# Patient Record
Sex: FEMALE | Race: BLACK OR AFRICAN AMERICAN | NOT HISPANIC OR LATINO | Employment: OTHER | URBAN - METROPOLITAN AREA
[De-identification: names, ages, dates, MRNs, and addresses within clinical notes are randomized per-mention and may not be internally consistent; named-entity substitution may affect disease eponyms.]

---

## 2017-01-19 ENCOUNTER — HOSPITAL ENCOUNTER (EMERGENCY)
Facility: HOSPITAL | Age: 72
Discharge: HOME | End: 2017-01-19
Attending: EMERGENCY MEDICINE
Payer: MEDICARE

## 2017-01-19 VITALS
WEIGHT: 125 LBS | OXYGEN SATURATION: 98 % | SYSTOLIC BLOOD PRESSURE: 134 MMHG | DIASTOLIC BLOOD PRESSURE: 86 MMHG | HEART RATE: 78 BPM | TEMPERATURE: 99 F | RESPIRATION RATE: 16 BRPM | BODY MASS INDEX: 24.41 KG/M2

## 2017-01-19 DIAGNOSIS — M46.1 SACROILIITIS: ICD-10-CM

## 2017-01-19 DIAGNOSIS — G89.29 CHRONIC BACK PAIN, UNSPECIFIED BACK LOCATION, UNSPECIFIED BACK PAIN LATERALITY: Primary | ICD-10-CM

## 2017-01-19 DIAGNOSIS — M54.9 CHRONIC BACK PAIN, UNSPECIFIED BACK LOCATION, UNSPECIFIED BACK PAIN LATERALITY: Primary | ICD-10-CM

## 2017-01-19 PROCEDURE — 25000003 PHARM REV CODE 250: Performed by: PHYSICIAN ASSISTANT

## 2017-01-19 PROCEDURE — 96372 THER/PROPH/DIAG INJ SC/IM: CPT

## 2017-01-19 PROCEDURE — 99283 EMERGENCY DEPT VISIT LOW MDM: CPT | Mod: ,,, | Performed by: PHYSICIAN ASSISTANT

## 2017-01-19 PROCEDURE — 99283 EMERGENCY DEPT VISIT LOW MDM: CPT | Mod: 25

## 2017-01-19 PROCEDURE — 63600175 PHARM REV CODE 636 W HCPCS: Performed by: PHYSICIAN ASSISTANT

## 2017-01-19 RX ORDER — NAPROXEN 500 MG/1
500 TABLET ORAL 2 TIMES DAILY WITH MEALS
Qty: 20 TABLET | Refills: 0 | Status: SHIPPED | OUTPATIENT
Start: 2017-01-19 | End: 2017-01-19

## 2017-01-19 RX ORDER — TRAMADOL HYDROCHLORIDE 50 MG/1
50 TABLET ORAL EVERY 6 HOURS PRN
Qty: 21 TABLET | Refills: 0 | Status: SHIPPED | OUTPATIENT
Start: 2017-01-19 | End: 2017-01-19

## 2017-01-19 RX ORDER — HYDROCODONE BITARTRATE AND ACETAMINOPHEN 5; 325 MG/1; MG/1
1 TABLET ORAL EVERY 4 HOURS PRN
Qty: 8 TABLET | Refills: 0 | Status: SHIPPED | OUTPATIENT
Start: 2017-01-19 | End: 2017-04-04

## 2017-01-19 RX ORDER — HYDROCODONE BITARTRATE AND ACETAMINOPHEN 5; 325 MG/1; MG/1
1 TABLET ORAL EVERY 4 HOURS PRN
Qty: 21 TABLET | Refills: 0 | Status: SHIPPED | OUTPATIENT
Start: 2017-01-19 | End: 2017-01-19

## 2017-01-19 RX ORDER — NAPROXEN 500 MG/1
500 TABLET ORAL
Status: COMPLETED | OUTPATIENT
Start: 2017-01-19 | End: 2017-01-19

## 2017-01-19 RX ORDER — TRIAMCINOLONE ACETONIDE 40 MG/ML
40 INJECTION, SUSPENSION INTRA-ARTICULAR; INTRAMUSCULAR
Status: COMPLETED | OUTPATIENT
Start: 2017-01-19 | End: 2017-01-19

## 2017-01-19 RX ADMIN — TRIAMCINOLONE ACETONIDE 40 MG: 40 INJECTION, SUSPENSION INTRA-ARTICULAR; INTRAMUSCULAR at 01:01

## 2017-01-19 RX ADMIN — NAPROXEN 500 MG: 500 TABLET ORAL at 01:01

## 2017-01-19 NOTE — ED AVS SNAPSHOT
[facility]  [address] [facility] [address]               Krysta Kam   2017 12:58 PM   ED    Description:  Female : 1945   Department:  [facility]           Your Care was Coordinated By:     Provider Role From To    Live Keller MD Attending Provider 17 [phone] --    Hermila Brownlee PA-C Physician Assistant 17 [phone] --      Reason for Visit     Flank Pain           Diagnoses this Visit        Comments    Chronic back pain, unspecified back location, unspecified back pain laterality    -  Primary     Sacroiliitis           ED Disposition     ED Disposition Condition Comment    Discharge             To Do List           Follow-up Information     Follow up with Gold Palafox MD In 1 week.    Specialty:  Internal Medicine    Contact information:    [address]  [place] [address]  [phone]          Follow up with [facility] In 1 week.    Specialty:  Physical Therapy    Contact information:    [address]  [facility] [address] [phone]        Follow up with [facility].    Specialty:  Emergency Medicine    Why:  If symptoms worsen    Contact information:    [address] [facility] [address] [phone]       These Medications        Disp Refills Start End    hydrocodone-acetaminophen 5-325mg (NORCO) 5-325 mg per tablet 8 tablet 0 2017     Take 1 tablet by mouth every 4 (four) hours as needed for Pain. - Oral    Pharmacy: [facility] [facility], [place] - [address] Ph #: [phone]         [facility]     [facility] -  Assistance  Registered nurses in the [facility] provide clinical advisement, health education, appointment booking, and other advisory services.  Call for this free service at [phone].             Medications            Message regarding Medications     Verify the changes and/or additions to your medication regime listed below are the same as discussed with your clinician today.  If any of these changes or additions are incorrect, please notify your healthcare provider.        START taking these NEW medications        Refills    hydrocodone-acetaminophen 5-325mg (NORCO) 5-325 mg per tablet 0    Sig: Take 1 tablet by mouth every 4 (four) hours as needed for Pain.    Class: Print    Route: Oral      These medications were administered today        Dose Freq    triamcinolone acetonide injection 40 mg 40 mg ED 1 Time    Sig: Inject 1 mL (40 mg total) into the muscle ED 1 Time.    Class: Normal    Route: Intramuscular    Cosign for Ordering: Accepted by Live Keller MD on 1/19/2017  1:55 PM    naproxen tablet 500 mg 500 mg ED 1 Time    Sig: Take 1 tablet (500 mg total) by mouth ED 1 Time.    Class: Normal    Route: Oral    Cosign for Ordering: Accepted by Live Keller MD on 1/19/2017  1:55 PM      STOP taking these medications     diclofenac (VOLTAREN) 50 MG EC tablet Take 1 tablet (50 mg total) by mouth 2 (two) times daily.           Verify that the below list of medications is an accurate representation of the medications you are currently taking.  If none reported, the list may be blank. If incorrect, please contact your healthcare provider. Carry this list with you in case of emergency.           Current Medications     amlodipine (NORVASC) 10 MG tablet take 1 tablet by mouth once daily    atorvastatin (LIPITOR) 20 MG tablet Take 1 tablet (20 mg total) by mouth once daily.    calcium carbonate-vit D3-min (CALTRATE 600+D PLUS MINERALS) 600 mg calcium- 400 unit Tab Take 1 tablet by mouth once daily.    ferrous gluconate (FERGON) 325 MG Tab Take 1 tablet (325 mg total) by mouth daily with breakfast.    gabapentin (NEURONTIN) 300 MG capsule Increase to 1 pill in morning and night for 7 days, followed by 1 pill  "morning afternoon and night    hydrocodone-acetaminophen 5-325mg (NORCO) 5-325 mg per tablet Take 1 tablet by mouth every 4 (four) hours as needed for Pain.    omeprazole (PRILOSEC) 40 MG capsule Take 1 capsule (40 mg total) by mouth once daily.           Clinical Reference Information           Your Vitals Were     BP Pulse Temp Resp Weight SpO2    134/86 (BP Location: Right arm, Patient Position: Sitting) 78 98.5 °F (36.9 °C) (Oral) 16 56.7 kg (125 lb) 98%    BMI                24.41 kg/m2          Allergies as of 1/19/2017        Reactions    Ondansetron     Other reaction(s): Flushing (Skin)    Savella [Milnacipran] Nausea Only      Immunizations Administered on Date of Encounter - 1/19/2017     None      ED Micro, Lab, POCT     None      ED Imaging Orders     None        Discharge Instructions       Take pain medication (Norco) every 4 hours as needed for pain relief.  Continue OTC/prescribe nonsteroidal anti-inflammatories as directed by PCP.  Follow-up with your primary care physician in 2 weeks if symptoms do not resolve.  Please consider back exercises for 5 minutes 2 times a day as discussed; see below and also google "back pain exercises".  Follow-up with physical therapy.  Return to ED for new or worsening symptoms.        Back Pain (Acute or Chronic)    Back pain is one of the most common problems. The good news is that most people feel better in 1 to 2 weeks, and most of the rest in 1 to 2 months. Most people can remain active.  People experience and describe pain differently; not everyone is the same.  · The pain can be sharp, stabbing, shooting, aching, cramping or burning.  · Movement, standing, bending, lifting, sitting, or walking may worsen pain.  · It can be localized to one spot or area, or it can be more generalized.  · It can spread or radiate upwards, to the front, or go down your arms or legs (sciatica).  · It can cause muscle spasm.  Most of the time, mechanical problems with the " muscles or spine cause the pain. Mechanical problems are usually caused by an injury to the muscles or ligaments. While illness can cause back pain, it is usually not caused by a serious illness. Mechanical problems include:   · Physical activity such as sports, exercise, work, or normal activity  · Overexertion, lifting, pushing, pulling incorrectly or too aggressively  · Sudden twisting, bending, or stretching from an accident, or accidental movement  · Poor posture  · Stretching or moving wrong, without noticing pain at the time  · Poor coordination, lack of regular exercise (check with your doctor about this)  · Spinal disc disease or arthritis  · Stress  Pain can also be related to pregnancy, or illness like appendicitis, bladder or kidney infections, pelvic infections, and many other things.  Acute back pain usually gets better in 1 to 2 weeks. Back pain related to disk disease, arthritis in the spinal joints or spinal stenosis (narrowing of the spinal canal) can become chronic and last for months or years.  Unless you had a physical injury (for example, a car accident or fall) X-rays are usually not needed for the initial evaluation of back pain. If pain continues and does not respond to medical treatment, X-rays and other tests may be needed.  Home care  Try these home care recommendations:  · When in bed, try to find a position of comfort. A firm mattress is best. Try lying flat on your back with pillows under your knees. You can also try lying on your side with your knees bent up towards your chest and a pillow between your knees.  · At first, do not try to stretch out the sore spots. If there is a strain, it is not like the good soreness you get after exercising without an injury. In this case, stretching may make it worse.  · Avoid prolong sitting, long car rides, or travel. This puts more stress on the lower back than standing or walking.  · During the first 24 to 72 hours after an acute injury or flare  up of chronic back pain, apply an ice pack to the painful area for 20 minutes and then remove it for 20 minutes. Do this over a period of 60 to 90 minutes or several times a day. This will reduce swelling and pain. Wrap the ice pack in a thin towel or plastic to protect your skin.  · You can start with ice, then switch to heat. Heat (hot shower, hot bath, or heating pad) reduces pain and works well for muscle spasms. Heat can be applied to the painful area for 20 minutes then remove it for 20 minutes. Do this over a period of 60 to 90 minutes or several times a day. Do not sleep on a heating pad. It can lead to skin burns or tissue damage.  · You can alternate ice and heat therapy. Talk with your doctor about the best treatment for your back pain.  · Therapeutic massage can help relax the back muscles without stretching them.  · Be aware of safe lifting methods and do not lift anything without stretching first.  Medicines  Talk to your doctor before using medicine, especially if you have other medical problems or are taking other medicines.  · You may use over-the-counter medicine as directed on the bottle to control pain, unless another pain medicine was prescribed. If you have chronic conditions like diabetes, liver or kidney disease, stomach ulcers, or gastrointestinal bleeding, or are taking blood thinners, talk to your doctor before taking any medicine.  · Be careful if you are given a prescription medicines, narcotics, or medicine for muscle spasms. They can cause drowsiness, affect your coordination, reflexes, and judgement. Do not drive or operate heavy machinery.  Follow-up care  Follow up with your healthcare provider, or as advised.   A radiologist will review any X-rays that were taken. Your provide will notify you of any new findings that may affect your care.  Call 911  Call emergency services if any of the following occur:  · Trouble breathing  · Confusion  · Very drowsy or trouble  awakening  · Fainting or loss of consciousness  · Rapid or very slow heart rate  · Loss of bowel or bladder control  When to seek medical advice  Call your healthcare provider right away if any of these occur:   · Pain becomes worse or spreads to your legs  · Weakness or numbness in one or both legs  · Numbness in the groin or genital area  © 2434-5678 [employer]. [address] [place] [address]. All rights reserved. This information is not intended as a substitute for professional medical care. Always follow your healthcare professional's instructions.        Exercises to Strengthen Your Lower Back  Strong lower back and abdominal muscles work together to support your spine. The exercises below will help strengthen the lower back. It is important that you begin exercising slowly and increase levels gradually.  Always begin any exercise program with stretching. If you feel pain while doing any of these exercises, stop and talk to your doctor about a more specific exercise program that better suits your condition.   Low back stretch  The point of stretching is to make you more flexible and increase your range of motion. Stretch only as much as you are able. Stretch slowly. Do not push your stretch to the limit. If at any point you feel pain while stretching, this is your (temporary) limit.  · Lie on your back with your knees bent and both feet on the ground.  · Slowly raise your left knee to your chest as you flatten your lower back against the floor. Hold for 5 seconds.  · Relax and repeat the exercise with your right knee.  · Do 10 of these exercises for each leg.  · Repeat hugging both knees to your chest at the same time.  Building lower back strength  Start your exercise routine with 10 to 30 minutes a day, 1 to 3 times a day.  Initial exercises  Lying on your back:  1. Ankle pumps: Move your foot up and down, towards your head, and then away. Repeat 10 times with each foot.  2. Heel  slides: Slowly bend your knee, drawing the heel of your foot towards you. Then slide your heel/foot from you, straightening your knee. Do not lift your foot off the floor (this is not a leg lift).  3. Abdominal contraction: Bend your knees and put your hands on your stomach. Tighten your stomach muscles. Hold for 5 seconds, then relax. Repeat 10 times.  4. Straight leg raise: Bend one leg at the knee and keep the other leg straight. Tighten your stomach muscles. Slowly lift your straight leg 6 to 12 inches off the floor and hold for up to 5 seconds. Repeat 10 times on each side.  Standin. Wall squats: Stand with your back against the wall. Move your feet about 12 inches away from the wall. Tighten your stomach muscles, and slowly bend your knees until they are at about a 45 degree angle. Do not go down too far. Hold about 5 seconds. Then slowly return to your starting position. Repeat 10 times.  2. Heel raises: Stand facing the wall. Slowly raise the heels of your feet up and down, while keeping your toes on the floor. If you have trouble balancing, you can touch the wall with your hands. Repeat 10 times.  More advanced exercises  When you feel comfortable enough, try these exercises.  1. Kneeling lumbar extension: Begin on your hands and knees. At the same time, raise and straighten your right arm and left leg until they are parallel to the ground. Hold for 2 seconds and come back slowly to a starting position. Repeat with left arm and right leg, alternating 10 times.  2. Prone lumbar extension: Lie face down, arms extended overhead, palms on the floor. At the same time, raise your right arm and left leg as high as comfortably possible. Hold for 10 seconds and slowly return to start. Repeat with left arm and right leg, alternating 10 times. Gradually build up to 20 times. (Advanced: Repeat this exercise raising both arms and both legs a few inches off the floor at the same time. Hold for 5 seconds and  release.)  3. Pelvic tilt: Lie on the floor on your back with your knees bent at 90 degrees. Your feet should be flat on the floor. Inhale, exhale, then slowly contract your abdominal muscles bringing your navel toward your spine. Let your pelvis rock back until your lower back is flat on the floor. Hold for 10 seconds while breathing smoothly.  4. Abdominal crunch: Perform a pelvic tilt (above) flattening your lower back against the floor. Holding the tension in your abdominal muscles, take another breath and raise your shoulder blades off the ground (this is not a full sit-up). Keep your head in line with your body (dont bend your neck forward). Hold for 2 seconds, then slowly lower.  © 2083-6350 [employer]. [address] [place] [address], [place] [address]. All rights reserved. This information is not intended as a substitute for professional medical care. Always follow your healthcare professional's instructions.            Your Future Surgeries/Procedures     Jan 31, 2017   Surgery with Brittni Edmondson MD   [facility] ([facility])    [address] [place] [address]   [phone]               [facility] complies with applicable Federal civil rights laws and does not discriminate on the basis of race, color, national origin, age, disability, or sex.        Language Assistance Services     ATTENTION: Language assistance services are available, free of charge. Please call [phone].      ATENCIÓN: Si habla español, tiene a chan disposición servicios gratuitos de asistencia lingüística. Llame al [phone].     CHÚ Ý: N?u b?n nói Ti?ng Vi?t, có các d?ch v? h? tr? ngôn ng? mi?n phí dành cho b?n. G?i s? [phone].

## 2017-01-19 NOTE — ED NOTES
Two patient identifiers checked and confirmed.    APPEARANCE: Resting comfortably in no acute distress. Patient has clean hair, skin and nails. Clothing is appropriate and properly fastened.  NEURO: Awake, alert, appropriate for age, and cooperative with a calm affect; pupils equal and round.  HEENT: Head symmetrical. Bilateral eyes without redness or drainage.  CARDIAC: Regular rate and rhythm.  RESPIRATORY: Airway is open and patent. Respirations are spontaneous on room air. Normal respiratory effort and rate noted.  GI/: Abdomen soft and non-distended. Patient is reported to void and stool appropriately for age.  NEUROVASCULAR: All extremities are warm and pink with +2 pulses and capillary refill less than 3 seconds.  MUSCULOSKELETAL: Moves all extremities well; no obvious deformities noted.  SKIN: Warm and dry, adequate turgor, mucus membranes moist and pink

## 2017-01-19 NOTE — ED PROVIDER NOTES
Encounter Date: 1/19/2017       History     Chief Complaint   Patient presents with    Flank Pain     Review of patient's allergies indicates:   Allergen Reactions    Ondansetron      Other reaction(s): Flushing (Skin)    Savella [milnacipran] Nausea Only     HPI Comments: Patient is a 71-year-old -American female with a past medical history of HTN, HLD, anemia, fibromyalgia, and GERD who presents the ED with low back pain.  Patient states that she has been having constant bilateral lower back pain for the past 5 months.  She denies any trauma or injury.  Patient's current pain is 7/10.  She states getting out of bed, bending, and movement exacerbates the pain.  Taking nonsteroidal anti-inflammatories relieves the pain.  Patient took ibuprofen last night with some improvements.  She denies any fever, chills, chest pain, SOB, abdominal pain, flank pain, nausea, vomiting, diarrhea (LPM this morning and normal), urinary/bowel incontinence or retention, dysuria, hematuria, lower extremity paresthesias or weakness, saddle anesthesias, or history of back surgeries.    The history is provided by the patient.     Past Medical History   Diagnosis Date    Anemia 6/2012    Cataract     Fibromyalgia     GERD (gastroesophageal reflux disease)     High cholesterol     Hypertension      Past Medical History Pertinent Negatives   Diagnosis Date Noted    Amblyopia 7/24/2013    Diabetes mellitus 7/24/2013    Diabetes mellitus 2/4/2014    Diabetic retinopathy 7/24/2013    Diabetic retinopathy 2/4/2014    Glaucoma 7/24/2013    Glaucoma 2/4/2014    Hypertensive retinopathy 7/24/2013     OU    Macular degeneration 7/24/2013    Retinal detachment 7/24/2013    Sickle cell anemia 7/11/2016    Sickle cell trait 7/11/2016    Strabismus 7/24/2013    Uveitis 7/24/2013     Past Surgical History   Procedure Laterality Date    Hysterectomy      Colonoscopy N/A 10/18/2016     Procedure: COLONOSCOPY;  Surgeon: Brittni  ROGE Edmondson MD;  Location: [facility] ([address] [place] [address]);  Service: Endoscopy;  Laterality: N/A;     Family History   Problem Relation Age of Onset    Hypertension Mother     Stroke Mother     Heart disease Father     Cataracts Neg Hx     Cancer Neg Hx     Diabetes Neg Hx     Glaucoma Neg Hx     Retinal detachment Neg Hx     Celiac disease Neg Hx     Cirrhosis Neg Hx     Colon cancer Neg Hx     Cystic fibrosis Neg Hx     Esophageal cancer Neg Hx     Inflammatory bowel disease Neg Hx     Liver disease Neg Hx     Stomach cancer Neg Hx     Amblyopia Neg Hx     Blindness Neg Hx     Macular degeneration Neg Hx     Strabismus Neg Hx     Thyroid disease Neg Hx      Social History   Substance Use Topics    Smoking status: Never Smoker    Smokeless tobacco: None    Alcohol use No     Review of Systems   Constitutional: Negative for chills and fever.   HENT: Negative for ear pain, postnasal drip and sore throat.    Eyes: Negative for pain.   Respiratory: Negative for cough and shortness of breath.    Cardiovascular: Negative for chest pain.   Gastrointestinal: Negative for abdominal pain, blood in stool, diarrhea, nausea and vomiting.   Genitourinary: Negative for difficulty urinating, dysuria, hematuria and urgency.   Musculoskeletal: Positive for back pain. Negative for gait problem, joint swelling and neck pain.   Neurological: Negative for weakness, light-headedness, numbness and headaches.       Physical Exam   Initial Vitals   BP Pulse Resp Temp SpO2   01/19/17 1212 01/19/17 1212 01/19/17 1212 01/19/17 1212 01/19/17 1212   134/86 78 16 98.5 °F (36.9 °C) 98 %     Physical Exam    Nursing note and vitals reviewed.  Constitutional: She appears well-developed and well-nourished.   HENT:   Head: Normocephalic and atraumatic.   Nose: Nose normal.   Eyes: Conjunctivae and EOM are normal.   Neck: Normal range of motion.   Cardiovascular: Normal rate, regular rhythm, normal heart sounds and intact distal pulses. Exam  reveals no friction rub.    No murmur heard.  Pulmonary/Chest: Breath sounds normal. No respiratory distress. She has no wheezes. She has no rales.   Abdominal: Soft. Bowel sounds are normal. She exhibits no distension. There is no tenderness. There is no rebound and no guarding.   Musculoskeletal: Normal range of motion.        Back:    No C, T, or L bony or paraspinal tenderness to palpation.  She has TTP at the SI joint.  Negative straight leg raises bilaterally.   Neurological: She is oriented to person, place, and time. She has normal strength. No sensory deficit.   Reflex Scores:       Patellar reflexes are 2+ on the right side.       Achilles reflexes are 2+ on the right side and 2+ on the left side.  Skin: Skin is warm and dry. No erythema. No pallor.         ED Course   Procedures  Labs Reviewed - No data to display                APC / Resident Notes:   Patient is a 71-year-old -American female with a past medical history of HTN, HLD, anemia, fibromyalgia, and GERD who presents the ED with low back pain.  Vital signs stable.  NAD and nontoxic.  She has TTP at the left SI joint.  No spinous or paraspinal tenderness to palpation.  Skin without signs of infection.  I have considered but do not suspect ANDREA, fractures or dislocations.  I will treat patient for back pain and sacroiliitis.  She does not need any further labs or imaging at this time.  Will give steroid injection here.  I will prescribe Norco prn for home.  She is to continue OTC NSAIDs as directed by her PCP.  Back exercises as discussed.  Follow up with PCP as scheduled or sooner if needed.  Strict ED return precaution given.  All questions answered.  She is comfortable with plan and stable for discharge.  I reviewed patient's chart and discussed this case with my supervising MISIS.         Attending Attestation:     Physician Attestation Statement for NP/PA:   I discussed this assessment and plan of this patient with the NP/PA, but I did  not personally examine the patient. The face to face encounter was performed by the NP/PA.    Other NP/PA Attestation Additions:    History of Present Illness: 71 year old woman presents for evaluation of acute on chronic back pain without hx trauma                   ED Course     Clinical Impression:   The primary encounter diagnosis was Chronic back pain, unspecified back location, unspecified back pain laterality. A diagnosis of Sacroiliitis was also pertinent to this visit.    Disposition:   Disposition: Discharged  Condition: Stable       Hermila Brownlee PA-C  01/19/17 [id number]

## 2017-01-19 NOTE — DISCHARGE INSTRUCTIONS
"Take pain medication (Norco) every 4 hours as needed for pain relief.  Continue OTC/prescribe nonsteroidal anti-inflammatories as directed by PCP.  Follow-up with your primary care physician in 2 weeks if symptoms do not resolve.  Please consider back exercises for 5 minutes 2 times a day as discussed; see below and also google "back pain exercises".  Follow-up with physical therapy.  Return to ED for new or worsening symptoms.        Back Pain (Acute or Chronic)    Back pain is one of the most common problems. The good news is that most people feel better in 1 to 2 weeks, and most of the rest in 1 to 2 months. Most people can remain active.  People experience and describe pain differently; not everyone is the same.  · The pain can be sharp, stabbing, shooting, aching, cramping or burning.  · Movement, standing, bending, lifting, sitting, or walking may worsen pain.  · It can be localized to one spot or area, or it can be more generalized.  · It can spread or radiate upwards, to the front, or go down your arms or legs (sciatica).  · It can cause muscle spasm.  Most of the time, mechanical problems with the muscles or spine cause the pain. Mechanical problems are usually caused by an injury to the muscles or ligaments. While illness can cause back pain, it is usually not caused by a serious illness. Mechanical problems include:   · Physical activity such as sports, exercise, work, or normal activity  · Overexertion, lifting, pushing, pulling incorrectly or too aggressively  · Sudden twisting, bending, or stretching from an accident, or accidental movement  · Poor posture  · Stretching or moving wrong, without noticing pain at the time  · Poor coordination, lack of regular exercise (check with your doctor about this)  · Spinal disc disease or arthritis  · Stress  Pain can also be related to pregnancy, or illness like appendicitis, bladder or kidney infections, pelvic infections, and many other things.  Acute back pain " usually gets better in 1 to 2 weeks. Back pain related to disk disease, arthritis in the spinal joints or spinal stenosis (narrowing of the spinal canal) can become chronic and last for months or years.  Unless you had a physical injury (for example, a car accident or fall) X-rays are usually not needed for the initial evaluation of back pain. If pain continues and does not respond to medical treatment, X-rays and other tests may be needed.  Home care  Try these home care recommendations:  · When in bed, try to find a position of comfort. A firm mattress is best. Try lying flat on your back with pillows under your knees. You can also try lying on your side with your knees bent up towards your chest and a pillow between your knees.  · At first, do not try to stretch out the sore spots. If there is a strain, it is not like the good soreness you get after exercising without an injury. In this case, stretching may make it worse.  · Avoid prolong sitting, long car rides, or travel. This puts more stress on the lower back than standing or walking.  · During the first 24 to 72 hours after an acute injury or flare up of chronic back pain, apply an ice pack to the painful area for 20 minutes and then remove it for 20 minutes. Do this over a period of 60 to 90 minutes or several times a day. This will reduce swelling and pain. Wrap the ice pack in a thin towel or plastic to protect your skin.  · You can start with ice, then switch to heat. Heat (hot shower, hot bath, or heating pad) reduces pain and works well for muscle spasms. Heat can be applied to the painful area for 20 minutes then remove it for 20 minutes. Do this over a period of 60 to 90 minutes or several times a day. Do not sleep on a heating pad. It can lead to skin burns or tissue damage.  · You can alternate ice and heat therapy. Talk with your doctor about the best treatment for your back pain.  · Therapeutic massage can help relax the back muscles without  stretching them.  · Be aware of safe lifting methods and do not lift anything without stretching first.  Medicines  Talk to your doctor before using medicine, especially if you have other medical problems or are taking other medicines.  · You may use over-the-counter medicine as directed on the bottle to control pain, unless another pain medicine was prescribed. If you have chronic conditions like diabetes, liver or kidney disease, stomach ulcers, or gastrointestinal bleeding, or are taking blood thinners, talk to your doctor before taking any medicine.  · Be careful if you are given a prescription medicines, narcotics, or medicine for muscle spasms. They can cause drowsiness, affect your coordination, reflexes, and judgement. Do not drive or operate heavy machinery.  Follow-up care  Follow up with your healthcare provider, or as advised.   A radiologist will review any X-rays that were taken. Your provide will notify you of any new findings that may affect your care.  Call 911  Call emergency services if any of the following occur:  · Trouble breathing  · Confusion  · Very drowsy or trouble awakening  · Fainting or loss of consciousness  · Rapid or very slow heart rate  · Loss of bowel or bladder control  When to seek medical advice  Call your healthcare provider right away if any of these occur:   · Pain becomes worse or spreads to your legs  · Weakness or numbness in one or both legs  · Numbness in the groin or genital area  © 0792-3878 [employer]. [address] [place] [address], [place] [address]. All rights reserved. This information is not intended as a substitute for professional medical care. Always follow your healthcare professional's instructions.        Exercises to Strengthen Your Lower Back  Strong lower back and abdominal muscles work together to support your spine. The exercises below will help strengthen the lower back. It is important that you begin exercising slowly and increase levels  gradually.  Always begin any exercise program with stretching. If you feel pain while doing any of these exercises, stop and talk to your doctor about a more specific exercise program that better suits your condition.   Low back stretch  The point of stretching is to make you more flexible and increase your range of motion. Stretch only as much as you are able. Stretch slowly. Do not push your stretch to the limit. If at any point you feel pain while stretching, this is your (temporary) limit.  · Lie on your back with your knees bent and both feet on the ground.  · Slowly raise your left knee to your chest as you flatten your lower back against the floor. Hold for 5 seconds.  · Relax and repeat the exercise with your right knee.  · Do 10 of these exercises for each leg.  · Repeat hugging both knees to your chest at the same time.  Building lower back strength  Start your exercise routine with 10 to 30 minutes a day, 1 to 3 times a day.  Initial exercises  Lying on your back:  1. Ankle pumps: Move your foot up and down, towards your head, and then away. Repeat 10 times with each foot.  2. Heel slides: Slowly bend your knee, drawing the heel of your foot towards you. Then slide your heel/foot from you, straightening your knee. Do not lift your foot off the floor (this is not a leg lift).  3. Abdominal contraction: Bend your knees and put your hands on your stomach. Tighten your stomach muscles. Hold for 5 seconds, then relax. Repeat 10 times.  4. Straight leg raise: Bend one leg at the knee and keep the other leg straight. Tighten your stomach muscles. Slowly lift your straight leg 6 to 12 inches off the floor and hold for up to 5 seconds. Repeat 10 times on each side.  Standin. Wall squats: Stand with your back against the wall. Move your feet about 12 inches away from the wall. Tighten your stomach muscles, and slowly bend your knees until they are at about a 45 degree angle. Do not go down too far. Hold about  5 seconds. Then slowly return to your starting position. Repeat 10 times.  2. Heel raises: Stand facing the wall. Slowly raise the heels of your feet up and down, while keeping your toes on the floor. If you have trouble balancing, you can touch the wall with your hands. Repeat 10 times.  More advanced exercises  When you feel comfortable enough, try these exercises.  1. Kneeling lumbar extension: Begin on your hands and knees. At the same time, raise and straighten your right arm and left leg until they are parallel to the ground. Hold for 2 seconds and come back slowly to a starting position. Repeat with left arm and right leg, alternating 10 times.  2. Prone lumbar extension: Lie face down, arms extended overhead, palms on the floor. At the same time, raise your right arm and left leg as high as comfortably possible. Hold for 10 seconds and slowly return to start. Repeat with left arm and right leg, alternating 10 times. Gradually build up to 20 times. (Advanced: Repeat this exercise raising both arms and both legs a few inches off the floor at the same time. Hold for 5 seconds and release.)  3. Pelvic tilt: Lie on the floor on your back with your knees bent at 90 degrees. Your feet should be flat on the floor. Inhale, exhale, then slowly contract your abdominal muscles bringing your navel toward your spine. Let your pelvis rock back until your lower back is flat on the floor. Hold for 10 seconds while breathing smoothly.  4. Abdominal crunch: Perform a pelvic tilt (above) flattening your lower back against the floor. Holding the tension in your abdominal muscles, take another breath and raise your shoulder blades off the ground (this is not a full sit-up). Keep your head in line with your body (dont bend your neck forward). Hold for 2 seconds, then slowly lower.  © 3149-0751 [employer]. [address] [place] [address], [place] [address]. All rights reserved. This information is not intended as a  substitute for professional medical care. Always follow your healthcare professional's instructions.

## 2017-01-31 ENCOUNTER — SURGERY (OUTPATIENT)
Age: 72
End: 2017-01-31

## 2017-01-31 ENCOUNTER — ANESTHESIA (OUTPATIENT)
Dept: ENDOSCOPY | Facility: HOSPITAL | Age: 72
End: 2017-01-31
Payer: MEDICARE

## 2017-01-31 ENCOUNTER — ANESTHESIA EVENT (OUTPATIENT)
Dept: ENDOSCOPY | Facility: HOSPITAL | Age: 72
End: 2017-01-31
Payer: MEDICARE

## 2017-01-31 VITALS — RESPIRATION RATE: 16 BRPM

## 2017-01-31 PROBLEM — K63.5 COLON POLYPS: Status: ACTIVE | Noted: 2017-01-31

## 2017-01-31 PROCEDURE — D9220A PRA ANESTHESIA: Mod: ANES,,, | Performed by: ANESTHESIOLOGY

## 2017-01-31 PROCEDURE — 63600175 PHARM REV CODE 636 W HCPCS: Performed by: NURSE ANESTHETIST, CERTIFIED REGISTERED

## 2017-01-31 PROCEDURE — D9220A PRA ANESTHESIA: Mod: CRNA,,, | Performed by: NURSE ANESTHETIST, CERTIFIED REGISTERED

## 2017-01-31 RX ORDER — PROPOFOL 10 MG/ML
VIAL (ML) INTRAVENOUS
Status: DISCONTINUED | OUTPATIENT
Start: 2017-01-31 | End: 2017-01-31

## 2017-01-31 RX ADMIN — PROPOFOL 20 MG: 10 INJECTION, EMULSION INTRAVENOUS at 01:01

## 2017-01-31 RX ADMIN — PROPOFOL 50 MG: 10 INJECTION, EMULSION INTRAVENOUS at 01:01

## 2017-01-31 NOTE — ANESTHESIA POSTPROCEDURE EVALUATION
Anesthesia Post Evaluation    Patient: Krysta Kam    Procedure(s) Performed: Procedure(s) (LRB):  SIGMOIDOSCOPY-FLEXIBLE (N/A)    Final Anesthesia Type: general  Patient location during evaluation: GI PACU  Patient participation: Yes- Able to Participate  Level of consciousness: awake and alert  Post-procedure vital signs: reviewed and stable  Pain management: adequate  Airway patency: patent  PONV status at discharge: No PONV  Anesthetic complications: no      Cardiovascular status: blood pressure returned to baseline  Respiratory status: unassisted  Hydration status: euvolemic  Follow-up not needed.        Visit Vitals    BP (!) 109/59 (BP Location: Left arm, Patient Position: Lying, BP Method: Automatic)    Pulse 60    Temp 36.7 °C (98 °F) (Oral)    Resp 18    Ht 5' (1.524 m)    Wt 56.7 kg (125 lb)    SpO2 97%    BMI 24.41 kg/m2       Pain/Marito Score: Pain Assessment Performed: Yes (1/31/2017  1:58 PM)  Presence of Pain: denies (1/31/2017  1:58 PM)  Marito Score: 10 (1/31/2017  1:58 PM)

## 2017-01-31 NOTE — ANESTHESIA PREPROCEDURE EVALUATION
01/31/2017  Krysta Kam is a 71 y.o., female.    [facility] Anesthesia Evaluation    I have reviewed the Patient Summary Reports.    I have reviewed the Nursing Notes.   I have reviewed the Medications.     Review of Systems  Anesthesia Hx:  No problems with previous Anesthesia  History of prior surgery of interest to airway management or planning: Previous anesthesia: General Denies Family Hx of Anesthesia complications.   Denies Personal Hx of Anesthesia complications.   Social:  Non-Smoker, No Alcohol Use    Hematology/Oncology:         -- Anemia:   Cardiovascular:   Exercise tolerance: good Hypertension    Hepatic/GI:   GERD    Neurological:   Chronic Pain Syndrome       Physical Exam  General:  Well nourished    Airway/Jaw/Neck:  Airway Findings: Mouth Opening: Normal Tongue: Normal  General Airway Assessment: Adult  Mallampati: II  Improves to II with phonation.  TM Distance: Normal, at least 6 cm  Jaw/Neck Findings:  Neck ROM: Normal ROM      Dental:  Dental Findings: Upper Dentures, Lower Dentures   Chest/Lungs:  Chest/Lungs Findings: Clear to auscultation, Normal Respiratory Rate     Heart/Vascular:  Heart Findings: Rate: Normal  Rhythm: Regular Rhythm  Sounds: Normal        Mental Status:  Mental Status Findings:  Cooperative, Alert and Oriented         Anesthesia Plan  Type of Anesthesia, risks & benefits discussed:  Anesthesia Type:  general  Patient's Preference:   Intra-op Monitoring Plan: standard ASA monitors  Intra-op Monitoring Plan Comments:   Post Op Pain Control Plan:   Post Op Pain Control Plan Comments:   Induction:   IV  Beta Blocker:  Patient is not currently on a Beta-Blocker (No further documentation required).       Informed Consent: Patient understands risks and agrees with Anesthesia plan.  Questions answered. Anesthesia consent signed with patient.  ASA Score: 2     Day of  Surgery Review of History & Physical:    H&P update referred to the surgeon.         Ready For Surgery From Anesthesia Perspective.

## 2017-01-31 NOTE — ANESTHESIA RELEASE NOTE
Anesthesia Release from PACU Note    Patient: Krysta Kam    Procedure(s) Performed: Procedure(s) (LRB):  SIGMOIDOSCOPY-FLEXIBLE (N/A)    Anesthesia type: general    Post pain: Adequate analgesia    Post assessment: no apparent anesthetic complications, tolerated procedure well and no evidence of recall    Last Vitals:   Visit Vitals    BP (!) 109/59 (BP Location: Left arm, Patient Position: Lying, BP Method: Automatic)    Pulse 60    Temp 36.7 °C (98 °F) (Oral)    Resp 18    Ht 5' (1.524 m)    Wt 56.7 kg (125 lb)    SpO2 97%    BMI 24.41 kg/m2       Post vital signs: stable    Level of consciousness: awake, alert  and oriented    Nausea/Vomiting: no nausea/no vomiting    Complications: none    Airway Patency: patent    Respiratory: unassisted    Cardiovascular: stable and blood pressure at baseline    Hydration: euvolemic

## 2017-01-31 NOTE — TRANSFER OF CARE
Anesthesia Transfer of Care Note    Patient: Krysta Kam    Procedure(s) Performed: Procedure(s) (LRB):  SIGMOIDOSCOPY-FLEXIBLE (N/A)    Patient location: PACU    Anesthesia Type: general    Transport from OR: Transported from OR on 2-3 L/min O2 by NC with adequate spontaneous ventilation    Post pain: adequate analgesia    Post assessment: no apparent anesthetic complications    Post vital signs: stable    Level of consciousness: awake, alert and oriented    Nausea/Vomiting: no nausea/vomiting    Complications: none          Last vitals:   Visit Vitals    BP (!) 152/67    Pulse 82    Temp 37.2 °C (99 °F)    Resp 18    Ht 5' (1.524 m)    Wt 56.7 kg (125 lb)    SpO2 99%    BMI 24.41 kg/m2

## 2017-02-07 ENCOUNTER — TELEPHONE (OUTPATIENT)
Dept: ENDOSCOPY | Facility: HOSPITAL | Age: 72
End: 2017-02-07

## 2017-03-13 PROBLEM — K63.5 COLON POLYPS: Status: RESOLVED | Noted: 2017-01-31 | Resolved: 2017-03-13

## 2017-04-04 ENCOUNTER — OFFICE VISIT (OUTPATIENT)
Dept: INTERNAL MEDICINE | Facility: CLINIC | Age: 72
End: 2017-04-04
Payer: MEDICARE

## 2017-04-04 ENCOUNTER — LAB VISIT (OUTPATIENT)
Dept: LAB | Facility: HOSPITAL | Age: 72
End: 2017-04-04
Attending: INTERNAL MEDICINE
Payer: MEDICARE

## 2017-04-04 VITALS
HEIGHT: 60 IN | BODY MASS INDEX: 26.84 KG/M2 | HEART RATE: 80 BPM | WEIGHT: 136.69 LBS | DIASTOLIC BLOOD PRESSURE: 76 MMHG | SYSTOLIC BLOOD PRESSURE: 132 MMHG

## 2017-04-04 DIAGNOSIS — M26.609 TMJ DISEASE: ICD-10-CM

## 2017-04-04 DIAGNOSIS — E78.5 HYPERLIPIDEMIA, UNSPECIFIED HYPERLIPIDEMIA TYPE: ICD-10-CM

## 2017-04-04 DIAGNOSIS — I10 ESSENTIAL HYPERTENSION: ICD-10-CM

## 2017-04-04 DIAGNOSIS — D50.9 IRON DEFICIENCY ANEMIA, UNSPECIFIED IRON DEFICIENCY ANEMIA TYPE: ICD-10-CM

## 2017-04-04 DIAGNOSIS — H53.9 VISION ABNORMALITIES: ICD-10-CM

## 2017-04-04 DIAGNOSIS — M79.7 FIBROMYALGIA: Primary | ICD-10-CM

## 2017-04-04 DIAGNOSIS — R52 TOTAL BODY PAIN: ICD-10-CM

## 2017-04-04 LAB
ALBUMIN SERPL BCP-MCNC: 3.7 G/DL
ALP SERPL-CCNC: 81 U/L
ALT SERPL W/O P-5'-P-CCNC: 19 U/L
ANION GAP SERPL CALC-SCNC: 14 MMOL/L
AST SERPL-CCNC: 23 U/L
BASOPHILS # BLD AUTO: 0.03 K/UL
BASOPHILS NFR BLD: 0.4 %
BILIRUB SERPL-MCNC: 0.6 MG/DL
BUN SERPL-MCNC: 12 MG/DL
CALCIUM SERPL-MCNC: 9.9 MG/DL
CHLORIDE SERPL-SCNC: 104 MMOL/L
CHOLEST/HDLC SERPL: 2.7 {RATIO}
CO2 SERPL-SCNC: 23 MMOL/L
CREAT SERPL-MCNC: 0.9 MG/DL
DIFFERENTIAL METHOD: ABNORMAL
EOSINOPHIL # BLD AUTO: 0.1 K/UL
EOSINOPHIL NFR BLD: 1.8 %
ERYTHROCYTE [DISTWIDTH] IN BLOOD BY AUTOMATED COUNT: 17.8 %
EST. GFR  (AFRICAN AMERICAN): >60 ML/MIN/1.73 M^2
EST. GFR  (NON AFRICAN AMERICAN): >60 ML/MIN/1.73 M^2
GLUCOSE SERPL-MCNC: 96 MG/DL
HCT VFR BLD AUTO: 36 %
HDL/CHOLESTEROL RATIO: 37.7 %
HDLC SERPL-MCNC: 231 MG/DL
HDLC SERPL-MCNC: 87 MG/DL
HGB BLD-MCNC: 11.6 G/DL
LDLC SERPL CALC-MCNC: 134.6 MG/DL
LYMPHOCYTES # BLD AUTO: 2.2 K/UL
LYMPHOCYTES NFR BLD: 27.2 %
MCH RBC QN AUTO: 23.5 PG
MCHC RBC AUTO-ENTMCNC: 32.2 %
MCV RBC AUTO: 73 FL
MONOCYTES # BLD AUTO: 0.6 K/UL
MONOCYTES NFR BLD: 7.2 %
NEUTROPHILS # BLD AUTO: 5 K/UL
NEUTROPHILS NFR BLD: 63.4 %
NONHDLC SERPL-MCNC: 144 MG/DL
PLATELET # BLD AUTO: 236 K/UL
PLATELET BLD QL SMEAR: ABNORMAL
PMV BLD AUTO: ABNORMAL FL
POTASSIUM SERPL-SCNC: 4.1 MMOL/L
PROT SERPL-MCNC: 7.8 G/DL
RBC # BLD AUTO: 4.94 M/UL
SODIUM SERPL-SCNC: 141 MMOL/L
TRIGL SERPL-MCNC: 47 MG/DL
WBC # BLD AUTO: 7.91 K/UL

## 2017-04-04 PROCEDURE — 1159F MED LIST DOCD IN RCRD: CPT | Mod: S$GLB,,, | Performed by: INTERNAL MEDICINE

## 2017-04-04 PROCEDURE — 36415 COLL VENOUS BLD VENIPUNCTURE: CPT

## 2017-04-04 PROCEDURE — 3078F DIAST BP <80 MM HG: CPT | Mod: S$GLB,,, | Performed by: INTERNAL MEDICINE

## 2017-04-04 PROCEDURE — 80053 COMPREHEN METABOLIC PANEL: CPT

## 2017-04-04 PROCEDURE — 1160F RVW MEDS BY RX/DR IN RCRD: CPT | Mod: S$GLB,,, | Performed by: INTERNAL MEDICINE

## 2017-04-04 PROCEDURE — 99999 PR PBB SHADOW E&M-EST. PATIENT-LVL III: CPT | Mod: PBBFAC,,, | Performed by: INTERNAL MEDICINE

## 2017-04-04 PROCEDURE — 1157F ADVNC CARE PLAN IN RCRD: CPT | Mod: S$GLB,,, | Performed by: INTERNAL MEDICINE

## 2017-04-04 PROCEDURE — 80061 LIPID PANEL: CPT

## 2017-04-04 PROCEDURE — 85025 COMPLETE CBC W/AUTO DIFF WBC: CPT

## 2017-04-04 PROCEDURE — 3075F SYST BP GE 130 - 139MM HG: CPT | Mod: S$GLB,,, | Performed by: INTERNAL MEDICINE

## 2017-04-04 PROCEDURE — 99214 OFFICE O/P EST MOD 30 MIN: CPT | Mod: S$GLB,,, | Performed by: INTERNAL MEDICINE

## 2017-04-04 PROCEDURE — 99499 UNLISTED E&M SERVICE: CPT | Mod: S$GLB,,, | Performed by: INTERNAL MEDICINE

## 2017-04-04 PROCEDURE — 1125F AMNT PAIN NOTED PAIN PRSNT: CPT | Mod: S$GLB,,, | Performed by: INTERNAL MEDICINE

## 2017-04-04 RX ORDER — BACLOFEN 10 MG/1
10 TABLET ORAL 3 TIMES DAILY PRN
Qty: 90 TABLET | Refills: 11 | Status: SHIPPED | OUTPATIENT
Start: 2017-04-04 | End: 2018-10-18 | Stop reason: SDUPTHER

## 2017-04-04 NOTE — PROGRESS NOTES
She is a 71-year-old female coming in today with   history of iron deficiency anemia, had a recent scopes (10/2016) , both upper and lower.   Upper scope showed patchy mild inflammation of the gastric antrum. Biopsies   were taken for H. pylori testing. She had a white mucosal nodule found in the   duodenal that was biopsied. No source of bleeding was found on the EGD.  In 10/2016  she had a polyp removed from the sigmoid colon  On the  colonoscopy, she had nonbleeding colonic angioectasia, two 5 mm polyps in the   sigmoid colon. IN Oct 2016, she had a  H and H of 10.5 and 33.2 with an MCV of 68 with  elevated TIBC. Iron was actually looking pretty good at 30 and saturated iron   is now at 6. In the past, she has had normal MCV with normal H and H.     She   continues to have fibromyalgia, which she says she still has a lot of aches and   pains of her TMJ and her joints. She denies any fevers or chills. She   continues to take physical therapy, which is helping. Tylenol is not helping   her pain at all. She is not taking any narcotics.       She suffers from   hyperlipidemia. She is on Lipitor, a 20 mg pill.       She has hypertension. Blood   pressure is doing well today at 132/76. She has a longstanding history of   hypertension. She is not up-to-date with pneumonia, zoster or tetanus vaccines,  but does not wish to have these since she has flu vaccine done on September 14th.     REVIEW OF SYSTEMS: Otherwise negative. SHe complains of  trouble with vision.- blurriness episodic, both eyes.  She has seen optho and they diagnosed her with dry eye and a small cararacts.    No urinary   problems.     PHYSICAL EXAMINATION:  GENERAL: She is a well-appearing 70-year-old female in no acute distress.  NECK: Supple. She has no JVD. Thyroid is not enlarged.  CARDIOVASCULAR: S1 and S2, regular rate and rhythm without murmur, gallop or   rub.  ABDOMEN: Soft, nontender, no hepatosplenomegaly, no guarding or rebound    tenderness.  LOWER EXTREMITIES: No edema.  JOINTS: No swollen or erythematous joints. She does have some tender points in  the legs and arms, but it is not redness or swellling    ASSESSMENT: Fibromyalgia, insomnia, hyperlipidemia, TMJ, total body pain and   iron deficiency anemia.     PLAN:  . Fibromyalgia    Iron deficiency anemia, unspecified iron deficiency anemia type    Essential hypertension    TMJ disease    Total body pain    Back pain with sciatica    Hyperlipidemia     Will recheck H/H-- recheck lipid panel-- WIll try some baclofen for back opain and tmj-  She will follow up with optho.      . We discussed vaccinations including the  one she does not wish to get and also discussed taking her lipid-lowering   medicines appropriately and discussed her issues with the medication.

## 2017-04-04 NOTE — MR AVS SNAPSHOT
[facility]  [address]  [facility] [address]  Phone: [phone]  Fax: [phone]                  Krysta Kam   2017 10:20 AM   Office Visit    Description:  Female : 1945   Provider:  Gold Palafox Jr., MD   Department:  [facility]           Reason for Visit     Follow-up           Diagnoses this Visit        Comments    Fibromyalgia    -  Primary     Iron deficiency anemia, unspecified iron deficiency anemia type         Essential hypertension         TMJ disease         Total body pain         Hyperlipidemia, unspecified hyperlipidemia type         Vision abnormalities                To Do List           Goals (5 Years of Data)     None       These Medications        Disp Refills Start End    baclofen (LIORESAL) 10 MG tablet 90 tablet 11 2017     Take 1 tablet (10 mg total) by mouth 3 (three) times daily as needed. - Oral    Pharmacy: [id number] [address] [place], [place] - [address] Ph #: [phone]         [facility] On [facility]     [facility] On Call Nurse Care Line -  Assistance  Unless otherwise directed by your provider, please contact [facility], our nurse care line that is available for  assistance.     Registered nurses in the [facility] provide: appointment scheduling, clinical advisement, health education, and other advisory services.  Call: [phone] (toll free)               Medications           Message regarding Medications     Verify the changes and/or additions to your medication regime listed below are the same as discussed with your clinician today.  If any of these changes or additions are incorrect, please notify your healthcare provider.        START taking these NEW medications        Refills    baclofen (LIORESAL) 10 MG tablet 11    Sig: Take 1 tablet (10 mg total) by mouth 3 (three) times daily as needed.    Class: Normal    Route: Oral      STOP  taking these medications     hydrocodone-acetaminophen 5-325mg (NORCO) 5-325 mg per tablet Take 1 tablet by mouth every 4 (four) hours as needed for Pain.           Verify that the below list of medications is an accurate representation of the medications you are currently taking.  If none reported, the list may be blank. If incorrect, please contact your healthcare provider. Carry this list with you in case of emergency.           Current Medications     amlodipine (NORVASC) 10 MG tablet take 1 tablet by mouth once daily    atorvastatin (LIPITOR) 20 MG tablet Take 1 tablet (20 mg total) by mouth once daily.    ferrous gluconate (FERGON) 325 MG Tab Take 1 tablet (325 mg total) by mouth daily with breakfast.    gabapentin (NEURONTIN) 300 MG capsule Increase to 1 pill in morning and night for 7 days, followed by 1 pill morning afternoon and night    baclofen (LIORESAL) 10 MG tablet Take 1 tablet (10 mg total) by mouth 3 (three) times daily as needed.    calcium carbonate-vit D3-min (CALTRATE 600+D PLUS MINERALS) 600 mg calcium- 400 unit Tab Take 1 tablet by mouth once daily.    omeprazole (PRILOSEC) 40 MG capsule Take 1 capsule (40 mg total) by mouth once daily.           Clinical Reference Information           Your Vitals Were     BP Pulse Height Weight BMI    132/76 (BP Location: Left arm, Patient Position: Sitting, BP Method: Manual) 80 5' (1.524 m) 62 kg (136 lb 11 oz) 26.69 kg/m2      Blood Pressure          Most Recent Value    BP  132/76      Allergies as of 4/4/2017     Ondansetron    Savella [Milnacipran]      Immunizations Administered on Date of Encounter - 4/4/2017     None      Orders Placed During Today's Visit      Normal Orders This Visit    Ambulatory consult to Optometry     Future Labs/Procedures Expected by Expires    CBC auto differential  4/4/2017 6/3/2018    Comprehensive metabolic panel  4/4/2017 6/3/2018    Lipid panel  4/4/2017 6/3/2018      Language Assistance Services     ATTENTION:  Language assistance services are available, free of charge. Please call [phone].      ATENCIÓN: Si habla tamieañol, tiene a chan disposición servicios gratuitos de asistencia lingüística. Llame al [phone].     CHÚ Ý: N?u b?n nói Ti?ng Vi?t, có các d?ch v? h? tr? ngôn ng? mi?n phí dành cho b?n. G?i s? [phone].         [facility] complies with applicable Federal civil rights laws and does not discriminate on the basis of race, color, national origin, age, disability, or sex.

## 2017-04-08 ENCOUNTER — PATIENT MESSAGE (OUTPATIENT)
Dept: INTERNAL MEDICINE | Facility: CLINIC | Age: 72
End: 2017-04-08

## 2017-04-10 ENCOUNTER — PATIENT MESSAGE (OUTPATIENT)
Dept: INTERNAL MEDICINE | Facility: CLINIC | Age: 72
End: 2017-04-10

## 2017-04-11 ENCOUNTER — PATIENT MESSAGE (OUTPATIENT)
Dept: INTERNAL MEDICINE | Facility: CLINIC | Age: 72
End: 2017-04-11

## 2017-05-24 ENCOUNTER — PATIENT MESSAGE (OUTPATIENT)
Dept: INTERNAL MEDICINE | Facility: CLINIC | Age: 72
End: 2017-05-24

## 2017-05-25 RX ORDER — GABAPENTIN 300 MG/1
CAPSULE ORAL
Qty: 30 CAPSULE | Refills: 11 | Status: SHIPPED | OUTPATIENT
Start: 2017-05-25 | End: 2018-01-24 | Stop reason: SDUPTHER

## 2017-05-26 RX ORDER — GABAPENTIN 300 MG/1
300 CAPSULE ORAL NIGHTLY
Qty: 30 CAPSULE | Refills: 11 | OUTPATIENT
Start: 2017-05-26

## 2017-05-31 RX ORDER — ATORVASTATIN CALCIUM 20 MG/1
TABLET, FILM COATED ORAL
Qty: 90 TABLET | Refills: 3 | Status: SHIPPED | OUTPATIENT
Start: 2017-05-31 | End: 2018-05-24 | Stop reason: SDUPTHER

## 2017-07-11 ENCOUNTER — OFFICE VISIT (OUTPATIENT)
Dept: INTERNAL MEDICINE | Facility: CLINIC | Age: 72
End: 2017-07-11
Payer: MEDICARE

## 2017-07-11 VITALS
HEIGHT: 60 IN | BODY MASS INDEX: 26.79 KG/M2 | OXYGEN SATURATION: 98 % | SYSTOLIC BLOOD PRESSURE: 128 MMHG | HEART RATE: 70 BPM | DIASTOLIC BLOOD PRESSURE: 71 MMHG | WEIGHT: 136.44 LBS

## 2017-07-11 DIAGNOSIS — M25.512 LEFT SHOULDER PAIN, UNSPECIFIED CHRONICITY: Primary | ICD-10-CM

## 2017-07-11 PROCEDURE — 1125F AMNT PAIN NOTED PAIN PRSNT: CPT | Mod: S$GLB,,, | Performed by: INTERNAL MEDICINE

## 2017-07-11 PROCEDURE — 99213 OFFICE O/P EST LOW 20 MIN: CPT | Mod: S$GLB,,, | Performed by: INTERNAL MEDICINE

## 2017-07-11 PROCEDURE — 1159F MED LIST DOCD IN RCRD: CPT | Mod: S$GLB,,, | Performed by: INTERNAL MEDICINE

## 2017-07-11 PROCEDURE — 99999 PR PBB SHADOW E&M-EST. PATIENT-LVL IV: CPT | Mod: PBBFAC,,, | Performed by: INTERNAL MEDICINE

## 2017-07-11 RX ORDER — PIROXICAM 10 MG/1
10 CAPSULE ORAL DAILY
Qty: 30 CAPSULE | Refills: 3 | Status: SHIPPED | OUTPATIENT
Start: 2017-07-11 | End: 2018-01-20 | Stop reason: SDUPTHER

## 2017-07-12 ENCOUNTER — OFFICE VISIT (OUTPATIENT)
Dept: OPTOMETRY | Facility: CLINIC | Age: 72
End: 2017-07-12
Payer: MEDICARE

## 2017-07-12 DIAGNOSIS — H52.03 HYPEROPIA WITH ASTIGMATISM AND PRESBYOPIA, BILATERAL: ICD-10-CM

## 2017-07-12 DIAGNOSIS — H25.13 NUCLEAR SCLEROTIC CATARACT OF BOTH EYES: Primary | ICD-10-CM

## 2017-07-12 DIAGNOSIS — H04.123 DRY EYE SYNDROME, BILATERAL: ICD-10-CM

## 2017-07-12 DIAGNOSIS — H52.4 HYPEROPIA WITH ASTIGMATISM AND PRESBYOPIA, BILATERAL: ICD-10-CM

## 2017-07-12 DIAGNOSIS — H52.203 HYPEROPIA WITH ASTIGMATISM AND PRESBYOPIA, BILATERAL: ICD-10-CM

## 2017-07-12 PROCEDURE — 99999 PR PBB SHADOW E&M-EST. PATIENT-LVL II: CPT | Mod: PBBFAC,,, | Performed by: OPTOMETRIST

## 2017-07-12 PROCEDURE — 92015 DETERMINE REFRACTIVE STATE: CPT | Mod: S$GLB,,, | Performed by: OPTOMETRIST

## 2017-07-12 PROCEDURE — 92014 COMPRE OPH EXAM EST PT 1/>: CPT | Mod: S$GLB,,, | Performed by: OPTOMETRIST

## 2017-07-12 NOTE — PATIENT INSTRUCTIONS
CATARACT    Symptoms and Signs:  A cataract starts out small, and at first has little effect on your vision. You may notice that your vision is blurred a little, like looking through a cloudy piece of glass or viewing an impressionist painting. A cataract may make light from the sun or a lamp seem too bright or glaring. Or you may notice when you drive at night that the oncoming headlights cause more glare than before. Colors may not appear as bright as they once did.  The type of cataract you have will affect exactly which symptoms you experience and how soon they will occur. When a nuclear cataract first develops it can bring about a temporary improvement in your near vision, called second sight. Unfortunately, the improved vision is short-lived and will disappear as the cataract worsens. Meanwhile, a sub-capsular cataract may not produce any symptoms until it's well-developed.    Causes:  No one knows for sure why the eye's lens changes as we age, forming cataracts. Researchers are gradually identifying factors that may cause cataracts - and information that may help to prevent them.  Many studies suggest that exposure to ultraviolet light is associated with cataracts, so eye care practitioners recommend wearing sunglasses and a wide-brimmed hat to lessen your exposure.  Other studies suggest people with diabetes are at risk for developing a cataract.   Some eye care practitioners believe that a diet high in antioxidants, such as beta-carotene (vitamin A), selenium and vitamins C and E, may forestall cataracts.  The most important of these is probably vitamin C; it might be helpful to supplement the diet with an extra Vitamin C tablet.  Meanwhile, eating a lot of salt may increase your risk.  Other risk factors include cigarette smoke, air pollution and heavy alcohol consumption.  We simply recommend that you be careful to use sunglasses and to take Vitamin C.    Treatment:  When symptoms begin to appear, we can  improve your vision for a while using new glasses, strong bifocals, magnification, appropriate lighting or other visual aids.  This is true in your case; your cataract does not impact your vision very much at this time. If you experience any of the symptoms we described you can return at any time. Otherwise it is fine to see you in 1 year.

## 2017-07-12 NOTE — PROGRESS NOTES
"HPI     Ms. Krysta Kam was referred by Gold Palafox MD for vision   abnormalities.    Pt says he vision is not blurry, but says vision "is not clear enough for   me." Affects all ranges with glasses. She requests refraction today. She   also has trouble "focusing" when the sun is in her eyes while driving.   She also says that vision is cloudy upon waking since she started taking   Gabapentin, has been improving over time. Vision improved after using   Refresh.   She also reports itching and tearing OU, tearing worse in sunlight.     (+)drops: Refresh 1-2 times per day OU   (-)flashes  (+)floaters: longstanding x 3 yrs and stable, unsure which eye  (-)diplopia    Diabetic no  Hemoglobin A1C       Date                     Value               Ref Range             Status                09/07/2016               6.3 (H)             4.5 - 6.2 %           Final                  05/02/2016               6.5 (H)             4.5 - 6.2 %           Final                 03/22/2016               6.2                 4.5 - 6.2 %           Final            ----------    OCULAR HISTORY  Last Eye Exam 07/11/16 with Dr. Prado  (-)eye surgery   Dry eyes  Cataracts OU    FAMILY HISTORY  (-)Glaucoma        Last edited by Brandie Shah, OD on 7/12/2017  9:50 AM. (History)            Assessment /Plan     For exam results, see Encounter Report.    Nuclear sclerotic cataract of both eyes   Cause of mild blur OU. Surgery not yet indicated. Monitor yearly.    Dry eye syndrome, bilateral   Cause of tearing and itchiness OU. Continue Refresh up to QID OU.    Hyperopia with astigmatism and presbyopia, bilateral   Small change OU. New glasses prescription released, adaptation expected.  New glasses optional.   Eyeglass Final Rx     Eyeglass Final Rx       Sphere Cylinder Axis Add    Right +1.00 +1.25 004 +2.75    Left +1.00 +1.25 164 +2.75    Expiration Date:  7/13/2018                 RTC 1 year                 "

## 2017-07-12 NOTE — LETTER
July 12, 2017      Gold Palafox Jr., MD  [address]  [facility] [address]           [facility]  [address]  [facility] [address]  Phone: [phone]          Patient: Krysta Kam   MR Number: [id number]   YOB: 1945   Date of Visit: 7/12/2017       Dear Dr. Gold Palafox Jr.:    Thank you for referring Krysta Kam to me for evaluation. Attached you will find relevant portions of my assessment and plan of care.    If you have questions, please do not hesitate to call me. I look forward to following Krysta Kam along with you.    Sincerely,    Brandie Shah, OD    Enclosure  CC:  No Recipients    If you would like to receive this communication electronically, please contact [email or web address] or [phone] to request more information on [facility] access.    For providers and/or their staff who would like to refer a patient to [facility], please contact us through our one-stop-shop provider referral line, [facility] [place], at [phone].    If you feel you have received this communication in error or would no longer like to receive these types of communications, please e-mail [email or web address]

## 2017-08-02 NOTE — PROGRESS NOTES
HISTORY OF PRESENT ILLNESS:  She is a 71-year-old female coming in today for   pain, aches all over, bodyaches.  She has a history of fibromyalgia.  She comes   in today with more shoulder pain than she has been having in the past.  She has   to use her right arm to raise her left arm because of pain.  She has normal   passive range of motion of the shoulder.  For active range of motion, she had   trouble combing her hair on that side.  She denies any fevers or chills.  She   does not remember any trauma to the shoulder and denies any other issues today.    She always has aches all over, but that has not changed recently.  Her TMJ is   doing well.  She has hyperlipidemia and hypertension   has been well   controlled, blood pressure is 128/71 today.    REVIEW OF SYSTEMS:  Otherwise, unremarkable.    PHYSICAL EXAMINATION:  GENERAL:  She is a well-appearing 71-year-old female in no acute distress.  NECK:  Supple.  She has no JVD.  Thyroid is not enlarged.  CARDIOVASCULAR:  S1 and S2, regular rate and rhythm without murmur, gallop or   rub.  ABDOMEN:  Soft, nontender.  No hepatosplenomegaly.  No guarding or rebound   tenderness.  LOWER EXTREMITIES:  No edema.  She has normal passive range of motion with a   little bit of pain on the left shoulder.  No point tenderness.  Active range of   motion is decreased secondary to pain, but her shoulder is definitely not   frozen.    ASSESSMENT:  Left shoulder pain, suspect rotator cuff tendinitis.  We are going   to have her follow up with Orthopedics.  She is already on gabapentin.  She has   had some issues with gastritis.  Today, we discussed nonsteroidal use.  She   should continue on the omeprazole, but recommend continuing Advil for this and   we showed a couple of quick shoulder exercises and I want her to do once a day   to help prevent frozen shoulder.  If she has any worsening or any other   symptoms, she is going to give me a call.        / [id number]  blank(s)        TAWNY/IN  dd: 08/02/2017 15:51:22 (CDT)  td: 08/03/2017 02:02:17 (CDT)  Doc ID   #[id number]  Job ID #[id number]    CC:

## 2017-08-29 RX ORDER — AMLODIPINE BESYLATE 10 MG/1
TABLET ORAL
Qty: 90 TABLET | Refills: 2 | Status: SHIPPED | OUTPATIENT
Start: 2017-08-29 | End: 2018-03-06 | Stop reason: SDUPTHER

## 2017-09-21 ENCOUNTER — TELEPHONE (OUTPATIENT)
Dept: INTERNAL MEDICINE | Facility: CLINIC | Age: 72
End: 2017-09-21

## 2017-09-21 NOTE — TELEPHONE ENCOUNTER
Pt has a cbc placed is that a lab you will like pt to have or are there any other labs you will like pt to have please advise pt appt is :10/5

## 2017-09-21 NOTE — TELEPHONE ENCOUNTER
----- Message from Kennedi Kay sent at 9/21/2017  4:12 PM CDT -----  Contact: [phone]  Patient would like to know if she has to come in for labs before she sees the Dr. Please call to advise

## 2017-09-26 ENCOUNTER — LAB VISIT (OUTPATIENT)
Dept: LAB | Facility: HOSPITAL | Age: 72
End: 2017-09-26
Attending: INTERNAL MEDICINE
Payer: MEDICARE

## 2017-09-26 DIAGNOSIS — D50.0 IRON DEFICIENCY ANEMIA DUE TO CHRONIC BLOOD LOSS: ICD-10-CM

## 2017-09-26 LAB
BASOPHILS # BLD AUTO: 0.04 K/UL
BASOPHILS NFR BLD: 0.6 %
DIFFERENTIAL METHOD: ABNORMAL
EOSINOPHIL # BLD AUTO: 0.2 K/UL
EOSINOPHIL NFR BLD: 3 %
ERYTHROCYTE [DISTWIDTH] IN BLOOD BY AUTOMATED COUNT: 15.3 %
FERRITIN SERPL-MCNC: 23 NG/ML
HCT VFR BLD AUTO: 38.5 %
HGB BLD-MCNC: 12.3 G/DL
IRON SERPL-MCNC: 63 UG/DL
LYMPHOCYTES # BLD AUTO: 2.1 K/UL
LYMPHOCYTES NFR BLD: 30.7 %
MCH RBC QN AUTO: 25 PG
MCHC RBC AUTO-ENTMCNC: 31.9 G/DL
MCV RBC AUTO: 78 FL
MONOCYTES # BLD AUTO: 0.5 K/UL
MONOCYTES NFR BLD: 6.5 %
NEUTROPHILS # BLD AUTO: 4.1 K/UL
NEUTROPHILS NFR BLD: 59.2 %
PLATELET # BLD AUTO: 197 K/UL
PMV BLD AUTO: ABNORMAL FL
RBC # BLD AUTO: 4.92 M/UL
SATURATED IRON: 14 %
TOTAL IRON BINDING CAPACITY: 460 UG/DL
TRANSFERRIN SERPL-MCNC: 311 MG/DL
WBC # BLD AUTO: 6.94 K/UL

## 2017-09-26 PROCEDURE — 82728 ASSAY OF FERRITIN: CPT

## 2017-09-26 PROCEDURE — 83540 ASSAY OF IRON: CPT

## 2017-09-26 PROCEDURE — 36415 COLL VENOUS BLD VENIPUNCTURE: CPT

## 2017-10-05 ENCOUNTER — IMMUNIZATION (OUTPATIENT)
Dept: INTERNAL MEDICINE | Facility: CLINIC | Age: 72
End: 2017-10-05
Payer: MEDICARE

## 2017-10-05 ENCOUNTER — OFFICE VISIT (OUTPATIENT)
Dept: INTERNAL MEDICINE | Facility: CLINIC | Age: 72
End: 2017-10-05
Payer: MEDICARE

## 2017-10-05 VITALS
BODY MASS INDEX: 26.97 KG/M2 | DIASTOLIC BLOOD PRESSURE: 68 MMHG | SYSTOLIC BLOOD PRESSURE: 159 MMHG | HEART RATE: 78 BPM | WEIGHT: 137.38 LBS | HEIGHT: 60 IN

## 2017-10-05 DIAGNOSIS — M26.609 TMJ DISEASE: ICD-10-CM

## 2017-10-05 DIAGNOSIS — E78.00 HIGH CHOLESTEROL: ICD-10-CM

## 2017-10-05 DIAGNOSIS — I10 ESSENTIAL HYPERTENSION: ICD-10-CM

## 2017-10-05 DIAGNOSIS — M79.7 FIBROMYALGIA: Primary | ICD-10-CM

## 2017-10-05 DIAGNOSIS — D50.9 IRON DEFICIENCY ANEMIA, UNSPECIFIED IRON DEFICIENCY ANEMIA TYPE: ICD-10-CM

## 2017-10-05 PROCEDURE — 99213 OFFICE O/P EST LOW 20 MIN: CPT | Mod: S$GLB,,, | Performed by: INTERNAL MEDICINE

## 2017-10-05 PROCEDURE — 90662 IIV NO PRSV INCREASED AG IM: CPT | Mod: S$GLB,,, | Performed by: INTERNAL MEDICINE

## 2017-10-05 PROCEDURE — G0008 ADMIN INFLUENZA VIRUS VAC: HCPCS | Mod: S$GLB,,, | Performed by: INTERNAL MEDICINE

## 2017-10-05 PROCEDURE — 99999 PR PBB SHADOW E&M-EST. PATIENT-LVL III: CPT | Mod: PBBFAC,,, | Performed by: INTERNAL MEDICINE

## 2017-10-05 NOTE — PROGRESS NOTES
Subjective:       Patient ID: Krysta Kam is a 71 y.o. female.    Chief Complaint: Follow-up    HPI     She is a 71-year-old female presenting to the clinic for a f/u appt regarding her medical conditions.     Fibromyalgia  Complaining of left sided shoulder pain, non-radiating, which was present on last visit. No history of trauma to the shoulder. Prescribed peroxicam last visit, which she has been taking, reports improvement with use. Has normal passive ROM and active range of motion.     TMJ- right side  Reports that lately her TMJ has been active, and has been associated with a HA. Reports not being compliant with her medications.     She suffers from   hyperlipidemia. She is on Lipitor, a 20 mg pill.      She has hypertension. Blood   pressure is doing well today at 159/68. She has a longstanding history of   hypertension.     Anemia  Anemia has improved, iron studies relieve that she is not deficient in iron. Denies any bleeding.     Otherwise no complaints.     Review of Systems   Constitutional: Negative for appetite change, chills, diaphoresis and fever.   HENT: Negative for trouble swallowing and voice change.    Eyes: Negative for photophobia and visual disturbance.   Respiratory: Negative for choking, chest tightness and shortness of breath.    Cardiovascular: Negative for chest pain, palpitations and leg swelling.   Gastrointestinal: Negative for abdominal distention and abdominal pain.   Endocrine: Negative for polyphagia and polyuria.   Genitourinary: Negative for difficulty urinating and dysuria.   Musculoskeletal: Positive for back pain and neck pain. Negative for gait problem and joint swelling.   Neurological: Negative for tremors and weakness.   Psychiatric/Behavioral: Negative for agitation and behavioral problems.       Objective:      Physical Exam   Constitutional: She is oriented to person, place, and time. She appears well-developed and well-nourished.   HENT:   Head:  Normocephalic and atraumatic.   Eyes: EOM are normal. Pupils are equal, round, and reactive to light.   Neck: Normal range of motion. Neck supple.   Cardiovascular: Normal rate, regular rhythm and normal heart sounds.    Pulmonary/Chest: Effort normal and breath sounds normal.   Abdominal: Bowel sounds are normal.   Musculoskeletal: Normal range of motion. She exhibits no edema.   Left shoulder, Full passive/active ROM   Neurological: She is alert and oriented to person, place, and time.       Assessment:       1. Fibromyalgia    2. TMJ disease    3. High cholesterol    4. Essential hypertension    5. Iron deficiency anemia, unspecified iron deficiency anemia type        Plan:       Fibromyalgia  Iron deficiency anemia, unspecified iron deficiency anemia type   - suspecting some thalassemia trait  Essential hypertension  TMJ disease  Total body pain   Back pain with sciatica  Hyperlipidemia      - Continue all current medications.    - Advised patient to continue Iron therapy    - Patient counseled on medication compliance.    - Patient advised to use ice back for her TMJ.    - Flu shot today   - Follow-up 6 months            Charlette Garcia MD     Internal Medicine PGY-1     #[phone]

## 2017-10-11 RX ORDER — OMEPRAZOLE 40 MG/1
40 CAPSULE, DELAYED RELEASE ORAL DAILY
Qty: 30 CAPSULE | Refills: 11 | Status: SHIPPED | OUTPATIENT
Start: 2017-10-11 | End: 2017-10-11 | Stop reason: SDUPTHER

## 2017-10-11 RX ORDER — OMEPRAZOLE 40 MG/1
40 CAPSULE, DELAYED RELEASE ORAL DAILY
Qty: 30 CAPSULE | Refills: 11 | Status: SHIPPED | OUTPATIENT
Start: 2017-10-11

## 2018-01-21 RX ORDER — PIROXICAM 10 MG/1
CAPSULE ORAL
Qty: 30 CAPSULE | Refills: 3 | Status: SHIPPED | OUTPATIENT
Start: 2018-01-21 | End: 2018-01-22 | Stop reason: SDUPTHER

## 2018-01-22 RX ORDER — PIROXICAM 10 MG/1
10 CAPSULE ORAL DAILY
Qty: 90 CAPSULE | Refills: 3 | Status: SHIPPED | OUTPATIENT
Start: 2018-01-22 | End: 2018-07-12 | Stop reason: SDUPTHER

## 2018-01-23 ENCOUNTER — TELEPHONE (OUTPATIENT)
Dept: PAIN MEDICINE | Facility: CLINIC | Age: 73
End: 2018-01-23

## 2018-01-23 NOTE — TELEPHONE ENCOUNTER
Staff spoke with patient regarding Gabapentin refill request, advised patient that should would need to come in for an office visit since her LOV 05/2016, patient refused to make an apppintment stated she would call her PCP for a refill

## 2018-01-24 RX ORDER — GABAPENTIN 300 MG/1
300 CAPSULE ORAL NIGHTLY
Qty: 30 CAPSULE | Refills: 11 | Status: SHIPPED | OUTPATIENT
Start: 2018-01-24 | End: 2018-05-31 | Stop reason: SDUPTHER

## 2018-01-24 NOTE — TELEPHONE ENCOUNTER
I looked in the medications and you were the last one to prescribe it, but it might have been a day you were covering for Doctor Palafox. According to her chart it looks like she is still seeing him as her PCP

## 2018-02-16 ENCOUNTER — PES CALL (OUTPATIENT)
Dept: ADMINISTRATIVE | Facility: CLINIC | Age: 73
End: 2018-02-16

## 2018-03-06 RX ORDER — AMLODIPINE BESYLATE 10 MG/1
10 TABLET ORAL DAILY
Qty: 90 TABLET | Refills: 2 | Status: SHIPPED | OUTPATIENT
Start: 2018-03-06 | End: 2018-03-06

## 2018-03-06 RX ORDER — AMLODIPINE BESYLATE 10 MG/1
TABLET ORAL
Qty: 90 TABLET | Refills: 2 | Status: SHIPPED | OUTPATIENT
Start: 2018-03-06 | End: 2022-01-14 | Stop reason: SDUPTHER

## 2018-03-16 ENCOUNTER — TELEPHONE (OUTPATIENT)
Dept: INTERNAL MEDICINE | Facility: CLINIC | Age: 73
End: 2018-03-16

## 2018-03-16 NOTE — TELEPHONE ENCOUNTER
----- Message from Kennedi Kay sent at 3/16/2018  2:58 PM CDT -----  Contact: [phone]  Patient would like nurse to give her a call concerning Elaina Pimentel her mom.

## 2018-04-13 ENCOUNTER — TELEPHONE (OUTPATIENT)
Dept: INTERNAL MEDICINE | Facility: CLINIC | Age: 73
End: 2018-04-13

## 2018-04-13 DIAGNOSIS — D50.9 IRON DEFICIENCY ANEMIA, UNSPECIFIED IRON DEFICIENCY ANEMIA TYPE: ICD-10-CM

## 2018-04-13 DIAGNOSIS — I10 ESSENTIAL HYPERTENSION: Primary | ICD-10-CM

## 2018-05-24 ENCOUNTER — LAB VISIT (OUTPATIENT)
Dept: LAB | Facility: HOSPITAL | Age: 73
End: 2018-05-24
Attending: INTERNAL MEDICINE
Payer: MEDICARE

## 2018-05-24 DIAGNOSIS — I10 ESSENTIAL HYPERTENSION: ICD-10-CM

## 2018-05-24 DIAGNOSIS — D50.9 ANEMIA, IRON DEFICIENCY: ICD-10-CM

## 2018-05-24 LAB
ALBUMIN SERPL BCP-MCNC: 3.8 G/DL
ALP SERPL-CCNC: 71 U/L
ALT SERPL W/O P-5'-P-CCNC: 19 U/L
ANION GAP SERPL CALC-SCNC: 7 MMOL/L
AST SERPL-CCNC: 22 U/L
BASOPHILS # BLD AUTO: 0.03 K/UL
BASOPHILS NFR BLD: 0.4 %
BILIRUB SERPL-MCNC: 0.6 MG/DL
BUN SERPL-MCNC: 15 MG/DL
CALCIUM SERPL-MCNC: 9.6 MG/DL
CHLORIDE SERPL-SCNC: 104 MMOL/L
CHOLEST SERPL-MCNC: 220 MG/DL
CHOLEST/HDLC SERPL: 3 {RATIO}
CO2 SERPL-SCNC: 28 MMOL/L
CREAT SERPL-MCNC: 0.7 MG/DL
DIFFERENTIAL METHOD: ABNORMAL
EOSINOPHIL # BLD AUTO: 0.2 K/UL
EOSINOPHIL NFR BLD: 3.2 %
ERYTHROCYTE [DISTWIDTH] IN BLOOD BY AUTOMATED COUNT: 14.8 %
EST. GFR  (AFRICAN AMERICAN): >60 ML/MIN/1.73 M^2
EST. GFR  (NON AFRICAN AMERICAN): >60 ML/MIN/1.73 M^2
GLUCOSE SERPL-MCNC: 112 MG/DL
HCT VFR BLD AUTO: 38.6 %
HDLC SERPL-MCNC: 73 MG/DL
HDLC SERPL: 33.2 %
HGB BLD-MCNC: 12.6 G/DL
LDLC SERPL CALC-MCNC: 136 MG/DL
LYMPHOCYTES # BLD AUTO: 2 K/UL
LYMPHOCYTES NFR BLD: 26.9 %
MCH RBC QN AUTO: 25.4 PG
MCHC RBC AUTO-ENTMCNC: 32.6 G/DL
MCV RBC AUTO: 78 FL
MONOCYTES # BLD AUTO: 0.7 K/UL
MONOCYTES NFR BLD: 9.5 %
NEUTROPHILS # BLD AUTO: 4.3 K/UL
NEUTROPHILS NFR BLD: 59.9 %
NONHDLC SERPL-MCNC: 147 MG/DL
PLATELET # BLD AUTO: 216 K/UL
PMV BLD AUTO: 13.2 FL
POTASSIUM SERPL-SCNC: 4.3 MMOL/L
PROT SERPL-MCNC: 7.7 G/DL
RBC # BLD AUTO: 4.97 M/UL
SODIUM SERPL-SCNC: 139 MMOL/L
TRIGL SERPL-MCNC: 55 MG/DL
WBC # BLD AUTO: 7.25 K/UL

## 2018-05-24 PROCEDURE — 85025 COMPLETE CBC W/AUTO DIFF WBC: CPT

## 2018-05-24 PROCEDURE — 80061 LIPID PANEL: CPT

## 2018-05-24 PROCEDURE — 36415 COLL VENOUS BLD VENIPUNCTURE: CPT

## 2018-05-24 PROCEDURE — 80053 COMPREHEN METABOLIC PANEL: CPT

## 2018-05-25 RX ORDER — ATORVASTATIN CALCIUM 20 MG/1
TABLET, FILM COATED ORAL
Qty: 90 TABLET | Refills: 3 | Status: SHIPPED | OUTPATIENT
Start: 2018-05-25 | End: 2018-05-31

## 2018-05-31 ENCOUNTER — HOSPITAL ENCOUNTER (OUTPATIENT)
Dept: RADIOLOGY | Facility: HOSPITAL | Age: 73
Discharge: HOME | End: 2018-05-31
Attending: INTERNAL MEDICINE
Payer: MEDICARE

## 2018-05-31 ENCOUNTER — OFFICE VISIT (OUTPATIENT)
Dept: INTERNAL MEDICINE | Facility: CLINIC | Age: 73
End: 2018-05-31
Payer: MEDICARE

## 2018-05-31 DIAGNOSIS — M26.609 TMJ DISEASE: Primary | ICD-10-CM

## 2018-05-31 DIAGNOSIS — M79.7 FIBROMYALGIA: ICD-10-CM

## 2018-05-31 DIAGNOSIS — G62.9 PERIPHERAL POLYNEUROPATHY: ICD-10-CM

## 2018-05-31 DIAGNOSIS — E78.00 HIGH CHOLESTEROL: ICD-10-CM

## 2018-05-31 DIAGNOSIS — R73.09 ELEVATED GLUCOSE: ICD-10-CM

## 2018-05-31 DIAGNOSIS — E78.5 HYPERLIPIDEMIA, UNSPECIFIED HYPERLIPIDEMIA TYPE: ICD-10-CM

## 2018-05-31 DIAGNOSIS — I10 ESSENTIAL HYPERTENSION: ICD-10-CM

## 2018-05-31 DIAGNOSIS — M25.569 KNEE PAIN, UNSPECIFIED CHRONICITY, UNSPECIFIED LATERALITY: ICD-10-CM

## 2018-05-31 PROCEDURE — 99214 OFFICE O/P EST MOD 30 MIN: CPT | Mod: S$GLB,,, | Performed by: INTERNAL MEDICINE

## 2018-05-31 PROCEDURE — 73560 X-RAY EXAM OF KNEE 1 OR 2: CPT | Mod: 26,RT,, | Performed by: RADIOLOGY

## 2018-05-31 PROCEDURE — 3077F SYST BP >= 140 MM HG: CPT | Mod: CPTII,S$GLB,, | Performed by: INTERNAL MEDICINE

## 2018-05-31 PROCEDURE — 73560 X-RAY EXAM OF KNEE 1 OR 2: CPT | Mod: TC,RT

## 2018-05-31 PROCEDURE — 3079F DIAST BP 80-89 MM HG: CPT | Mod: CPTII,S$GLB,, | Performed by: INTERNAL MEDICINE

## 2018-05-31 PROCEDURE — 99999 PR PBB SHADOW E&M-EST. PATIENT-LVL IV: CPT | Mod: PBBFAC,,, | Performed by: INTERNAL MEDICINE

## 2018-05-31 RX ORDER — GABAPENTIN 300 MG/1
300 CAPSULE ORAL 2 TIMES DAILY
Qty: 60 CAPSULE | Refills: 11 | Status: SHIPPED | OUTPATIENT
Start: 2018-05-31 | End: 2019-09-11 | Stop reason: SDUPTHER

## 2018-05-31 RX ORDER — ATORVASTATIN CALCIUM 40 MG/1
40 TABLET, FILM COATED ORAL DAILY
Qty: 90 TABLET | Refills: 3 | Status: SHIPPED | OUTPATIENT
Start: 2018-05-31 | End: 2019-09-11 | Stop reason: SDUPTHER

## 2018-05-31 RX ORDER — ASPIRIN 81 MG/1
81 TABLET ORAL DAILY
Refills: 0 | COMMUNITY
Start: 2018-05-31 | End: 2020-09-01

## 2018-05-31 NOTE — PROGRESS NOTES
"INTERNAL MEDICINE RESIDENT CLINIC  CLINIC NOTE    Patient Name: Krysta Kam  YOB: 1945    PRESENTING HISTORY       History of Present Illness:  Ms. Krysta Kam is a 72 y.o. female w/ significant PMHx of fibromyalgia, HTN, TMJ dysfunction, peripheral neuropathy, DJD of knee, history of depression presents today for follow up on her on going medical problems.    She c/o right knee pain that started 1 month ago, aching, 9/10, starts when she gets up from sitting position and goes away if she continue walking. It radiates sometimes to the whole right leg, she sometimes feel the pain at night when she is laying in bed. She noticed some swelling but denies any redness, fevers, or chills.    She also reports feeling "sting/pocking" pain on the her lower back both sides, doesn't radiates anywhere and only stays for few mins ?, no hx of dysuria, change of urine color or smell.    Pt ASCVD score 17.5%, her last Hb A1c in 2016 was 6.3%, Last cholesterol 220    Review of Systems:  Constitutional: no fever or chills  Eyes: no visual changes  ENT: no nasal congestion or sore throat  Respiratory: no cough or shortness of breath  Cardiovascular: no chest pain or palpitations  Gastrointestinal: no nausea or vomiting, no abdominal pain or change in bowel habits  Genitourinary: no hematuria or dysuria  Musculoskeletal: no arthralgias or myalgias  Neurological: no seizures or tremors    PAST HISTORY:     Past Medical History:   Diagnosis Date    Anemia 6/2012    Cataract     Colon polyps 1/31/2017    Fibromyalgia     GERD (gastroesophageal reflux disease)     High cholesterol     Hypertension        Past Surgical History:   Procedure Laterality Date    COLONOSCOPY N/A 10/18/2016    Procedure: COLONOSCOPY;  Surgeon: Brittni Edmondson MD;  Location: [address];  Service: Endoscopy;  Laterality: N/A;    HYSTERECTOMY         Family History   Problem Relation Age of Onset    " Hypertension Mother     Stroke Mother     Heart disease Father     Cataracts Neg Hx     Cancer Neg Hx     Diabetes Neg Hx     Glaucoma Neg Hx     Retinal detachment Neg Hx     Celiac disease Neg Hx     Cirrhosis Neg Hx     Colon cancer Neg Hx     Cystic fibrosis Neg Hx     Esophageal cancer Neg Hx     Inflammatory bowel disease Neg Hx     Liver disease Neg Hx     Stomach cancer Neg Hx     Amblyopia Neg Hx     Blindness Neg Hx     Macular degeneration Neg Hx     Strabismus Neg Hx     Thyroid disease Neg Hx        Social History     Social History    Marital status: Single     Spouse name: N/A    Number of children: N/A    Years of education: N/A     Social History Main Topics    Smoking status: Never Smoker    Smokeless tobacco: None    Alcohol use No    Drug use: No    Sexual activity: Not Currently     Other Topics Concern    None     Social History Narrative    Retired        MEDICATIONS & ALLERGIES:     Current Outpatient Prescriptions on File Prior to Visit   Medication Sig    amLODIPine (NORVASC) 10 MG tablet take 1 tablet by mouth once daily    baclofen (LIORESAL) 10 MG tablet Take 1 tablet (10 mg total) by mouth 3 (three) times daily as needed.    ferrous gluconate (FERGON) 325 MG Tab Take 1 tablet (325 mg total) by mouth daily with breakfast.    omeprazole (PRILOSEC) 40 MG capsule Take 1 capsule (40 mg total) by mouth once daily.    piroxicam (FELDENE) 10 MG Cap Take 1 capsule (10 mg total) by mouth once daily.    [DISCONTINUED] atorvastatin (LIPITOR) 20 MG tablet take 1 tablet by mouth once daily    [DISCONTINUED] gabapentin (NEURONTIN) 300 MG capsule Take 1 capsule (300 mg total) by mouth every evening.    calcium carbonate-vit D3-min (CALTRATE 600+D PLUS MINERALS) 600 mg calcium- 400 unit Tab Take 1 tablet by mouth once daily.     No current facility-administered medications on file prior to visit.        Review of patient's allergies indicates:   Allergen Reactions     Ondansetron      Other reaction(s): Flushing (Skin)    Savella [milnacipran] Nausea Only       OBJECTIVE:   Vital Signs:  Vitals:    05/31/18 0951   BP: (!) 140/86   Pulse: 79   Weight: 64.9 kg (143 lb 1.3 oz)   Height: 5' (1.524 m)       No results found for this or any previous visit (from the past 24 hour(s)).      Physical Exam:   General:  Well developed, well nourished, no acute distress  HEENT:  Normocephalic, atraumatic, PERRL, EOMI, clear sclera, ears normal, throat clear without erythema or exudates  CVS:  RRR, S1 and S2 normal, no murmurs, rubs, gallops  Resp:  Lungs clear to auscultation, no wheezes, rales, rhonchi, cough  GI:  Abdomen soft, non-tender, non-distended, normoactive bowel sounds, no masses  MSK:  No muscle atrophy, cyanosis, peripheral edema, full range of motion  Skin:  No rashes, ulcers, erythema  Neuro:  CNII-XII grossly intact  Psych:  Alert and oriented to person, place, and time    ASSESSMENT & PLAN:     Krysta was seen today for follow-up.    Diagnoses and all orders for this visit:    TMJ disease    Peripheral polyneuropathy    Essential hypertension    High cholesterol    Fibromyalgia    Elevated glucose  -     Hemoglobin A1c; Future  -     Comprehensive metabolic panel; Future  -     Hemoglobin A1c; Future    Hyperlipidemia, unspecified hyperlipidemia type  -     Lipid panel; Future    Knee pain, unspecified chronicity, unspecified laterality  -     X-Ray Knee 1 or 2 View Right; Future  -     Ambulatory consult to Orthopedics    Other orders  -     atorvastatin (LIPITOR) 40 MG tablet; Take 1 tablet (40 mg total) by mouth once daily.  -     gabapentin (NEURONTIN) 300 MG capsule; Take 1 capsule (300 mg total) by mouth 2 (two) times daily.  -     aspirin (ECOTRIN) 81 MG EC tablet; Take 1 tablet (81 mg total) by mouth once daily.      Will repeat HbA1c today and will consider adding metformin if its high, will also increase her gabapentin for her aches to BID, will increase her  Lipitor to 40 mg from 20 mg, will add ASA 81 mg.    For her knee will do x-rays and refer her to orthopedic as last time she had the pain a steroid shoot help her symptoms.  Her BP is on the higher side will consider another agent (HZT) next visit if continues to be high.    Michael Leary  PGY-1

## 2018-06-02 VITALS
SYSTOLIC BLOOD PRESSURE: 140 MMHG | HEIGHT: 60 IN | HEART RATE: 79 BPM | DIASTOLIC BLOOD PRESSURE: 82 MMHG | WEIGHT: 143.06 LBS | BODY MASS INDEX: 28.09 KG/M2

## 2018-06-02 NOTE — PROGRESS NOTES
I have personally taken the history and examined this patient and agree with the resident's note as stated above with the following thoughts:    Discussed various aches and pains-- spent time discussing bp goals.  Will follow upin 3 months if bp is still high will add another agent.

## 2018-06-08 DIAGNOSIS — M25.561 RIGHT KNEE PAIN, UNSPECIFIED CHRONICITY: Primary | ICD-10-CM

## 2018-06-11 ENCOUNTER — TELEPHONE (OUTPATIENT)
Dept: ORTHOPEDICS | Facility: CLINIC | Age: 73
End: 2018-06-11

## 2018-06-11 NOTE — TELEPHONE ENCOUNTER
----- Message from Jacqui Khan sent at 6/11/2018 10:47 AM CDT -----  Contact: Pt  Pt would like to be called back regarding her appt for the xray pt states she had one in late may    Pt can be reached at [phone]

## 2018-06-25 ENCOUNTER — OFFICE VISIT (OUTPATIENT)
Dept: ORTHOPEDICS | Facility: CLINIC | Age: 73
End: 2018-06-25
Payer: MEDICARE

## 2018-06-25 ENCOUNTER — HOSPITAL ENCOUNTER (OUTPATIENT)
Dept: RADIOLOGY | Facility: HOSPITAL | Age: 73
Discharge: HOME | End: 2018-06-25
Attending: PHYSICIAN ASSISTANT
Payer: MEDICARE

## 2018-06-25 VITALS
HEIGHT: 60 IN | HEART RATE: 72 BPM | BODY MASS INDEX: 28.09 KG/M2 | WEIGHT: 143.06 LBS | DIASTOLIC BLOOD PRESSURE: 67 MMHG | SYSTOLIC BLOOD PRESSURE: 153 MMHG

## 2018-06-25 DIAGNOSIS — M25.561 RIGHT KNEE PAIN, UNSPECIFIED CHRONICITY: ICD-10-CM

## 2018-06-25 DIAGNOSIS — M17.11 PRIMARY OSTEOARTHRITIS OF RIGHT KNEE: Primary | ICD-10-CM

## 2018-06-25 PROCEDURE — 20610 DRAIN/INJ JOINT/BURSA W/O US: CPT | Mod: RT,S$GLB,, | Performed by: PHYSICIAN ASSISTANT

## 2018-06-25 PROCEDURE — 73562 X-RAY EXAM OF KNEE 3: CPT | Mod: 26,59,LT, | Performed by: RADIOLOGY

## 2018-06-25 PROCEDURE — 73562 X-RAY EXAM OF KNEE 3: CPT | Mod: 59,TC,LT

## 2018-06-25 PROCEDURE — 99999 PR PBB SHADOW E&M-EST. PATIENT-LVL III: CPT | Mod: PBBFAC,,, | Performed by: PHYSICIAN ASSISTANT

## 2018-06-25 PROCEDURE — 73564 X-RAY EXAM KNEE 4 OR MORE: CPT | Mod: 26,RT,, | Performed by: RADIOLOGY

## 2018-06-25 PROCEDURE — 3078F DIAST BP <80 MM HG: CPT | Mod: CPTII,S$GLB,, | Performed by: PHYSICIAN ASSISTANT

## 2018-06-25 PROCEDURE — 3077F SYST BP >= 140 MM HG: CPT | Mod: CPTII,S$GLB,, | Performed by: PHYSICIAN ASSISTANT

## 2018-06-25 PROCEDURE — 99203 OFFICE O/P NEW LOW 30 MIN: CPT | Mod: 25,S$GLB,, | Performed by: PHYSICIAN ASSISTANT

## 2018-06-25 RX ADMIN — TRIAMCINOLONE ACETONIDE 60 MG: 40 INJECTION, SUSPENSION INTRA-ARTICULAR; INTRAMUSCULAR at 04:06

## 2018-06-25 NOTE — LETTER
June 26, 2018      Gold Palafox Jr., MD  [address]  [facility] [address]           [facility]  [address]  [facility] [address]  Phone: [phone]          Patient: Krysta Kam   MR Number: [id number]   YOB: 1945   Date of Visit: 6/25/2018       Dear Dr. Gold Palafox Jr.:    Thank you for referring Krysta Kam to me for evaluation. Attached you will find relevant portions of my assessment and plan of care.    If you have questions, please do not hesitate to call me. I look forward to following Krysta Kam along with you.    Sincerely,    Kaylyn Argueta PA-C    Enclosure  CC:  No Recipients    If you would like to receive this communication electronically, please contact [email or web address] or [phone] to request more information on [facility] access.    For providers and/or their staff who would like to refer a patient to [facility], please contact us through our one-stop-shop provider referral line, [facility], at [phone].    If you feel you have received this communication in error or would no longer like to receive these types of communications, please e-mail [email or web address]

## 2018-06-26 RX ORDER — TRIAMCINOLONE ACETONIDE 40 MG/ML
60 INJECTION, SUSPENSION INTRA-ARTICULAR; INTRAMUSCULAR
Status: COMPLETED | OUTPATIENT
Start: 2018-06-26 | End: 2018-06-25

## 2018-06-26 NOTE — PROGRESS NOTES
SUBJECTIVE:     Chief Complaint : right knee pain    History of Present Illness:  Krysta Kam is a 72 y.o. female seen in clinic today with a chief complaint of right knee pain. Patient denies trauma. She has long history of knee pain and received injections many years ago in rheumatology. Pain is currently lateral. She has pain with weight bearing. She admits to mild intermittent swelling. She has tried topical analgesics. No previous knee surgery.     Past Medical History:   Diagnosis Date    Anemia 6/2012    Cataract     Colon polyps 1/31/2017    Fibromyalgia     GERD (gastroesophageal reflux disease)     High cholesterol     Hypertension        Review of Systems:  Constitutional: no fever or chills  ENT: no nasal congestion or sore throat  Respiratory: no cough or shortness of breath  Cardiovascular: no chest pain or palpitations  Gastrointestinal: no nausea or vomiting, tolerating diet  Genitourinary: no hematuria or dysuria  Integument/Breast: no rash or pruritis  Hematologic/Lymphatic: no easy bruising or lymphadenopathy  Musculoskeletal: see HPI  Neurological: no seizures or tremors  Behavioral/Psych: no auditory or visual hallucinations    OBJECTIVE:     PHYSICAL EXAM:  Blood pressure (!) 153/67, pulse 72, height 5' (1.524 m), weight 64.9 kg (143 lb 1.3 oz).   General Appearance: WDWN, NAD  Gait: Normal  Neuro/Psych: Mood & affect appropriate  Lungs: Respirations equal and unlabored.   CV: 2+ bilateral upper and lower extremity pulses.   Skin: Intact throughout LE  Extremities: No LE edema    Right Knee Exam  Range of Motion:0-125 active   Effusion: small  Condition of skin:intact  Location of tenderness:Lateral joint line   Strength:4 of 5 quadriceps strength and 5 of 5 hamstring strength  Stability:stable to testing  Valentine: negative    Left Knee Exam  Range of Motion:0-130 active   Effusion:none  Condition of skin:intact  Location of tenderness:None   Strength:4 of 5 quadriceps  strength and 5 of 5 hamstring strength  Stability:stable to testing  Valentine: negative/negative    Alignment: Moderate valgus    Right Hip Examination: no pain with PROM     RADIOGRAPHS: AP, lateral and merchant knee x-rays ordered and images reviewed today by me reveal advanced degenerative changes lateral compartment right knee, increases in flexion    ASSESSMENT/PLAN:   Primary osteoarthritis right knee  - Offered arthrocentesis and injection. Pt declines. Wants injection only. CSI administered.  - F/u prn     Knee Injection Procedure Note    Diagnosis: right knee degenerative arthritis  Indications: right knee pain  Procedure Details: Verbal consent was obtained for the procedure. The injection site was identified and the skin was prepared with alcohol. The right knee was injected from an anterolateral approach with 1.5 ml of Kenalog and 2 ml Lidocaine under sterile technique using a 22 gauge needle. The needle was removed and the area cleansed and dressed.  Complications:  Patient tolerated the procedure well.    she was advised to rest the knee today, using ice and elevation as needed for comfort and swelling.Immediate relief of the knee pain may be short lived and secondary to the lidocaine. she may have an increase in discomfort tonight followed by steady improvement over the next several days. It may take 1-2 weeks following the injection to get the full benefit of the medication.

## 2018-07-12 RX ORDER — PIROXICAM 10 MG/1
10 CAPSULE ORAL DAILY PRN
Qty: 90 CAPSULE | Refills: 0 | Status: SHIPPED | OUTPATIENT
Start: 2018-07-12 | End: 2018-10-22 | Stop reason: SDUPTHER

## 2018-07-31 ENCOUNTER — OFFICE VISIT (OUTPATIENT)
Dept: OPTOMETRY | Facility: CLINIC | Age: 73
End: 2018-07-31
Payer: MEDICARE

## 2018-07-31 DIAGNOSIS — H52.203 HYPEROPIA WITH ASTIGMATISM AND PRESBYOPIA, BILATERAL: ICD-10-CM

## 2018-07-31 DIAGNOSIS — H04.123 DRY EYE SYNDROME, BILATERAL: ICD-10-CM

## 2018-07-31 DIAGNOSIS — H52.03 HYPEROPIA WITH ASTIGMATISM AND PRESBYOPIA, BILATERAL: ICD-10-CM

## 2018-07-31 DIAGNOSIS — H25.13 NUCLEAR SCLEROTIC CATARACT OF BOTH EYES: ICD-10-CM

## 2018-07-31 DIAGNOSIS — H35.033 HYPERTENSIVE RETINOPATHY OF BOTH EYES: ICD-10-CM

## 2018-07-31 DIAGNOSIS — H52.4 HYPEROPIA WITH ASTIGMATISM AND PRESBYOPIA, BILATERAL: ICD-10-CM

## 2018-07-31 DIAGNOSIS — Z01.00 ROUTINE EYE EXAM: Primary | ICD-10-CM

## 2018-07-31 PROCEDURE — 92014 COMPRE OPH EXAM EST PT 1/>: CPT | Mod: S$GLB,,, | Performed by: OPTOMETRIST

## 2018-07-31 PROCEDURE — 92015 DETERMINE REFRACTIVE STATE: CPT | Mod: S$GLB,,, | Performed by: OPTOMETRIST

## 2018-07-31 PROCEDURE — 99499 UNLISTED E&M SERVICE: CPT | Mod: S$GLB,,, | Performed by: OPTOMETRIST

## 2018-07-31 PROCEDURE — 99999 PR PBB SHADOW E&M-EST. PATIENT-LVL II: CPT | Mod: PBBFAC,,, | Performed by: OPTOMETRIST

## 2018-07-31 NOTE — LETTER
July 31, 2018      Gold Palafox Jr., MD  [address]  [facility] [address]           [facility]  [address]  [facility] [address]  Phone: [phone]          Patient: Krysta Kam   MR Number: [id number]   YOB: 1945   Date of Visit: 7/31/2018       Dear Dr. Gold Palafox Jr.:    Thank you for referring Krysta Kam to me for evaluation. Attached you will find relevant portions of my assessment and plan of care.    If you have questions, please do not hesitate to call me. I look forward to following Krysta Kam along with you.    Sincerely,    Brandie Shah, OD    Enclosure  CC:  No Recipients    If you would like to receive this communication electronically, please contact [email or web address] or [phone] to request more information on [employer] access.    For providers and/or their staff who would like to refer a patient to [facility], please contact us through our one-stop-shop provider referral line, [facility], at [phone].    If you feel you have received this communication in error or would no longer like to receive these types of communications, please e-mail [email or web address]

## 2018-07-31 NOTE — PROGRESS NOTES
HPI     Ms. Krysta Kam is here for her annual eye exam ([facility]).    Patient complains of eyes feeling dry, improved with artificial tears.  She also reports blurred vision with glasses. She requests refraction   today.    (+)drops: artifical tears prn OU (BID-TID)  (-)flashes  (-)floaters  (-)diplopia    Diabetic yes  Hemoglobin A1C       Date                     Value               Ref Range             Status           05/31/2018               6.8 (H)             4.0 - 5.6 %         Final  09/07/2016               6.3 (H)             4.5 - 6.2 %         Final  05/02/2016               6.5 (H)             4.5 - 6.2 %         Final    HTN: yes  BP Readings from Last 3 Encounters:  06/25/18 : (!) 153/67  06/02/18 : (!) 140/82  10/05/17 : (!) 159/68          OCULAR HISTORY  Last Eye Exam 07/12/17 with Dr. Shah   (-)eye surgery   Dry eyes  Cataracts OU    FAMILY HISTORY  (-)Glaucoma none         Last edited by Brandie Shah, OD on 7/31/2018  1:19 PM. (History)            Assessment /Plan     For exam results, see Encounter Report.    Routine eye exam    Hyperopia with astigmatism and presbyopia, bilateral   New glasses prescription released, adaptation expected.  New glasses optional.     Nuclear sclerotic cataract of both eyes   Cause of mildly reduced best-corrected visual acuity. Mild symptoms, but pt is not interested in cataract surgery at this time. Monitor.    Dry eye syndrome, bilateral   Continue artificial tears prn.     Hypertensive retinopathy of both eyes   As previously noted. Mild retinopathy OU, BP uncontrolled. Patient educated on findings and potential risk for retinal vascular occlusions. Continue management of HTN as directed by PCP. Discussed diet changes. Monitor yearly.         RTC 1 year

## 2018-07-31 NOTE — PATIENT INSTRUCTIONS
CATARACT    Symptoms and Signs:  A cataract starts out small, and at first has little effect on your vision. You may notice that your vision is blurred a little, like looking through a cloudy piece of glass or viewing an impressionist painting. A cataract may make light from the sun or a lamp seem too bright or glaring. Or you may notice when you drive at night that the oncoming headlights cause more glare than before. Colors may not appear as bright as they once did.  The type of cataract you have will affect exactly which symptoms you experience and how soon they will occur. When a nuclear cataract first develops it can bring about a temporary improvement in your near vision, called second sight. Unfortunately, the improved vision is short-lived and will disappear as the cataract worsens. Meanwhile, a sub-capsular cataract may not produce any symptoms until it's well-developed.    Causes:  No one knows for sure why the eye's lens changes as we age, forming cataracts. Researchers are gradually identifying factors that may cause cataracts - and information that may help to prevent them.  Many studies suggest that exposure to ultraviolet light is associated with cataracts, so eye care practitioners recommend wearing sunglasses and a wide-brimmed hat to lessen your exposure.  Other studies suggest people with diabetes are at risk for developing a cataract.   Some eye care practitioners believe that a diet high in antioxidants, such as beta-carotene (vitamin A), selenium and vitamins C and E, may forestall cataracts.  The most important of these is probably vitamin C; it might be helpful to supplement the diet with an extra Vitamin C tablet.  Meanwhile, eating a lot of salt may increase your risk.  Other risk factors include cigarette smoke, air pollution and heavy alcohol consumption.  We simply recommend that you be careful to use sunglasses and to take Vitamin C.    Treatment:  When symptoms begin to appear, we can  improve your vision for a while using new glasses, strong bifocals, magnification, appropriate lighting or other visual aids.  This is true in your case; your cataract does not impact your vision very much at this time. If you experience any of the symptoms we described you can return at any time. Otherwise it is fine to see you in 1 year.     ==============================================      Tips for Using Less Salt  Most people with heart problems need to eat less salt (sodium). Reducing the amount of salt you eat may help control your blood pressure. The higher your blood pressure, the greater your risk for heart disease, stroke, blindness, and kidney problems.    At the store  · Make low-salt choices by reading labels carefully. Look for the total amount of sodium per serving.  · Use more fresh food. Buy more fruits and vegetables. Select lean meats, fish, and poultry.  · Use fewer frozen, canned, and packaged foods which often contain a lot of sodium.  · Use plain frozen vegetables without sauces or toppings. These products are often low- or no-sodium.  · Opt for reduced-sodium or no-salt-added versions of canned vegetables and soups.  In the kitchen  · Don't add salt to food when you're cooking. Season with flavorings such as onion, garlic, pepper, salt-free herbal blends, and lemon or lime juice.  · Use a cookbook containing low-salt recipes. It can give you ideas for tasty meals that are healthy for your heart.  · Sprinkle salt-free herbal blends on vegetables and meat.  · Drain and rinse canned foods, such as canned beans and vegetables, before cooking or eating.  Eating out  · Tell the  you're on a low-salt diet. Ask questions about the menu.  · Order fish, chicken, and meat broiled, baked, poached, or grilled without salt, butter, or breading.  · Use lemon, pepper, and salt-free herb mixes to add flavor.  · Choose plain steamed rice, boiled noodles, and baked or boiled potatoes. Top potatoes with  chives and a little sour cream.     Beware! Salt goes by many other names. Limit foods with these words listed as ingredients: salt, sodium, soy sauce, baking soda, baking powder, MSG, monosodium, Na (the chemical symbol for sodium). Some antacids are also high in salt. © 7748-9517 [employer]. [address] [place] [address], [place] [address]. All rights reserved. This information is not intended as a substitute for professional medical care. Always follow your healthcare professional's instructions.           Low-Salt Choices  Eating salt (sodium) can make your body retain too much water. Excess water makes your heart work harder. Canned, packaged, and frozen foods are easy to prepare, but they are often high in sodium. Here are some ideas for low-salt foods you can easily prepare yourself.    For breakfast  · Fruit or 100% fruit juice  · Whole-wheat bread or an English muffin. Compare sodium content on labels.  · Low-fat milk or yogurt  · Unsalted eggs  · Shredded wheat  · Corn tortillas  · Unsalted steamed rice  · Regular (not instant) hot cereal, made without salt  Stay away from:  · Sausage, ty, and ham  · Flour tortillas  · Packaged muffins, pancakes, and biscuits  · Instant hot cereals  · Cottage cheese  For lunch and dinner  · Fresh fish, chicken, turkey, or meat--baked, broiled, or roasted without salt  · Dry beans, cooked without salt  · Tofu, stir-fried without salt  · Unsalted fresh fruit and vegetables, or frozen or canned fruit and vegetables with no added salt  Stay away from:  · Lunch or deli meat that is cured or smoked  · Cheese  · Tomato juice and catsup  · Canned vegetables, soups, and fish not labeled as no-salt-added or reduced sodium  · Packaged gravies and sauces  · Olives, pickles, and relish  · Bottled salad dressings  For snacks and desserts  · Yogurt  · Unsalted, air popped popcorn  · Unsalted nuts or seeds  Stay away from:  · Pies and cakes  · Packaged dessert  mixes  · Pizza  · Canned and packaged puddings  · Pretzels, chips, crackers, and nuts--unless the label says unsalted  © 20006463-7458 [employer]. [address] [place] [address], [place] [address]. All rights reserved. This information is not intended as a substitute for professional medical care. Always follow your healthcare professional's instructions.      Eating Heart-Healthy Food: Using the DASH Plan    Eating for your heart doesnt have to be hard or boring. You just need to know how to make healthier choices. The DASH eating plan has been developed to help you do just that. DASH stands for Dietary Approaches to Stop Hypertension. It is a plan that has been proven to be healthier for your heart and to lower your risk for high blood pressure. It can also help lower your risk for cancer, heart disease, osteoporosis, and diabetes.  Choosing from each food group  Choose foods from each of the food groups below each day. Try to get the recommended number of servings for each food group. The serving numbers are based on a diet of 2,000 calories a day. Talk to your doctor if youre unsure about your calorie needs. Along with getting the correct servings, the DASH plan also recommends a sodium intake less than 2,300 mg per day.          Grains  Servings: 6-8 a day  A serving is:  · 1 slice bread  · 1 ounce dry cereal  · Half a cup cooked rice, pasta or cereal  Best choices: Whole grains and any grains high in fiber.  Vegetables  Servings: 4-5 a day  A serving is:  · 1 cup raw leafy vegetable  · Half a cup cut-up raw or cooked vegetable  · Half a cup vegetable juice  Best choices: Fresh or frozen vegetables prepared without added salt or fat.    Fruits  Servings: 4-5 a day  A serving is:  · 1 medium fruit  · One-quarter cup dried fruit  · Half a cup fresh, frozen, or canned fruit  · Half a cup of 100% fruit juics  Best choices: A variety of fresh fruits of different colors. Whole fruits are a better choice than  fruit juices.  Low-fat or fat-free dairy  Servings: 2-3 a day  A serving is:  · 1 cup milk  · 1 cup yogurt  · One and a half ounces cheese  Best choices: Skim or 1% milk, low-fat or fat-free yogurt or buttermilk, and low-fat cheeses.            Lean meats, poultry, fish  Servings: 6 or fewer a day  A serving is:  · 1 ounce cooked meats, poultry, or fish  · 1 egg  Best choices: Lean poultry and fish. Trim away visible fat. Broil, grill, roast, or boil instead of frying. Remove skin from poultry before eating. Limit how much red meat you eat.   Nuts, seeds, beans  Servings: 4-5 a week  A serving is:  · One-third cup nuts (one and a half ounces)  · 2 tablespoons nut butter or seeds  · Half a cup cooked dry beans or legumes  Best choices: Dry roasted nuts with no salt added, lentils, kidney beans, garbanzo beans, and whole powell beans.    Fats and oils  Servings: 2-3 a day  A serving is:  · 1 teaspoon vegetable oil  · 1 teaspoon soft margarine  · 1 tablespoon mayonnaise  · 2 tablespoons salad dressing  Best choices: Nut and vegetable oils (nontropical vegetable oils), such as olive and canola oil.  Sweets  Servings: 5 a week or fewer  A serving is:  · 1 tablespoon sugar, maple syrup, or honey  · 1 tablespoon jam or jelly  · 1 half-ounce jelly beans (about 15)  · 1 cup lemonade  Best choices: Dried fruit can be a satisfying sweet. Choose low-fat sweets. And watch your serving sizes!        For more on the DASH eating plan, visit:  www.nhlbi.nih.gov/health/health-topics/topics/dash    © 0359-6469 [employer]. [address] [place] [address], [place] [address]. All rights reserved. This information is not intended as a substitute for professional medical care. Always follow your healthcare professional's instructions.

## 2018-08-24 ENCOUNTER — PES CALL (OUTPATIENT)
Dept: ADMINISTRATIVE | Facility: CLINIC | Age: 73
End: 2018-08-24

## 2018-10-08 ENCOUNTER — OFFICE VISIT (OUTPATIENT)
Dept: INTERNAL MEDICINE | Facility: CLINIC | Age: 73
End: 2018-10-08
Payer: MEDICARE

## 2018-10-08 VITALS
OXYGEN SATURATION: 98 % | WEIGHT: 135.38 LBS | BODY MASS INDEX: 26.58 KG/M2 | DIASTOLIC BLOOD PRESSURE: 58 MMHG | SYSTOLIC BLOOD PRESSURE: 120 MMHG | HEART RATE: 76 BPM | HEIGHT: 60 IN

## 2018-10-08 DIAGNOSIS — M79.7 FIBROMYALGIA: ICD-10-CM

## 2018-10-08 DIAGNOSIS — Z86.59 HISTORY OF DEPRESSION: ICD-10-CM

## 2018-10-08 DIAGNOSIS — E78.00 HIGH CHOLESTEROL: ICD-10-CM

## 2018-10-08 DIAGNOSIS — Z00.00 ENCOUNTER FOR PREVENTIVE HEALTH EXAMINATION: Primary | ICD-10-CM

## 2018-10-08 DIAGNOSIS — M17.0 PRIMARY OSTEOARTHRITIS OF BOTH KNEES: ICD-10-CM

## 2018-10-08 DIAGNOSIS — I10 ESSENTIAL HYPERTENSION: ICD-10-CM

## 2018-10-08 DIAGNOSIS — G62.9 PERIPHERAL POLYNEUROPATHY: ICD-10-CM

## 2018-10-08 DIAGNOSIS — Z12.39 BREAST CANCER SCREENING: ICD-10-CM

## 2018-10-08 PROCEDURE — G0439 PPPS, SUBSEQ VISIT: HCPCS | Mod: S$GLB,,, | Performed by: NURSE PRACTITIONER

## 2018-10-08 PROCEDURE — 3078F DIAST BP <80 MM HG: CPT | Mod: CPTII,S$GLB,, | Performed by: NURSE PRACTITIONER

## 2018-10-08 PROCEDURE — 3074F SYST BP LT 130 MM HG: CPT | Mod: CPTII,S$GLB,, | Performed by: NURSE PRACTITIONER

## 2018-10-08 PROCEDURE — 99214 OFFICE O/P EST MOD 30 MIN: CPT | Mod: PBBFAC | Performed by: NURSE PRACTITIONER

## 2018-10-08 PROCEDURE — 99999 PR PBB SHADOW E&M-EST. PATIENT-LVL IV: CPT | Mod: PBBFAC,,, | Performed by: NURSE PRACTITIONER

## 2018-10-08 RX ORDER — CHOLECALCIFEROL (VITAMIN D3) 25 MCG
1000 TABLET ORAL DAILY
COMMUNITY

## 2018-10-08 NOTE — PATIENT INSTRUCTIONS
Understanding Carbohydrates, Fats, and Protein  Food is a source of fuel and nourishment for your body. Its also a source of pleasure. Having diabetes doesnt mean you have to eat special foods or give up desserts. Instead, your dietitian can show you how to plan meals to suit your body. To start, learn how different foods affect blood sugar.  Carbohydrates  Carbohydrates are the main source of fuel for the body. Carbohydrates raise blood sugar. Many people think carbohydrates are only found in pasta or bread. But carbohydrates are actually in many kinds of foods:  · Sugars occur naturally in foods such as fruit, milk, honey, and molasses. Sugars can also be added to many foods, from cereals and yogurt to candy and desserts. Sugars raise blood sugar.  · Starches are found in bread, cereals, pasta, and dried beans. Theyre also found in corn, peas, potatoes, yam, acorn squash, and butternut squash. Starches also raise blood sugar.   · Fiber is found in foods such as vegetables, fruits, beans, and whole grains. Unlike other carbs, fiber isnt digested or absorbed. So it doesnt raise blood sugar. In fact, fiber can help keep blood sugar from rising too fast. It also helps keep blood cholesterol at a healthy level.  Did you know?  Even though carbohydrates raise blood sugar, its best to have some in every meal. They are an important part of a healthy diet.   Fat  Fat is an energy source that can be stored until needed. Fat does not raise blood sugar. However, it can raise blood cholesterol, increasing the risk of heart disease. Fat is also high in calories, which can cause weight gain. Not all types of fat are the same.  More Healthy:  · Monounsaturated fats are mostly found in vegetable oils, such as olive, canola, and peanut oils. They are also found in avocados and some nuts. Monounsaturated fats are healthy for your heart. Thats because they lower LDL (unhealthy) cholesterol.  · Polyunsaturated fats are mostly  found in vegetable oils, such as corn, safflower, and soybean oils. They are also found in some seeds, nuts, and fish. Polyunsaturated fats lower LDL (unhealthy) cholesterol. So, choosing them instead of saturated fats is healthy for your heart. Certain unsaturated fats can help lower triglycerides.   Less Healthy:  · Saturated fats are found in animal products, such as meat, poultry, whole milk, lard, and butter. Saturated fats raise LDL cholesterol and are not healthy for your heart.  · Hydrogenated oils and trans fats are formed when vegetable oils are processed into solid fats. They are found in many processed foods. Hydrogenated oils and trans fats raise LDL cholesterol and lower HDL (healthy) cholesterol. They are not healthy for your heart.  Protein  Protein helps the body build and repair muscle and other tissue. Protein has little or no effect on blood sugar. However, many foods that contain protein also contain saturated fat. By choosing low-fat protein sources, you can get the benefits of protein without the extra fat:  · Plant protein is found in dry beans and peas, nuts, and soy products, such as tofu and soymilk. These sources tend to be cholesterol-free and low in saturated fat.  · Animal protein is found in fish, poultry, meat, cheese, milk, and eggs. These contain cholesterol and can be high in saturated fat. Aim for lean, lower-fat choices.  Date Last Reviewed: 3/1/2016  © 7755-0346 [employer]. [address] [place] [address]. All rights reserved. This information is not intended as a substitute for professional medical care. Always follow your healthcare professional's instructions.        Diabetes: Meal Planning    You can help keep your blood sugar level in your target range by eating healthy foods. Your healthcare team can help you create a low-fat, nutritious meal plan. Take an active role in your diabetes management by following your meal plan and working with your  healthcare team.  Make your meal plan  A meal plan gives guidelines for the types and amounts of food you should eat. The goal is to balance food and insulin (or other diabetes medications) so your blood sugars will be in your target range. Your dietitian will help you make a flexible meal plan that includes many foods that you like.  Watch serving sizes  Your meal plan will group foods by servings. To learn how much a serving is, start by measuring food portions at each meal. Soon youll know what a serving looks like on your plate. Ask your healthcare provider about how to balance servings of different foods.  Eat from all the food groups  The basis of a healthy meal plan is variety (eating lots of different foods). Choose lean meats, fresh fruits and vegetables, whole grains, and low-fat or nonfat dairy products. Eating a wide variety of foods provides the nutrients your body needs. It can also keep you from getting bored with your meal plan.  Learn about carbohydrates, fats, and protein  · Carbohydrates are starches, sugars, and fiber. They are found in many foods, including fruit, bread, pasta, milk, and sweets. Of all the foods you eat, carbohydrates have the most effect on your blood sugar. Your dietitian may teach you about carb counting, a way to figure out the number of carbohydrates in a meal.  · Fats have the most calories. They also have the most effect on your weight and your risk of heart disease. When you have diabetes, its important to control your weight and protect your heart. Foods that are high in fat include whole milk, cheese, snack foods, and desserts.  · Protein is important for building and repairing muscles and bones. Choose low-fat protein sources, such as fish, egg whites, and skinless chicken.  Reduce liquid sugars  Extra calories from sodas, sports drinks, and fruit drinks make it hard to keep blood sugar in range. Cut as many liquid sugars from your meal plan as you can.  This  includes most fruit juices, which are often high in natural or added sugar. Instead, drink plenty of water and other sugar-free beverages.  Eat less fat  If you need to lose weight, try to reduce the amount of fat in your diet. This can also help lower your cholesterol level to keep blood vessels healthier. Cut fat by using only small amounts of liquid oil for cooking. Read food labels carefully to avoid foods with unhealthy trans fats.  Timing your meals  When it comes to blood sugar control, when you eat is as important as what you eat. You may need to eat several small meals spaced evenly throughout the day to stay in your target range. So dont skip breakfast or wait until late in the day to get most of your calories. Doing so can cause your blood sugar to rise too high or fall too low.   Date Last Reviewed: 3/1/2016  © 7000-3968 [employer]. [address] [place] [address]. All rights reserved. This information is not intended as a substitute for professional medical care. Always follow your healthcare professional's instructions.        Counseling and Referral of Other Preventative  (Italic type indicates deductible and co-insurance are waived)    Patient Name: Krysta Kam  Today's Date: 10/8/2018    Health Maintenance       Date Due Completion Date    TETANUS VACCINE 12/30/1963 ---    Zoster Vaccine 12/30/2005 ---    Pneumococcal (65+) (1 of 2 - PCV13) 12/30/2010 ---    Mammogram 05/02/2017 5/2/2016    Influenza Vaccine 08/01/2018 10/5/2017    DEXA SCAN 05/09/2019 5/9/2016    Lipid Panel 05/24/2019 5/24/2018    Colonoscopy 10/18/2026 10/18/2016        Orders Placed This Encounter   Procedures    Mammo Digital Screening Bilateral With CAD     The following information is provided to all patients.  This information is to help you find resources for any of the problems found today that may be affecting your health:                Living healthy guide: www.[facility].[place]       Understanding Diabetes: www.diabetes.org      Eating healthy: www.cdc.gov/healthyweight      CDC home safety checklist: www.cdc.gov/steadi/patient.html      Agency on Aging: www.goea.[place].[facility]      Alcoholics anonymous (AA): www.aa.org      Physical Activity: www.kaleb.nih.gov/qa8oltc      Tobacco use: www.quitwithusla.org

## 2018-10-08 NOTE — PROGRESS NOTES
Krysta Kam presented for a  Medicare AWV and comprehensive Health Risk Assessment today. The following components were reviewed and updated:    · Medical history  · Family History  · Social history  · Allergies and Current Medications  · Health Risk Assessment  · Health Maintenance  · Care Team     ** See Completed Assessments for Annual Wellness Visit within the encounter summary.**       The following assessments were completed:  · Living Situation  · CAGE  · Depression Screening  · Timed Get Up and Go  · Whisper Test  · Cognitive Function Screening  ·   ·   · Nutrition Screening  · ADL Screening  · PAQ Screening    Vitals:    10/08/18 1010   BP: (!) 120/58   Pulse: 76   SpO2: 98%   Weight: 61.4 kg (135 lb 5.8 oz)   Height: 5' (1.524 m)     Body mass index is 26.44 kg/m².  Physical Exam   Constitutional: She is oriented to person, place, and time. She appears well-developed and well-nourished.   HENT:   Head: Normocephalic and atraumatic.   Nose: Nose normal.   Eyes: Conjunctivae and EOM are normal.   Cardiovascular: Normal rate, regular rhythm, normal heart sounds and intact distal pulses.   No murmur heard.  Pulmonary/Chest: Effort normal and breath sounds normal.   Musculoskeletal: Normal range of motion.   Neurological: She is alert and oriented to person, place, and time.   Skin: Skin is warm and dry.   Psychiatric: She has a normal mood and affect. Her behavior is normal. Judgment and thought content normal.   Nursing note and vitals reviewed.        Diagnoses and health risks identified today and associated recommendations/orders:    1. Encounter for preventive health examination  Assessment performed. Health maintenance updated. Chart review completed.  Flu and Pneumococcal Rx given today. Not interested in this time about zoster and tetanus vaccine.  Would like DMV form completed. Will send form to PCP for review.    2. Breast cancer screening  - Mammo Digital Screening Bilateral With CAD; Future    3.  Peripheral polyneuropathy  Chronic. Stable on current regimen. Followed by PCP.    4. History of depression  Stable. Negative phq2 today.    5. High cholesterol  Chronic. Stable on current regimen. Followed by PCP.    6. Essential hypertension  Chronic. Stable on current regimen. Followed by PCP.    7. Fibromyalgia  Chronic. Stable with current regimen. Followed by PCP.    8. Primary osteoarthritis of both knees  Right knee giving her persistent pain. Ice offers some relief. Followed by Orthopedics. Continue current regimen.  Injections were not as helpful as they have been in the past.    Provided Krysta with a 5-10 year written screening schedule and personal prevention plan. Recommendations were developed using the USPSTF age appropriate recommendations. Education, counseling, and referrals were provided as needed. After Visit Summary printed and given to patient which includes a list of additional screenings\tests needed.    Follow-up for follow up with Primary Care Provider as instructed, ;sooner if problems, HRA in 1 year.    MITA Hanley

## 2018-10-17 ENCOUNTER — TELEPHONE (OUTPATIENT)
Dept: INTERNAL MEDICINE | Facility: CLINIC | Age: 73
End: 2018-10-17

## 2018-10-17 NOTE — TELEPHONE ENCOUNTER
----- Message from Clare Miller sent at 10/17/2018 11:00 AM CDT -----  Contact: [phone]  Caller is requesting a sooner appointment. Caller declined first available appointment listed below. Caller will not accept being placed on the wait list and is requesting a message be sent to the provider.    When is the next available appointment:  12/03  Did you offer to schedule the next available appt and put the patient on the wait list?:  yes yesWhat visit type: EP  Symptoms:  6 Month follow up  Patient preference of timeframe to be scheduled:  11/14  What is the reason the patient is requesting a sooner appointment? (insurance terminating, changing jobs):  no  Would you prefer an answer via [employer]?:  no  Comments:

## 2018-10-18 ENCOUNTER — TELEPHONE (OUTPATIENT)
Dept: INTERNAL MEDICINE | Facility: CLINIC | Age: 73
End: 2018-10-18

## 2018-10-18 RX ORDER — BACLOFEN 10 MG/1
10 TABLET ORAL 3 TIMES DAILY PRN
Qty: 90 TABLET | Refills: 11 | Status: SHIPPED | OUTPATIENT
Start: 2018-10-18 | End: 2021-05-24

## 2018-10-18 NOTE — TELEPHONE ENCOUNTER
----- Message from Soraya Pacheco sent at 10/18/2018  8:22 AM CDT -----  Contact: [phone]  Type: Rx    Name of medication(s): baclofen (LIORESAL) 10 MG tablet    Is this a refill? New rx? Refill     Who prescribed medication? Kimberly    Pharmacy Name, Phone, & Location: [facility] on [address]  [address] - [phone]    Comments:  silvio Roberts

## 2018-10-18 NOTE — TELEPHONE ENCOUNTER
----- Message from Soraya Pacheco sent at 10/18/2018  8:29 AM CDT -----  Contact: [phone]  Patient is checking the status of paperwork to get a handicap sticker.    Please advise, thank you

## 2018-10-23 RX ORDER — PIROXICAM 10 MG/1
CAPSULE ORAL
Qty: 90 CAPSULE | Refills: 0 | Status: SHIPPED | OUTPATIENT
Start: 2018-10-23 | End: 2018-12-18

## 2018-10-30 ENCOUNTER — TELEPHONE (OUTPATIENT)
Dept: INTERNAL MEDICINE | Facility: CLINIC | Age: 73
End: 2018-10-30

## 2018-10-30 NOTE — TELEPHONE ENCOUNTER
We need to complete this at a visit-- I have to have seen her within the last 90 days to do this-- looks like We see her in couple weeks, can she wait?

## 2018-10-30 NOTE — TELEPHONE ENCOUNTER
----- Message from Nehal Guillaume sent at 10/30/2018 11:34 AM CDT -----  Contact: self [phone]  Patient is calling to check status of form for Handicap sticker.     Please call and advise, Thanks !

## 2018-11-07 ENCOUNTER — LAB VISIT (OUTPATIENT)
Dept: LAB | Facility: HOSPITAL | Age: 73
End: 2018-11-07
Attending: INTERNAL MEDICINE
Payer: MEDICARE

## 2018-11-07 DIAGNOSIS — D50.9 IRON DEFICIENCY ANEMIA, UNSPECIFIED IRON DEFICIENCY ANEMIA TYPE: ICD-10-CM

## 2018-11-07 DIAGNOSIS — R73.09 ELEVATED GLUCOSE: ICD-10-CM

## 2018-11-07 DIAGNOSIS — E78.5 HYPERLIPIDEMIA, UNSPECIFIED HYPERLIPIDEMIA TYPE: ICD-10-CM

## 2018-11-07 LAB
ALBUMIN SERPL BCP-MCNC: 3.7 G/DL
ALP SERPL-CCNC: 75 U/L
ALT SERPL W/O P-5'-P-CCNC: 16 U/L
ANION GAP SERPL CALC-SCNC: 6 MMOL/L
AST SERPL-CCNC: 21 U/L
BASOPHILS # BLD AUTO: 0.03 K/UL
BASOPHILS NFR BLD: 0.5 %
BILIRUB SERPL-MCNC: 0.6 MG/DL
BUN SERPL-MCNC: 12 MG/DL
CALCIUM SERPL-MCNC: 9.8 MG/DL
CHLORIDE SERPL-SCNC: 105 MMOL/L
CHOLEST SERPL-MCNC: 189 MG/DL
CHOLEST/HDLC SERPL: 2.9 {RATIO}
CO2 SERPL-SCNC: 29 MMOL/L
CREAT SERPL-MCNC: 0.8 MG/DL
DIFFERENTIAL METHOD: ABNORMAL
EOSINOPHIL # BLD AUTO: 0.2 K/UL
EOSINOPHIL NFR BLD: 3.1 %
ERYTHROCYTE [DISTWIDTH] IN BLOOD BY AUTOMATED COUNT: 14.7 %
EST. GFR  (AFRICAN AMERICAN): >60 ML/MIN/1.73 M^2
EST. GFR  (NON AFRICAN AMERICAN): >60 ML/MIN/1.73 M^2
ESTIMATED AVG GLUCOSE: 146 MG/DL
GLUCOSE SERPL-MCNC: 111 MG/DL
HBA1C MFR BLD HPLC: 6.7 %
HCT VFR BLD AUTO: 36.8 %
HDLC SERPL-MCNC: 66 MG/DL
HDLC SERPL: 34.9 %
HGB BLD-MCNC: 11.5 G/DL
LDLC SERPL CALC-MCNC: 112.4 MG/DL
LYMPHOCYTES # BLD AUTO: 1.8 K/UL
LYMPHOCYTES NFR BLD: 27.6 %
MCH RBC QN AUTO: 25 PG
MCHC RBC AUTO-ENTMCNC: 31.3 G/DL
MCV RBC AUTO: 80 FL
MONOCYTES # BLD AUTO: 0.5 K/UL
MONOCYTES NFR BLD: 7.9 %
NEUTROPHILS # BLD AUTO: 3.9 K/UL
NEUTROPHILS NFR BLD: 60.7 %
NONHDLC SERPL-MCNC: 123 MG/DL
PLATELET # BLD AUTO: 205 K/UL
PMV BLD AUTO: 13.1 FL
POTASSIUM SERPL-SCNC: 4.4 MMOL/L
PROT SERPL-MCNC: 7.2 G/DL
RBC # BLD AUTO: 4.6 M/UL
SODIUM SERPL-SCNC: 140 MMOL/L
TRIGL SERPL-MCNC: 53 MG/DL
WBC # BLD AUTO: 6.48 K/UL

## 2018-11-07 PROCEDURE — 36415 COLL VENOUS BLD VENIPUNCTURE: CPT | Mod: HCNC

## 2018-11-07 PROCEDURE — 80061 LIPID PANEL: CPT | Mod: HCNC

## 2018-11-07 PROCEDURE — 85025 COMPLETE CBC W/AUTO DIFF WBC: CPT | Mod: HCNC

## 2018-11-07 PROCEDURE — 80053 COMPREHEN METABOLIC PANEL: CPT | Mod: HCNC

## 2018-11-07 PROCEDURE — 83036 HEMOGLOBIN GLYCOSYLATED A1C: CPT | Mod: HCNC

## 2018-11-14 ENCOUNTER — OFFICE VISIT (OUTPATIENT)
Dept: INTERNAL MEDICINE | Facility: CLINIC | Age: 73
End: 2018-11-14
Payer: MEDICARE

## 2018-11-14 ENCOUNTER — IMMUNIZATION (OUTPATIENT)
Dept: INTERNAL MEDICINE | Facility: CLINIC | Age: 73
End: 2018-11-14
Payer: MEDICARE

## 2018-11-14 VITALS
SYSTOLIC BLOOD PRESSURE: 135 MMHG | HEIGHT: 60 IN | DIASTOLIC BLOOD PRESSURE: 88 MMHG | WEIGHT: 135.56 LBS | OXYGEN SATURATION: 98 % | BODY MASS INDEX: 26.61 KG/M2 | HEART RATE: 71 BPM

## 2018-11-14 DIAGNOSIS — M79.7 FIBROMYALGIA: ICD-10-CM

## 2018-11-14 DIAGNOSIS — Z12.31 ENCOUNTER FOR SCREENING MAMMOGRAM FOR BREAST CANCER: ICD-10-CM

## 2018-11-14 DIAGNOSIS — E11.9 DIABETES MELLITUS WITHOUT COMPLICATION: ICD-10-CM

## 2018-11-14 DIAGNOSIS — E78.00 HIGH CHOLESTEROL: ICD-10-CM

## 2018-11-14 DIAGNOSIS — M17.0 PRIMARY OSTEOARTHRITIS OF BOTH KNEES: Primary | ICD-10-CM

## 2018-11-14 DIAGNOSIS — I10 ESSENTIAL HYPERTENSION: ICD-10-CM

## 2018-11-14 DIAGNOSIS — M26.609 TMJ DISEASE: ICD-10-CM

## 2018-11-14 DIAGNOSIS — G62.9 PERIPHERAL POLYNEUROPATHY: ICD-10-CM

## 2018-11-14 PROCEDURE — 99999 PR PBB SHADOW E&M-EST. PATIENT-LVL IV: CPT | Mod: PBBFAC,HCNC,, | Performed by: INTERNAL MEDICINE

## 2018-11-14 PROCEDURE — 90662 IIV NO PRSV INCREASED AG IM: CPT | Mod: HCNC,S$GLB,, | Performed by: INTERNAL MEDICINE

## 2018-11-14 PROCEDURE — 99214 OFFICE O/P EST MOD 30 MIN: CPT | Mod: HCNC,S$GLB,, | Performed by: INTERNAL MEDICINE

## 2018-11-14 PROCEDURE — 3075F SYST BP GE 130 - 139MM HG: CPT | Mod: CPTII,HCNC,S$GLB, | Performed by: INTERNAL MEDICINE

## 2018-11-14 PROCEDURE — 1101F PT FALLS ASSESS-DOCD LE1/YR: CPT | Mod: CPTII,HCNC,S$GLB, | Performed by: INTERNAL MEDICINE

## 2018-11-14 PROCEDURE — 3079F DIAST BP 80-89 MM HG: CPT | Mod: CPTII,HCNC,S$GLB, | Performed by: INTERNAL MEDICINE

## 2018-11-14 PROCEDURE — 3044F HG A1C LEVEL LT 7.0%: CPT | Mod: CPTII,HCNC,S$GLB, | Performed by: INTERNAL MEDICINE

## 2018-11-14 PROCEDURE — G0008 ADMIN INFLUENZA VIRUS VAC: HCPCS | Mod: HCNC,S$GLB,, | Performed by: INTERNAL MEDICINE

## 2018-11-14 RX ORDER — LOSARTAN POTASSIUM 100 MG/1
100 TABLET ORAL DAILY
Qty: 90 TABLET | Refills: 3 | Status: SHIPPED | OUTPATIENT
Start: 2018-11-14 | End: 2018-11-14

## 2018-11-14 RX ORDER — LOSARTAN POTASSIUM 50 MG/1
50 TABLET ORAL DAILY
Qty: 90 TABLET | Refills: 3 | Status: SHIPPED | OUTPATIENT
Start: 2018-11-14 | End: 2019-03-13 | Stop reason: SDUPTHER

## 2018-11-18 NOTE — PROGRESS NOTES
HISTORY OF PRESENT ILLNESS:  She is a 72-year-old female coming in today to   follow up her ongoing medical problems.  She is here for a six-month followup.    She has hyperlipidemia, which she has had for multiple years.  She is taking   atorvastatin 40 mg a day.  Most recent cholesterol was back in November 7th.    Her total cholesterol was 189, HDL was   , LDL was 112.  She is not having any   kind of chest pain or shortness of breath.  She also suffers from diabetes   mellitus type 2.  Most recent hemoglobin A1c was 6.6 and this is from today.    She denies any polyuria or polydipsia.  Most recent glucose was 108.    She has fibromyalgia affecting her right knee.  Her back hurts when she bends   over.  She is on baclofen and she is also on gabapentin.  We also have her on   Feldene nonsteroidal and this has been helping.  She saw Orthopedics last in   June 2018.  Overall, she states her aches and pains are doing okay.  They do   interfere with her daily activity as far as getting up and moving around as much   as she would like to, but she does okay.  She has TMJ, this has not been a   problem recently.  She has hypertension.  Blood pressure today was 160/94 when she first comes in;   after sitting and relaxing and resting well, it was 135/88.  Reviewing her most   recent blood pressure was 120/58 back in October; in June, it was 153/67,   140/82, 159/68, 128/71.  So, it seems kind of labile.  She is taking her   amlodipine 10 mg a day and she has been started on losartan 50 mg a day.  She   has peripheral neuropathy for which she takes gabapentin and also osteoarthritis   of the knee.  She did have a Kenalog injection in the knee, but it did not seem   to help.  She is supposed to follow up with Orthopedics.    REVIEW OF SYSTEMS:  She denies any chest pain, shortness of breath,   palpitations, nausea, vomiting, blurriness of vision.  No PND or orthopnea.    PHYSICAL EXAMINATION:  GENERAL:  She is a  well-appearing -American female in no acute distress.  NECK:  Supple.  She has no JVD.  Thyroid is not enlarged.  CARDIOVASCULAR:  S1 and S2, regular rate and rhythm without murmur, gallop or   rub.  ABDOMEN:  Soft, nonobese, nontender.  No hepatosplenomegaly.  No guarding or   rebound tenderness.  LOWER EXTREMITIES:  No edema.  She has some crepitance of both knees.  She has   tender points on her back that go along with fibromyalgia.  No wounds or skin   lesions noted.    ASSESSMENT:  1.  Primary osteoarthritis.  2.  Fibromyalgia.  3.  Hyperlipidemia.  4.  Hypertension.  5.  Diabetes mellitus type 2.  6.  She is going to get her screening mammogram.  Also, she is going to make eye   doctor appointment.  7.  TMJ, doing well at this time.  8.  Peripheral neuropathy. She will continue her gabapentin.  We are going to   work on low-fat, low-cholesterol diet.  Discussed exercise for her back.    Discussed seeing Orthopedics for her knee and possibly she is a candidate for   Synvisc injections.  We discussed also possibility of knee replacement.  I will   follow her up again in six months with labs.      UVALDO  dd: 11/18/2018 09:07:49 (CST)  td: 11/19/2018 00:39:28 (CST)  Doc ID   #[id number]  Job ID #[id number]    CC:

## 2018-12-18 ENCOUNTER — TELEPHONE (OUTPATIENT)
Dept: INTERNAL MEDICINE | Facility: CLINIC | Age: 73
End: 2018-12-18

## 2018-12-18 RX ORDER — CELECOXIB 100 MG/1
100 CAPSULE ORAL 2 TIMES DAILY
Qty: 90 CAPSULE | Refills: 1 | Status: SHIPPED | OUTPATIENT
Start: 2018-12-18 | End: 2019-02-20 | Stop reason: SDUPTHER

## 2018-12-18 NOTE — TELEPHONE ENCOUNTER
----- Message from Nehal Guillaume sent at 12/18/2018  9:00 AM CST -----  Contact: self [phone]  Patient requesting a call from the office to discuss medication piroxicam (FELDENE) 10 MG Cap . Please call advise, Thanks

## 2018-12-18 NOTE — TELEPHONE ENCOUNTER
Pt has been taking her Piroxicam 10mg she says she;s been having some side effects from medication. She wants to know if you can change medication to something else. Also she wants to know if she can get physical therapy on her leg its giving out on her.

## 2019-02-19 ENCOUNTER — TELEPHONE (OUTPATIENT)
Dept: INTERNAL MEDICINE | Facility: CLINIC | Age: 74
End: 2019-02-19

## 2019-02-19 DIAGNOSIS — E11.9 DIABETES MELLITUS WITHOUT COMPLICATION: Primary | ICD-10-CM

## 2019-02-19 NOTE — TELEPHONE ENCOUNTER
----- Message from Rick Montana sent at 2/19/2019 11:03 AM CST -----  Contact: Patient  Patient has an appointment on 3/1/2019 and would like to know if she needs blood work prior to the visit    Callback: [phone]    Thank you

## 2019-02-20 RX ORDER — CELECOXIB 100 MG/1
CAPSULE ORAL
Qty: 180 CAPSULE | Refills: 1 | Status: SHIPPED | OUTPATIENT
Start: 2019-02-20 | End: 2019-07-25

## 2019-02-22 ENCOUNTER — LAB VISIT (OUTPATIENT)
Dept: LAB | Facility: HOSPITAL | Age: 74
End: 2019-02-22
Attending: INTERNAL MEDICINE
Payer: MEDICARE

## 2019-02-22 DIAGNOSIS — E11.9 DIABETES MELLITUS WITHOUT COMPLICATION: ICD-10-CM

## 2019-02-22 LAB
ALBUMIN SERPL BCP-MCNC: 3.8 G/DL
ALP SERPL-CCNC: 68 U/L
ALT SERPL W/O P-5'-P-CCNC: 15 U/L
ANION GAP SERPL CALC-SCNC: 9 MMOL/L
AST SERPL-CCNC: 22 U/L
BILIRUB SERPL-MCNC: 0.7 MG/DL
BUN SERPL-MCNC: 14 MG/DL
CALCIUM SERPL-MCNC: 9.8 MG/DL
CHLORIDE SERPL-SCNC: 103 MMOL/L
CO2 SERPL-SCNC: 29 MMOL/L
CREAT SERPL-MCNC: 0.8 MG/DL
EST. GFR  (AFRICAN AMERICAN): >60 ML/MIN/1.73 M^2
EST. GFR  (NON AFRICAN AMERICAN): >60 ML/MIN/1.73 M^2
ESTIMATED AVG GLUCOSE: 137 MG/DL
GLUCOSE SERPL-MCNC: 102 MG/DL
HBA1C MFR BLD HPLC: 6.4 %
POTASSIUM SERPL-SCNC: 4.4 MMOL/L
PROT SERPL-MCNC: 7.2 G/DL
SODIUM SERPL-SCNC: 141 MMOL/L

## 2019-02-22 PROCEDURE — 80053 COMPREHEN METABOLIC PANEL: CPT | Mod: HCNC

## 2019-02-22 PROCEDURE — 36415 COLL VENOUS BLD VENIPUNCTURE: CPT | Mod: HCNC

## 2019-02-22 PROCEDURE — 83036 HEMOGLOBIN GLYCOSYLATED A1C: CPT | Mod: HCNC

## 2019-03-01 ENCOUNTER — OFFICE VISIT (OUTPATIENT)
Dept: INTERNAL MEDICINE | Facility: CLINIC | Age: 74
End: 2019-03-01
Payer: MEDICARE

## 2019-03-01 VITALS
BODY MASS INDEX: 25.53 KG/M2 | RESPIRATION RATE: 12 BRPM | WEIGHT: 130.06 LBS | OXYGEN SATURATION: 98 % | HEART RATE: 62 BPM | HEIGHT: 60 IN

## 2019-03-01 DIAGNOSIS — E11.9 DIABETES MELLITUS WITHOUT COMPLICATION: Primary | ICD-10-CM

## 2019-03-01 DIAGNOSIS — M17.9 OSTEOARTHRITIS OF KNEE, UNSPECIFIED LATERALITY, UNSPECIFIED OSTEOARTHRITIS TYPE: ICD-10-CM

## 2019-03-01 DIAGNOSIS — G62.9 PERIPHERAL POLYNEUROPATHY: ICD-10-CM

## 2019-03-01 DIAGNOSIS — Z12.31 ENCOUNTER FOR SCREENING MAMMOGRAM FOR BREAST CANCER: ICD-10-CM

## 2019-03-01 DIAGNOSIS — Z86.59 HISTORY OF DEPRESSION: ICD-10-CM

## 2019-03-01 DIAGNOSIS — E78.00 HIGH CHOLESTEROL: ICD-10-CM

## 2019-03-01 DIAGNOSIS — M79.7 FIBROMYALGIA: ICD-10-CM

## 2019-03-01 PROCEDURE — 99214 PR OFFICE/OUTPT VISIT, EST, LEVL IV, 30-39 MIN: ICD-10-PCS | Mod: HCNC,S$GLB,, | Performed by: INTERNAL MEDICINE

## 2019-03-01 PROCEDURE — 1101F PT FALLS ASSESS-DOCD LE1/YR: CPT | Mod: HCNC,CPTII,S$GLB, | Performed by: INTERNAL MEDICINE

## 2019-03-01 PROCEDURE — 99499 UNLISTED E&M SERVICE: CPT | Mod: HCNC,S$GLB,, | Performed by: INTERNAL MEDICINE

## 2019-03-01 PROCEDURE — 99999 PR PBB SHADOW E&M-EST. PATIENT-LVL III: ICD-10-PCS | Mod: PBBFAC,HCNC,, | Performed by: INTERNAL MEDICINE

## 2019-03-01 PROCEDURE — 3044F HG A1C LEVEL LT 7.0%: CPT | Mod: HCNC,CPTII,S$GLB, | Performed by: INTERNAL MEDICINE

## 2019-03-01 PROCEDURE — 1101F PR PT FALLS ASSESS DOC 0-1 FALLS W/OUT INJ PAST YR: ICD-10-PCS | Mod: HCNC,CPTII,S$GLB, | Performed by: INTERNAL MEDICINE

## 2019-03-01 PROCEDURE — 99999 PR PBB SHADOW E&M-EST. PATIENT-LVL III: CPT | Mod: PBBFAC,HCNC,, | Performed by: INTERNAL MEDICINE

## 2019-03-01 PROCEDURE — 99214 OFFICE O/P EST MOD 30 MIN: CPT | Mod: HCNC,S$GLB,, | Performed by: INTERNAL MEDICINE

## 2019-03-01 PROCEDURE — 99499 RISK ADDL DX/OHS AUDIT: ICD-10-PCS | Mod: HCNC,S$GLB,, | Performed by: INTERNAL MEDICINE

## 2019-03-01 PROCEDURE — 3044F PR MOST RECENT HEMOGLOBIN A1C LEVEL <7.0%: ICD-10-PCS | Mod: HCNC,CPTII,S$GLB, | Performed by: INTERNAL MEDICINE

## 2019-03-01 RX ORDER — DULOXETIN HYDROCHLORIDE 30 MG/1
30 CAPSULE, DELAYED RELEASE ORAL DAILY
Qty: 30 CAPSULE | Refills: 11 | Status: SHIPPED | OUTPATIENT
Start: 2019-03-01 | End: 2019-06-13

## 2019-03-01 NOTE — PROGRESS NOTES
HISTORY OF PRESENT ILLNESS:  She is a 73-year-old female coming in today to   follow up her ongoing medical problems.  She is here for a six-month followup.        She has hyperlipidemia, which she has had for multiple years.  She is taking   atorvastatin 40 mg a day.  Most recent cholesterol was back in November 7th.    Her total cholesterol was 189, HDL was 66  , LDL was 112.  She is not having any   kind of chest pain or shortness of breath.      She also suffers from diabetes   mellitus type 2.  Most recent hemoglobin A1c was 6.6 and this is from today.    She denies any polyuria or polydipsia.  Most recent glucose was 102, 6.4 hgb A1c.  .     She has fibromyalgia affecting her right knee.  Her back hurts when she bends   over.  She is on baclofen and she is also on gabapentin.  We also have her on   Feldene nonsteroidal and this has been helping.  She saw Orthopedics last in   June 2018.  Overall, she states her aches and pains are doing okay.  They do   interfere with her daily activity as far as getting up and moving around as much   as she would like to, but she does okay.  She has TMJ, this has not been a   problem recently.      She has hypertension.  Blood pressure today was 130/80    She is taking her   amlodipine 10 mg a day and she has been started on losartan 50 mg a day.      She   has peripheral neuropathy for which she takes gabapentin and also osteoarthritis   of the knee.  She did have a Kenalog injection in the knee, but it did not seem   to help.      REVIEW OF SYSTEMS:  She denies any chest pain, shortness of breath,   palpitations, nausea, vomiting, blurriness of vision.  No PND or orthopnea.     PHYSICAL EXAMINATION: Pulse 62   Resp 12   Ht 5' (1.524 m)   Wt 59 kg (130 lb 1.1 oz)   SpO2 98%   BMI 25.40 kg/m²     GENERAL:  She is a well-appearing -American female in no acute distress.  NECK:  Supple.  She has no JVD.  Thyroid is not enlarged.  CARDIOVASCULAR:  S1 and S2, regular  rate and rhythm without murmur, gallop or   rub.  ABDOMEN:  Soft, nonobese, nontender.  No hepatosplenomegaly.  No guarding or   rebound tenderness.  LOWER EXTREMITIES:  No edema.  She has some crepitance of both knees.  She has   tender points on her back that go along with fibromyalgia.  No wounds or skin   lesions noted.     ASSESSMENT:  1.  Primary osteoarthritis.  2.  Fibromyalgia.  3.  Hyperlipidemia.  4.  Hypertension.  5.  Diabetes mellitus type 2.  6. Anxiety--   7.  TMJ, doing well at this time.  8.  Peripheral neuropathy. She will continue her gabapentin.  We are going to   work on low-fat, low-cholesterol diet.    Discussed seeing Orthopedics for her knee and possibly she is a candidate for   Synvisc injections.  We discussed also possibility of knee replacement.   9. Will also start some medicine for anxiety-- she is taking care of her demented 9-0 year old mother an this is very stressful.  SHe will have her mother start adult day care soon and that may help.       I will   follow her up again in six months with labs.

## 2019-03-06 ENCOUNTER — OFFICE VISIT (OUTPATIENT)
Dept: ORTHOPEDICS | Facility: CLINIC | Age: 74
End: 2019-03-06
Payer: MEDICARE

## 2019-03-06 VITALS
DIASTOLIC BLOOD PRESSURE: 80 MMHG | WEIGHT: 130.31 LBS | HEART RATE: 69 BPM | BODY MASS INDEX: 25.58 KG/M2 | HEIGHT: 60 IN | SYSTOLIC BLOOD PRESSURE: 160 MMHG

## 2019-03-06 DIAGNOSIS — M17.11 PRIMARY OSTEOARTHRITIS OF RIGHT KNEE: Primary | ICD-10-CM

## 2019-03-06 PROCEDURE — 3077F PR MOST RECENT SYSTOLIC BLOOD PRESSURE >= 140 MM HG: ICD-10-PCS | Mod: HCNC,CPTII,S$GLB, | Performed by: PHYSICIAN ASSISTANT

## 2019-03-06 PROCEDURE — 1101F PT FALLS ASSESS-DOCD LE1/YR: CPT | Mod: HCNC,CPTII,S$GLB, | Performed by: PHYSICIAN ASSISTANT

## 2019-03-06 PROCEDURE — 3077F SYST BP >= 140 MM HG: CPT | Mod: HCNC,CPTII,S$GLB, | Performed by: PHYSICIAN ASSISTANT

## 2019-03-06 PROCEDURE — 99999 PR PBB SHADOW E&M-EST. PATIENT-LVL IV: CPT | Mod: PBBFAC,HCNC,, | Performed by: PHYSICIAN ASSISTANT

## 2019-03-06 PROCEDURE — 99213 PR OFFICE/OUTPT VISIT, EST, LEVL III, 20-29 MIN: ICD-10-PCS | Mod: HCNC,25,S$GLB, | Performed by: PHYSICIAN ASSISTANT

## 2019-03-06 PROCEDURE — 20610 DRAIN/INJ JOINT/BURSA W/O US: CPT | Mod: HCNC,RT,S$GLB, | Performed by: PHYSICIAN ASSISTANT

## 2019-03-06 PROCEDURE — 99999 PR PBB SHADOW E&M-EST. PATIENT-LVL IV: ICD-10-PCS | Mod: PBBFAC,HCNC,, | Performed by: PHYSICIAN ASSISTANT

## 2019-03-06 PROCEDURE — 20610 PR DRAIN/INJECT LARGE JOINT/BURSA: ICD-10-PCS | Mod: HCNC,RT,S$GLB, | Performed by: PHYSICIAN ASSISTANT

## 2019-03-06 PROCEDURE — 3079F DIAST BP 80-89 MM HG: CPT | Mod: HCNC,CPTII,S$GLB, | Performed by: PHYSICIAN ASSISTANT

## 2019-03-06 PROCEDURE — 3079F PR MOST RECENT DIASTOLIC BLOOD PRESSURE 80-89 MM HG: ICD-10-PCS | Mod: HCNC,CPTII,S$GLB, | Performed by: PHYSICIAN ASSISTANT

## 2019-03-06 PROCEDURE — 1101F PR PT FALLS ASSESS DOC 0-1 FALLS W/OUT INJ PAST YR: ICD-10-PCS | Mod: HCNC,CPTII,S$GLB, | Performed by: PHYSICIAN ASSISTANT

## 2019-03-06 PROCEDURE — 99213 OFFICE O/P EST LOW 20 MIN: CPT | Mod: HCNC,25,S$GLB, | Performed by: PHYSICIAN ASSISTANT

## 2019-03-06 RX ORDER — TRIAMCINOLONE ACETONIDE 40 MG/ML
40 INJECTION, SUSPENSION INTRA-ARTICULAR; INTRAMUSCULAR
Status: COMPLETED | OUTPATIENT
Start: 2019-03-06 | End: 2019-03-06

## 2019-03-06 RX ADMIN — TRIAMCINOLONE ACETONIDE 40 MG: 40 INJECTION, SUSPENSION INTRA-ARTICULAR; INTRAMUSCULAR at 01:03

## 2019-03-06 NOTE — LETTER
March 6, 2019      Gold Palafox Jr., MD  [address]  [facility] [address]           [facility]  [address]  [facility] [address]  Phone: [phone]          Patient: Krysta Kam   MR Number: [id number]   YOB: 1945   Date of Visit: 3/6/2019       Dear Dr. Gold Palafox Jr.:    Thank you for referring Krysta Kam to me for evaluation. Attached you will find relevant portions of my assessment and plan of care.    If you have questions, please do not hesitate to call me. I look forward to following Krysta Kam along with you.    Sincerely,    Nam Conti PA-C    Enclosure  CC:  No Recipients    If you would like to receive this communication electronically, please contact [email or web address] or [phone] to request more information on [facility] access.    For providers and/or their staff who would like to refer a patient to [facility], please contact us through our one-stop-shop provider referral line, [facility], at [phone].    If you feel you have received this communication in error or would no longer like to receive these types of communications, please e-mail [email or web address]

## 2019-03-06 NOTE — PROGRESS NOTES
SUBJECTIVE:     Chief Complaint & History of Present Illness:  Krysta Kam is a Established patient 73 y.o. female who is seen here today with a complaint of    Chief Complaint   Patient presents with    Right Knee - Pain    .  She is a patient well-known to us was last seen treated the clinic for this condition 06/25/2018 which time she had undergone a therapeutic diagnostic cortisone injection of the right knee. She received very short.  Relief from the injection but has had complete return of pain in about the knee having difficulty with start-up pain and weight-bearing and range of motion.  She also complaining of soreness and pain in the posterior aspect of the knee with some radiation down the calf to the foot.  She does have past history of plantar fasciitis but feels this may be a little bit different  On a scale of 1-10, with 10 being worst pain imaginable, he rates this pain as 3 on good days and 8 on bad days.  she describes the pain as tender and sore.    Review of patient's allergies indicates:   Allergen Reactions    Ondansetron      Other reaction(s): Flushing (Skin)    Savella [milnacipran] Nausea Only         Current Outpatient Medications   Medication Sig Dispense Refill    amLODIPine (NORVASC) 10 MG tablet take 1 tablet by mouth once daily 90 tablet 2    aspirin (ECOTRIN) 81 MG EC tablet Take 1 tablet (81 mg total) by mouth once daily.  0    atorvastatin (LIPITOR) 40 MG tablet Take 1 tablet (40 mg total) by mouth once daily. 90 tablet 3    baclofen (LIORESAL) 10 MG tablet Take 1 tablet (10 mg total) by mouth 3 (three) times daily as needed. 90 tablet 11    celecoxib (CELEBREX) 100 MG capsule TAKE 1 CAPSULE TWICE DAILY 180 capsule 1    DULoxetine (CYMBALTA) 30 MG capsule Take 1 capsule (30 mg total) by mouth once daily. 30 capsule 11    ferrous gluconate (FERGON) 325 MG Tab Take 1 tablet (325 mg total) by mouth daily with breakfast.  0    gabapentin (NEURONTIN) 300 MG  capsule Take 1 capsule (300 mg total) by mouth 2 (two) times daily. 60 capsule 11    losartan (COZAAR) 50 MG tablet Take 1 tablet (50 mg total) by mouth once daily. 90 tablet 3    multivit-min/iron/folic/lutein (CENTRUM SILVER WOMEN ORAL) Take 1 tablet by mouth once daily.      omeprazole (PRILOSEC) 40 MG capsule Take 1 capsule (40 mg total) by mouth once daily. 30 capsule 11    vitamin D (VITAMIN D3) 1000 units Tab Take 1,000 Units by mouth once daily.      calcium carbonate-vit D3-min (CALTRATE 600+D PLUS MINERALS) 600 mg calcium- 400 unit Tab Take 1 tablet by mouth once daily. 60 tablet 11     No current facility-administered medications for this visit.        Past Medical History:   Diagnosis Date    Anemia 6/2012    Cataract     Colon polyps 1/31/2017    Diabetes mellitus without complication 11/14/2018    Fibromyalgia     GERD (gastroesophageal reflux disease)     High cholesterol     Hypertension        Past Surgical History:   Procedure Laterality Date    COLONOSCOPY N/A 10/18/2016    Per[facility] at [facility] (4TH FLR)    ESOPHAGOGASTRODUODENOSCOPY (EGD) N/A 10/18/2016    [facility] ([facility])    HYSTERECTOMY      SIGMOIDOSCOPY-FLEXIBLE N/A 1/31/2017    Performed by [facility] at [facility] (4TH FLR)       Vital Signs (Most Recent)  Vitals:    03/06/19 0916   BP: (!) 160/80   Pulse: 69           Review of Systems:  ROS:  Constitutional: no fever or chills  Eyes: no visual changes  ENT: no nasal congestion or sore throat, Positive TMJ  Respiratory: no cough or shortness of breath  Cardiovascular: no chest pain or palpitations  Gastrointestinal: no nausea or vomiting, tolerating diet, Positive GERD  Genitourinary: no hematuria or dysuria  Integument/Breast: no rash or pruritis  Hematologic/Lymphatic: no easy bruising or lymphadenopathy, Positive history iron deficiency anemia  Musculoskeletal: no arthralgias or  myalgias  Neurological: no seizures or tremors, Positive peripheral neuropathy  Behavioral/Psych: no auditory or visual hallucinations, Positive history of depression  Endocrine: no heat or cold intolerance, Positive diabetes type 2                OBJECTIVE:     PHYSICAL EXAM:  Height: 5' (152.4 cm) Weight: 59.1 kg (130 lb 4.7 oz), General Appearance: Well nourished, well developed, in no acute distress.  Neurological: Mood & affect are normal.  right  Knee Exam:  Knee Range of Motion:0-115 degrees flexion   Effusion:yes  Condition of skin:intact  Location of tenderness:Lateral joint line and popliteal fossa   Strength:limited by pain and 5 of 5  Stability:  Lachman: stable, LCL: stable, MCL: stable, PCL: stable and posteromedial (dial): stable  Varus /Valgus stress:  normal  Valentine:   negative/negative    left  Knee Exam:  Knee Range of Motion:0-120 degrees flexion   Effusion:none  Condition of skin:intact  Location of tenderness:None   Strength:5 of 5  Stability:  Lachman: stable, LCL: stable, MCL: stable, PCL: stable and posteromedial (dial): stable  Varus /Valgus stress:  normal  Valentine:   negative/negative      Hip Examination:  normal    RADIOGRAPHS:  X-rays from previous visit reviewed by me today demonstrate moderate arthritic changes throughout both knees right much more than left with significant lateral joint space loss sclerotic changes noted in the lateral compartment with early osteophytic spurring.  No other evidence of fracture dislocation or other bony abnormality    ASSESSMENT/PLAN:       ICD-10-CM ICD-9-CM   1. Primary osteoarthritis of right knee M17.11 715.16       Plan: We discussed with the patient at length all the different treatment options available for  the knee including anti-inflammatories, acetaminophen, rest, ice, knee strengthening exercise, occasional cortisone injections for temporary relief, Viscosupplimentation injections, arthroscopic debridement osteotomy, and finally knee  arthroplasty.   Will proceed with aspiration and injection of the right knee.  A lateral  brace for support and protection    The injection site was identified and the skin was prepared with a betadine solution. The   right  knee was injected with 1 ml of kenalog and 5 ml Lidocaine under sterile technique. Krysta Kam tolerated the procedure well, she was advised to rest the knee today, ice and elevation. she did receive immediate relief of the pain in and about his knee she was told this would be short lived and is secondary to the lidocaine. she may have an increase in his discomfort tonight followed by steady improvement over the next several days. I may take 1-3 weeks following the injection to get the full benefit of the medication.  I will see her back in 4-6 months. Sooner if he has any problems or concerns.    Krysta Kam was advised to monitor her blood sugars closely over the next several days. The steroid may cause a rise in them. If her glucose levels rise to a point she is uncomfortable or he is unable to control them is is to contact his PCP or go immediately to the emergency department.

## 2019-03-07 ENCOUNTER — PATIENT MESSAGE (OUTPATIENT)
Dept: INTERNAL MEDICINE | Facility: CLINIC | Age: 74
End: 2019-03-07

## 2019-03-11 ENCOUNTER — TELEPHONE (OUTPATIENT)
Dept: INTERNAL MEDICINE | Facility: CLINIC | Age: 74
End: 2019-03-11

## 2019-03-11 NOTE — TELEPHONE ENCOUNTER
----- Message from Fiona Campbell sent at 3/11/2019  4:25 PM CDT -----  Contact: [phone]  patient is requesting a call from the office concerning medication losartan (COZAAR) 50 MG tablet. Patient stated the medication is on recall.    Please advise, thanks

## 2019-03-13 RX ORDER — LOSARTAN POTASSIUM 50 MG/1
50 TABLET ORAL DAILY
Qty: 90 TABLET | Refills: 3 | Status: SHIPPED | OUTPATIENT
Start: 2019-03-13 | End: 2019-06-13

## 2019-04-04 ENCOUNTER — HOSPITAL ENCOUNTER (EMERGENCY)
Facility: HOSPITAL | Age: 74
Discharge: HOME | End: 2019-04-04
Attending: EMERGENCY MEDICINE
Payer: MEDICARE

## 2019-04-04 VITALS
DIASTOLIC BLOOD PRESSURE: 75 MMHG | RESPIRATION RATE: 17 BRPM | OXYGEN SATURATION: 99 % | SYSTOLIC BLOOD PRESSURE: 170 MMHG | HEART RATE: 72 BPM | TEMPERATURE: 98 F

## 2019-04-04 DIAGNOSIS — R07.9 CHEST PAIN: ICD-10-CM

## 2019-04-04 DIAGNOSIS — R00.2 PALPITATIONS: ICD-10-CM

## 2019-04-04 LAB
ALBUMIN SERPL BCP-MCNC: 3.6 G/DL (ref 3.5–5.2)
ALP SERPL-CCNC: 84 U/L (ref 55–135)
ALT SERPL W/O P-5'-P-CCNC: 17 U/L (ref 10–44)
ANION GAP SERPL CALC-SCNC: 10 MMOL/L (ref 8–16)
AST SERPL-CCNC: 24 U/L (ref 10–40)
BASOPHILS # BLD AUTO: 0.05 K/UL (ref 0–0.2)
BASOPHILS NFR BLD: 0.6 % (ref 0–1.9)
BILIRUB SERPL-MCNC: 0.3 MG/DL (ref 0.1–1)
BUN SERPL-MCNC: 19 MG/DL (ref 8–23)
CALCIUM SERPL-MCNC: 9.8 MG/DL (ref 8.7–10.5)
CHLORIDE SERPL-SCNC: 105 MMOL/L (ref 95–110)
CO2 SERPL-SCNC: 24 MMOL/L (ref 23–29)
CREAT SERPL-MCNC: 0.8 MG/DL (ref 0.5–1.4)
DIFFERENTIAL METHOD: ABNORMAL
EOSINOPHIL # BLD AUTO: 0.3 K/UL (ref 0–0.5)
EOSINOPHIL NFR BLD: 3.4 % (ref 0–8)
ERYTHROCYTE [DISTWIDTH] IN BLOOD BY AUTOMATED COUNT: 16.9 % (ref 11.5–14.5)
EST. GFR  (AFRICAN AMERICAN): >60 ML/MIN/1.73 M^2
EST. GFR  (NON AFRICAN AMERICAN): >60 ML/MIN/1.73 M^2
GLUCOSE SERPL-MCNC: 108 MG/DL (ref 70–110)
HCT VFR BLD AUTO: 31.1 % (ref 37–48.5)
HGB BLD-MCNC: 9.5 G/DL (ref 12–16)
IMM GRANULOCYTES # BLD AUTO: 0.03 K/UL (ref 0–0.04)
IMM GRANULOCYTES NFR BLD AUTO: 0.4 % (ref 0–0.5)
LYMPHOCYTES # BLD AUTO: 2.4 K/UL (ref 1–4.8)
LYMPHOCYTES NFR BLD: 30.2 % (ref 18–48)
MCH RBC QN AUTO: 23.1 PG (ref 27–31)
MCHC RBC AUTO-ENTMCNC: 30.5 G/DL (ref 32–36)
MCV RBC AUTO: 76 FL (ref 82–98)
MONOCYTES # BLD AUTO: 0.9 K/UL (ref 0.3–1)
MONOCYTES NFR BLD: 11.5 % (ref 4–15)
NEUTROPHILS # BLD AUTO: 4.3 K/UL (ref 1.8–7.7)
NEUTROPHILS NFR BLD: 53.9 % (ref 38–73)
NRBC BLD-RTO: 0 /100 WBC
PLATELET # BLD AUTO: 228 K/UL (ref 150–350)
PMV BLD AUTO: 12.6 FL (ref 9.2–12.9)
POTASSIUM SERPL-SCNC: 4 MMOL/L (ref 3.5–5.1)
PROT SERPL-MCNC: 7 G/DL (ref 6–8.4)
RBC # BLD AUTO: 4.11 M/UL (ref 4–5.4)
SODIUM SERPL-SCNC: 139 MMOL/L (ref 136–145)
TROPONIN I SERPL DL<=0.01 NG/ML-MCNC: <0.006 NG/ML (ref 0–0.03)
WBC # BLD AUTO: 7.91 K/UL (ref 3.9–12.7)

## 2019-04-04 PROCEDURE — 99284 PR EMERGENCY DEPT VISIT,LEVEL IV: ICD-10-PCS | Mod: GC,,, | Performed by: EMERGENCY MEDICINE

## 2019-04-04 PROCEDURE — 93010 ELECTROCARDIOGRAM REPORT: CPT | Mod: HCNC,,, | Performed by: INTERNAL MEDICINE

## 2019-04-04 PROCEDURE — 25000003 PHARM REV CODE 250: Mod: HCNC | Performed by: STUDENT IN AN ORGANIZED HEALTH CARE EDUCATION/TRAINING PROGRAM

## 2019-04-04 PROCEDURE — 85025 COMPLETE CBC W/AUTO DIFF WBC: CPT | Mod: HCNC

## 2019-04-04 PROCEDURE — 84484 ASSAY OF TROPONIN QUANT: CPT | Mod: HCNC

## 2019-04-04 PROCEDURE — 80053 COMPREHEN METABOLIC PANEL: CPT | Mod: HCNC

## 2019-04-04 PROCEDURE — 93010 EKG 12-LEAD: ICD-10-PCS | Mod: HCNC,,, | Performed by: INTERNAL MEDICINE

## 2019-04-04 PROCEDURE — 99284 EMERGENCY DEPT VISIT MOD MDM: CPT | Mod: GC,,, | Performed by: EMERGENCY MEDICINE

## 2019-04-04 PROCEDURE — 93005 ELECTROCARDIOGRAM TRACING: CPT | Mod: HCNC

## 2019-04-04 PROCEDURE — 99285 EMERGENCY DEPT VISIT HI MDM: CPT | Mod: 25,HCNC

## 2019-04-04 RX ORDER — ASPIRIN 325 MG
325 TABLET ORAL
Status: COMPLETED | OUTPATIENT
Start: 2019-04-04 | End: 2019-04-04

## 2019-04-04 RX ADMIN — ALUMINUM HYDROXIDE, MAGNESIUM HYDROXIDE, AND SIMETHICONE 50 ML: 200; 200; 20 SUSPENSION ORAL at 01:04

## 2019-04-04 RX ADMIN — ASPIRIN 325 MG ORAL TABLET 325 MG: 325 PILL ORAL at 01:04

## 2019-04-04 NOTE — ED PROVIDER NOTES
"Encounter Date: 4/4/2019       History     Chief Complaint   Patient presents with    Palpitations     Presents to ED c/o waking up feeling like her heart is racing. C/o feeling pulse in both ears.      HPI     The patient is a 73F w/ PMH of fibromyalgia, HTN/HLD presenting with palpitations and chest pain. Patient states that she woke up at 10:30pm feeling a throbbing in her ears. She also feels like she has a racing/uncomfortable sensation in her chest. She also endorses an "aching" in her chest which she rates a 6/10 in severity. Non-radiating. She endorses subjective shortness of breath. Denies vomiting. Denies cough, fever. She has no personal history or family history of cardiac disease.      Review of patient's allergies indicates:   Allergen Reactions    Ondansetron      Other reaction(s): Flushing (Skin)    Savella [milnacipran] Nausea Only     Past Medical History:   Diagnosis Date    Anemia 6/2012    Cataract     Colon polyps 1/31/2017    Diabetes mellitus without complication 11/14/2018    Fibromyalgia     GERD (gastroesophageal reflux disease)     High cholesterol     Hypertension      Past Surgical History:   Procedure Laterality Date    COLONOSCOPY N/A 10/18/2016    Performed by [facility] at [facility] (4TH FLR)    ESOPHAGOGASTRODUODENOSCOPY (EGD) N/A 10/18/2016    Performed by [facility] at [facility] (4TH FLR)    HYSTERECTOMY      SIGMOIDOSCOPY-FLEXIBLE N/A 1/31/2017    Performed by [facility] at [facility] (4TH FLR)     Family History   Problem Relation Age of Onset    Hypertension Mother     Stroke Mother     Heart disease Father     Cataracts Neg Hx     Cancer Neg Hx     Diabetes Neg Hx     Glaucoma Neg Hx     Retinal detachment Neg Hx     Celiac disease Neg Hx     Cirrhosis Neg Hx     Colon cancer Neg Hx     Cystic fibrosis Neg Hx     Esophageal cancer Neg Hx     Inflammatory bowel disease Neg Hx     Liver disease Neg Hx     Stomach cancer " Neg Hx     Amblyopia Neg Hx     Blindness Neg Hx     Macular degeneration Neg Hx     Strabismus Neg Hx     Thyroid disease Neg Hx      Social History     Tobacco Use    Smoking status: Never Smoker    Smokeless tobacco: Never Used   Substance Use Topics    Alcohol use: No    Drug use: No     Review of Systems   Constitutional: Negative for fever.   HENT: Negative for drooling and facial swelling.    Eyes: Negative for discharge and redness.   Respiratory: Positive for shortness of breath.    Cardiovascular: Positive for chest pain and palpitations.   Gastrointestinal: Negative for vomiting.   Musculoskeletal: Negative for joint swelling.   Skin: Negative for rash.   Neurological: Negative for facial asymmetry and speech difficulty.   Psychiatric/Behavioral: Negative for behavioral problems.       Physical Exam     Initial Vitals [04/04/19 0043]   BP Pulse Resp Temp SpO2   (!) 166/72 76 18 98.5 °F (36.9 °C) 99 %      MAP       --         Physical Exam    Constitutional: She appears well-developed and well-nourished.   HENT:   Head: Normocephalic and atraumatic.   Eyes: Right eye exhibits no chemosis, no discharge and no exudate. Left eye exhibits no chemosis, no discharge and no exudate.   Neck: Normal range of motion. No stridor present.   Cardiovascular: Normal rate, regular rhythm and normal heart sounds.   No murmur heard.  Pulmonary/Chest: Breath sounds normal. No respiratory distress. She has no wheezes. She has no rhonchi. She has no rales.   Abdominal: Soft. There is no tenderness.   Neurological: She is alert.   Skin: Skin is warm and dry.   Psychiatric: She has a normal mood and affect. Her speech is normal and behavior is normal.         ED Course   Procedures  Labs Reviewed   CBC W/ AUTO DIFFERENTIAL   COMPREHENSIVE METABOLIC PANEL   TROPONIN I          Imaging Results    None                APC / Resident Notes:   PGY-2 MDM:     Patient is a 73 y.o. presenting with a chief complaint of  "palpitations and chest pain. Vital signs stable, patient afebrile, non-tachycardic and non-hypoxic. Initial EKG NSR, normal rate, normal intervals, no ST elevations or depressions, no ST interval changes. I have low concern for ACS at this point- I think description of chest "ache" is more consistent with MSK pain.However, given age and risk factors will order cardiac work-up.    Workup ongoing, will continue to re-assess patient and update management as needed.     Dorothy Ramesh  [facility] Emergency Medicine, PGY1   4/4/2019 1:29 AM    PGY2 Re-Evaluation:    Cardiac work-up unremarkable. Patient with slow/small drop in Hg. I notified the patient that she needs to follow up with her PCP regarding chest pain AND drop in hg as she may need outpatient stress test and/or colonoscopy. Stable for discharge with outpatient follow up.    Dorothy Ramesh  [facility] Emergency Medicine, PGY2   4/4/2019 2:58 AM                         Clinical Impression:   palpitations    ICD-10-CM ICD-9-CM   1. Palpitations R00.2 785.1   2. Chest pain R07.9 786.50                                Dorothy Ramesh MD  Resident  04/04/19 0258    "

## 2019-04-04 NOTE — ED NOTES
Patient states that she woke up at approximately 2300 feeling like her heart was racing, stating that she could feel her pulse in her ears. Patient states that she now has a headache. Denies shortness of breath. Patient states that she feels tired. Patient is alert and oriented. Patient is ambulatory at this time.

## 2019-04-22 ENCOUNTER — TELEPHONE (OUTPATIENT)
Dept: INTERNAL MEDICINE | Facility: CLINIC | Age: 74
End: 2019-04-22

## 2019-04-22 NOTE — TELEPHONE ENCOUNTER
----- Message from Rai Strauss sent at 4/22/2019 11:05 AM CDT -----  Contact: Pt  Pt is requesting an earlier appt due to having pain in both ears. No further in formation was given.    Pt can be reached at [phone].    Thank You.

## 2019-04-24 ENCOUNTER — OFFICE VISIT (OUTPATIENT)
Dept: INTERNAL MEDICINE | Facility: CLINIC | Age: 74
End: 2019-04-24
Payer: MEDICARE

## 2019-04-24 VITALS
OXYGEN SATURATION: 99 % | TEMPERATURE: 99 F | BODY MASS INDEX: 26.01 KG/M2 | HEIGHT: 60 IN | WEIGHT: 132.5 LBS | HEART RATE: 77 BPM | DIASTOLIC BLOOD PRESSURE: 72 MMHG | SYSTOLIC BLOOD PRESSURE: 136 MMHG

## 2019-04-24 DIAGNOSIS — J01.00 ACUTE NON-RECURRENT MAXILLARY SINUSITIS: Primary | ICD-10-CM

## 2019-04-24 PROCEDURE — 3075F SYST BP GE 130 - 139MM HG: CPT | Mod: HCNC,CPTII,S$GLB, | Performed by: NURSE PRACTITIONER

## 2019-04-24 PROCEDURE — 99999 PR PBB SHADOW E&M-EST. PATIENT-LVL V: CPT | Mod: PBBFAC,HCNC,, | Performed by: NURSE PRACTITIONER

## 2019-04-24 PROCEDURE — 99999 PR PBB SHADOW E&M-EST. PATIENT-LVL V: ICD-10-PCS | Mod: PBBFAC,HCNC,, | Performed by: NURSE PRACTITIONER

## 2019-04-24 PROCEDURE — 1101F PR PT FALLS ASSESS DOC 0-1 FALLS W/OUT INJ PAST YR: ICD-10-PCS | Mod: HCNC,CPTII,S$GLB, | Performed by: NURSE PRACTITIONER

## 2019-04-24 PROCEDURE — 1101F PT FALLS ASSESS-DOCD LE1/YR: CPT | Mod: HCNC,CPTII,S$GLB, | Performed by: NURSE PRACTITIONER

## 2019-04-24 PROCEDURE — 3075F PR MOST RECENT SYSTOLIC BLOOD PRESS GE 130-139MM HG: ICD-10-PCS | Mod: HCNC,CPTII,S$GLB, | Performed by: NURSE PRACTITIONER

## 2019-04-24 PROCEDURE — 99214 OFFICE O/P EST MOD 30 MIN: CPT | Mod: HCNC,S$GLB,, | Performed by: NURSE PRACTITIONER

## 2019-04-24 PROCEDURE — 99214 PR OFFICE/OUTPT VISIT, EST, LEVL IV, 30-39 MIN: ICD-10-PCS | Mod: HCNC,S$GLB,, | Performed by: NURSE PRACTITIONER

## 2019-04-24 PROCEDURE — 3078F PR MOST RECENT DIASTOLIC BLOOD PRESSURE < 80 MM HG: ICD-10-PCS | Mod: HCNC,CPTII,S$GLB, | Performed by: NURSE PRACTITIONER

## 2019-04-24 PROCEDURE — 3078F DIAST BP <80 MM HG: CPT | Mod: HCNC,CPTII,S$GLB, | Performed by: NURSE PRACTITIONER

## 2019-04-24 RX ORDER — AMOXICILLIN AND CLAVULANATE POTASSIUM 875; 125 MG/1; MG/1
1 TABLET, FILM COATED ORAL EVERY 12 HOURS
Qty: 20 TABLET | Refills: 0 | Status: SHIPPED | OUTPATIENT
Start: 2019-04-24 | End: 2019-05-04

## 2019-04-24 RX ORDER — AZELASTINE 1 MG/ML
1 SPRAY, METERED NASAL 2 TIMES DAILY
Qty: 30 ML | Refills: 3 | Status: SHIPPED | OUTPATIENT
Start: 2019-04-24 | End: 2021-05-24

## 2019-04-24 NOTE — PROGRESS NOTES
Subjective:       Patient ID: Krysta Kma is a 73 y.o. female.    Chief Complaint: Otalgia; Sore Throat; and Cough    Disclaimer: This note has been generated using voice-recognition software. There may be typographical errors that have been missed during proof-reading  Pt of Dr Palafox here complaining of ear pain for 3 weeks and sore throat.  The patient denies any tinnitus in the ears.  Patient denies hearing loss, fever, chills nausea vomiting or diarrhea.    Otalgia    Associated symptoms include coughing and a sore throat. Pertinent negatives include no abdominal pain, diarrhea, headaches, neck pain, rash, rhinorrhea or vomiting.   Sore Throat    Associated symptoms include coughing and ear pain. Pertinent negatives include no abdominal pain, congestion, diarrhea, headaches, neck pain or vomiting.   Cough   Associated symptoms include ear pain and a sore throat. Pertinent negatives include no chills, fever, headaches, myalgias, postnasal drip, rash or rhinorrhea.     Review of Systems   Constitutional: Negative for chills, diaphoresis, fatigue and fever.   HENT: Positive for ear pain and sore throat. Negative for congestion, nosebleeds, postnasal drip, rhinorrhea, sinus pressure and sinus pain.    Respiratory: Positive for cough.    Gastrointestinal: Negative for abdominal pain, diarrhea, nausea and vomiting.   Musculoskeletal: Negative for arthralgias, myalgias, neck pain and neck stiffness.   Skin: Negative for rash.   Neurological: Negative for dizziness, facial asymmetry and headaches.   Hematological: Negative for adenopathy.   Psychiatric/Behavioral: Negative for sleep disturbance.         Past Medical History:   Diagnosis Date    Anemia 6/2012    Cataract     Colon polyps 1/31/2017    Diabetes mellitus without complication 11/14/2018    Fibromyalgia     GERD (gastroesophageal reflux disease)     High cholesterol     Hypertension      Past Surgical History:   Procedure Laterality Date     COLONOSCOPY N/A 10/18/2016    Performed by [facility] (4TH FLR)    ESOPHAGOGASTRODUODENOSCOPY (EGD) N/A 10/18/2016    Performed by [facility] END[facility] (4TH FLR)    HYSTERECTOMY      SIGMOIDOSCOPY-FLEXIBLE N/A 1/31/2017    Performed by [facility] END[facility] (4TH FLR)     Social History     Social History Narrative    Retired      Family History   Problem Relation Age of Onset    Hypertension Mother     Stroke Mother     Heart disease Father     Cataracts Neg Hx     Cancer Neg Hx     Diabetes Neg Hx     Glaucoma Neg Hx     Retinal detachment Neg Hx     Celiac disease Neg Hx     Cirrhosis Neg Hx     Colon cancer Neg Hx     Cystic fibrosis Neg Hx     Esophageal cancer Neg Hx     Inflammatory bowel disease Neg Hx     Liver disease Neg Hx     Stomach cancer Neg Hx     Amblyopia Neg Hx     Blindness Neg Hx     Macular degeneration Neg Hx     Strabismus Neg Hx     Thyroid disease Neg Hx      Outpatient Encounter Medications as of 4/24/2019   Medication Sig Dispense Refill    amLODIPine (NORVASC) 10 MG tablet take 1 tablet by mouth once daily 90 tablet 2    aspirin (ECOTRIN) 81 MG EC tablet Take 1 tablet (81 mg total) by mouth once daily.  0    atorvastatin (LIPITOR) 40 MG tablet Take 1 tablet (40 mg total) by mouth once daily. 90 tablet 3    baclofen (LIORESAL) 10 MG tablet Take 1 tablet (10 mg total) by mouth 3 (three) times daily as needed. 90 tablet 11    celecoxib (CELEBREX) 100 MG capsule TAKE 1 CAPSULE TWICE DAILY 180 capsule 1    gabapentin (NEURONTIN) 300 MG capsule Take 1 capsule (300 mg total) by mouth 2 (two) times daily. 60 capsule 11    losartan (COZAAR) 50 MG tablet Take 1 tablet (50 mg total) by mouth once daily. 90 tablet 3    multivit-min/iron/folic/lutein (CENTRUM SILVER WOMEN ORAL) Take 1 tablet by mouth once daily.      omeprazole (PRILOSEC) 40 MG capsule Take 1 capsule (40 mg total) by mouth once daily. 30 capsule 11     "vitamin D (VITAMIN D3) 1000 units Tab Take 1,000 Units by mouth once daily.      amoxicillin-clavulanate 875-125mg (AUGMENTIN) 875-125 mg per tablet Take 1 tablet by mouth every 12 (twelve) hours. for 10 days 20 tablet 0    azelastine (ASTELIN) 137 mcg (0.1 %) nasal spray 1 spray (137 mcg total) by Nasal route 2 (two) times daily. 30 mL 3    calcium carbonate-vit D3-min (CALTRATE 600+D PLUS MINERALS) 600 mg calcium- 400 unit Tab Take 1 tablet by mouth once daily. 60 tablet 11    DULoxetine (CYMBALTA) 30 MG capsule Take 1 capsule (30 mg total) by mouth once daily. 30 capsule 11    ferrous gluconate (FERGON) 325 MG Tab Take 1 tablet (325 mg total) by mouth daily with breakfast.  0     No facility-administered encounter medications on file as of 4/24/2019.      Last 3 sets of Vitals  Vitals - 1 value per visit 3/6/2019 4/4/2019 4/24/2019   SYSTOLIC 160 170 136   DIASTOLIC 80 75 72   PULSE 69 72 77   TEMPERATURE - 97.8 98.6   RESPIRATIONS - 17 -   SPO2 - 99 99   Weight (lb) 130.29 - 132.5   Weight (kg) 59.1 - 60.1   HEIGHT 5' 0" - 5' 0"   BODY MASS INDEX 25.45 - 25.88   VISIT REPORT - - -   Pain Score  8 - 3   Some recent data might be hidden         Objective:      Physical Exam   Constitutional: She is oriented to person, place, and time. She appears well-developed and well-nourished. No distress.   HENT:   Head: Normocephalic and atraumatic.   Right Ear: External ear normal.   Left Ear: External ear normal.   Nose: Mucosal edema and rhinorrhea present. Right sinus exhibits maxillary sinus tenderness and frontal sinus tenderness. Left sinus exhibits maxillary sinus tenderness and frontal sinus tenderness.   Mouth/Throat: Uvula is midline, oropharynx is clear and moist and mucous membranes are normal. No tonsillar exudate.   Purulent rhinorrhea adherent to nares     Eyes: Pupils are equal, round, and reactive to light. Conjunctivae are normal. Right eye exhibits no discharge. Left eye exhibits no discharge. "   Neck: Normal range of motion. Neck supple.   Cardiovascular: Normal rate, regular rhythm, normal heart sounds and intact distal pulses.   Pulmonary/Chest: Effort normal and breath sounds normal. No stridor. No respiratory distress. She has no wheezes. She has no rales.   Abdominal: Soft.   Lymphadenopathy:     She has no cervical adenopathy.   Neurological: She is alert and oriented to person, place, and time.   Skin: Skin is warm and dry. Capillary refill takes less than 2 seconds. No rash noted. She is not diaphoretic. No erythema. No pallor.   Psychiatric: She has a normal mood and affect. Her behavior is normal. Judgment and thought content normal.   Nursing note and vitals reviewed.          Lab Results   Component Value Date    WBC 7.91 04/04/2019    RBC 4.11 04/04/2019    HGB 9.5 (L) 04/04/2019    HCT 31.1 (L) 04/04/2019    MCV 76 (L) 04/04/2019    MCH 23.1 (L) 04/04/2019    MCHC 30.5 (L) 04/04/2019    RDW 16.9 (H) 04/04/2019     04/04/2019    MPV 12.6 04/04/2019    GRAN 4.3 04/04/2019    GRAN 53.9 04/04/2019    LYMPH 2.4 04/04/2019    LYMPH 30.2 04/04/2019    MONO 0.9 04/04/2019    MONO 11.5 04/04/2019    EOS 0.3 04/04/2019    BASO 0.05 04/04/2019    EOSINOPHIL 3.4 04/04/2019    BASOPHIL 0.6 04/04/2019     Lab Results   Component Value Date    WBC 7.91 04/04/2019    HGB 9.5 (L) 04/04/2019    HCT 31.1 (L) 04/04/2019     04/04/2019    CHOL 189 11/07/2018    TRIG 53 11/07/2018    HDL 66 11/07/2018    ALT 17 04/04/2019    AST 24 04/04/2019     04/04/2019    K 4.0 04/04/2019     04/04/2019    CREATININE 0.8 04/04/2019    BUN 19 04/04/2019    CO2 24 04/04/2019    TSH 1.485 02/14/2014    INR 0.9 03/29/2016    HGBA1C 6.4 (H) 02/22/2019       Assessment:       1. Acute non-recurrent maxillary sinusitis        Plan:           Krysta was seen today for otalgia, sore throat and cough.    Diagnoses and all orders for this visit:    Acute non-recurrent maxillary sinusitis  -      amoxicillin-clavulanate 875-125mg (AUGMENTIN) 875-125 mg per tablet; Take 1 tablet by mouth every 12 (twelve) hours. for 10 days  -     azelastine (ASTELIN) 137 mcg (0.1 %) nasal spray; 1 spray (137 mcg total) by Nasal route 2 (two) times daily.      Patient Instructions   Take antibiotics with food twice a day for 10 days    Eat yogurt    Use the nasal spray as directed      Acute Sinusitis    Acute sinusitis is irritation and swelling of the sinuses. It is usually caused by a viral infection after a common cold. Your doctor can help you find relief.  What is acute sinusitis?  Sinuses are air-filled spaces in the skull behind the face. They are kept moist and clean by a lining of mucosa. Things such as pollen, smoke, and chemical fumes can irritate the mucosa. It can then swell up. As a response to irritation, the mucosa makes more mucus and other fluids. Tiny hairlike cilia cover the mucosa. Cilia help carry mucus toward the opening of the sinus. Too much mucus may cause the cilia to stop working. This blocks the sinus opening. A buildup of fluid in the sinuses then causes pain and pressure. It can also encourage bacteria to grow in the sinuses.  Common symptoms of acute sinusitis  You may have:  · Facial soreness pain  · Headache  · Fever  · Fluid draining in the back of the throat (postnasal drip)  · Congestion  · Drainage that is thick and colored, instead of clear  · Cough  Diagnosing acute sinusitis  Your doctor will ask about your symptoms and health history. He or she will look at your ear, nose, and throat. You usually won't need to have X-rays taken.    The doctor may take a sample of mucus to check for bacteria. If you have sinusitis that keeps coming back, you may need imaging tests such as X-rays or CAT scans. This will help your doctor check for a structural problem that may be causing the infection.  Treating acute sinusitis  Treatment is aimed at unblocking the sinus opening and helping the cilia  work again. You may need to take antihistamine and decongestant medicine. These can reduce inflammation and decrease the amount of fluid your sinuses make. If you have a bacterial infection, you will need to take antibiotic medicine for 10 to 14 days. Take this medicine until it is gone, even if you feel better.  Date Last Reviewed: 10/1/2016  © 2004-3350 [employer], [employer]. [address] [place] [address], [place] [address]. All rights reserved. This information is not intended as a substitute for professional medical care. Always follow your healthcare professional's instructions.

## 2019-04-24 NOTE — PATIENT INSTRUCTIONS
Take antibiotics with food twice a day for 10 days    Eat yogurt    Use the nasal spray as directed      Acute Sinusitis    Acute sinusitis is irritation and swelling of the sinuses. It is usually caused by a viral infection after a common cold. Your doctor can help you find relief.  What is acute sinusitis?  Sinuses are air-filled spaces in the skull behind the face. They are kept moist and clean by a lining of mucosa. Things such as pollen, smoke, and chemical fumes can irritate the mucosa. It can then swell up. As a response to irritation, the mucosa makes more mucus and other fluids. Tiny hairlike cilia cover the mucosa. Cilia help carry mucus toward the opening of the sinus. Too much mucus may cause the cilia to stop working. This blocks the sinus opening. A buildup of fluid in the sinuses then causes pain and pressure. It can also encourage bacteria to grow in the sinuses.  Common symptoms of acute sinusitis  You may have:  · Facial soreness pain  · Headache  · Fever  · Fluid draining in the back of the throat (postnasal drip)  · Congestion  · Drainage that is thick and colored, instead of clear  · Cough  Diagnosing acute sinusitis  Your doctor will ask about your symptoms and health history. He or she will look at your ear, nose, and throat. You usually won't need to have X-rays taken.    The doctor may take a sample of mucus to check for bacteria. If you have sinusitis that keeps coming back, you may need imaging tests such as X-rays or CAT scans. This will help your doctor check for a structural problem that may be causing the infection.  Treating acute sinusitis  Treatment is aimed at unblocking the sinus opening and helping the cilia work again. You may need to take antihistamine and decongestant medicine. These can reduce inflammation and decrease the amount of fluid your sinuses make. If you have a bacterial infection, you will need to take antibiotic medicine for 10 to 14 days. Take this medicine until  it is gone, even if you feel better.  Date Last Reviewed: 10/1/2016  © 2533-2940 [employer], [employer]. [address] [place] [address], [place] [address]. All rights reserved. This information is not intended as a substitute for professional medical care. Always follow your healthcare professional's instructions.

## 2019-06-13 ENCOUNTER — LAB VISIT (OUTPATIENT)
Dept: LAB | Facility: HOSPITAL | Age: 74
End: 2019-06-13
Attending: INTERNAL MEDICINE
Payer: MEDICARE

## 2019-06-13 ENCOUNTER — TELEPHONE (OUTPATIENT)
Dept: INTERNAL MEDICINE | Facility: CLINIC | Age: 74
End: 2019-06-13

## 2019-06-13 ENCOUNTER — OFFICE VISIT (OUTPATIENT)
Dept: INTERNAL MEDICINE | Facility: CLINIC | Age: 74
End: 2019-06-13
Payer: MEDICARE

## 2019-06-13 DIAGNOSIS — K55.9 ISCHEMIC COLITIS: ICD-10-CM

## 2019-06-13 DIAGNOSIS — D50.9 IRON DEFICIENCY ANEMIA, UNSPECIFIED IRON DEFICIENCY ANEMIA TYPE: ICD-10-CM

## 2019-06-13 DIAGNOSIS — E11.9 DIABETES MELLITUS WITHOUT COMPLICATION: ICD-10-CM

## 2019-06-13 DIAGNOSIS — I10 ESSENTIAL HYPERTENSION: ICD-10-CM

## 2019-06-13 DIAGNOSIS — D64.9 ANEMIA, UNSPECIFIED TYPE: ICD-10-CM

## 2019-06-13 DIAGNOSIS — M79.7 FIBROMYALGIA: Primary | ICD-10-CM

## 2019-06-13 DIAGNOSIS — E78.00 HIGH CHOLESTEROL: ICD-10-CM

## 2019-06-13 DIAGNOSIS — F41.9 ANXIETY: ICD-10-CM

## 2019-06-13 LAB
ACANTHOCYTES BLD QL SMEAR: PRESENT
ALBUMIN SERPL BCP-MCNC: 4 G/DL (ref 3.5–5.2)
ALP SERPL-CCNC: 73 U/L (ref 55–135)
ALT SERPL W/O P-5'-P-CCNC: 18 U/L (ref 10–44)
ANION GAP SERPL CALC-SCNC: 12 MMOL/L (ref 8–16)
ANISOCYTOSIS BLD QL SMEAR: SLIGHT
AST SERPL-CCNC: 26 U/L (ref 10–40)
BASOPHILS # BLD AUTO: 0.05 K/UL (ref 0–0.2)
BASOPHILS NFR BLD: 0.8 % (ref 0–1.9)
BILIRUB SERPL-MCNC: 0.5 MG/DL (ref 0.1–1)
BUN SERPL-MCNC: 13 MG/DL (ref 8–23)
CALCIUM SERPL-MCNC: 9.7 MG/DL (ref 8.7–10.5)
CHLORIDE SERPL-SCNC: 104 MMOL/L (ref 95–110)
CHOLEST SERPL-MCNC: 178 MG/DL (ref 120–199)
CHOLEST/HDLC SERPL: 2.3 {RATIO} (ref 2–5)
CO2 SERPL-SCNC: 24 MMOL/L (ref 23–29)
CREAT SERPL-MCNC: 0.8 MG/DL (ref 0.5–1.4)
DIFFERENTIAL METHOD: ABNORMAL
EOSINOPHIL # BLD AUTO: 0.2 K/UL (ref 0–0.5)
EOSINOPHIL NFR BLD: 2.4 % (ref 0–8)
ERYTHROCYTE [DISTWIDTH] IN BLOOD BY AUTOMATED COUNT: 16.9 % (ref 11.5–14.5)
EST. GFR  (AFRICAN AMERICAN): >60 ML/MIN/1.73 M^2
EST. GFR  (NON AFRICAN AMERICAN): >60 ML/MIN/1.73 M^2
ESTIMATED AVG GLUCOSE: 120 MG/DL (ref 68–131)
GIANT PLATELETS BLD QL SMEAR: PRESENT
GLUCOSE SERPL-MCNC: 95 MG/DL (ref 70–110)
HBA1C MFR BLD HPLC: 5.8 % (ref 4–5.6)
HCT VFR BLD AUTO: 29 % (ref 37–48.5)
HDLC SERPL-MCNC: 77 MG/DL (ref 40–75)
HDLC SERPL: 43.3 % (ref 20–50)
HGB BLD-MCNC: 8.2 G/DL (ref 12–16)
HYPOCHROMIA BLD QL SMEAR: ABNORMAL
LDLC SERPL CALC-MCNC: 91.2 MG/DL (ref 63–159)
LYMPHOCYTES # BLD AUTO: 2 K/UL (ref 1–4.8)
LYMPHOCYTES NFR BLD: 31.7 % (ref 18–48)
MCH RBC QN AUTO: 18.8 PG (ref 27–31)
MCHC RBC AUTO-ENTMCNC: 28.3 G/DL (ref 32–36)
MCV RBC AUTO: 66 FL (ref 82–98)
MONOCYTES # BLD AUTO: 0.7 K/UL (ref 0.3–1)
MONOCYTES NFR BLD: 11 % (ref 4–15)
NEUTROPHILS # BLD AUTO: 3.3 K/UL (ref 1.8–7.7)
NEUTROPHILS NFR BLD: 54.1 % (ref 38–73)
NONHDLC SERPL-MCNC: 101 MG/DL
PLATELET # BLD AUTO: 304 K/UL (ref 150–350)
PLATELET BLD QL SMEAR: ABNORMAL
PMV BLD AUTO: 11.6 FL (ref 9.2–12.9)
POIKILOCYTOSIS BLD QL SMEAR: SLIGHT
POLYCHROMASIA BLD QL SMEAR: ABNORMAL
POTASSIUM SERPL-SCNC: 4.5 MMOL/L (ref 3.5–5.1)
PROT SERPL-MCNC: 7.6 G/DL (ref 6–8.4)
RBC # BLD AUTO: 4.37 M/UL (ref 4–5.4)
SCHISTOCYTES BLD QL SMEAR: ABNORMAL
SODIUM SERPL-SCNC: 140 MMOL/L (ref 136–145)
TARGETS BLD QL SMEAR: ABNORMAL
TRIGL SERPL-MCNC: 49 MG/DL (ref 30–150)
WBC # BLD AUTO: 6.18 K/UL (ref 3.9–12.7)

## 2019-06-13 PROCEDURE — 99499 RISK ADDL DX/OHS AUDIT: ICD-10-PCS | Mod: HCNC,S$GLB,, | Performed by: INTERNAL MEDICINE

## 2019-06-13 PROCEDURE — 3044F PR MOST RECENT HEMOGLOBIN A1C LEVEL <7.0%: ICD-10-PCS | Mod: HCNC,CPTII,S$GLB, | Performed by: INTERNAL MEDICINE

## 2019-06-13 PROCEDURE — 3077F PR MOST RECENT SYSTOLIC BLOOD PRESSURE >= 140 MM HG: ICD-10-PCS | Mod: HCNC,CPTII,S$GLB, | Performed by: INTERNAL MEDICINE

## 2019-06-13 PROCEDURE — 85025 COMPLETE CBC W/AUTO DIFF WBC: CPT | Mod: HCNC

## 2019-06-13 PROCEDURE — 1101F PR PT FALLS ASSESS DOC 0-1 FALLS W/OUT INJ PAST YR: ICD-10-PCS | Mod: HCNC,CPTII,S$GLB, | Performed by: INTERNAL MEDICINE

## 2019-06-13 PROCEDURE — 99999 PR PBB SHADOW E&M-EST. PATIENT-LVL V: ICD-10-PCS | Mod: PBBFAC,HCNC,, | Performed by: INTERNAL MEDICINE

## 2019-06-13 PROCEDURE — 1101F PT FALLS ASSESS-DOCD LE1/YR: CPT | Mod: HCNC,CPTII,S$GLB, | Performed by: INTERNAL MEDICINE

## 2019-06-13 PROCEDURE — 99499 UNLISTED E&M SERVICE: CPT | Mod: HCNC,S$GLB,, | Performed by: INTERNAL MEDICINE

## 2019-06-13 PROCEDURE — 80053 COMPREHEN METABOLIC PANEL: CPT | Mod: HCNC

## 2019-06-13 PROCEDURE — 83036 HEMOGLOBIN GLYCOSYLATED A1C: CPT | Mod: HCNC

## 2019-06-13 PROCEDURE — 99999 PR PBB SHADOW E&M-EST. PATIENT-LVL V: CPT | Mod: PBBFAC,HCNC,, | Performed by: INTERNAL MEDICINE

## 2019-06-13 PROCEDURE — 3044F HG A1C LEVEL LT 7.0%: CPT | Mod: HCNC,CPTII,S$GLB, | Performed by: INTERNAL MEDICINE

## 2019-06-13 PROCEDURE — 99214 OFFICE O/P EST MOD 30 MIN: CPT | Mod: HCNC,S$GLB,, | Performed by: INTERNAL MEDICINE

## 2019-06-13 PROCEDURE — 80061 LIPID PANEL: CPT | Mod: HCNC

## 2019-06-13 PROCEDURE — 36415 COLL VENOUS BLD VENIPUNCTURE: CPT | Mod: HCNC

## 2019-06-13 PROCEDURE — 99214 PR OFFICE/OUTPT VISIT, EST, LEVL IV, 30-39 MIN: ICD-10-PCS | Mod: HCNC,S$GLB,, | Performed by: INTERNAL MEDICINE

## 2019-06-13 PROCEDURE — 3079F PR MOST RECENT DIASTOLIC BLOOD PRESSURE 80-89 MM HG: ICD-10-PCS | Mod: HCNC,CPTII,S$GLB, | Performed by: INTERNAL MEDICINE

## 2019-06-13 PROCEDURE — 3077F SYST BP >= 140 MM HG: CPT | Mod: HCNC,CPTII,S$GLB, | Performed by: INTERNAL MEDICINE

## 2019-06-13 PROCEDURE — 3079F DIAST BP 80-89 MM HG: CPT | Mod: HCNC,CPTII,S$GLB, | Performed by: INTERNAL MEDICINE

## 2019-06-13 RX ORDER — LOSARTAN POTASSIUM 100 MG/1
100 TABLET ORAL DAILY
Qty: 90 TABLET | Refills: 3 | Status: SHIPPED | OUTPATIENT
Start: 2019-06-13 | End: 2020-06-15

## 2019-06-13 RX ORDER — LOSARTAN POTASSIUM 25 MG/1
100 TABLET ORAL DAILY
Qty: 90 TABLET | Refills: 3 | Status: SHIPPED | OUTPATIENT
Start: 2019-06-13 | End: 2019-06-13

## 2019-06-13 RX ORDER — FERROUS SULFATE 324(65)MG
325 TABLET, DELAYED RELEASE (ENTERIC COATED) ORAL DAILY
Qty: 90 TABLET | Refills: 2 | Status: SHIPPED | OUTPATIENT
Start: 2019-06-13 | End: 2019-08-22

## 2019-06-13 RX ORDER — ESCITALOPRAM OXALATE 10 MG/1
10 TABLET ORAL DAILY
Qty: 90 TABLET | Refills: 3 | Status: SHIPPED | OUTPATIENT
Start: 2019-06-13 | End: 2020-08-05

## 2019-06-13 NOTE — TELEPHONE ENCOUNTER
----- Message from Uli Allen sent at 6/13/2019 12:10 PM CDT -----  Contact: Mary Mortensen [phone]  Pharmacy is calling to clarify an RX.  RX name:  losartan (COZAAR) 25 MG tablet  What do they need to clarify:  Pharmacy stating the Rx is available in 100 MG tablets, wants to know if ok to prescribed, because insurance will not covered 25 MG at four times per day.  Comments: Mary Mortensen [phone]    Please call an advise  Thank you

## 2019-06-23 ENCOUNTER — TELEPHONE (OUTPATIENT)
Dept: INTERNAL MEDICINE | Facility: CLINIC | Age: 74
End: 2019-06-23

## 2019-06-23 VITALS
DIASTOLIC BLOOD PRESSURE: 84 MMHG | OXYGEN SATURATION: 99 % | HEART RATE: 65 BPM | WEIGHT: 133.19 LBS | BODY MASS INDEX: 26.15 KG/M2 | HEIGHT: 60 IN | SYSTOLIC BLOOD PRESSURE: 150 MMHG

## 2019-06-23 DIAGNOSIS — M25.569 KNEE PAIN, UNSPECIFIED CHRONICITY, UNSPECIFIED LATERALITY: Primary | ICD-10-CM

## 2019-06-23 NOTE — TELEPHONE ENCOUNTER
Please call-- anemia is little worse-- stop the clebrex. -- also called Celcoxib.    HOw is she feeling?

## 2019-06-23 NOTE — PROGRESS NOTES
HISTORY OF PRESENT ILLNESS:  A 73-year-old Ms. Krysta Kam comes in today to   follow up her ongoing medical problems and also she has some tinnitus today that   has been bothering her.  She has a history of hyperlipidemia, reports that she   has had for many years.  She is taking atorvastatin and tolerating it well.  Her   last lipid panel was done after this visit.  Cholesterol 170, HDL 77, LDL is   91.  She has diabetes mellitus type 2, which she has had for multiple years.    Most recent hemoglobin A1c is 5.8.  She denies any polyuria or polydipsia.  She   is not taking any diabetic medication.  Next, she has hypertension, which she   has had for quite a while.  Blood pressure back at her last visit with me in   March was 130/80.  Blood pressure today is 170/80.  She did not take her   amlodipine this morning.  She normally takes amlodipine and losartan for her   hypertension.  Recheck blood pressure is better at 150/84, but like I said she   did not take her blood pressure medicine this morning, but has been better   controlled, otherwise.  Next, she has peripheral neuropathy, for which she takes   gabapentin, also osteoarthritis of the knee.  She has both fibromyalgia and   TMJ.  For the fibromyalgia, she takes baclofen.  She has some back pain when she   bends over.  She had been on Feldene for anti-inflammatory.  Next, she has   anemia.  Recent H and H was 9.5, 31, the MCV going at 76, after this visit 8.2   and 29 with the MCV being 66.  Lastly, she complains a lot of anxiety.  She   thinks the tinnitus maybe is perfect for her anxiety.  She denies any suicidal   or homicidal thoughts or ideation, but she has been having a lot of stress.  The   main thing is her mom.  Today, she is crying quite a bit.  Her mom is a sweet   elderly lady that unfortunately has dementia that has been causing quite of     at home.  Eddy is trying to take care of her mom by herself.  So today, we   spent a lot of time  discussing her current state of health and also it is   probably time for her mom to come on into a nursing type of situation and just   the type of nature her mom has turned to 90s, stop picking grove.  She is not   eating very well.  So today, we discussed a lot about her anxiety and she is   quite tearful.  Viewing her records, she has history of ischemic colitis, has   not really been following up with GI recently.  She did not really have a crampy   abdominal pain occasionally, but nothing recently.    PHYSICAL EXAMINATION:  GENERAL:  She is a well-appearing, tearful female.  VITAL SIGNS:  Her blood pressure as above.  NECK:  Supple.  She has no JVD.  Thyroid is not enlarged.  CARDIOVASCULAR:  S1 and S2, regular rate and rhythm without murmur, gallop or   rub.  LUNGS:  Clear.  LOWER EXTREMITIES:  She has no edema.  Abdominal exam is unremarkable.  She is   very tearful.  HEENT:  Ear canals are open.  TMs clear.  Hearing is grossly normal.    ASSESSMENT:  1. Iron deficiency anemia with history of ischemic colitis, not really having   whole bunch of symptoms right now except for some crampy abdominal pain.  She is   past due to follow up with GI, so we will get that setup.  We are going to work   to keep her off nonsteroidals and due to her drop in H and H, we are going to   start her on some iron.  2. Hypertension, very stressed right now.  I am not going to increase her   medications, which has been better controlled.  I am going to follow her up next   month.  3. Diabetes mellitus type 2, well controlled.  4. Hyperlipidemia, also well controlled.  5. For her anxiety, we are also going to start her on low citalopram and I will   follow her up again next month.        TAWNY/KAYLA  dd: 06/23/2019 11:07:51 (CDT)  td: 06/23/2019 23:20:24 (CDT)  Doc ID   #[id number]  Job ID #[id number]    CC:

## 2019-06-24 NOTE — TELEPHONE ENCOUNTER
-She can you give her something else because that helps with her knee.   -Also it helps with her ears.  -She's been having some voices in her head that's about it.

## 2019-06-25 NOTE — TELEPHONE ENCOUNTER
Until she sees Gi and her anemia looks better-- she can take tylenol.  Also, I will pt referral in for her to see [facility]- maybe they can help gher with and injection

## 2019-07-02 ENCOUNTER — TELEPHONE (OUTPATIENT)
Dept: ORTHOPEDICS | Facility: CLINIC | Age: 74
End: 2019-07-02

## 2019-07-02 DIAGNOSIS — M25.561 RIGHT KNEE PAIN, UNSPECIFIED CHRONICITY: Primary | ICD-10-CM

## 2019-07-02 NOTE — TELEPHONE ENCOUNTER
Spoke with pt to see what she was coming in for on tomorrow. She said its her R knee I asked if she can come in 30 min earlier to get x rays. Pt verbalized understanding.

## 2019-07-03 ENCOUNTER — OFFICE VISIT (OUTPATIENT)
Dept: ORTHOPEDICS | Facility: CLINIC | Age: 74
End: 2019-07-03
Payer: MEDICARE

## 2019-07-03 ENCOUNTER — HOSPITAL ENCOUNTER (OUTPATIENT)
Dept: RADIOLOGY | Facility: HOSPITAL | Age: 74
Discharge: HOME | End: 2019-07-03
Attending: NURSE PRACTITIONER
Payer: MEDICARE

## 2019-07-03 VITALS
SYSTOLIC BLOOD PRESSURE: 153 MMHG | BODY MASS INDEX: 23.4 KG/M2 | DIASTOLIC BLOOD PRESSURE: 72 MMHG | HEIGHT: 60 IN | WEIGHT: 119.19 LBS | HEART RATE: 74 BPM

## 2019-07-03 DIAGNOSIS — M17.11 PRIMARY OSTEOARTHRITIS OF RIGHT KNEE: Primary | ICD-10-CM

## 2019-07-03 DIAGNOSIS — M25.561 RIGHT KNEE PAIN, UNSPECIFIED CHRONICITY: ICD-10-CM

## 2019-07-03 PROCEDURE — 99214 PR OFFICE/OUTPT VISIT, EST, LEVL IV, 30-39 MIN: ICD-10-PCS | Mod: HCNC,25,S$GLB, | Performed by: NURSE PRACTITIONER

## 2019-07-03 PROCEDURE — 3077F SYST BP >= 140 MM HG: CPT | Mod: HCNC,CPTII,S$GLB, | Performed by: NURSE PRACTITIONER

## 2019-07-03 PROCEDURE — 1101F PT FALLS ASSESS-DOCD LE1/YR: CPT | Mod: HCNC,CPTII,S$GLB, | Performed by: NURSE PRACTITIONER

## 2019-07-03 PROCEDURE — 3078F PR MOST RECENT DIASTOLIC BLOOD PRESSURE < 80 MM HG: ICD-10-PCS | Mod: HCNC,CPTII,S$GLB, | Performed by: NURSE PRACTITIONER

## 2019-07-03 PROCEDURE — 73560 X-RAY EXAM OF KNEE 1 OR 2: CPT | Mod: TC,HCNC,RT

## 2019-07-03 PROCEDURE — 3077F PR MOST RECENT SYSTOLIC BLOOD PRESSURE >= 140 MM HG: ICD-10-PCS | Mod: HCNC,CPTII,S$GLB, | Performed by: NURSE PRACTITIONER

## 2019-07-03 PROCEDURE — 1101F PR PT FALLS ASSESS DOC 0-1 FALLS W/OUT INJ PAST YR: ICD-10-PCS | Mod: HCNC,CPTII,S$GLB, | Performed by: NURSE PRACTITIONER

## 2019-07-03 PROCEDURE — 20610 DRAIN/INJ JOINT/BURSA W/O US: CPT | Mod: HCNC,RT,S$GLB, | Performed by: NURSE PRACTITIONER

## 2019-07-03 PROCEDURE — 73560 X-RAY EXAM OF KNEE 1 OR 2: CPT | Mod: 26,HCNC,59,LT | Performed by: RADIOLOGY

## 2019-07-03 PROCEDURE — 99999 PR PBB SHADOW E&M-EST. PATIENT-LVL V: ICD-10-PCS | Mod: PBBFAC,HCNC,, | Performed by: NURSE PRACTITIONER

## 2019-07-03 PROCEDURE — 73560 XR KNEE ORTHO RIGHT: ICD-10-PCS | Mod: 26,HCNC,59,LT | Performed by: RADIOLOGY

## 2019-07-03 PROCEDURE — 3078F DIAST BP <80 MM HG: CPT | Mod: HCNC,CPTII,S$GLB, | Performed by: NURSE PRACTITIONER

## 2019-07-03 PROCEDURE — 99999 PR PBB SHADOW E&M-EST. PATIENT-LVL V: CPT | Mod: PBBFAC,HCNC,, | Performed by: NURSE PRACTITIONER

## 2019-07-03 PROCEDURE — 20610 PR DRAIN/INJECT LARGE JOINT/BURSA: ICD-10-PCS | Mod: HCNC,RT,S$GLB, | Performed by: NURSE PRACTITIONER

## 2019-07-03 PROCEDURE — 73562 X-RAY EXAM OF KNEE 3: CPT | Mod: TC,HCNC,RT

## 2019-07-03 PROCEDURE — 99214 OFFICE O/P EST MOD 30 MIN: CPT | Mod: HCNC,25,S$GLB, | Performed by: NURSE PRACTITIONER

## 2019-07-03 RX ORDER — TRIAMCINOLONE ACETONIDE 40 MG/ML
40 INJECTION, SUSPENSION INTRA-ARTICULAR; INTRAMUSCULAR
Status: COMPLETED | OUTPATIENT
Start: 2019-07-03 | End: 2019-07-03

## 2019-07-03 RX ADMIN — TRIAMCINOLONE ACETONIDE 40 MG: 40 INJECTION, SUSPENSION INTRA-ARTICULAR; INTRAMUSCULAR at 01:07

## 2019-07-03 NOTE — LETTER
July 3, 2019      Gold Palafox Jr., MD  [address]  [facility] [address]           [facility]  [address]  [facility] [address]  Phone: [phone]          Patient: Krysta Kam   MR Number: [id number]   YOB: 1945   Date of Visit: 7/3/2019       Dear Dr. Gold Palafox Jr.:    Thank you for referring Krysta Kam to me for evaluation. Attached you will find relevant portions of my assessment and plan of care.    If you have questions, please do not hesitate to call me. I look forward to following Krysta Kam along with you.    Sincerely,    Michi Bell NP    Enclosure  CC:  No Recipients    If you would like to receive this communication electronically, please contact [email or web address] or [phone] to request more information on [employer] access.    For providers and/or their staff who would like to refer a patient to [facility], please contact us through our one-stop-shop provider referral line, [facility], at [phone].    If you feel you have received this communication in error or would no longer like to receive these types of communications, please e-mail [email or web address]

## 2019-07-03 NOTE — PROGRESS NOTES
SUBJECTIVE:     Chief Complaint & History of Present Illness:  Krysta Kam is a New 73 y.o. year old female patient here with a history of intermittent right knee pain which started 6 months ago.  There is not a history of trauma.  The pain is located in the lateral aspect of the knee.  The pain is described as achy, 8/10.  There is not radiation.  There is catching or locking.  Aggravating factors include going up and down stairs, rising after sitting, standing and walking.  Associated symptoms include knee giving out, swelling.  There is not numbness or tingling of the lower extremity.  There is back pain. Previous treatments include ice and steroid injections which have provided good and adequate relief.  There is not a history of previous injury or surgery to the knee.  The patient does not use an assistive device.    Review of patient's allergies indicates:   Allergen Reactions    Ondansetron      Other reaction(s): Flushing (Skin)    Savella [milnacipran] Nausea Only         Current Outpatient Medications   Medication Sig Dispense Refill    amLODIPine (NORVASC) 10 MG tablet take 1 tablet by mouth once daily 90 tablet 2    atorvastatin (LIPITOR) 40 MG tablet Take 1 tablet (40 mg total) by mouth once daily. 90 tablet 3    azelastine (ASTELIN) 137 mcg (0.1 %) nasal spray 1 spray (137 mcg total) by Nasal route 2 (two) times daily. 30 mL 3    escitalopram oxalate (LEXAPRO) 10 MG tablet Take 1 tablet (10 mg total) by mouth once daily. 90 tablet 3    ferrous sulfate 324 mg (65 mg iron) TbEC Take 1 tablet (324 mg total) by mouth once daily. 90 tablet 2    gabapentin (NEURONTIN) 300 MG capsule Take 1 capsule (300 mg total) by mouth 2 (two) times daily. 60 capsule 11    losartan (COZAAR) 100 MG tablet Take 1 tablet (100 mg total) by mouth once daily. 90 tablet 3    multivit-min/iron/folic/lutein (CENTRUM SILVER WOMEN ORAL) Take 1 tablet by mouth once daily.      vitamin D (VITAMIN D3) 1000 units  Tab Take 1,000 Units by mouth once daily.      aspirin (ECOTRIN) 81 MG EC tablet Take 1 tablet (81 mg total) by mouth once daily.  0    baclofen (LIORESAL) 10 MG tablet Take 1 tablet (10 mg total) by mouth 3 (three) times daily as needed. 90 tablet 11    calcium carbonate-vit D3-min (CALTRATE 600+D PLUS MINERALS) 600 mg calcium- 400 unit Tab Take 1 tablet by mouth once daily. 60 tablet 11    celecoxib (CELEBREX) 100 MG capsule TAKE 1 CAPSULE TWICE DAILY 180 capsule 1    omeprazole (PRILOSEC) 40 MG capsule Take 1 capsule (40 mg total) by mouth once daily. 30 capsule 11     Current Facility-Administered Medications   Medication Dose Route Frequency Provider Last Rate Last Dose    triamcinolone acetonide injection 40 mg  40 mg Intra-articular 1 time in Clinic/HOD Michi Bell NP           Past Medical History:   Diagnosis Date    Anemia 6/2012    Cataract     Colon polyps 1/31/2017    Diabetes mellitus without complication 11/14/2018    Fibromyalgia     GERD (gastroesophageal reflux disease)     High cholesterol     Hypertension        Past Surgical History:   Procedure Laterality Date    COLONOSCOPY N/A 10/18/2016    Performed [facility] at [facility] (4TH FLR)    ESOPHAGOGASTRODUODENOSCOPY (EGD) N/A 10/18/2016    Performed by [facility] at [facility] (4TH FLR)    HYSTERECTOMY      SIGMOIDOSCOPY-FLEXIBLE N/A 1/31/2017    Performed by Brittni [facility]son MD at [facility] (4TH FLR)       Family History   Problem Relation Age of Onset    Hypertension Mother     Stroke Mother     Heart disease Father     Cataracts Neg Hx     Cancer Neg Hx     Diabetes Neg Hx     Glaucoma Neg Hx     Retinal detachment Neg Hx     Celiac disease Neg Hx     Cirrhosis Neg Hx     Colon cancer Neg Hx     Cystic fibrosis Neg Hx     Esophageal cancer Neg Hx     Inflammatory bowel disease Neg Hx     Liver disease Neg Hx     Stomach cancer Neg Hx     Amblyopia Neg Hx     Blindness Neg Hx      Macular degeneration Neg Hx     Strabismus Neg Hx     Thyroid disease Neg Hx          Review of Systems:  ROS:  Constitutional: no fever or chills  Eyes: no visual changes  ENT: no nasal congestion or sore throat  Respiratory: no cough or shortness of breath  Cardiovascular: no chest pain or palpitations  Gastrointestinal: no nausea or vomiting, tolerating diet  Genitourinary: no hematuria or dysuria  Integument/Breast: no rash or pruritis  Hematologic/Lymphatic: no easy bruising or lymphadenopathy  Musculoskeletal: right knee pain  Neurological: no seizures or tremors  Behavioral/Psych: no auditory or visual hallucinations  Endocrine: no heat or cold intolerance      OBJECTIVE:     PHYSICAL EXAM:  Vital Signs (Most Recent)  Vitals:    07/03/19 1042   BP: (!) 153/72   Pulse: 74     Height: 5' (152.4 cm) Weight: 54.1 kg (119 lb 2.5 oz),   General Appearance: Well nourished, well developed, in no acute distress.  HENT: Normal cephalic, oropharynx pink and moist  Eyes: PERRLA bilaterally and EOM x 4  Respiratory: Even and unlabored  Skin: Warm and Dry.   Psychiatric: AAO x 4, Mood & affect are normal.    right  Knee Exam:  Knee Range of Motion:pain with terminal flexion   Effusion:swelling and fluid felt to lateral aspect of knee  Condition of skin:intact  Location of tenderness:Lateral joint line   Strength:normal  Stability:  stable to testing, Lachman: stable, LCL: stable, MCL: stable and PCL: stable  Varus /Valgus stress:  normal  Valentine:   negative      Hip Examination:  full painless range of motion, without tenderness    RADIOGRAPHS:  X-ray of right knee obtained today, personally by me shows lateral joint space narrowing on the right.  There is no fracture or dislocation seen.  Of note, on radiology report, joint effusion is noted in the suprapatellar bursa.    ASSESSMENT/PLAN:       ICD-10-CM ICD-9-CM   1. Primary osteoarthritis of right knee M17.11 715.16       Plan: We discussed with the patient at  length all the different treatment options available for  the knee including anti-inflammatories, acetaminophen, rest, ice, knee strengthening exercise, occasional cortisone injections for temporary relief, Viscosupplimentation injections, arthroscopic debridement osteotomy, and finally knee arthroplasty.     -Patient with right knee pain x 6 months.  -X-ray negative for fracture or dislocation.  DJD in lateral component on right.  Joint effusion in suprapatellar joint region.  -Will attempt to aspirate and administer steroid today.  -Recommend Tylenol.  Cannot do NSAIDs secondary to other co-morbidity.  -Previously seen by PMR and given knee injections which have helped in the past.  Will refer her to PMR.  -Will refer to therapy, sent to [facility] on [address] per request.    PROCEDURE:  I have explained the risks, benefits, and alternatives of the procedure in detail.  The patient voices understanding and all questions have been answered.  The patient agrees to proceed as planned. So after I performed a sterile prep of the skin in the normal fashion the right knee is injected using a 22 gauge needle from the anterolateral approach with a combination of 2 cc of 1% plain lidocaine, then aspirated knee and 20 cc of clear fluid was obtained, then using same needle, I injected 4cc 1% plain lidocaine and 40 mg of Kenalog.  The knee was then wrapped in an ace wrap.  She was told to keep on for the remainder of the day and remove at bedtime, then to reapply tomorrow and keep on during day and remove at bedtime.  The patient is cautioned and immediate relief of pain is secondary to the local anesthetic and will be temporary.  After the anesthetic wears off there may be a increase in pain that may last for a few hours or a few days and they should use ice to help alleviate this flair up of pain.     Lab Results   Component Value Date    HGBA1C 5.8 (H) 06/13/2019     Lab Results   Component Value Date    HGBA1C 5.8 (H) 06/13/2019       Estimated body mass index is 23.27 kg/m² as calculated from the following:    Height as of this encounter: 5' (1.524 m).    Weight as of this encounter: 54.1 kg (119 lb 2.5 oz).      Krysta Dina Kam was advised to monitor her blood sugars closely over the next several days. The steroid may cause a rise in them. If her glucose levels rise to a point she is uncomfortable or she is unable to control them she is to contact her PCP or go immediately to the emergency department.     Patient tolerated procedure well and post injection they reported improvement in their pain.

## 2019-07-10 ENCOUNTER — PES CALL (OUTPATIENT)
Dept: ADMINISTRATIVE | Facility: CLINIC | Age: 74
End: 2019-07-10

## 2019-07-23 ENCOUNTER — TELEPHONE (OUTPATIENT)
Dept: PHYSICAL MEDICINE AND REHAB | Facility: CLINIC | Age: 74
End: 2019-07-23

## 2019-07-23 NOTE — TELEPHONE ENCOUNTER
Spoke with pt to reschedule appt.  ----- Message from Rosemary Nuno sent at 7/23/2019 10:31 AM CDT -----  Contact: pt @ [phone]  Patient calling to reschedule her appt with Dr. Goldberg, please call.

## 2019-07-25 ENCOUNTER — OFFICE VISIT (OUTPATIENT)
Dept: INTERNAL MEDICINE | Facility: CLINIC | Age: 74
End: 2019-07-25
Payer: MEDICARE

## 2019-07-25 VITALS
OXYGEN SATURATION: 99 % | BODY MASS INDEX: 25.67 KG/M2 | SYSTOLIC BLOOD PRESSURE: 130 MMHG | HEART RATE: 69 BPM | DIASTOLIC BLOOD PRESSURE: 70 MMHG | HEIGHT: 60 IN | WEIGHT: 130.75 LBS

## 2019-07-25 DIAGNOSIS — E78.00 HIGH CHOLESTEROL: ICD-10-CM

## 2019-07-25 DIAGNOSIS — E11.9 DIABETES MELLITUS WITHOUT COMPLICATION: ICD-10-CM

## 2019-07-25 DIAGNOSIS — Z78.0 POST-MENOPAUSAL: ICD-10-CM

## 2019-07-25 DIAGNOSIS — M79.7 FIBROMYALGIA: ICD-10-CM

## 2019-07-25 DIAGNOSIS — I10 ESSENTIAL HYPERTENSION: ICD-10-CM

## 2019-07-25 DIAGNOSIS — F41.9 ANXIETY: ICD-10-CM

## 2019-07-25 DIAGNOSIS — K55.9 ISCHEMIC COLITIS: ICD-10-CM

## 2019-07-25 DIAGNOSIS — D64.9 ANEMIA, UNSPECIFIED TYPE: ICD-10-CM

## 2019-07-25 DIAGNOSIS — Z12.31 ENCOUNTER FOR SCREENING MAMMOGRAM FOR BREAST CANCER: Primary | ICD-10-CM

## 2019-07-25 PROCEDURE — 99999 PR PBB SHADOW E&M-EST. PATIENT-LVL IV: CPT | Mod: PBBFAC,HCNC,, | Performed by: INTERNAL MEDICINE

## 2019-07-25 PROCEDURE — 99499 UNLISTED E&M SERVICE: CPT | Mod: HCNC,S$GLB,, | Performed by: INTERNAL MEDICINE

## 2019-07-25 PROCEDURE — 99999 PR PBB SHADOW E&M-EST. PATIENT-LVL IV: ICD-10-PCS | Mod: PBBFAC,HCNC,, | Performed by: INTERNAL MEDICINE

## 2019-07-25 PROCEDURE — 99215 PR OFFICE/OUTPT VISIT, EST, LEVL V, 40-54 MIN: ICD-10-PCS | Mod: HCNC,S$GLB,, | Performed by: INTERNAL MEDICINE

## 2019-07-25 PROCEDURE — 3075F SYST BP GE 130 - 139MM HG: CPT | Mod: HCNC,CPTII,S$GLB, | Performed by: INTERNAL MEDICINE

## 2019-07-25 PROCEDURE — 3078F PR MOST RECENT DIASTOLIC BLOOD PRESSURE < 80 MM HG: ICD-10-PCS | Mod: HCNC,CPTII,S$GLB, | Performed by: INTERNAL MEDICINE

## 2019-07-25 PROCEDURE — 99215 OFFICE O/P EST HI 40 MIN: CPT | Mod: HCNC,S$GLB,, | Performed by: INTERNAL MEDICINE

## 2019-07-25 PROCEDURE — 99499 RISK ADDL DX/OHS AUDIT: ICD-10-PCS | Mod: HCNC,S$GLB,, | Performed by: INTERNAL MEDICINE

## 2019-07-25 PROCEDURE — 3075F PR MOST RECENT SYSTOLIC BLOOD PRESS GE 130-139MM HG: ICD-10-PCS | Mod: HCNC,CPTII,S$GLB, | Performed by: INTERNAL MEDICINE

## 2019-07-25 PROCEDURE — 3044F HG A1C LEVEL LT 7.0%: CPT | Mod: HCNC,CPTII,S$GLB, | Performed by: INTERNAL MEDICINE

## 2019-07-25 PROCEDURE — 3044F PR MOST RECENT HEMOGLOBIN A1C LEVEL <7.0%: ICD-10-PCS | Mod: HCNC,CPTII,S$GLB, | Performed by: INTERNAL MEDICINE

## 2019-07-25 PROCEDURE — 1101F PR PT FALLS ASSESS DOC 0-1 FALLS W/OUT INJ PAST YR: ICD-10-PCS | Mod: HCNC,CPTII,S$GLB, | Performed by: INTERNAL MEDICINE

## 2019-07-25 PROCEDURE — 1101F PT FALLS ASSESS-DOCD LE1/YR: CPT | Mod: HCNC,CPTII,S$GLB, | Performed by: INTERNAL MEDICINE

## 2019-07-25 PROCEDURE — 3078F DIAST BP <80 MM HG: CPT | Mod: HCNC,CPTII,S$GLB, | Performed by: INTERNAL MEDICINE

## 2019-07-25 RX ORDER — DICLOFENAC SODIUM 10 MG/G
2 GEL TOPICAL DAILY
Qty: 100 G | Refills: 2 | Status: SHIPPED | OUTPATIENT
Start: 2019-07-25 | End: 2020-08-25
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
## 2019-08-05 ENCOUNTER — OFFICE VISIT (OUTPATIENT)
Dept: OPTOMETRY | Facility: CLINIC | Age: 74
End: 2019-08-05
Payer: COMMERCIAL

## 2019-08-05 DIAGNOSIS — H52.4 HYPEROPIA WITH ASTIGMATISM AND PRESBYOPIA, BILATERAL: ICD-10-CM

## 2019-08-05 DIAGNOSIS — H52.203 HYPEROPIA WITH ASTIGMATISM AND PRESBYOPIA, BILATERAL: ICD-10-CM

## 2019-08-05 DIAGNOSIS — H25.13 NUCLEAR SCLEROTIC CATARACT OF BOTH EYES: ICD-10-CM

## 2019-08-05 DIAGNOSIS — H35.033 HYPERTENSIVE RETINOPATHY OF BOTH EYES: ICD-10-CM

## 2019-08-05 DIAGNOSIS — H16.223 KERATOCONJUNCTIVITIS SICCA, NOT SPECIFIED AS SJOGREN'S, BILATERAL: Primary | ICD-10-CM

## 2019-08-05 DIAGNOSIS — H52.03 HYPEROPIA WITH ASTIGMATISM AND PRESBYOPIA, BILATERAL: ICD-10-CM

## 2019-08-05 PROCEDURE — 92015 DETERMINE REFRACTIVE STATE: CPT | Mod: HCNC,S$GLB,, | Performed by: OPTOMETRIST

## 2019-08-05 PROCEDURE — 99499 RISK ADDL DX/OHS AUDIT: ICD-10-PCS | Mod: HCNC,S$GLB,, | Performed by: OPTOMETRIST

## 2019-08-05 PROCEDURE — 99999 PR PBB SHADOW E&M-EST. PATIENT-LVL III: ICD-10-PCS | Mod: PBBFAC,HCNC,, | Performed by: OPTOMETRIST

## 2019-08-05 PROCEDURE — 99499 UNLISTED E&M SERVICE: CPT | Mod: HCNC,S$GLB,, | Performed by: OPTOMETRIST

## 2019-08-05 PROCEDURE — 92014 COMPRE OPH EXAM EST PT 1/>: CPT | Mod: HCNC,S$GLB,, | Performed by: OPTOMETRIST

## 2019-08-05 PROCEDURE — 99999 PR PBB SHADOW E&M-EST. PATIENT-LVL III: CPT | Mod: PBBFAC,HCNC,, | Performed by: OPTOMETRIST

## 2019-08-05 PROCEDURE — 92015 PR REFRACTION: ICD-10-PCS | Mod: HCNC,S$GLB,, | Performed by: OPTOMETRIST

## 2019-08-05 PROCEDURE — 92014 PR EYE EXAM, EST PATIENT,COMPREHESV: ICD-10-PCS | Mod: HCNC,S$GLB,, | Performed by: OPTOMETRIST

## 2019-08-05 RX ORDER — CYCLOSPORINE 0.5 MG/ML
1 EMULSION OPHTHALMIC 2 TIMES DAILY
Qty: 60 EACH | Refills: 12 | Status: SHIPPED | OUTPATIENT
Start: 2019-08-05 | End: 2020-09-02 | Stop reason: SDUPTHER

## 2019-08-05 NOTE — PATIENT INSTRUCTIONS
MEIBOMITIS    Your eyes look dry today. Your eyes are dry not because you're missing the watery portion of your tear film but because you're missing an oily component. The oil component keeps the tears from evaporating and provides stability to the tears.    This portion of the tears is produced by the Meibomian glands which are located just behind the base of the eyelashes. Your Meibomian glands are clogged because of a condition called Meibomitis. Meibomitis is caused by chronic inflammation inside the glands thought to be a reaction to the normal bacteria that live on your skin.     As this is a chronic condition the goal of treatment is to reduce your symptoms and the inflammation under control. The initial treatment is as follows:     - Warm Compresses: Heat a wash cloth by running it under hot water. Then hold this wash cloth over your closed eyelids and allow your eyelids to absorb the heat. After 20-30 seconds once the wash cloth cools down you'll need to heat it up again.  (An alternative heat source is a gel pack or microwavable eye mask, warmed in the microwave or in a dish of water,  wrapped with a warm wet washcloth). You want to get 10 minutes of warmth to your eyelids, so if the compress feels cool before the end of 10 minutes you should reheat it. You should consistently do this 2 times a day.     - Artificial tears: Some good Artificial tear drops to try are Systane Balance, Soothe XP, Refresh Optive, Blink, Thera Tears or Genteal. You should use these consistently 2-3 times a day more if you need them. It's important to use these when in breezy environments or when doing prolonged near work such as reading or computer. The goal is to prevent your eyes from drying rather than instilling them once your eye is already dry.     - Omega 3 Supplement: 1000 mg of good quality fish oil (labeled DHA and/or EPA).   Fish oil, flax seed oil or omega 3 supplements have found to be beneficial in Meibomitis and  dry eye syndrome. There are a lot of choices out there and not one single product has been shown to be more beneficial than others.    It usually takes 1-2 months of treatment before you'll notice a difference. However some will continue to have problems even with this therapy. If you continue to have problems please let me know.

## 2019-08-05 NOTE — PROGRESS NOTES
HPI     Ms. Krysta Kam is here for a diabetic eye exam.    Patient complains of dry and watery eyes for last 4 months. She has been   using artificial tears tid-qid with little relief (dryness returns in less   than 20 minutes). Dryness is worse in the mornings, accompanied by   headache at top of her head and feeling like there is sand in her eyes.     She c/o of blurred vision at all ranges with current correction (3yr old   bifocal). She requests refraction today.    (+)drops: artificial tears OU tid-qid  (-)flashes  (+)floaters: unchanged  (-)diplopia  (-)transient vision loss    (+)Diabetes: not taking any meds  Hemoglobin A1C       Date                     Value               Ref Range             Status           06/13/2019               5.8 (H)             4.0 - 5.6 %         Final  02/22/2019               6.4 (H)             4.0 - 5.6 %         Final  11/14/2018               6.6 (H)             4.0 - 5.6 %         Final    (+)HTN  BP Readings from Last 3 Encounters:  07/25/19 : 130/70  07/03/19 : (!) 153/72  06/23/19 : (!) 150/84    OCULAR HISTORY  Last Eye Exam 07/31/18 with Dr. Shah   (-)eye surgery   Cataracts OU  Hypertensive retinopathy OU  Dry eyes     FAMILY HISTORY  (-)Glaucoma          Last edited by Brandie Shah, OD on 8/5/2019 11:42 AM. (History)            Assessment /Plan     For exam results, see Encounter Report.    Keratoconjunctivitis sicca, not specified as Sjogren's, bilateral   No relief with artificial tears TID-QID OU. Start warm compresses and Restasis BID OU.   -     cycloSPORINE (RESTASIS) 0.05 % ophthalmic emulsion; Place 0.4 mLs (1 drop total) into both eyes 2 (two) times daily.  Dispense: 60 each; Refill: 12    Nuclear sclerotic cataract of both eyes   Mild and stable. Monitor.    Hypertensive retinopathy of both eyes   Mild and stable, likely due to previously uncontrolled HTN, but BP has been well controlled recently. Monitor yearly.     Hyperopia with  astigmatism and presbyopia, bilateral   Relatively stable OU, but slight increase in add power. New glasses prescription released, adaptation expected.    Eyeglass Final Rx     Eyeglass Final Rx       Sphere Cylinder Axis Add    Right +1.25 +0.75 005 +2.75    Left +1.00 +1.25 166 +2.75    Expiration Date:  8/5/2020                 RTC 1 year

## 2019-08-09 ENCOUNTER — HOSPITAL ENCOUNTER (OUTPATIENT)
Dept: RADIOLOGY | Facility: OTHER | Age: 74
Discharge: HOME | End: 2019-08-09
Attending: INTERNAL MEDICINE
Payer: MEDICARE

## 2019-08-09 DIAGNOSIS — Z12.31 ENCOUNTER FOR SCREENING MAMMOGRAM FOR BREAST CANCER: ICD-10-CM

## 2019-08-09 DIAGNOSIS — Z78.0 POST-MENOPAUSAL: ICD-10-CM

## 2019-08-09 PROCEDURE — 77067 SCR MAMMO BI INCL CAD: CPT | Mod: TC,HCNC

## 2019-08-09 PROCEDURE — 77067 SCR MAMMO BI INCL CAD: CPT | Mod: 26,HCNC,, | Performed by: RADIOLOGY

## 2019-08-09 PROCEDURE — 77067 MAMMO DIGITAL SCREENING BILAT WITH CAD: ICD-10-PCS | Mod: 26,HCNC,, | Performed by: RADIOLOGY

## 2019-08-09 PROCEDURE — 77080 DEXA BONE DENSITY SPINE HIP: ICD-10-PCS | Mod: 26,HCNC,, | Performed by: RADIOLOGY

## 2019-08-09 PROCEDURE — 77080 DXA BONE DENSITY AXIAL: CPT | Mod: 26,HCNC,, | Performed by: RADIOLOGY

## 2019-08-09 PROCEDURE — 77080 DXA BONE DENSITY AXIAL: CPT | Mod: TC,HCNC

## 2019-08-21 ENCOUNTER — TELEPHONE (OUTPATIENT)
Dept: GASTROENTEROLOGY | Facility: CLINIC | Age: 74
End: 2019-08-21

## 2019-08-21 NOTE — TELEPHONE ENCOUNTER
MA contacted pt to confirm appointment on 8/21 at 1 pm. Pt confirmed and wanted to know did she have to fast for her appointment tomorrow. MA told the pt she did not have to fast for her appointment .

## 2019-08-22 ENCOUNTER — OFFICE VISIT (OUTPATIENT)
Dept: GASTROENTEROLOGY | Facility: CLINIC | Age: 74
End: 2019-08-22
Payer: MEDICARE

## 2019-08-22 ENCOUNTER — TELEPHONE (OUTPATIENT)
Dept: GASTROENTEROLOGY | Facility: CLINIC | Age: 74
End: 2019-08-22

## 2019-08-22 ENCOUNTER — LAB VISIT (OUTPATIENT)
Dept: LAB | Facility: HOSPITAL | Age: 74
End: 2019-08-22
Payer: MEDICARE

## 2019-08-22 VITALS
HEIGHT: 60 IN | WEIGHT: 128.5 LBS | SYSTOLIC BLOOD PRESSURE: 179 MMHG | HEART RATE: 74 BPM | BODY MASS INDEX: 25.23 KG/M2 | DIASTOLIC BLOOD PRESSURE: 84 MMHG

## 2019-08-22 DIAGNOSIS — D50.9 IRON DEFICIENCY ANEMIA, UNSPECIFIED IRON DEFICIENCY ANEMIA TYPE: ICD-10-CM

## 2019-08-22 DIAGNOSIS — D50.9 IRON DEFICIENCY ANEMIA, UNSPECIFIED IRON DEFICIENCY ANEMIA TYPE: Primary | ICD-10-CM

## 2019-08-22 DIAGNOSIS — K59.00 CONSTIPATION, UNSPECIFIED CONSTIPATION TYPE: ICD-10-CM

## 2019-08-22 LAB
BASOPHILS # BLD AUTO: 0.04 K/UL (ref 0–0.2)
BASOPHILS NFR BLD: 0.6 % (ref 0–1.9)
DIFFERENTIAL METHOD: ABNORMAL
EOSINOPHIL # BLD AUTO: 0.1 K/UL (ref 0–0.5)
EOSINOPHIL NFR BLD: 1.5 % (ref 0–8)
ERYTHROCYTE [DISTWIDTH] IN BLOOD BY AUTOMATED COUNT: 26.5 % (ref 11.5–14.5)
FERRITIN SERPL-MCNC: 34 NG/ML (ref 20–300)
HCT VFR BLD AUTO: 39.1 % (ref 37–48.5)
HGB BLD-MCNC: 11.4 G/DL (ref 12–16)
IMM GRANULOCYTES # BLD AUTO: 0.02 K/UL (ref 0–0.04)
IMM GRANULOCYTES NFR BLD AUTO: 0.3 % (ref 0–0.5)
IRON SERPL-MCNC: 36 UG/DL (ref 30–160)
LYMPHOCYTES # BLD AUTO: 1.5 K/UL (ref 1–4.8)
LYMPHOCYTES NFR BLD: 21.4 % (ref 18–48)
MCH RBC QN AUTO: 21.6 PG (ref 27–31)
MCHC RBC AUTO-ENTMCNC: 29.2 G/DL (ref 32–36)
MCV RBC AUTO: 74 FL (ref 82–98)
MONOCYTES # BLD AUTO: 0.6 K/UL (ref 0.3–1)
MONOCYTES NFR BLD: 8.5 % (ref 4–15)
NEUTROPHILS # BLD AUTO: 4.9 K/UL (ref 1.8–7.7)
NEUTROPHILS NFR BLD: 67.7 % (ref 38–73)
NRBC BLD-RTO: 0 /100 WBC
PLATELET # BLD AUTO: 193 K/UL (ref 150–350)
PMV BLD AUTO: ABNORMAL FL (ref 9.2–12.9)
RBC # BLD AUTO: 5.29 M/UL (ref 4–5.4)
SATURATED IRON: 8 % (ref 20–50)
TOTAL IRON BINDING CAPACITY: 459 UG/DL (ref 250–450)
TRANSFERRIN SERPL-MCNC: 310 MG/DL (ref 200–375)
WBC # BLD AUTO: 7.16 K/UL (ref 3.9–12.7)

## 2019-08-22 PROCEDURE — 99214 OFFICE O/P EST MOD 30 MIN: CPT | Mod: HCNC,S$GLB,, | Performed by: NURSE PRACTITIONER

## 2019-08-22 PROCEDURE — 82728 ASSAY OF FERRITIN: CPT | Mod: HCNC

## 2019-08-22 PROCEDURE — 1101F PR PT FALLS ASSESS DOC 0-1 FALLS W/OUT INJ PAST YR: ICD-10-PCS | Mod: HCNC,CPTII,S$GLB, | Performed by: NURSE PRACTITIONER

## 2019-08-22 PROCEDURE — 83540 ASSAY OF IRON: CPT | Mod: HCNC

## 2019-08-22 PROCEDURE — 83516 IMMUNOASSAY NONANTIBODY: CPT | Mod: 59,HCNC

## 2019-08-22 PROCEDURE — 3079F PR MOST RECENT DIASTOLIC BLOOD PRESSURE 80-89 MM HG: ICD-10-PCS | Mod: HCNC,CPTII,S$GLB, | Performed by: NURSE PRACTITIONER

## 2019-08-22 PROCEDURE — 85025 COMPLETE CBC W/AUTO DIFF WBC: CPT | Mod: HCNC

## 2019-08-22 PROCEDURE — 3079F DIAST BP 80-89 MM HG: CPT | Mod: HCNC,CPTII,S$GLB, | Performed by: NURSE PRACTITIONER

## 2019-08-22 PROCEDURE — 36415 COLL VENOUS BLD VENIPUNCTURE: CPT | Mod: HCNC

## 2019-08-22 PROCEDURE — 99999 PR PBB SHADOW E&M-EST. PATIENT-LVL III: CPT | Mod: PBBFAC,HCNC,, | Performed by: NURSE PRACTITIONER

## 2019-08-22 PROCEDURE — 3077F PR MOST RECENT SYSTOLIC BLOOD PRESSURE >= 140 MM HG: ICD-10-PCS | Mod: HCNC,CPTII,S$GLB, | Performed by: NURSE PRACTITIONER

## 2019-08-22 PROCEDURE — 99999 PR PBB SHADOW E&M-EST. PATIENT-LVL III: ICD-10-PCS | Mod: PBBFAC,HCNC,, | Performed by: NURSE PRACTITIONER

## 2019-08-22 PROCEDURE — 99214 PR OFFICE/OUTPT VISIT, EST, LEVL IV, 30-39 MIN: ICD-10-PCS | Mod: HCNC,S$GLB,, | Performed by: NURSE PRACTITIONER

## 2019-08-22 PROCEDURE — 86677 HELICOBACTER PYLORI ANTIBODY: CPT | Mod: HCNC

## 2019-08-22 PROCEDURE — 3077F SYST BP >= 140 MM HG: CPT | Mod: HCNC,CPTII,S$GLB, | Performed by: NURSE PRACTITIONER

## 2019-08-22 PROCEDURE — 1101F PT FALLS ASSESS-DOCD LE1/YR: CPT | Mod: HCNC,CPTII,S$GLB, | Performed by: NURSE PRACTITIONER

## 2019-08-22 RX ORDER — POLYETHYLENE GLYCOL 3350 17 G/17G
17 POWDER, FOR SOLUTION ORAL DAILY
Qty: 1530 G | Refills: 0 | Status: SHIPPED | OUTPATIENT
Start: 2019-08-22 | End: 2019-11-20

## 2019-08-22 RX ORDER — FERROUS SULFATE 325(65) MG
325 TABLET ORAL
Qty: 90 TABLET | Refills: 3 | Status: SHIPPED | OUTPATIENT
Start: 2019-08-22 | End: 2020-08-21

## 2019-08-22 NOTE — TELEPHONE ENCOUNTER
MA contacted pt to give test results per Erica. Pt verbalized understanding.       ----- Message from Erica Xiong NP sent at 8/22/2019  2:53 PM CDT -----  Her anemia has improved.  However she is still anemic.  She needs to take her oral iron as discussed in clinic

## 2019-08-22 NOTE — PATIENT INSTRUCTIONS
Start taking your iron every other day.    Start taking miralax one capful twice a day x 14 days. Mix with 8-10 oz of liquid. Then start taking it once nightly there after. Miralax is generally a safe medication and can be taken long term daily.     What is constipation?Constipation is a common problem that makes it hard to have bowel movements. Your bowel movements might be:  ?Too hard  ?Too small  ?Hard to get out  ?Happening fewer than 3 times a week  What causes constipation?Constipation can be caused by:  ?Side effects of some medicines  ?Poor diet  ?Diseases of the digestive system   What other symptoms should I watch for?These symptoms could signal a more serious problem:  ?Blood in the toilet or on the toilet paper after having a bowel movement  ?Fever  ?Weight loss  ?Feeling weak  Is there anything I can do on my own to get rid of constipation?Yes. Try these steps:  ?Eat foods that have a lot of fiber. Good choices are fruits, vegetables, prune juice, and cereal  ?Drink plenty of water and other fluids.  ?When you feel the need to go to the bathroom, go to the bathroom. Don't hold it.      Dietary sources of iron  Food Approximate measure Iron (mg)   High iron sources    Cream of Wheat (quick or instant)* 1/2 cup 7.8   Kidney, beef¶ 2 oz (60 g) 5.3   Liver, beef¶ 2 oz (60 g) 5.8   Liver, calf¶ 2 oz (60 g) 9   Liver, chicken¶ 2 oz (60 g) 6   Liverwurst¶ 2 oz (60 g) 3.6   Prune juice 1/2 cup 5.1   Spinach 1/2 cup 3.2   Moderate iron sources   All-Bran cereal 1/2 cup 2.9   Almonds, dried unblanched 1/2 cup 3   Dried beans and peas   Baked beans, no pork 1/4 cup 1.5   Blackeye peas, cooked 1/4 cup 0.8   Chick peas, dry 1/4 cup 3.5   Great northern beans, cooked 1/4 cup 1.3   Green peas, cooked 1/4 cup 1.4   Lentils, dry 1/4 cup 3.4   Lima beans, cooked 1/4 cup 1.3   Navy beans, cooked 1/4 cup 1.3   Red beans, dry 1/4 cup 3.5   Soybeans, cooked 1/4 cup 1.4   White beans, dry 1/4 cup 3.9   Beef, cooked 2 oz (60  g) 2-3?   Ham, cooked 2 oz (60 g) 1.3   Matt, cooked 2 oz (60 g) 1.9   Peaches, dried 1/4 cup 2.4   Peanuts, roasted without skins 3 1/2 oz (100 g) 3.2   Pork, cooked 2 oz (60 g) 2-3?   Prunes, dried 2 large 1.1   Scallops 2 oz (60 g) 1.6   Turkey, cooked 2 oz (60 g) 1.7   Approximate iron content of children's favorite foods   Hamburger, small 1 3   Large 1 5.2   Big Mac 1 4.3   Quarter Pounder 1 5.1   Spaghetti with meatballs 1 cup 3.3   Frankfurter and beans 1 cup 4.8   Pork and beans 1 cup 5.9   Raisins§ 5/8 cup 3.5   Cereals, fortified 1 serving 4.5-17.8   Nuts§ 1 cup 5-7   Seeds, sunflower§ 3 1/2 oz (100 g) 7.1   Ch[place] con carne 1 cup 3.6   Beef burrito or irena 1 medium 3.4-4.6   Cheese pizza 2 slices 3   Cheese pizza with beef 2 slices 4.8   White bread 1 piece 0.7

## 2019-08-22 NOTE — PROGRESS NOTES
[facility] Consultation Note    Reason for Consult:  The primary encounter diagnosis was Iron deficiency anemia, unspecified iron deficiency anemia type. A diagnosis of Constipation, unspecified constipation type was also pertinent to this visit.    PCP:   Gold Palafox   [address] / [place] [address]    Referring MD:  Gold Palafox Jr., Md  [address] [place] [address]    HPI:  This is a 73 y.o. female here for evaluation of anemia. She is a new patient. She was seen by Dr. Edmondson 9/2016 for the same. She had an Egd/colonoscopy. Egd essentially unremarkable for sources of bleeding. Colon showed 20 mm polypoid, ulcerated lesion was found in the sigmoid colon. Biopsied- normal. Repeat flex sig 3 mos later showed healed sigmoid.     Today her only GI complaints are constipation and decrease in appetite. These are not new sx. She she takes milk of magnesia couple times a week to help with the constipation.  She has not tried daily MiraLax.  She denies nausea, vomiting, abdominal pain, overt signs of GI bleeding.  She has been prescribed oral iron therapy.  She does not take daily because she says it causes constipation.  BP is elevated today.  She has not taken her blood pressure medications today.  She is not taking her Prilosec.  She denies any reflux type symptoms.    ROS:  Constitutional: No fevers, +chills, + weight loss  ENT: No allergies  CV: No chest pain  Pulm: No cough, No shortness of breath  Ophtho: +vision changes, Just saw eye MD  GI: see HPI  Derm: No rash  MSK: + Back pain  : No hematuria  Neuro: No syncope, No seizure  Psych: No anxiety, + depression    Medical History:  has a past medical history of Anemia (6/2012), Cataract, Colon polyps (1/31/2017), Diabetes mellitus without complication (11/14/2018), Fibromyalgia, GERD (gastroesophageal reflux disease), High cholesterol, and Hypertension.    Surgical History:  has a past surgical history that  includes Hysterectomy and Colonoscopy (N/A, 10/18/2016).    Family History: family history includes Heart disease in her father; Hypertension in her mother; Stroke in her mother..     Social History:  reports that she has never smoked. She has never used smokeless tobacco. She reports that she does not drink alcohol or use drugs.    Review of patient's allergies indicates:   Allergen Reactions    Ondansetron      Other reaction(s): Flushing (Skin)    Savella [milnacipran] Nausea Only       Current Outpatient Medications on File Prior to Visit   Medication Sig Dispense Refill    aspirin (ECOTRIN) 81 MG EC tablet Take 1 tablet (81 mg total) by mouth once daily.  0    atorvastatin (LIPITOR) 40 MG tablet Take 1 tablet (40 mg total) by mouth once daily. 90 tablet 3    azelastine (ASTELIN) 137 mcg (0.1 %) nasal spray 1 spray (137 mcg total) by Nasal route 2 (two) times daily. 30 mL 3    baclofen (LIORESAL) 10 MG tablet Take 1 tablet (10 mg total) by mouth 3 (three) times daily as needed. 90 tablet 11    calcium carbonate-vit D3-min (CALTRATE 600+D PLUS MINERALS) 600 mg calcium- 400 unit Tab Take 1 tablet by mouth once daily. 60 tablet 11    cycloSPORINE (RESTASIS) 0.05 % ophthalmic emulsion Place 0.4 mLs (1 drop total) into both eyes 2 (two) times daily. 60 each 12    gabapentin (NEURONTIN) 300 MG capsule Take 1 capsule (300 mg total) by mouth 2 (two) times daily. (Patient taking differently: Take 300 mg by mouth once daily. ) 60 capsule 11    losartan (COZAAR) 100 MG tablet Take 1 tablet (100 mg total) by mouth once daily. 90 tablet 3    multivit-min/iron/folic/lutein (CENTRUM SILVER WOMEN ORAL) Take 1 tablet by mouth once daily.      omeprazole (PRILOSEC) 40 MG capsule Take 1 capsule (40 mg total) by mouth once daily. 30 capsule 11    vitamin D (VITAMIN D3) 1000 units Tab Take 1,000 Units by mouth once daily.      [DISCONTINUED] ferrous sulfate 324 mg (65 mg iron) TbEC Take 1 tablet (324 mg total) by mouth  once daily. 90 tablet 2    amLODIPine (NORVASC) 10 MG tablet take 1 tablet by mouth once daily 90 tablet 2    diclofenac sodium (VOLTAREN) 1 % Gel Apply 2 g topically once daily. 100 g 2    escitalopram oxalate (LEXAPRO) 10 MG tablet Take 1 tablet (10 mg total) by mouth once daily. 90 tablet 3     No current facility-administered medications on file prior to visit.          Objective Findings:    Vital Signs:  BP (!) 179/84 (BP Location: Left arm)   Pulse 74   Ht 5' (1.524 m)   Wt 58.3 kg (128 lb 8.5 oz)   BMI 25.10 kg/m²   Body mass index is 25.1 kg/m².    Physical Exam:  General Appearance: Well appearing in no acute distress  Head:   Normocephalic, without obvious abnormality  Eyes:    No scleral icterus  ENT: Neck supple  Extremities: No edema  Skin: No rash  Neurologic: AAO x 3      Labs:  Lab Results   Component Value Date    WBC 6.18 2019    HGB 8.2 (L) 2019    HCT 29.0 (L) 2019     2019    CHOL 178 2019    TRIG 49 2019    HDL 77 (H) 2019    ALT 18 2019    AST 26 2019     2019    K 4.5 2019     2019    CREATININE 0.8 2019    BUN 13 2019    CO2 24 2019    TSH 1.485 2014    INR 0.9 2016    HGBA1C 5.8 (H) 2019       Imagin2016 CT of abdomen and pelvis.  No significant acute abnormalities.    Endoscopy:    10/2016 Had work up for anemia.  Egd essentially unremarkable for sources of bleeding. Colon showed 20 mm polypoid, ulcerated lesion was found in the sigmoid colon. Biopsied- normal. Repeat flex sig 3 mos later showed healed sigmoid ulcerated lesion.     Assessment:    Ms. Kam is a 73-year-old BF with:    1. Iron deficiency anemia, unspecified iron deficiency anemia type    2. Constipation, unspecified constipation type       Slowly worsening anemia over that past several mos. Will repeats labs today.  Patient not consistently taking iron due to constipation.  We  discussed MiraLax regimen and the importance of taking her iron daily.  Since it has been 3 years since her last scopes it is reasonable to repeat those.  She will me with the schedulers today.    Recommendations:  1.  Labs  2.  Start taking iron daily  3.  Start taking MiraLax daily to help keep stool soft  4.  EGD and colonoscopy    Follow up in about 10 weeks (around 10/31/2019).      Order summary:  Orders Placed This Encounter    CBC auto differential    Iron and TIBC    Ferritin    H.Pylori Antibody IgG    Celiac Disease Panel    ferrous sulfate (FEOSOL) 325 mg (65 mg iron) Tab tablet    polyethylene glycol (GLYCOLAX) 17 gram/dose powder    Case request GI: EGD (ESOPHAGOGASTRODUODENOSCOPY), COLONOSCOPY         Thank you so much for allowing me to participate in the care of Krysta Xiong, EDERC

## 2019-08-22 NOTE — LETTER
August 22, 2019      Gold Palafox Jr., MD  [address]  [place] [address]           [facility]  [address]  [place] [address]  Phone: [phone]  Fax: [phone]          Patient: Krysta Kam   MR Number: [id number]   YOB: 1945   Date of Visit: 8/22/2019       Dear Dr. Gold Palafox Jr.:    Thank you for referring Krysta Kam to me for evaluation. Attached you will find relevant portions of my assessment and plan of care.    If you have questions, please do not hesitate to call me. I look forward to following Krysta Kam along with you.    Sincerely,    Erica Xiong, KATRIN    Enclosure  CC:  No Recipients    If you would like to receive this communication electronically, please contact [email or web address] or [phone] to request more information on [employer] access.    For providers and/or their staff who would like to refer a patient to [facility], please contact us through our one-stop-shop provider referral line, [facility], at [phone].    If you feel you have received this communication in error or would no longer like to receive these types of communications, please e-mail [email or web address]

## 2019-08-22 NOTE — PROGRESS NOTES
Her anemia has improved.  However she is still anemic.  She needs to take her oral iron as discussed in clinic

## 2019-08-24 LAB — H PYLORI IGG SERPL QL IA: NEGATIVE

## 2019-08-26 ENCOUNTER — TELEPHONE (OUTPATIENT)
Dept: GASTROENTEROLOGY | Facility: CLINIC | Age: 74
End: 2019-08-26

## 2019-08-26 LAB
GLIADIN PEPTIDE IGA SER-ACNC: 5 UNITS
GLIADIN PEPTIDE IGG SER-ACNC: 2 UNITS
IGA SERPL-MCNC: 225 MG/DL (ref 70–400)
TTG IGA SER-ACNC: 37 UNITS
TTG IGG SER-ACNC: 5 UNITS

## 2019-08-26 NOTE — TELEPHONE ENCOUNTER
MA contacted pt to give test results per Erica. MA also scheduled pt with an appointment on 9/9 at 10:30 am to meet with the GI dietitian. MA will mail out a reminder in the mail.       ----- Message from Erica Xiong NP sent at 8/26/2019  2:05 PM CDT -----  Her Celiac panel came back positive. I would like her to stop eating foods that contain gluten to see if that helps her symptoms. I need her to meet with our GI dietician to discuss a Gluten free diet as it can be overwhelming. MA to get that set up

## 2019-08-26 NOTE — PROGRESS NOTES
Her Celiac panel came back positive. I would like her to stop eating foods that contain gluten to see if that helps her symptoms. I need her to meet with our GI dietician to discuss a Gluten free diet as it can be overwhelming. MA to get that set up

## 2019-08-29 DIAGNOSIS — Z12.11 SPECIAL SCREENING FOR MALIGNANT NEOPLASMS, COLON: Primary | ICD-10-CM

## 2019-08-29 RX ORDER — POLYETHYLENE GLYCOL 3350, SODIUM SULFATE ANHYDROUS, SODIUM BICARBONATE, SODIUM CHLORIDE, POTASSIUM CHLORIDE 236; 22.74; 6.74; 5.86; 2.97 G/4L; G/4L; G/4L; G/4L; G/4L
4 POWDER, FOR SOLUTION ORAL ONCE
Qty: 4000 ML | Refills: 0 | Status: SHIPPED | OUTPATIENT
Start: 2019-08-29 | End: 2019-08-29

## 2019-09-09 ENCOUNTER — TELEPHONE (OUTPATIENT)
Dept: INTERNAL MEDICINE | Facility: CLINIC | Age: 74
End: 2019-09-09

## 2019-09-10 ENCOUNTER — TELEPHONE (OUTPATIENT)
Dept: INTERNAL MEDICINE | Facility: CLINIC | Age: 74
End: 2019-09-10

## 2019-09-10 DIAGNOSIS — M17.0 PRIMARY OSTEOARTHRITIS OF BOTH KNEES: Primary | ICD-10-CM

## 2019-09-10 RX ORDER — LIDOCAINE 50 MG/G
1 PATCH TOPICAL DAILY
Qty: 30 PATCH | Refills: 2 | Status: SHIPPED | OUTPATIENT
Start: 2019-09-10 | End: 2021-05-24

## 2019-09-12 RX ORDER — GABAPENTIN 300 MG/1
CAPSULE ORAL
Qty: 180 CAPSULE | Refills: 11 | Status: SHIPPED | OUTPATIENT
Start: 2019-09-12 | End: 2020-12-28

## 2019-09-12 RX ORDER — ATORVASTATIN CALCIUM 40 MG/1
TABLET, FILM COATED ORAL
Qty: 90 TABLET | Refills: 3 | Status: SHIPPED | OUTPATIENT
Start: 2019-09-12 | End: 2021-05-05 | Stop reason: SDUPTHER

## 2019-09-17 ENCOUNTER — TELEPHONE (OUTPATIENT)
Dept: INTERNAL MEDICINE | Facility: CLINIC | Age: 74
End: 2019-09-17

## 2019-09-17 NOTE — TELEPHONE ENCOUNTER
"----- Message from Anjelica Cheema MA sent at 9/17/2019  1:17 PM CDT -----  Prior Authorization Needed    Rx: lidocaine (LIDODERM) 5 %    To submit the PA:    1: Go to " [email or web address].[email or web address] " and click "Enter a Key"    2. Enter the patient's last name and date of birth and the key.      KEY: [id number]    3. Complete the forms and click "send to Plan"    Note chart when prior authorization has been submitted.    Please notify pharmacy when prior authorization has been approved.    Thank You    "

## 2019-09-17 NOTE — TELEPHONE ENCOUNTER
----- Message from Marcelina Dumont sent at 9/17/2019 12:04 PM CDT -----  Contact: Pt  Pt says she need to doctor to get in touch with [employer] for authorization need to get Patch authorized     Pt can be reached at [phone]

## 2019-09-19 ENCOUNTER — PATIENT OUTREACH (OUTPATIENT)
Dept: ADMINISTRATIVE | Facility: OTHER | Age: 74
End: 2019-09-19

## 2019-10-01 ENCOUNTER — PES CALL (OUTPATIENT)
Dept: ADMINISTRATIVE | Facility: CLINIC | Age: 74
End: 2019-10-01

## 2019-10-08 ENCOUNTER — HOSPITAL ENCOUNTER (OUTPATIENT)
Facility: HOSPITAL | Age: 74
Discharge: HOME | End: 2019-10-08
Attending: INTERNAL MEDICINE | Admitting: INTERNAL MEDICINE
Payer: MEDICARE

## 2019-10-08 ENCOUNTER — ANESTHESIA (OUTPATIENT)
Dept: ENDOSCOPY | Facility: HOSPITAL | Age: 74
End: 2019-10-08
Payer: MEDICARE

## 2019-10-08 ENCOUNTER — ANESTHESIA EVENT (OUTPATIENT)
Dept: ENDOSCOPY | Facility: HOSPITAL | Age: 74
End: 2019-10-08
Payer: MEDICARE

## 2019-10-08 ENCOUNTER — TELEPHONE (OUTPATIENT)
Dept: GASTROENTEROLOGY | Facility: CLINIC | Age: 74
End: 2019-10-08

## 2019-10-08 VITALS
HEIGHT: 60 IN | OXYGEN SATURATION: 99 % | RESPIRATION RATE: 18 BRPM | SYSTOLIC BLOOD PRESSURE: 162 MMHG | HEART RATE: 57 BPM | WEIGHT: 121 LBS | DIASTOLIC BLOOD PRESSURE: 71 MMHG | TEMPERATURE: 98 F | BODY MASS INDEX: 23.75 KG/M2

## 2019-10-08 DIAGNOSIS — D50.9 IRON DEFICIENCY ANEMIA, UNSPECIFIED IRON DEFICIENCY ANEMIA TYPE: Primary | ICD-10-CM

## 2019-10-08 PROCEDURE — 43235 PR EGD, FLEX, DIAGNOSTIC: ICD-10-PCS | Mod: 51,HCNC,, | Performed by: INTERNAL MEDICINE

## 2019-10-08 PROCEDURE — 37000009 HC ANESTHESIA EA ADD 15 MINS: Mod: HCNC | Performed by: INTERNAL MEDICINE

## 2019-10-08 PROCEDURE — 63600175 PHARM REV CODE 636 W HCPCS: Mod: HCNC | Performed by: INTERNAL MEDICINE

## 2019-10-08 PROCEDURE — E9220 PRA ENDO ANESTHESIA: ICD-10-PCS | Mod: HCNC,,, | Performed by: NURSE ANESTHETIST, CERTIFIED REGISTERED

## 2019-10-08 PROCEDURE — 43235 EGD DIAGNOSTIC BRUSH WASH: CPT | Mod: HCNC | Performed by: INTERNAL MEDICINE

## 2019-10-08 PROCEDURE — 63600175 PHARM REV CODE 636 W HCPCS: Mod: HCNC | Performed by: NURSE ANESTHETIST, CERTIFIED REGISTERED

## 2019-10-08 PROCEDURE — 25000003 PHARM REV CODE 250: Mod: HCNC | Performed by: NURSE ANESTHETIST, CERTIFIED REGISTERED

## 2019-10-08 PROCEDURE — 45378 DIAGNOSTIC COLONOSCOPY: CPT | Mod: HCNC | Performed by: INTERNAL MEDICINE

## 2019-10-08 PROCEDURE — 43235 EGD DIAGNOSTIC BRUSH WASH: CPT | Mod: 51,HCNC,, | Performed by: INTERNAL MEDICINE

## 2019-10-08 PROCEDURE — 45378 DIAGNOSTIC COLONOSCOPY: CPT | Mod: HCNC,,, | Performed by: INTERNAL MEDICINE

## 2019-10-08 PROCEDURE — 37000008 HC ANESTHESIA 1ST 15 MINUTES: Mod: HCNC | Performed by: INTERNAL MEDICINE

## 2019-10-08 PROCEDURE — 45378 PR COLONOSCOPY,DIAGNOSTIC: ICD-10-PCS | Mod: HCNC,,, | Performed by: INTERNAL MEDICINE

## 2019-10-08 PROCEDURE — E9220 PRA ENDO ANESTHESIA: HCPCS | Mod: HCNC,,, | Performed by: NURSE ANESTHETIST, CERTIFIED REGISTERED

## 2019-10-08 RX ORDER — LIDOCAINE HCL/PF 100 MG/5ML
SYRINGE (ML) INTRAVENOUS
Status: DISCONTINUED | OUTPATIENT
Start: 2019-10-08 | End: 2019-10-08

## 2019-10-08 RX ORDER — ESMOLOL HYDROCHLORIDE 10 MG/ML
INJECTION INTRAVENOUS
Status: DISCONTINUED | OUTPATIENT
Start: 2019-10-08 | End: 2019-10-08

## 2019-10-08 RX ORDER — SODIUM CHLORIDE 9 MG/ML
INJECTION, SOLUTION INTRAVENOUS CONTINUOUS
Status: DISCONTINUED | OUTPATIENT
Start: 2019-10-08 | End: 2019-10-08 | Stop reason: HOSPADM

## 2019-10-08 RX ORDER — PROPOFOL 10 MG/ML
VIAL (ML) INTRAVENOUS
Status: DISCONTINUED | OUTPATIENT
Start: 2019-10-08 | End: 2019-10-08

## 2019-10-08 RX ORDER — SODIUM CHLORIDE 0.9 % (FLUSH) 0.9 %
10 SYRINGE (ML) INJECTION
Status: DISCONTINUED | OUTPATIENT
Start: 2019-10-08 | End: 2019-10-08 | Stop reason: HOSPADM

## 2019-10-08 RX ORDER — PROPOFOL 10 MG/ML
VIAL (ML) INTRAVENOUS CONTINUOUS PRN
Status: DISCONTINUED | OUTPATIENT
Start: 2019-10-08 | End: 2019-10-08

## 2019-10-08 RX ADMIN — ESMOLOL HYDROCHLORIDE 20 MG: 10 INJECTION INTRAVENOUS at 09:10

## 2019-10-08 RX ADMIN — PROPOFOL 20 MG: 10 INJECTION, EMULSION INTRAVENOUS at 09:10

## 2019-10-08 RX ADMIN — PROPOFOL 60 MG: 10 INJECTION, EMULSION INTRAVENOUS at 09:10

## 2019-10-08 RX ADMIN — SODIUM CHLORIDE: 0.9 INJECTION, SOLUTION INTRAVENOUS at 09:10

## 2019-10-08 RX ADMIN — LIDOCAINE HYDROCHLORIDE 40 MG: 20 INJECTION, SOLUTION INTRAVENOUS at 09:10

## 2019-10-08 RX ADMIN — PROPOFOL 150 MCG/KG/MIN: 10 INJECTION, EMULSION INTRAVENOUS at 09:10

## 2019-10-08 NOTE — ANESTHESIA POSTPROCEDURE EVALUATION
Anesthesia Post Evaluation    Patient: Krysta Kam    Procedure(s) Performed: Procedure(s) (LRB):  EGD (ESOPHAGOGASTRODUODENOSCOPY) (N/A)  COLONOSCOPY (N/A)    Final Anesthesia Type: general  Patient location during evaluation: GI PACU  Patient participation: Yes- Able to Participate  Level of consciousness: awake and alert  Post-procedure vital signs: reviewed and stable  Pain management: adequate  Airway patency: patent  PONV status at discharge: No PONV  Anesthetic complications: no      Cardiovascular status: hemodynamically stable  Respiratory status: unassisted, spontaneous ventilation and room air  Hydration status: euvolemic  Follow-up not needed.          Vitals Value Taken Time   /71 10/8/2019 10:35 AM   Temp 36.5 °C (97.7 °F) 10/8/2019  9:57 AM   Pulse 57 10/8/2019 10:35 AM   Resp 18 10/8/2019 10:35 AM   SpO2 99 % 10/8/2019 10:35 AM         Event Time     Out of Recovery 10:37:49          Pain/Marito Score: Marito Score: 9 (10/8/2019  9:57 AM)

## 2019-10-08 NOTE — TELEPHONE ENCOUNTER
MA contacted pt to let her know she needs to f/u in clinic with Erica in about 2 weeks. Pt did not answer. MA left a message for pt to call the clinic back.       ----- Message from Erica Xiong NP sent at 10/8/2019 10:16 AM CDT -----  Have pt f/u with me in clinic in about 2 weeks to further discuss her anemia

## 2019-10-08 NOTE — ANESTHESIA PREPROCEDURE EVALUATION
10/08/2019  Krysta Kam is a 73 y.o., female here for EGD/Colonosocpy to evaluate for constipation and iron deficiency anemia.    Past Medical History:   Diagnosis Date    Anemia 6/2012    Cataract     Colon polyps 1/31/2017    Diabetes mellitus without complication 11/14/2018    Fibromyalgia     GERD (gastroesophageal reflux disease)     High cholesterol     Hypertension      Past Surgical History:   Procedure Laterality Date    COLONOSCOPY N/A 10/18/2016    Procedure: COLONOSCOPY;  Surgeon: Brittni Edmondson MD;  Location: [address];  Service: Endoscopy;  Laterality: N/A;    HYSTERECTOMY           Anesthesia Evaluation    I have reviewed the Patient Summary Reports.     I have reviewed the Medications.     Review of Systems  Anesthesia Hx:   Denies Personal Hx of Anesthesia complications.   Social:  Non-Smoker    Hematology/Oncology:         -- Anemia:   Cardiovascular:   Hypertension, poorly controlled ECG has been reviewed. Does not check bp at home.   Hepatic/GI:   Bowel Prep. GERD    Musculoskeletal:   Arthritis     Neurological:   Neuromuscular Disease, fibromyalgia   Endocrine:   Diabetes, well controlled, type 2        Physical Exam  General:  Well nourished    Airway/Jaw/Neck:  Airway Findings: Mouth Opening: Small, but > 3cm Tongue: Normal  General Airway Assessment: Adult  Mallampati: II  TM Distance: Normal, at least 6 cm  Jaw/Neck Findings:  Neck ROM: Normal ROM  Neck Findings:  C/o TMJ.  Will exercise care in placing the bite block while pt awake     Eyes/Ears/Nose:  EYES/EARS/NOSE FINDINGS: Normal   Dental:  Dental Findings: Upper Dentures, Periodontal disease, Severe   Chest/Lungs:  Chest/Lungs Findings: Clear to auscultation, Normal Respiratory Rate     Heart/Vascular:  Heart Findings: Rate: Normal  Rhythm: Regular Rhythm  Sounds: Normal        Mental  Status:  Mental Status Findings:  Cooperative, Alert and Oriented         Anesthesia Plan  Type of Anesthesia, risks & benefits discussed:  Anesthesia Type:  general  Patient's Preference: GA  Intra-op Monitoring Plan: standard ASA monitors  Intra-op Monitoring Plan Comments:   Post Op Pain Control Plan: IV/PO Opioids PRN  Post Op Pain Control Plan Comments:   Induction:   IV  Beta Blocker:  Patient is not currently on a Beta-Blocker (No further documentation required).       Informed Consent: Patient understands risks and agrees with Anesthesia plan.  Questions answered. Anesthesia consent signed with patient.  ASA Score: 2     Day of Surgery Review of History & Physical: I have interviewed and examined the patient. I have reviewed the patient's H&P dated: 8/22/2019. There are no significant changes.  H&P update referred to the provider.         Ready For Surgery From Anesthesia Perspective.

## 2019-10-08 NOTE — H&P
[facility]  History & Physical    Subjective:      Chief Complaint/Reason for Admission: anemia    Krysta Kam is a 73 y.o. female.    Past Medical History:   Diagnosis Date    Anemia 6/2012    Cataract     Colon polyps 1/31/2017    Diabetes mellitus without complication 11/14/2018    Fibromyalgia     GERD (gastroesophageal reflux disease)     High cholesterol     Hypertension      Past Surgical History:   Procedure Laterality Date    COLONOSCOPY N/A 10/18/2016    Procedure: COLONOSCOPY;  Surgeon: Brittni Edmondson MD;  Location: [address]);  Service: Endoscopy;  Laterality: N/A;    HYSTERECTOMY       Family History   Problem Relation Age of Onset    Hypertension Mother     Stroke Mother     Heart disease Father     Cataracts Neg Hx     Cancer Neg Hx     Diabetes Neg Hx     Glaucoma Neg Hx     Retinal detachment Neg Hx     Celiac disease Neg Hx     Cirrhosis Neg Hx     Colon cancer Neg Hx     Cystic fibrosis Neg Hx     Esophageal cancer Neg Hx     Inflammatory bowel disease Neg Hx     Liver disease Neg Hx     Stomach cancer Neg Hx     Amblyopia Neg Hx     Blindness Neg Hx     Macular degeneration Neg Hx     Strabismus Neg Hx     Thyroid disease Neg Hx      Social History     Tobacco Use    Smoking status: Never Smoker    Smokeless tobacco: Never Used   Substance Use Topics    Alcohol use: No    Drug use: No       PTA Medications   Medication Sig    aspirin (ECOTRIN) 81 MG EC tablet Take 1 tablet (81 mg total) by mouth once daily.    atorvastatin (LIPITOR) 40 MG tablet TAKE 1 TABLET EVERY DAY    calcium carbonate-vit D3-min (CALTRATE 600+D PLUS MINERALS) 600 mg calcium- 400 unit Tab Take 1 tablet by mouth once daily.    cycloSPORINE (RESTASIS) 0.05 % ophthalmic emulsion Place 0.4 mLs (1 drop total) into both eyes 2 (two) times daily.    escitalopram oxalate (LEXAPRO) 10 MG tablet Take 1 tablet (10 mg total) by mouth once daily.     ferrous sulfate (FEOSOL) 325 mg (65 mg iron) Tab tablet Take 1 tablet (325 mg total) by mouth daily with breakfast.    gabapentin (NEURONTIN) 300 MG capsule TAKE 1 CAPSULE TWICE DAILY    losartan (COZAAR) 100 MG tablet Take 1 tablet (100 mg total) by mouth once daily.    amLODIPine (NORVASC) 10 MG tablet take 1 tablet by mouth once daily    azelastine (ASTELIN) 137 mcg (0.1 %) nasal spray 1 spray (137 mcg total) by Nasal route 2 (two) times daily.    baclofen (LIORESAL) 10 MG tablet Take 1 tablet (10 mg total) by mouth 3 (three) times daily as needed.    diclofenac sodium (VOLTAREN) 1 % Gel Apply 2 g topically once daily.    lidocaine (LIDODERM) 5 % Place 1 patch onto the skin once daily. Remove & Discard patch within 12 hours or as directed by MD    multivit-min/iron/folic/lutein (CENTRUM SILVER WOMEN ORAL) Take 1 tablet by mouth once daily.    omeprazole (PRILOSEC) 40 MG capsule Take 1 capsule (40 mg total) by mouth once daily.    polyethylene glycol (GLYCOLAX) 17 gram/dose powder Take 17 g by mouth once daily.    vitamin D (VITAMIN D3) 1000 units Tab Take 1,000 Units by mouth once daily.     Review of patient's allergies indicates:   Allergen Reactions    Ondansetron      Other reaction(s): Flushing (Skin)    Savella [milnacipran] Nausea Only        Review of Systems   Respiratory: Negative.    Cardiovascular: Negative.        Objective:      Vital Signs (Most Recent)  Temp: 97.7 °F (36.5 °C) (10/08/19 0901)  Pulse: 89 (10/08/19 0901)  Resp: 16 (10/08/19 0901)  BP: (!) 211/92 (10/08/19 0901)  SpO2: 98 % (10/08/19 0901)    Vital Signs Range (Last 24H):  Temp:  [97.7 °F (36.5 °C)]   Pulse:  [89]   Resp:  [16]   BP: (211)/(92)   SpO2:  [98 %]     Physical Exam   Cardiovascular: Normal rate and regular rhythm.   Pulmonary/Chest: Effort normal and breath sounds normal.       Data Review:     ECG: .     Assessment:      There are no hospital problems to display for this patient.      Plan:    Indication  for procedure:    ASA:II  Airway normal  Malampati class:per anes    Personal and family history negative for anesthesia problems    Plan:egd/colon  Anesthesia plan: general

## 2019-10-08 NOTE — [employer] PATIENT INSTRUCTIONS
Discharge Summary/Instructions after an Endoscopic Procedure  Patient Name: Krysta Kam  Patient MRN: [id number]  Patient YOB: 1945 Tuesday, October 08, 2019  Gold Ravi MD  RESTRICTIONS:  During your procedure today, you received medications for sedation.  These   medications may affect your judgment, balance and coordination.  Therefore,   for 24 hours, you have the following restrictions:   - DO NOT drive a car, operate machinery, make legal/financial decisions,   sign important papers or drink alcohol.    ACTIVITY:  Today: no heavy lifting, straining or running due to procedural   sedation/anesthesia.  The following day: return to full activity including work.  DIET:  Eat and drink normally unless instructed otherwise.     TREATMENT FOR COMMON SIDE EFFECTS:  - Mild abdominal pain, nausea, belching, bloating or excessive gas:  rest,   eat lightly and use a heating pad.  - Sore Throat: treat with throat lozenges and/or gargle with warm salt   water.  - Because air was used during the procedure, expelling large amounts of air   from your rectum or belching is normal.  - If a bowel prep was taken, you may not have a bowel movement for 1-3 days.    This is normal.  SYMPTOMS TO WATCH FOR AND REPORT TO YOUR PHYSICIAN:  1. Abdominal pain or bloating, other than gas cramps.  2. Chest pain.  3. Back pain.  4. Signs of infection such as: chills or fever occurring within 24 hours   after the procedure.  5. Rectal bleeding, which would show as bright red, maroon, or black stools.   (A tablespoon of blood from the rectum is not serious, especially if   hemorrhoids are present.)  6. Vomiting.  7. Weakness or dizziness.  GO DIRECTLY TO THE NEAREST EMERGENCY ROOM IF YOU HAVE ANY OF THE FOLLOWING:      Difficulty breathing              Chills and/or fever over 101 F   Persistent vomiting and/or vomiting blood   Severe abdominal pain   Severe chest pain   Black, tarry stools   Bleeding- more than one  tablespoon   Any other symptom or condition that you feel may need urgent attention  Your doctor recommends these additional instructions:  If any biopsies were taken, your doctors clinic will contact you in 1 to 2   weeks with any results.  - Patient has a contact number available for emergencies.  The signs and   symptoms of potential delayed complications were discussed with the   patient.  Return to normal activities tomorrow.  Written discharge   instructions were provided to the patient.   - Discharge patient to home (ambulatory).   - Resume previous diet.   - Continue present medications.   - Return to GI clinic at appointment to be scheduled. follow hgb as out pt,   and consider additional work up  - No repeat colonoscopy due to age.  For questions, problems or results please call your physician - Gold Ravi MD at Work:  [phone].  [facility], EMERGENCY ROOM PHONE NUMBER: [phone]  IF A COMPLICATION OR EMERGENCY SITUATION ARISES AND YOU ARE UNABLE TO REACH   YOUR PHYSICIAN - GO DIRECTLY TO THE EMERGENCY ROOM.  Gold Ravi MD  10/8/2019 9:55:55 AM  This report has been verified and signed electronically.  [employer]

## 2019-10-08 NOTE — [employer]ATION PATIENT INSTRUCTIONS
Discharge Summary/Instructions after an Endoscopic Procedure  Patient Name: Krysta Kam  Patient MRN: [id number]  Patient YOB: 1945 Tuesday, October 08, 2019  Gold Ravi MD  RESTRICTIONS:  During your procedure today, you received medications for sedation.  These   medications may affect your judgment, balance and coordination.  Therefore,   for 24 hours, you have the following restrictions:   - DO NOT drive a car, operate machinery, make legal/financial decisions,   sign important papers or drink alcohol.    ACTIVITY:  Today: no heavy lifting, straining or running due to procedural   sedation/anesthesia.  The following day: return to full activity including work.  DIET:  Eat and drink normally unless instructed otherwise.     TREATMENT FOR COMMON SIDE EFFECTS:  - Mild abdominal pain, nausea, belching, bloating or excessive gas:  rest,   eat lightly and use a heating pad.  - Sore Throat: treat with throat lozenges and/or gargle with warm salt   water.  - Because air was used during the procedure, expelling large amounts of air   from your rectum or belching is normal.  - If a bowel prep was taken, you may not have a bowel movement for 1-3 days.    This is normal.  SYMPTOMS TO WATCH FOR AND REPORT TO YOUR PHYSICIAN:  1. Abdominal pain or bloating, other than gas cramps.  2. Chest pain.  3. Back pain.  4. Signs of infection such as: chills or fever occurring within 24 hours   after the procedure.  5. Rectal bleeding, which would show as bright red, maroon, or black stools.   (A tablespoon of blood from the rectum is not serious, especially if   hemorrhoids are present.)  6. Vomiting.  7. Weakness or dizziness.  GO DIRECTLY TO THE NEAREST EMERGENCY ROOM IF YOU HAVE ANY OF THE FOLLOWING:      Difficulty breathing              Chills and/or fever over 101 F   Persistent vomiting and/or vomiting blood   Severe abdominal pain   Severe chest pain   Black, tarry stools   Bleeding- more than one  tablespoon   Any other symptom or condition that you feel may need urgent attention  Your doctor recommends these additional instructions:  If any biopsies were taken, your doctors clinic will contact you in 1 to 2   weeks with any results.  - Patient has a contact number available for emergencies.  The signs and   symptoms of potential delayed complications were discussed with the   patient.  Return to normal activities tomorrow.  Written discharge   instructions were provided to the patient.   - Discharge patient to home (ambulatory).   - Resume previous diet.   - Continue present medications.   - Return to GI clinic after studies are complete.  For questions, problems or results please call your physician - Gold Ravi MD at Work:  [phone].  [facility], EMERGENCY ROOM PHONE NUMBER: [phone]  IF A COMPLICATION OR EMERGENCY SITUATION ARISES AND YOU ARE UNABLE TO REACH   YOUR PHYSICIAN - GO DIRECTLY TO THE EMERGENCY ROOM.  Gold Ravi MD  10/8/2019 9:20:51 AM  This report has been verified and signed electronically.  [employer]

## 2019-10-08 NOTE — TRANSFER OF CARE
Anesthesia Transfer of Care Note    Patient: Krysta Kam    Procedure(s) Performed: Procedure(s) (LRB):  EGD (ESOPHAGOGASTRODUODENOSCOPY) (N/A)  COLONOSCOPY (N/A)    Patient location: GI    Anesthesia Type: general    Transport from OR: Transported from OR on room air with adequate spontaneous ventilation    Post pain: adequate analgesia    Post assessment: no apparent anesthetic complications and tolerated procedure well    Post vital signs: stable    Level of consciousness: awake and alert    Nausea/Vomiting: no nausea/vomiting    Complications: none    Transfer of care protocol was followed      Last vitals:   Visit Vitals  BP (!) 187/86 (BP Location: Left arm, Patient Position: Sitting)   Pulse 69   Temp 36.5 °C (97.7 °F) (Temporal)   Resp 16   Ht 5' (1.524 m)   Wt 54.9 kg (121 lb)   SpO2 98%   Breastfeeding? No   BMI 23.63 kg/m²

## 2019-10-08 NOTE — ANESTHESIA RELEASE NOTE
Anesthesia Release from PACU Note    Patient: Krysta Kam    Procedure(s) Performed: Procedure(s) (LRB):  EGD (ESOPHAGOGASTRODUODENOSCOPY) (N/A)  COLONOSCOPY (N/A)    Anesthesia type: general    Post pain: Adequate analgesia    Post assessment: no apparent anesthetic complications and tolerated procedure well    Last Vitals:   Visit Vitals  BP (!) 162/71 (BP Location: Left arm, Patient Position: Sitting)   Pulse (!) 57   Temp 36.5 °C (97.7 °F) (Temporal)   Resp 18   Ht 5' (1.524 m)   Wt 54.9 kg (121 lb)   SpO2 99%   Breastfeeding? No   BMI 23.63 kg/m²       Post vital signs: stable    Level of consciousness: awake and alert     Nausea/Vomiting: no nausea/no vomiting    Complications: none    Airway Patency: patent    Respiratory: unassisted, spontaneous ventilation, room air    Cardiovascular: stable and blood pressure at baseline    Hydration: euvolemic

## 2019-10-14 ENCOUNTER — LAB VISIT (OUTPATIENT)
Dept: LAB | Facility: HOSPITAL | Age: 74
End: 2019-10-14
Attending: INTERNAL MEDICINE
Payer: MEDICARE

## 2019-10-14 DIAGNOSIS — D64.9 ANEMIA, UNSPECIFIED TYPE: ICD-10-CM

## 2019-10-14 DIAGNOSIS — E11.9 DIABETES MELLITUS WITHOUT COMPLICATION: ICD-10-CM

## 2019-10-14 LAB
ALBUMIN SERPL BCP-MCNC: 3.8 G/DL (ref 3.5–5.2)
ALP SERPL-CCNC: 58 U/L (ref 55–135)
ALT SERPL W/O P-5'-P-CCNC: 15 U/L (ref 10–44)
ANION GAP SERPL CALC-SCNC: 8 MMOL/L (ref 8–16)
AST SERPL-CCNC: 20 U/L (ref 10–40)
BASOPHILS # BLD AUTO: 0.04 K/UL (ref 0–0.2)
BASOPHILS NFR BLD: 0.8 % (ref 0–1.9)
BILIRUB SERPL-MCNC: 0.5 MG/DL (ref 0.1–1)
BUN SERPL-MCNC: 16 MG/DL (ref 8–23)
CALCIUM SERPL-MCNC: 10.1 MG/DL (ref 8.7–10.5)
CHLORIDE SERPL-SCNC: 104 MMOL/L (ref 95–110)
CO2 SERPL-SCNC: 30 MMOL/L (ref 23–29)
CREAT SERPL-MCNC: 0.9 MG/DL (ref 0.5–1.4)
DIFFERENTIAL METHOD: ABNORMAL
EOSINOPHIL # BLD AUTO: 0.2 K/UL (ref 0–0.5)
EOSINOPHIL NFR BLD: 2.9 % (ref 0–8)
ERYTHROCYTE [DISTWIDTH] IN BLOOD BY AUTOMATED COUNT: 18 % (ref 11.5–14.5)
EST. GFR  (AFRICAN AMERICAN): >60 ML/MIN/1.73 M^2
EST. GFR  (NON AFRICAN AMERICAN): >60 ML/MIN/1.73 M^2
ESTIMATED AVG GLUCOSE: 117 MG/DL (ref 68–131)
GLUCOSE SERPL-MCNC: 95 MG/DL (ref 70–110)
HBA1C MFR BLD HPLC: 5.7 % (ref 4–5.6)
HCT VFR BLD AUTO: 39.2 % (ref 37–48.5)
HGB BLD-MCNC: 12.3 G/DL (ref 12–16)
LYMPHOCYTES # BLD AUTO: 1.5 K/UL (ref 1–4.8)
LYMPHOCYTES NFR BLD: 28.7 % (ref 18–48)
MCH RBC QN AUTO: 24.6 PG (ref 27–31)
MCHC RBC AUTO-ENTMCNC: 31.4 G/DL (ref 32–36)
MCV RBC AUTO: 79 FL (ref 82–98)
MONOCYTES # BLD AUTO: 0.5 K/UL (ref 0.3–1)
MONOCYTES NFR BLD: 9.6 % (ref 4–15)
NEUTROPHILS # BLD AUTO: 3 K/UL (ref 1.8–7.7)
NEUTROPHILS NFR BLD: 57.6 % (ref 38–73)
NRBC BLD-RTO: 0 /100 WBC
PLATELET # BLD AUTO: 178 K/UL (ref 150–350)
PMV BLD AUTO: ABNORMAL FL (ref 9.2–12.9)
POTASSIUM SERPL-SCNC: 4.5 MMOL/L (ref 3.5–5.1)
PROT SERPL-MCNC: 7.4 G/DL (ref 6–8.4)
RBC # BLD AUTO: 4.99 M/UL (ref 4–5.4)
SODIUM SERPL-SCNC: 142 MMOL/L (ref 136–145)
WBC # BLD AUTO: 5.22 K/UL (ref 3.9–12.7)

## 2019-10-14 PROCEDURE — 36415 COLL VENOUS BLD VENIPUNCTURE: CPT | Mod: HCNC

## 2019-10-14 PROCEDURE — 85025 COMPLETE CBC W/AUTO DIFF WBC: CPT | Mod: HCNC

## 2019-10-14 PROCEDURE — 80053 COMPREHEN METABOLIC PANEL: CPT | Mod: HCNC

## 2019-10-14 PROCEDURE — 83036 HEMOGLOBIN GLYCOSYLATED A1C: CPT | Mod: HCNC

## 2019-10-15 ENCOUNTER — TELEPHONE (OUTPATIENT)
Dept: ENDOSCOPY | Facility: HOSPITAL | Age: 74
End: 2019-10-15

## 2019-10-18 ENCOUNTER — OFFICE VISIT (OUTPATIENT)
Dept: INTERNAL MEDICINE | Facility: CLINIC | Age: 74
End: 2019-10-18
Payer: MEDICARE

## 2019-10-18 VITALS
SYSTOLIC BLOOD PRESSURE: 170 MMHG | DIASTOLIC BLOOD PRESSURE: 80 MMHG | WEIGHT: 125.44 LBS | OXYGEN SATURATION: 99 % | BODY MASS INDEX: 24.63 KG/M2 | HEIGHT: 60 IN | HEART RATE: 65 BPM

## 2019-10-18 DIAGNOSIS — M79.7 FIBROMYALGIA: ICD-10-CM

## 2019-10-18 DIAGNOSIS — M17.0 PRIMARY OSTEOARTHRITIS OF BOTH KNEES: ICD-10-CM

## 2019-10-18 DIAGNOSIS — E78.00 HIGH CHOLESTEROL: ICD-10-CM

## 2019-10-18 DIAGNOSIS — I10 ESSENTIAL HYPERTENSION: ICD-10-CM

## 2019-10-18 DIAGNOSIS — D50.9 IRON DEFICIENCY ANEMIA, UNSPECIFIED IRON DEFICIENCY ANEMIA TYPE: ICD-10-CM

## 2019-10-18 DIAGNOSIS — R52 TOTAL BODY PAIN: Primary | ICD-10-CM

## 2019-10-18 PROCEDURE — 99999 PR PBB SHADOW E&M-EST. PATIENT-LVL IV: ICD-10-PCS | Mod: PBBFAC,HCNC,, | Performed by: INTERNAL MEDICINE

## 2019-10-18 PROCEDURE — 3079F DIAST BP 80-89 MM HG: CPT | Mod: HCNC,CPTII,S$GLB, | Performed by: INTERNAL MEDICINE

## 2019-10-18 PROCEDURE — 99214 PR OFFICE/OUTPT VISIT, EST, LEVL IV, 30-39 MIN: ICD-10-PCS | Mod: HCNC,S$GLB,, | Performed by: INTERNAL MEDICINE

## 2019-10-18 PROCEDURE — 3077F SYST BP >= 140 MM HG: CPT | Mod: HCNC,CPTII,S$GLB, | Performed by: INTERNAL MEDICINE

## 2019-10-18 PROCEDURE — 99499 UNLISTED E&M SERVICE: CPT | Mod: S$GLB,,, | Performed by: INTERNAL MEDICINE

## 2019-10-18 PROCEDURE — 3077F PR MOST RECENT SYSTOLIC BLOOD PRESSURE >= 140 MM HG: ICD-10-PCS | Mod: HCNC,CPTII,S$GLB, | Performed by: INTERNAL MEDICINE

## 2019-10-18 PROCEDURE — 99499 RISK ADDL DX/OHS AUDIT: ICD-10-PCS | Mod: S$GLB,,, | Performed by: INTERNAL MEDICINE

## 2019-10-18 PROCEDURE — 99214 OFFICE O/P EST MOD 30 MIN: CPT | Mod: HCNC,S$GLB,, | Performed by: INTERNAL MEDICINE

## 2019-10-18 PROCEDURE — 99999 PR PBB SHADOW E&M-EST. PATIENT-LVL IV: CPT | Mod: PBBFAC,HCNC,, | Performed by: INTERNAL MEDICINE

## 2019-10-18 PROCEDURE — 3079F PR MOST RECENT DIASTOLIC BLOOD PRESSURE 80-89 MM HG: ICD-10-PCS | Mod: HCNC,CPTII,S$GLB, | Performed by: INTERNAL MEDICINE

## 2019-10-18 PROCEDURE — 1101F PR PT FALLS ASSESS DOC 0-1 FALLS W/OUT INJ PAST YR: ICD-10-PCS | Mod: HCNC,CPTII,S$GLB, | Performed by: INTERNAL MEDICINE

## 2019-10-18 PROCEDURE — 1101F PT FALLS ASSESS-DOCD LE1/YR: CPT | Mod: HCNC,CPTII,S$GLB, | Performed by: INTERNAL MEDICINE

## 2019-10-18 RX ORDER — CHLORTHALIDONE 25 MG/1
25 TABLET ORAL DAILY
Qty: 90 TABLET | Refills: 3 | Status: SHIPPED | OUTPATIENT
Start: 2019-10-18 | End: 2020-07-24

## 2019-10-25 ENCOUNTER — PATIENT OUTREACH (OUTPATIENT)
Dept: ADMINISTRATIVE | Facility: OTHER | Age: 74
End: 2019-10-25

## 2019-10-25 NOTE — PROGRESS NOTES
She is a 73-year-old lady coming in today to follow-up her ongoing medical problems.  She still having a lot of anxiety in dealing with her elderly, demented mother.  Her mother is going to adult  now and that is helping.  I last saw back in July.  Recently her blood pressure was elevated though.  Blood pressure today is 170/80.  She is on amlodipine 10 mg a day and losartan 100 mg a day.  Recheck blood pressure is 170/80.  She is not having any type of headache or chest pain at the current time.  She has had hypertension for multiple years.    She has ischemic colitis and has been having some iron deficiency anemia,   started her on iron last visit, she is tolerating it well.  In the past, she has   been on Feldene and she also was on Celebrex, but she is off both of these.  Has returned to normal, and her MCV is now normal..Follow-up with GI about a week ago..  She is not having any blood in the stool.      She says her fibromyalgia has been acting up since she has been off nonsteroidals,   but she is not having any abdominal pain.        She has diabetes mellitus type 2.    She has been pretty well controlled.  Last hemoglobin A1c is 5.8 back in June.  She is due for hemoglobin A1c.     She is not on any medication for her diabetes, but working on a low-glucose   diet.  She saw Ophthalmology couple months ago.          REVIEW OF SYSTEMS:  No chest pain, shortness of breath, palpitations, nausea,   vomiting, blurriness of vision.  No PND or orthopnea.     PHYSICAL EXAMINATION:BP (!) 170/80   Pulse 65   Ht 5' (1.524 m)   Wt 56.9 kg (125 lb 7.1 oz)   SpO2 99%   BMI 24.50 kg/m²     GENERAL:  She is a well-appearing 73-year-old lady,   She is by herself.  Her ear canals are open.  TMs are clear.  Oropharynx is clear.  Her chest is clear bilaterally cardiovascular S1, S2 without murmur.  Her abdomen is soft and nontender.  Bowel sounds unremarkable.  Her upper and lower extremities both have normal distal  pulses.  In her mood seems good today.  She is walking without any assistance of a walker or cane.  She still appears little bit on the nervous side.      ASSESSMENT:  1. Also, we will continue citalopram.  I also discussed her mother last visit     into a nursing home, but seems things have kind of settled down a little bit   and we discussed that her mother really is more palliative care type issue and   to pick her battles and we just make sure that Ms. Kam is taking care of   herself including catching back with health maintenance.  2. Diabetes, well controlled.  Check a hemoglobin A1c today.  3. Anemia.   follow up with GI.  But her anemia has resolved..    4. Fibromyalgia.  We are going to actually start her back on some diclofenac,   but we will make a diclofenac gel, which she would have less GI toxicity to 2 g   transdermally daily.  5. Hypertension blood pressure is worse.  Room start her also chlorthalidone and recheck blood pressure in a month.  We discussed low-salt diet.  6. Hyperlipidemia.  Continue medications.

## 2019-10-28 ENCOUNTER — TELEPHONE (OUTPATIENT)
Dept: GASTROENTEROLOGY | Facility: CLINIC | Age: 74
End: 2019-10-28

## 2019-10-28 NOTE — TELEPHONE ENCOUNTER
MA contacted pt to confirm her appointment for 10/28 at 9 am . Pt stated she couldn't pay a copay and wanted to know would they still be able to see her. MA told pt that she wouldn't be able to answer that and gave her the number to [facility] scheduling to direct her to the right depart to talk to .

## 2019-10-29 ENCOUNTER — LAB VISIT (OUTPATIENT)
Dept: LAB | Facility: HOSPITAL | Age: 74
End: 2019-10-29
Payer: MEDICARE

## 2019-10-29 ENCOUNTER — OFFICE VISIT (OUTPATIENT)
Dept: GASTROENTEROLOGY | Facility: CLINIC | Age: 74
End: 2019-10-29
Payer: MEDICARE

## 2019-10-29 VITALS
SYSTOLIC BLOOD PRESSURE: 154 MMHG | BODY MASS INDEX: 24.06 KG/M2 | HEIGHT: 60 IN | WEIGHT: 122.56 LBS | DIASTOLIC BLOOD PRESSURE: 79 MMHG | HEART RATE: 73 BPM

## 2019-10-29 DIAGNOSIS — D50.9 IRON DEFICIENCY ANEMIA, UNSPECIFIED IRON DEFICIENCY ANEMIA TYPE: ICD-10-CM

## 2019-10-29 DIAGNOSIS — K90.0 CELIAC DISEASE: ICD-10-CM

## 2019-10-29 DIAGNOSIS — K59.00 CONSTIPATION, UNSPECIFIED CONSTIPATION TYPE: Primary | ICD-10-CM

## 2019-10-29 LAB
25(OH)D3+25(OH)D2 SERPL-MCNC: 57 NG/ML (ref 30–96)
CERULOPLASMIN SERPL-MCNC: 43 MG/DL (ref 15–45)
FERRITIN SERPL-MCNC: 31 NG/ML (ref 20–300)
FOLATE SERPL-MCNC: 16 NG/ML (ref 4–24)
IGA SERPL-MCNC: 223 MG/DL (ref 40–350)
MAGNESIUM SERPL-MCNC: 1.6 MG/DL (ref 1.6–2.6)
VIT B12 SERPL-MCNC: 707 PG/ML (ref 210–950)

## 2019-10-29 PROCEDURE — 99499 RISK ADDL DX/OHS AUDIT: ICD-10-PCS | Mod: HCNC,,, | Performed by: NURSE PRACTITIONER

## 2019-10-29 PROCEDURE — 82728 ASSAY OF FERRITIN: CPT | Mod: HCNC

## 2019-10-29 PROCEDURE — 99999 PR PBB SHADOW E&M-EST. PATIENT-LVL III: CPT | Mod: PBBFAC,HCNC,, | Performed by: NURSE PRACTITIONER

## 2019-10-29 PROCEDURE — 84630 ASSAY OF ZINC: CPT | Mod: HCNC

## 2019-10-29 PROCEDURE — 1101F PR PT FALLS ASSESS DOC 0-1 FALLS W/OUT INJ PAST YR: ICD-10-PCS | Mod: HCNC,CPTII,S$GLB, | Performed by: NURSE PRACTITIONER

## 2019-10-29 PROCEDURE — 1101F PT FALLS ASSESS-DOCD LE1/YR: CPT | Mod: HCNC,CPTII,S$GLB, | Performed by: NURSE PRACTITIONER

## 2019-10-29 PROCEDURE — 99499 UNLISTED E&M SERVICE: CPT | Mod: HCNC,S$GLB,, | Performed by: NURSE PRACTITIONER

## 2019-10-29 PROCEDURE — 3078F DIAST BP <80 MM HG: CPT | Mod: HCNC,CPTII,S$GLB, | Performed by: NURSE PRACTITIONER

## 2019-10-29 PROCEDURE — 83516 IMMUNOASSAY NONANTIBODY: CPT | Mod: HCNC

## 2019-10-29 PROCEDURE — 83735 ASSAY OF MAGNESIUM: CPT | Mod: HCNC

## 2019-10-29 PROCEDURE — 3077F PR MOST RECENT SYSTOLIC BLOOD PRESSURE >= 140 MM HG: ICD-10-PCS | Mod: HCNC,CPTII,S$GLB, | Performed by: NURSE PRACTITIONER

## 2019-10-29 PROCEDURE — 82784 ASSAY IGA/IGD/IGG/IGM EACH: CPT | Mod: HCNC

## 2019-10-29 PROCEDURE — 84446 ASSAY OF VITAMIN E: CPT | Mod: HCNC

## 2019-10-29 PROCEDURE — 99499 UNLISTED E&M SERVICE: CPT | Mod: HCNC,,, | Performed by: NURSE PRACTITIONER

## 2019-10-29 PROCEDURE — 36415 COLL VENOUS BLD VENIPUNCTURE: CPT | Mod: HCNC

## 2019-10-29 PROCEDURE — 82390 ASSAY OF CERULOPLASMIN: CPT | Mod: HCNC

## 2019-10-29 PROCEDURE — 82607 VITAMIN B-12: CPT | Mod: HCNC

## 2019-10-29 PROCEDURE — 82380 ASSAY OF CAROTENE: CPT | Mod: HCNC

## 2019-10-29 PROCEDURE — 99999 PR PBB SHADOW E&M-EST. PATIENT-LVL III: ICD-10-PCS | Mod: PBBFAC,HCNC,, | Performed by: NURSE PRACTITIONER

## 2019-10-29 PROCEDURE — 3078F PR MOST RECENT DIASTOLIC BLOOD PRESSURE < 80 MM HG: ICD-10-PCS | Mod: HCNC,CPTII,S$GLB, | Performed by: NURSE PRACTITIONER

## 2019-10-29 PROCEDURE — 84590 ASSAY OF VITAMIN A: CPT | Mod: HCNC

## 2019-10-29 PROCEDURE — 84255 ASSAY OF SELENIUM: CPT | Mod: HCNC

## 2019-10-29 PROCEDURE — 82306 VITAMIN D 25 HYDROXY: CPT | Mod: HCNC

## 2019-10-29 PROCEDURE — 3077F SYST BP >= 140 MM HG: CPT | Mod: HCNC,CPTII,S$GLB, | Performed by: NURSE PRACTITIONER

## 2019-10-29 PROCEDURE — 99214 OFFICE O/P EST MOD 30 MIN: CPT | Mod: HCNC,S$GLB,, | Performed by: NURSE PRACTITIONER

## 2019-10-29 PROCEDURE — 99499 RISK ADDL DX/OHS AUDIT: ICD-10-PCS | Mod: HCNC,S$GLB,, | Performed by: NURSE PRACTITIONER

## 2019-10-29 PROCEDURE — 82746 ASSAY OF FOLIC ACID SERUM: CPT | Mod: HCNC

## 2019-10-29 PROCEDURE — 99214 PR OFFICE/OUTPT VISIT, EST, LEVL IV, 30-39 MIN: ICD-10-PCS | Mod: HCNC,S$GLB,, | Performed by: NURSE PRACTITIONER

## 2019-10-29 NOTE — PATIENT INSTRUCTIONS
"What is celiac disease? -- Celiac disease is a condition that impairs your body's ability to break down certain foods. People who have the disease get sick if they eat bread, pasta, pizza, and cereal. These foods and others contain a protein called "gluten." Gluten damages the intestines of people with celiac disease (figure 1). As a result, their bodies can't absorb nutrients from food. The disease affects children and adults.  What are the symptoms of celiac disease? -- Some people with celiac disease have no symptoms. When symptoms do occur, they can include:  ?Pain in the belly  ?Diarrhea  ?Bowel movements that are oily and float  ?Weight loss  ?Feeling bloated, or too full all the time  ?Low appetite  ?Bad gas  ?Itchy skin rashes  ?Invisible symptoms, such as weak bones or low iron levels  ?Slow growth in children  Should I see a doctor or nurse? -- If you think you have celiac disease, see a doctor before cutting out gluten from your diet. It's the only way to make sure you get the right kind of help.  Should I take my child to the doctor? -- Your child should see the doctor if he or she:  ?Has diarrhea that lasts for weeks  ?Has constipation that won't go away  ?Has brown or yellow teeth with pits or grooves in them  ?Is too small or light for his or her age  ?Is close to becoming a teenager but does not seem to be going through the changes that teenagers go through (doctors call this "delayed puberty")  ?Has other family members with the disease  Is there a test for celiac disease? -- Doctors use more than one test to diagnose celiac disease:  ?Blood test - A blood test looks for proteins that some people make after eating gluten. People who have celiac disease have lots of these proteins, called antibodies. You should ideally be on a gluten-containing diet for several weeks before getting the blood test. If you are on a gluten-free diet, your doctor might do other blood tests to see if you are genetically " "likely to have celiac disease.  ?Biopsy - To do a biopsy, the doctor will put a thin tube with a tiny camera down your throat. When the tube is in your small intestine, he or she will take a small sample of tissue. That way he or she can look at the tissue under a microscope and see if eating gluten has damaged the intestine.  How is celiac disease treated? -- The best treatment is to stop eating gluten completely. This might be hard to do at first. You will need to avoid rye, wheat, barley, and maybe oats. These ingredients appear in many common foods, including:  ?Bread, pasta, pastries, and cereal  ?Many sauces, spreads, and condiments  ?Beers, ales, lagers, and malt vinegars  You should also avoid milk, cheese, and other dairy foods at first. These foods can be hard to break down. You will want to wait to eat these foods until after your intestines have a chance to heal.  Foods that do not contain gluten (and are fine to eat) include:  ?Rice, corn, potatoes, buckwheat, and soybeans  ?Fruits and vegetables  ?Flours, pasta, and other products made from these ingredients that have a label on them that says "gluten free"  ?Wine and distilled alcoholic drinks, such as rum, tequila, vodka, and whiskey  Your doctor might also prescribe vitamins to make up for nutrients that you have not been getting from food.  What will my life be like? -- People usually feel a lot better within 2 weeks of starting a gluten-free diet. Still, most people need to make huge changes in their lives to avoid gluten.  A health expert can help you learn how to change your eating habits and still be healthy. You will also need to learn how to read and understand labels on foods.  You will probably need to avoid gluten for the rest of your life. Your doctor will most likely suggest getting a blood test at least once a year. This is to see how your body is responding to a gluten-free diet.  The hardest part about the disease is learning to eat " in a whole new way. The good news is, there are plenty of foods made just for people with celiac disease. The new diet just takes a little getting used to.      Continue taking IRON supplements.

## 2019-10-29 NOTE — LETTER
October 29, 2019      Gold Palafox Jr., MD  [address]  [place] [address]           [facility] Gastro[facility]  [address]  [place] [address]  Phone: [phone]  Fax: [phone]          Patient: Krysta Kam   MR Number: [id number]   YOB: 1945   Date of Visit: 10/29/2019       Dear Dr. Gold Palafox Jr.:    Thank you for referring Krysta Kam to me for evaluation. Attached you will find relevant portions of my assessment and plan of care.    If you have questions, please do not hesitate to call me. I look forward to following Krysta Kam along with you.    Sincerely,    Erica Xiong, KATRIN    Enclosure  CC:  No Recipients    If you would like to receive this communication electronically, please contact [email or web address] or [phone] to request more information on [employer] Link access.    For providers and/or their staff who would like to refer a patient to [facility], please contact us through our one-stop-shop provider referral line, [facility], at [phone].    If you feel you have received this communication in error or would no longer like to receive these types of communications, please e-mail [email or web address]

## 2019-10-30 ENCOUNTER — IMMUNIZATION (OUTPATIENT)
Dept: PHARMACY | Facility: CLINIC | Age: 74
End: 2019-10-30
Payer: MEDICARE

## 2019-10-30 LAB
TTG IGA SER-ACNC: 34 UNITS
ZINC SERPL-MCNC: 86 UG/DL (ref 60–130)

## 2019-11-01 ENCOUNTER — TELEPHONE (OUTPATIENT)
Dept: GASTROENTEROLOGY | Facility: CLINIC | Age: 74
End: 2019-11-01

## 2019-11-01 LAB
A-TOCOPHEROL VIT E SERPL-MCNC: 1630 UG/DL (ref 500–1800)
CAROTENE SERPL-MCNC: 97 UG/DL (ref 60–200)
VIT A SERPL-MCNC: 57 UG/DL (ref 38–106)

## 2019-11-01 NOTE — TELEPHONE ENCOUNTER
MA contacted pt to give test results per Erica . pt verbalized understanding and repeated back that she is to stick to a gluten free diet.       ----- Message from Erica Xiong NP sent at 11/1/2019  3:41 PM CDT -----  Her vitamin and mineral labs are good. Her celiac lab is still elevated, needs to make sure she is adhering to a gluten free diet.

## 2019-11-01 NOTE — PROGRESS NOTES
Her vitamin and mineral labs are good. Her celiac lab is still elevated, needs to make sure she is adhering to a gluten free diet.

## 2019-11-02 LAB — SELENIUM SERPL-MCNC: 122 UG/L (ref 23–190)

## 2019-11-11 ENCOUNTER — TELEPHONE (OUTPATIENT)
Dept: INTERNAL MEDICINE | Facility: CLINIC | Age: 74
End: 2019-11-11

## 2019-11-11 NOTE — TELEPHONE ENCOUNTER
----- Message from Soraya Pacheco sent at 11/11/2019  3:21 PM CST -----  Contact: [phone]  Patient is requesting a call from the office regarding a prior auth for medicationlidocaine (LIDODERM) 5 %.    Please advise, thank you.

## 2019-11-11 NOTE — TELEPHONE ENCOUNTER
----- Message from Soraya Pacheco sent at 11/11/2019  3:21 PM CST -----  Contact: [phone]  Patient is requesting a call from the office regarding a prior auth for medicationlidocaine (LIDODERM) 5 %.    Please advise, thank you.

## 2019-12-09 ENCOUNTER — PATIENT OUTREACH (OUTPATIENT)
Dept: ADMINISTRATIVE | Facility: HOSPITAL | Age: 74
End: 2019-12-09

## 2020-01-06 ENCOUNTER — OFFICE VISIT (OUTPATIENT)
Dept: INTERNAL MEDICINE | Facility: CLINIC | Age: 75
End: 2020-01-06
Payer: MEDICARE

## 2020-01-06 VITALS
OXYGEN SATURATION: 98 % | SYSTOLIC BLOOD PRESSURE: 136 MMHG | HEIGHT: 61 IN | DIASTOLIC BLOOD PRESSURE: 80 MMHG | HEART RATE: 62 BPM | WEIGHT: 121.69 LBS | BODY MASS INDEX: 22.98 KG/M2

## 2020-01-06 DIAGNOSIS — E11.9 DIABETES MELLITUS WITHOUT COMPLICATION: ICD-10-CM

## 2020-01-06 DIAGNOSIS — I10 ESSENTIAL HYPERTENSION: ICD-10-CM

## 2020-01-06 DIAGNOSIS — K55.9 ISCHEMIC COLITIS: ICD-10-CM

## 2020-01-06 DIAGNOSIS — E78.00 HIGH CHOLESTEROL: ICD-10-CM

## 2020-01-06 DIAGNOSIS — E11.36 TYPE 2 DIABETES MELLITUS WITH DIABETIC CATARACT, WITHOUT LONG-TERM CURRENT USE OF INSULIN: ICD-10-CM

## 2020-01-06 DIAGNOSIS — M79.7 FIBROMYALGIA: Primary | ICD-10-CM

## 2020-01-06 DIAGNOSIS — Z86.59 HISTORY OF DEPRESSION: ICD-10-CM

## 2020-01-06 PROCEDURE — 1101F PR PT FALLS ASSESS DOC 0-1 FALLS W/OUT INJ PAST YR: ICD-10-PCS | Mod: HCNC,CPTII,S$GLB, | Performed by: INTERNAL MEDICINE

## 2020-01-06 PROCEDURE — 3079F DIAST BP 80-89 MM HG: CPT | Mod: HCNC,CPTII,S$GLB, | Performed by: INTERNAL MEDICINE

## 2020-01-06 PROCEDURE — 3044F HG A1C LEVEL LT 7.0%: CPT | Mod: HCNC,CPTII,S$GLB, | Performed by: INTERNAL MEDICINE

## 2020-01-06 PROCEDURE — 99999 PR PBB SHADOW E&M-EST. PATIENT-LVL IV: ICD-10-PCS | Mod: PBBFAC,HCNC,, | Performed by: INTERNAL MEDICINE

## 2020-01-06 PROCEDURE — 3079F PR MOST RECENT DIASTOLIC BLOOD PRESSURE 80-89 MM HG: ICD-10-PCS | Mod: HCNC,CPTII,S$GLB, | Performed by: INTERNAL MEDICINE

## 2020-01-06 PROCEDURE — 99214 OFFICE O/P EST MOD 30 MIN: CPT | Mod: HCNC,S$GLB,, | Performed by: INTERNAL MEDICINE

## 2020-01-06 PROCEDURE — 3075F PR MOST RECENT SYSTOLIC BLOOD PRESS GE 130-139MM HG: ICD-10-PCS | Mod: HCNC,CPTII,S$GLB, | Performed by: INTERNAL MEDICINE

## 2020-01-06 PROCEDURE — 99499 UNLISTED E&M SERVICE: CPT | Mod: HCNC,S$GLB,, | Performed by: INTERNAL MEDICINE

## 2020-01-06 PROCEDURE — 1125F PR PAIN SEVERITY QUANTIFIED, PAIN PRESENT: ICD-10-PCS | Mod: HCNC,S$GLB,, | Performed by: INTERNAL MEDICINE

## 2020-01-06 PROCEDURE — 1159F PR MEDICATION LIST DOCUMENTED IN MEDICAL RECORD: ICD-10-PCS | Mod: HCNC,S$GLB,, | Performed by: INTERNAL MEDICINE

## 2020-01-06 PROCEDURE — 1125F AMNT PAIN NOTED PAIN PRSNT: CPT | Mod: HCNC,S$GLB,, | Performed by: INTERNAL MEDICINE

## 2020-01-06 PROCEDURE — 1159F MED LIST DOCD IN RCRD: CPT | Mod: HCNC,S$GLB,, | Performed by: INTERNAL MEDICINE

## 2020-01-06 PROCEDURE — 3044F PR MOST RECENT HEMOGLOBIN A1C LEVEL <7.0%: ICD-10-PCS | Mod: HCNC,CPTII,S$GLB, | Performed by: INTERNAL MEDICINE

## 2020-01-06 PROCEDURE — 99499 RISK ADDL DX/OHS AUDIT: ICD-10-PCS | Mod: HCNC,S$GLB,, | Performed by: INTERNAL MEDICINE

## 2020-01-06 PROCEDURE — 1101F PT FALLS ASSESS-DOCD LE1/YR: CPT | Mod: HCNC,CPTII,S$GLB, | Performed by: INTERNAL MEDICINE

## 2020-01-06 PROCEDURE — 99999 PR PBB SHADOW E&M-EST. PATIENT-LVL IV: CPT | Mod: PBBFAC,HCNC,, | Performed by: INTERNAL MEDICINE

## 2020-01-06 PROCEDURE — 3075F SYST BP GE 130 - 139MM HG: CPT | Mod: HCNC,CPTII,S$GLB, | Performed by: INTERNAL MEDICINE

## 2020-01-06 PROCEDURE — 99214 PR OFFICE/OUTPT VISIT, EST, LEVL IV, 30-39 MIN: ICD-10-PCS | Mod: HCNC,S$GLB,, | Performed by: INTERNAL MEDICINE

## 2020-01-06 NOTE — PROGRESS NOTES
"She is a 74-year-old lady coming in today to follow-up her ongoing medical problems.  She still having a lot of anxiety in dealing with her elderly, demented mother.  Her mother is going to adult  now and that is helping.  I last saw back in Oct 2019 .  She has htn .  Blood pressure today is 134/82.  She is on amlodipine 10 mg a day, chlorthalidone ( started last visit )  and losartan 100 mg a day.  Recheck blood pressure is 136/80.  She is not having any type of headache or chest pain at the current time.  She has had hypertension for multiple years.     She has ischemic colitis and has been having some iron deficiency anemia,   started her on iron last visit, she is tolerating it well.  In the past, she has   been on Feldene and she also was on Celebrex, but she is off both of these.  Has returned to normal, and her MCV is now normal..Follow-up with GI about a week ago..  She is not having any blood in the stool.       She says her fibromyalgia has been acting up since she has been off nonsteroidals,   but she is not having any abdominal pain.  her right knee is bothering her currently.  SHe uses a brac and it helps.  SHe has some low back and thoracic  pain in the morning but it resolves with movements.        She has diabetes mellitus type 2.    She has been pretty well controlled.  Last hemoglobin A1c is 5.7 back in Oct .    She is not on any medication for her diabetes, but working on a low-glucose   diet.   She is on a statin and last lipid panel was in Jne 2019.             REVIEW OF SYSTEMS:  No chest pain, shortness of breath, palpitations, nausea,   vomiting, blurriness of vision.  No PND or orthopnea.     PHYSICAL EXAMINATION:BP /80   Pulse 62   Ht 5' 1" (1.549 m)   Wt 55.2 kg (121 lb 11.1 oz)   SpO2 98%   BMI 22.99 kg/m²        GENERAL:  She is a well-appearing 73-year-old lady,   She is by herself.  Her ear canals are open.  TMs are clear.  Oropharynx is clear.  Her chest is clear " bilaterally cardiovascular S1, S2 without murmur.  Her abdomen is soft and nontender.  Bowel sounds unremarkable.  Her upper and lower extremities both have normal distal pulses.  In her mood seems good today.  She is walking without any assistance of a walker or cane.  She still appears little bit on the nervous side.      ASSESSMENT:  1. Also, we will continue citalopram.  I also discussed her mother last visit     into a nursing home, but seems things have kind of settled down a little bit   and we discussed that her mother really is more palliative care type issue and   to pick her battles and we just make sure that Ms. Kam is taking care of   herself including catching back with health maintenance. Refused tdap, shingrix and Pneumonia 23 vaccines   2. Diabetes, well controlled.    3. Anemia.   follow up with GI.  But her anemia has resolved..    4. Fibromyalgia.  We are going to actually start her back on some diclofenac,   but we will make a diclofenac gel, which she would have less GI toxicity to 2 g   transdermally daily.  5. Hypertension blood pressure is better .     We discussed low-salt diet.  6. Hyperlipidemia.  Continue medications.

## 2020-01-27 ENCOUNTER — PES CALL (OUTPATIENT)
Dept: ADMINISTRATIVE | Facility: CLINIC | Age: 75
End: 2020-01-27

## 2020-06-26 ENCOUNTER — PATIENT OUTREACH (OUTPATIENT)
Dept: ADMINISTRATIVE | Facility: HOSPITAL | Age: 75
End: 2020-06-26

## 2020-06-26 NOTE — PROGRESS NOTES
Health Maintenance Due   Topic Date Due    Hemoglobin A1c  04/14/2020    Foot Exam  06/13/2020    Lipid Panel  06/13/2020    Mammogram  08/09/2020     Portal message sent to patient regarding overdue health maintenance.  Chart review completed.

## 2020-07-10 ENCOUNTER — OFFICE VISIT (OUTPATIENT)
Dept: INTERNAL MEDICINE | Facility: CLINIC | Age: 75
End: 2020-07-10
Payer: MEDICARE

## 2020-07-10 ENCOUNTER — LAB VISIT (OUTPATIENT)
Dept: LAB | Facility: HOSPITAL | Age: 75
End: 2020-07-10
Attending: INTERNAL MEDICINE
Payer: MEDICARE

## 2020-07-10 VITALS
HEART RATE: 73 BPM | DIASTOLIC BLOOD PRESSURE: 80 MMHG | SYSTOLIC BLOOD PRESSURE: 136 MMHG | OXYGEN SATURATION: 100 % | BODY MASS INDEX: 23.39 KG/M2 | HEIGHT: 61 IN | WEIGHT: 123.88 LBS

## 2020-07-10 DIAGNOSIS — E78.00 HIGH CHOLESTEROL: ICD-10-CM

## 2020-07-10 DIAGNOSIS — M79.7 FIBROMYALGIA: ICD-10-CM

## 2020-07-10 DIAGNOSIS — E11.9 DIABETES MELLITUS WITHOUT COMPLICATION: ICD-10-CM

## 2020-07-10 DIAGNOSIS — F41.9 ANXIETY: Primary | ICD-10-CM

## 2020-07-10 DIAGNOSIS — M17.9 OSTEOARTHRITIS OF KNEE, UNSPECIFIED LATERALITY, UNSPECIFIED OSTEOARTHRITIS TYPE: ICD-10-CM

## 2020-07-10 DIAGNOSIS — Z12.31 OTHER SCREENING MAMMOGRAM: ICD-10-CM

## 2020-07-10 DIAGNOSIS — I10 ESSENTIAL HYPERTENSION: ICD-10-CM

## 2020-07-10 DIAGNOSIS — Z86.59 HISTORY OF DEPRESSION: ICD-10-CM

## 2020-07-10 DIAGNOSIS — M17.0 PRIMARY OSTEOARTHRITIS OF BOTH KNEES: ICD-10-CM

## 2020-07-10 DIAGNOSIS — E11.36 TYPE 2 DIABETES MELLITUS WITH DIABETIC CATARACT, WITHOUT LONG-TERM CURRENT USE OF INSULIN: ICD-10-CM

## 2020-07-10 PROCEDURE — 80061 LIPID PANEL: CPT | Mod: HCNC

## 2020-07-10 PROCEDURE — 3044F HG A1C LEVEL LT 7.0%: CPT | Mod: HCNC,CPTII,S$GLB, | Performed by: INTERNAL MEDICINE

## 2020-07-10 PROCEDURE — 1126F PR PAIN SEVERITY QUANTIFIED, NO PAIN PRESENT: ICD-10-PCS | Mod: HCNC,S$GLB,, | Performed by: INTERNAL MEDICINE

## 2020-07-10 PROCEDURE — 1101F PR PT FALLS ASSESS DOC 0-1 FALLS W/OUT INJ PAST YR: ICD-10-PCS | Mod: HCNC,CPTII,S$GLB, | Performed by: INTERNAL MEDICINE

## 2020-07-10 PROCEDURE — 80053 COMPREHEN METABOLIC PANEL: CPT | Mod: HCNC

## 2020-07-10 PROCEDURE — 1159F PR MEDICATION LIST DOCUMENTED IN MEDICAL RECORD: ICD-10-PCS | Mod: HCNC,S$GLB,, | Performed by: INTERNAL MEDICINE

## 2020-07-10 PROCEDURE — 3008F PR BODY MASS INDEX (BMI) DOCUMENTED: ICD-10-PCS | Mod: HCNC,CPTII,S$GLB, | Performed by: INTERNAL MEDICINE

## 2020-07-10 PROCEDURE — 99999 PR PBB SHADOW E&M-EST. PATIENT-LVL V: ICD-10-PCS | Mod: PBBFAC,HCNC,, | Performed by: INTERNAL MEDICINE

## 2020-07-10 PROCEDURE — 3079F DIAST BP 80-89 MM HG: CPT | Mod: HCNC,CPTII,S$GLB, | Performed by: INTERNAL MEDICINE

## 2020-07-10 PROCEDURE — 3075F SYST BP GE 130 - 139MM HG: CPT | Mod: HCNC,CPTII,S$GLB, | Performed by: INTERNAL MEDICINE

## 2020-07-10 PROCEDURE — 99999 PR PBB SHADOW E&M-EST. PATIENT-LVL V: CPT | Mod: PBBFAC,HCNC,, | Performed by: INTERNAL MEDICINE

## 2020-07-10 PROCEDURE — 3075F PR MOST RECENT SYSTOLIC BLOOD PRESS GE 130-139MM HG: ICD-10-PCS | Mod: HCNC,CPTII,S$GLB, | Performed by: INTERNAL MEDICINE

## 2020-07-10 PROCEDURE — 99214 OFFICE O/P EST MOD 30 MIN: CPT | Mod: HCNC,S$GLB,, | Performed by: INTERNAL MEDICINE

## 2020-07-10 PROCEDURE — 1159F MED LIST DOCD IN RCRD: CPT | Mod: HCNC,S$GLB,, | Performed by: INTERNAL MEDICINE

## 2020-07-10 PROCEDURE — 99214 PR OFFICE/OUTPT VISIT, EST, LEVL IV, 30-39 MIN: ICD-10-PCS | Mod: HCNC,S$GLB,, | Performed by: INTERNAL MEDICINE

## 2020-07-10 PROCEDURE — 3008F BODY MASS INDEX DOCD: CPT | Mod: HCNC,CPTII,S$GLB, | Performed by: INTERNAL MEDICINE

## 2020-07-10 PROCEDURE — 3044F PR MOST RECENT HEMOGLOBIN A1C LEVEL <7.0%: ICD-10-PCS | Mod: HCNC,CPTII,S$GLB, | Performed by: INTERNAL MEDICINE

## 2020-07-10 PROCEDURE — 1101F PT FALLS ASSESS-DOCD LE1/YR: CPT | Mod: HCNC,CPTII,S$GLB, | Performed by: INTERNAL MEDICINE

## 2020-07-10 PROCEDURE — 36415 COLL VENOUS BLD VENIPUNCTURE: CPT | Mod: HCNC

## 2020-07-10 PROCEDURE — 1126F AMNT PAIN NOTED NONE PRSNT: CPT | Mod: HCNC,S$GLB,, | Performed by: INTERNAL MEDICINE

## 2020-07-10 PROCEDURE — 3079F PR MOST RECENT DIASTOLIC BLOOD PRESSURE 80-89 MM HG: ICD-10-PCS | Mod: HCNC,CPTII,S$GLB, | Performed by: INTERNAL MEDICINE

## 2020-07-10 PROCEDURE — 83036 HEMOGLOBIN GLYCOSYLATED A1C: CPT | Mod: HCNC
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
## 2020-07-11 LAB
ALBUMIN SERPL BCP-MCNC: 3.9 G/DL (ref 3.5–5.2)
ALP SERPL-CCNC: 61 U/L (ref 55–135)
ALT SERPL W/O P-5'-P-CCNC: 14 U/L (ref 10–44)
ANION GAP SERPL CALC-SCNC: 8 MMOL/L (ref 8–16)
AST SERPL-CCNC: 22 U/L (ref 10–40)
BILIRUB SERPL-MCNC: 0.6 MG/DL (ref 0.1–1)
BUN SERPL-MCNC: 21 MG/DL (ref 8–23)
CALCIUM SERPL-MCNC: 10.5 MG/DL (ref 8.7–10.5)
CHLORIDE SERPL-SCNC: 102 MMOL/L (ref 95–110)
CHOLEST SERPL-MCNC: 202 MG/DL (ref 120–199)
CHOLEST/HDLC SERPL: 2.7 {RATIO} (ref 2–5)
CO2 SERPL-SCNC: 30 MMOL/L (ref 23–29)
CREAT SERPL-MCNC: 1 MG/DL (ref 0.5–1.4)
EST. GFR  (AFRICAN AMERICAN): >60 ML/MIN/1.73 M^2
EST. GFR  (NON AFRICAN AMERICAN): 55.6 ML/MIN/1.73 M^2
ESTIMATED AVG GLUCOSE: 128 MG/DL (ref 68–131)
GLUCOSE SERPL-MCNC: 107 MG/DL (ref 70–110)
HBA1C MFR BLD HPLC: 6.1 % (ref 4–5.6)
HDLC SERPL-MCNC: 75 MG/DL (ref 40–75)
HDLC SERPL: 37.1 % (ref 20–50)
LDLC SERPL CALC-MCNC: 113.6 MG/DL (ref 63–159)
NONHDLC SERPL-MCNC: 127 MG/DL
POTASSIUM SERPL-SCNC: 4.4 MMOL/L (ref 3.5–5.1)
PROT SERPL-MCNC: 7.5 G/DL (ref 6–8.4)
SODIUM SERPL-SCNC: 140 MMOL/L (ref 136–145)
TRIGL SERPL-MCNC: 67 MG/DL (ref 30–150)

## 2020-07-15 ENCOUNTER — TELEPHONE (OUTPATIENT)
Dept: INTERNAL MEDICINE | Facility: CLINIC | Age: 75
End: 2020-07-15

## 2020-07-15 NOTE — TELEPHONE ENCOUNTER
----- Message from Sobeida Sarah sent at 7/15/2020  9:46 AM CDT -----  Contact: self/[phone]  Patient called in regards needing to talk with Dr Palafox nurse about test results. Thank you.

## 2020-07-15 NOTE — TELEPHONE ENCOUNTER
All of her labs look okay.  Her hemoglobin A1c 6.1.  That means her diabetes is still well controlled.

## 2020-07-29 ENCOUNTER — OFFICE VISIT (OUTPATIENT)
Dept: SPORTS MEDICINE | Facility: CLINIC | Age: 75
End: 2020-07-29
Payer: MEDICARE

## 2020-07-29 ENCOUNTER — HOSPITAL ENCOUNTER (OUTPATIENT)
Dept: RADIOLOGY | Facility: HOSPITAL | Age: 75
Discharge: HOME | End: 2020-07-29
Attending: ORTHOPAEDIC SURGERY
Payer: MEDICARE

## 2020-07-29 VITALS
DIASTOLIC BLOOD PRESSURE: 73 MMHG | HEIGHT: 61 IN | BODY MASS INDEX: 23.15 KG/M2 | HEART RATE: 74 BPM | SYSTOLIC BLOOD PRESSURE: 129 MMHG | WEIGHT: 122.63 LBS

## 2020-07-29 DIAGNOSIS — M25.561 RIGHT KNEE PAIN, UNSPECIFIED CHRONICITY: Primary | ICD-10-CM

## 2020-07-29 DIAGNOSIS — M17.9 OSTEOARTHRITIS OF KNEE, UNSPECIFIED LATERALITY, UNSPECIFIED OSTEOARTHRITIS TYPE: ICD-10-CM

## 2020-07-29 DIAGNOSIS — M25.561 RIGHT KNEE PAIN, UNSPECIFIED CHRONICITY: ICD-10-CM

## 2020-07-29 DIAGNOSIS — M17.11 OSTEOARTHRITIS OF RIGHT KNEE, UNSPECIFIED OSTEOARTHRITIS TYPE: ICD-10-CM

## 2020-07-29 PROCEDURE — 3008F BODY MASS INDEX DOCD: CPT | Mod: HCNC,CPTII,S$GLB, | Performed by: ORTHOPAEDIC SURGERY

## 2020-07-29 PROCEDURE — 3008F PR BODY MASS INDEX (BMI) DOCUMENTED: ICD-10-PCS | Mod: HCNC,CPTII,S$GLB, | Performed by: ORTHOPAEDIC SURGERY

## 2020-07-29 PROCEDURE — 1125F AMNT PAIN NOTED PAIN PRSNT: CPT | Mod: HCNC,S$GLB,, | Performed by: ORTHOPAEDIC SURGERY

## 2020-07-29 PROCEDURE — 99999 PR PBB SHADOW E&M-EST. PATIENT-LVL V: CPT | Mod: PBBFAC,HCNC,, | Performed by: ORTHOPAEDIC SURGERY

## 2020-07-29 PROCEDURE — 99203 OFFICE O/P NEW LOW 30 MIN: CPT | Mod: HCNC,S$GLB,, | Performed by: ORTHOPAEDIC SURGERY

## 2020-07-29 PROCEDURE — 1159F MED LIST DOCD IN RCRD: CPT | Mod: HCNC,S$GLB,, | Performed by: ORTHOPAEDIC SURGERY

## 2020-07-29 PROCEDURE — 73564 X-RAY EXAM KNEE 4 OR MORE: CPT | Mod: TC,50,HCNC

## 2020-07-29 PROCEDURE — 73564 X-RAY EXAM KNEE 4 OR MORE: CPT | Mod: 26,50,HCNC, | Performed by: RADIOLOGY

## 2020-07-29 PROCEDURE — 1101F PR PT FALLS ASSESS DOC 0-1 FALLS W/OUT INJ PAST YR: ICD-10-PCS | Mod: HCNC,CPTII,S$GLB, | Performed by: ORTHOPAEDIC SURGERY

## 2020-07-29 PROCEDURE — 73564 XR KNEE ORTHO BILAT WITH FLEXION: ICD-10-PCS | Mod: 26,50,HCNC, | Performed by: RADIOLOGY

## 2020-07-29 PROCEDURE — 1125F PR PAIN SEVERITY QUANTIFIED, PAIN PRESENT: ICD-10-PCS | Mod: HCNC,S$GLB,, | Performed by: ORTHOPAEDIC SURGERY

## 2020-07-29 PROCEDURE — 1101F PT FALLS ASSESS-DOCD LE1/YR: CPT | Mod: HCNC,CPTII,S$GLB, | Performed by: ORTHOPAEDIC SURGERY

## 2020-07-29 PROCEDURE — 99999 PR PBB SHADOW E&M-EST. PATIENT-LVL V: ICD-10-PCS | Mod: PBBFAC,HCNC,, | Performed by: ORTHOPAEDIC SURGERY

## 2020-07-29 PROCEDURE — 3074F PR MOST RECENT SYSTOLIC BLOOD PRESSURE < 130 MM HG: ICD-10-PCS | Mod: HCNC,CPTII,S$GLB, | Performed by: ORTHOPAEDIC SURGERY

## 2020-07-29 PROCEDURE — 3078F PR MOST RECENT DIASTOLIC BLOOD PRESSURE < 80 MM HG: ICD-10-PCS | Mod: HCNC,CPTII,S$GLB, | Performed by: ORTHOPAEDIC SURGERY

## 2020-07-29 PROCEDURE — 3074F SYST BP LT 130 MM HG: CPT | Mod: HCNC,CPTII,S$GLB, | Performed by: ORTHOPAEDIC SURGERY

## 2020-07-29 PROCEDURE — 1159F PR MEDICATION LIST DOCUMENTED IN MEDICAL RECORD: ICD-10-PCS | Mod: HCNC,S$GLB,, | Performed by: ORTHOPAEDIC SURGERY

## 2020-07-29 PROCEDURE — 3078F DIAST BP <80 MM HG: CPT | Mod: HCNC,CPTII,S$GLB, | Performed by: ORTHOPAEDIC SURGERY

## 2020-07-29 PROCEDURE — 99203 PR OFFICE/OUTPT VISIT, NEW, LEVL III, 30-44 MIN: ICD-10-PCS | Mod: HCNC,S$GLB,, | Performed by: ORTHOPAEDIC SURGERY

## 2020-07-29 NOTE — LETTER
July 29, 2020      Gold Palafox Jr., MD  [address]  [facility] [address]           [facility]  [address]  [facility] [address]  Phone: [phone]          Patient: Krysta Kam   MR Number: [id number]   YOB: 1945   Date of Visit: 7/29/2020       Dear Dr. Gold Palafox Jr.:    Thank you for referring Krysta Kam to me for evaluation. Attached you will find relevant portions of my assessment and plan of care.    If you have questions, please do not hesitate to call me. I look forward to following Krysta Kam along with you.    Sincerely,    Tiffanie Magana MD    Enclosure  CC:  No Recipients    If you would like to receive this communication electronically, please contact [email or web address] or [phone] to request more information on [employer] Link access.    For providers and/or their staff who would like to refer a patient to [facility], please contact us through our one-stop-shop provider referral line, [facility], at [phone].    If you feel you have received this communication in error or would no longer like to receive these types of communications, please e-mail [email or web address]

## 2020-07-29 NOTE — PROGRESS NOTES
CC: Right knee pain, patient is retired, referred by Dr. Palafox     74 y.o. Female with a history of Right pain who She states that the pain is severe and not responding to any conservative care.      Pain has been present for over 2 or 3 years  She denies any injury or trauma   The patient notes taking care of her elderly mother who is 91    + mechanical symptoms (locking), no instability    Is affecting ADLs. Patient enjoys walking for exercise  She describes her pain as global, but primarily anterior and medial      She notes trying a knee sleeve, ice, heat, she notes about 3 cortisone injections in the past, last injection being on 7/2019 with Michi Bell (she notes 1 or 2 months of relief)  She notes taking Tylenol as needed     Review of Systems   Constitution: Negative. Negative for chills, fever and night sweats.   HENT: Negative for congestion and headaches.    Eyes: Negative for blurred vision, left vision loss and right vision loss.   Cardiovascular: Negative for chest pain and syncope.   Respiratory: Negative for cough and shortness of breath.    Endocrine: Negative for polydipsia, polyphagia and polyuria.   Hematologic/Lymphatic: Negative for bleeding problem. Does not bruise/bleed easily.   Skin: Negative for dry skin, itching and rash.   Musculoskeletal: Negative for falls. Positive for knee pain and muscle weakness.   Gastrointestinal: Negative for abdominal pain and bowel incontinence.   Genitourinary: Negative for bladder incontinence and nocturia.   Neurological: Negative for disturbances in coordination, loss of balance and seizures.   Psychiatric/Behavioral: Negative for depression. The patient does not have insomnia.    Allergic/Immunologic: Negative for hives and persistent infections.     PAST MEDICAL HISTORY:   Past Medical History:   Diagnosis Date    Anemia 6/2012    Cataract     Colon polyps 1/31/2017    Diabetes mellitus without complication 11/14/2018    Fibromyalgia     GERD  (gastroesophageal reflux disease)     High cholesterol     Hypertension      PAST SURGICAL HISTORY:   Past Surgical History:   Procedure Laterality Date    COLONOSCOPY N/A 10/18/2016    Procedure: COLONOSCOPY;  Surgeon: Brittni Edmondson MD;  [facility]  Service: Endoscopy;  Laterality: N/A;    COLONOSCOPY N/A 10/8/2019    Procedure: COLONOSCOPY;  Surgeon: Gold Ravi MD;  [facility]  Service: Endoscopy;  Laterality: N/A;    ESOPHAGOGASTRODUODENOSCOPY N/A 10/8/2019    Procedure: EGD (ESOPHAGOGASTRODUODENOSCOPY);  Surgeon: Gold Ravi MD;  Location: [facility] ([address] [place] [address]);  Service: Endoscopy;  Laterality: N/A;  Okay for any provider due to KARSTEN    HYSTERECTOMY       FAMILY HISTORY:   Family History   Problem Relation Age of Onset    Hypertension Mother     Stroke Mother     Heart disease Father     Cataracts Neg Hx     Cancer Neg Hx     Diabetes Neg Hx     Glaucoma Neg Hx     Retinal detachment Neg Hx     Celiac disease Neg Hx     Cirrhosis Neg Hx     Colon cancer Neg Hx     Cystic fibrosis Neg Hx     Esophageal cancer Neg Hx     Inflammatory bowel disease Neg Hx     Liver disease Neg Hx     Stomach cancer Neg Hx     Amblyopia Neg Hx     Blindness Neg Hx     Macular degeneration Neg Hx     Strabismus Neg Hx     Thyroid disease Neg Hx      SOCIAL HISTORY:   Social History     Socioeconomic History    Marital status: Single     Spouse name: Not on file    Number of children: Not on file    Years of education: Not on file    Highest education level: Not on file   Occupational History    Not on file   Social Needs    Financial resource strain: Not on file    Food insecurity     Worry: Not on file     Inability: Not on file    Transportation needs     Medical: Not on file     Non-medical: Not on file   Tobacco Use    Smoking status: Never Smoker    Smokeless tobacco: Never Used   Substance and Sexual Activity    Alcohol use: No    Drug use:  No    Sexual activity: Not Currently   Lifestyle    Physical activity     Days per week: Not on file     Minutes per session: Not on file    Stress: Not on file   Relationships    Social connections     Talks on phone: Not on file     Gets together: Not on file     Attends [employer] service: Not on file     Active member of club or organization: Not on file     Attends meetings of clubs or organizations: Not on file     Relationship status: Not on file   Other Topics Concern    Not on file   Social History Narrative    Retired        MEDICATIONS:   Current Outpatient Medications:     amLODIPine (NORVASC) 10 MG tablet, take 1 tablet by mouth once daily, Disp: 90 tablet, Rfl: 2    aspirin (ECOTRIN) 81 MG EC tablet, Take 1 tablet (81 mg total) by mouth once daily., Disp: , Rfl: 0    atorvastatin (LIPITOR) 40 MG tablet, TAKE 1 TABLET EVERY DAY, Disp: 90 tablet, Rfl: 3    azelastine (ASTELIN) 137 mcg (0.1 %) nasal spray, 1 spray (137 mcg total) by Nasal route 2 (two) times daily., Disp: 30 mL, Rfl: 3    baclofen (LIORESAL) 10 MG tablet, Take 1 tablet (10 mg total) by mouth 3 (three) times daily as needed., Disp: 90 tablet, Rfl: 11    calcium carbonate-vit D3-min (CALTRATE 600+D PLUS MINERALS) 600 mg calcium- 400 unit Tab, Take 1 tablet by mouth once daily., Disp: 60 tablet, Rfl: 11    chlorthalidone (HYGROTEN) 25 MG Tab, TAKE 1 TABLET (25 MG TOTAL) BY MOUTH ONCE DAILY., Disp: 90 tablet, Rfl: 3    cycloSPORINE (RESTASIS) 0.05 % ophthalmic emulsion, Place 0.4 mLs (1 drop total) into both eyes 2 (two) times daily., Disp: 60 each, Rfl: 12    diclofenac sodium (VOLTAREN) 1 % Gel, Apply 2 g topically once daily., Disp: 100 g, Rfl: 2    escitalopram oxalate (LEXAPRO) 10 MG tablet, Take 1 tablet (10 mg total) by mouth once daily., Disp: 90 tablet, Rfl: 3    ferrous sulfate (FEOSOL) 325 mg (65 mg iron) Tab tablet, Take 1 tablet (325 mg total) by mouth daily with breakfast., Disp: 90 tablet, Rfl: 3    flu vacc  "[id number],65yr up,PF (FLUZONE HIGH-DOSE 2019-20, PF,) 180 mcg/0.5 mL Syrg, Inject 0.5 ml into arm for one dose., Disp: 0.5 mL, Rfl: 0    gabapentin (NEURONTIN) 300 MG capsule, TAKE 1 CAPSULE TWICE DAILY, Disp: 180 capsule, Rfl: 11    lidocaine (LIDODERM) 5 %, Place 1 patch onto the skin once daily. Remove & Discard patch within 12 hours or as directed by MD, Disp: 30 patch, Rfl: 2    losartan (COZAAR) 100 MG tablet, TAKE 1 TABLET BY MOUTH ONCE DAILY, Disp: 90 tablet, Rfl: 3    multivit-min/iron/folic/lutein (CENTRUM SILVER WOMEN ORAL), Take 1 tablet by mouth once daily., Disp: , Rfl:     omeprazole (PRILOSEC) 40 MG capsule, Take 1 capsule (40 mg total) by mouth once daily., Disp: 30 capsule, Rfl: 11    vitamin D (VITAMIN D3) 1000 units Tab, Take 1,000 Units by mouth once daily., Disp: , Rfl:   ALLERGIES:   Review of patient's allergies indicates:   Allergen Reactions    Ondansetron      Other reaction(s): Flushing (Skin)    Savella [milnacipran] Nausea Only       VITAL SIGNS: /73   Pulse 74   Ht 5' 1" (1.549 m)   Wt 55.6 kg (122 lb 9.6 oz)   BMI 23.17 kg/m²      PHYSICAL EXAMINATION  VITAL SIGNS: /73   Pulse 74   Ht 5' 1" (1.549 m)   Wt 55.6 kg (122 lb 9.6 oz)   BMI 23.17 kg/m²    General:  The patient is alert and oriented x 3.  Mood is pleasant.  Observation of ears, eyes and nose reveal no gross abnormalities.  HEENT: NCAT, sclera nonicteric  Lungs: Respirations are equal and unlabored.    Right KNEE EXAMINATION     OBSERVATION / INSPECTION   Gait:   antalgic   Alignment:  Neutral   Scars:   None   Muscle atrophy: Moderate  Effusion:  Mild   Warmth:  None   Discoloration:   none     TENDERNESS / CREPITUS (T / C):          T / C      T / C   Patella   - / -   Lateral joint line   + / -    Peripatellar medial  -  Medial joint line    + / -    Peripatellar lateral -  Medial plica   - / -    Patellar tendon +   Popliteal fossa   - / -    Quad tendon   +   Gastrocnemius   -   Prepatellar " Bursa - / -   Quadricep   -   Tibial tubercle  -  Thigh/hamstring  -   Pes anserine/HS -  Fibula    -   ITB   - / -  Tibia     -   Tib/fib joint  - / -  LCL    -     MFC   - / -   MCL: Proximal  -    LFC   - / -    Distal   -          ROM: (* = pain)  PASSIVE   ACTIVE    Left :   5 / 0 / 135   5 / 0 / 135     Right :    Lacking 3/ 115   Lacking 3 110    PATELLOFEMORAL EXAMINATION:  See above noted areas of tenderness.   Patella position    Subluxation / dislocation: Centered           Sup. / Inf;   Normal   Crepitus (PF):    Absent   Patellar Mobility:       Medial-lateral:   Normal    Superior-inferior:  Normal    Inferior tilt   Normal    Patellar tendon:  Normal   Lateral tilt:    Normal   J-sign:     None   Patellofemoral grind:   No pain       MENISCAL SIGNS:     Pain on terminal extension:  +  Pain on terminal flexion:  +  Valentines maneuver:  + for pain  Squat     NT    LIGAMENT EXAMINATION:  ACL / Lachman:  normal (-1 to 2mm)    PCL-Post.  drawer: normal 0 to 2mm  MCL- Valgus:  normal 0 to 2mm  LCL- Varus:  normal 0 to 2mm  Pivot shift: normal (Equal)   Dial Test: difference c/w other side   At 30° flexion: normal (< 5°)    At 90° flexion: normal (< 5°)   Reverse Pivot Shift:   normal (Equal)     STRENGTH: (* = with pain) PAINFUL SIDE   Quadricep   5/5   Hamstrin/5    EXTREMITY NEURO-VASCULAR EXAMINATION:   Sensation:  Grossly intact to light touch all dermatomal regions.   Motor Function:  Fully intact motor function at hip, knee, foot and ankle    DTRs;  quadriceps and  achilles 2+.  No clonus and downgoing Babinski.    Vascular status:  DP and PT pulses 2+, brisk capillary refill, symmetric.     Other Findings:       X-rays:  including standing, weight bearing AP and flexion bilateral knees, lateral and merchant views ordered and images reviewed by me show:  No fracture, dislocation     ASSESSMENT:    Right Knee osteoarthritis     PLAN:   Referral to Dr. kennedy to discuss total knee  replacement and/or other treatment options   NSAIDs and Tylenol OTC as needed   All questions were answered, pt will contact us for questions or concerns in the interim.

## 2020-08-17 ENCOUNTER — PES CALL (OUTPATIENT)
Dept: ADMINISTRATIVE | Facility: CLINIC | Age: 75
End: 2020-08-17

## 2020-08-17 ENCOUNTER — PATIENT OUTREACH (OUTPATIENT)
Dept: ADMINISTRATIVE | Facility: OTHER | Age: 75
End: 2020-08-17

## 2020-08-18 ENCOUNTER — HOSPITAL ENCOUNTER (OUTPATIENT)
Dept: RADIOLOGY | Facility: HOSPITAL | Age: 75
Discharge: HOME | End: 2020-08-18
Attending: INTERNAL MEDICINE
Payer: MEDICARE

## 2020-08-18 ENCOUNTER — OFFICE VISIT (OUTPATIENT)
Dept: ORTHOPEDICS | Facility: CLINIC | Age: 75
End: 2020-08-18
Payer: MEDICARE

## 2020-08-18 VITALS — WEIGHT: 122.56 LBS | HEIGHT: 61 IN | BODY MASS INDEX: 23.14 KG/M2

## 2020-08-18 DIAGNOSIS — M17.11 PRIMARY OSTEOARTHRITIS OF RIGHT KNEE: Primary | ICD-10-CM

## 2020-08-18 DIAGNOSIS — Z12.31 OTHER SCREENING MAMMOGRAM: ICD-10-CM

## 2020-08-18 DIAGNOSIS — Z01.818 PRE-OP TESTING: ICD-10-CM

## 2020-08-18 PROCEDURE — 1101F PR PT FALLS ASSESS DOC 0-1 FALLS W/OUT INJ PAST YR: ICD-10-PCS | Mod: HCNC,CPTII,S$GLB, | Performed by: ORTHOPAEDIC SURGERY

## 2020-08-18 PROCEDURE — 1159F PR MEDICATION LIST DOCUMENTED IN MEDICAL RECORD: ICD-10-PCS | Mod: HCNC,S$GLB,, | Performed by: ORTHOPAEDIC SURGERY

## 2020-08-18 PROCEDURE — 99999 PR PBB SHADOW E&M-EST. PATIENT-LVL IV: ICD-10-PCS | Mod: PBBFAC,HCNC,, | Performed by: ORTHOPAEDIC SURGERY

## 2020-08-18 PROCEDURE — 1159F MED LIST DOCD IN RCRD: CPT | Mod: HCNC,S$GLB,, | Performed by: ORTHOPAEDIC SURGERY

## 2020-08-18 PROCEDURE — 99499 RISK ADDL DX/OHS AUDIT: ICD-10-PCS | Mod: HCNC,S$GLB,, | Performed by: ORTHOPAEDIC SURGERY

## 2020-08-18 PROCEDURE — 77063 BREAST TOMOSYNTHESIS BI: CPT | Mod: 26,HCNC,, | Performed by: RADIOLOGY

## 2020-08-18 PROCEDURE — 77067 MAMMO DIGITAL SCREENING BILAT WITH TOMOSYNTHESIS_CAD: ICD-10-PCS | Mod: 26,HCNC,, | Performed by: RADIOLOGY

## 2020-08-18 PROCEDURE — 77063 MAMMO DIGITAL SCREENING BILAT WITH TOMOSYNTHESIS_CAD: ICD-10-PCS | Mod: 26,HCNC,, | Performed by: RADIOLOGY

## 2020-08-18 PROCEDURE — 99214 OFFICE O/P EST MOD 30 MIN: CPT | Mod: HCNC,S$GLB,, | Performed by: ORTHOPAEDIC SURGERY

## 2020-08-18 PROCEDURE — 99214 PR OFFICE/OUTPT VISIT, EST, LEVL IV, 30-39 MIN: ICD-10-PCS | Mod: HCNC,S$GLB,, | Performed by: ORTHOPAEDIC SURGERY

## 2020-08-18 PROCEDURE — 77067 SCR MAMMO BI INCL CAD: CPT | Mod: TC,HCNC

## 2020-08-18 PROCEDURE — 3008F BODY MASS INDEX DOCD: CPT | Mod: HCNC,CPTII,S$GLB, | Performed by: ORTHOPAEDIC SURGERY

## 2020-08-18 PROCEDURE — 1125F AMNT PAIN NOTED PAIN PRSNT: CPT | Mod: HCNC,S$GLB,, | Performed by: ORTHOPAEDIC SURGERY

## 2020-08-18 PROCEDURE — 99499 UNLISTED E&M SERVICE: CPT | Mod: HCNC,S$GLB,, | Performed by: ORTHOPAEDIC SURGERY

## 2020-08-18 PROCEDURE — 1101F PT FALLS ASSESS-DOCD LE1/YR: CPT | Mod: HCNC,CPTII,S$GLB, | Performed by: ORTHOPAEDIC SURGERY

## 2020-08-18 PROCEDURE — 3008F PR BODY MASS INDEX (BMI) DOCUMENTED: ICD-10-PCS | Mod: HCNC,CPTII,S$GLB, | Performed by: ORTHOPAEDIC SURGERY

## 2020-08-18 PROCEDURE — 1125F PR PAIN SEVERITY QUANTIFIED, PAIN PRESENT: ICD-10-PCS | Mod: HCNC,S$GLB,, | Performed by: ORTHOPAEDIC SURGERY

## 2020-08-18 PROCEDURE — 77067 SCR MAMMO BI INCL CAD: CPT | Mod: 26,HCNC,, | Performed by: RADIOLOGY

## 2020-08-18 PROCEDURE — 99999 PR PBB SHADOW E&M-EST. PATIENT-LVL IV: CPT | Mod: PBBFAC,HCNC,, | Performed by: ORTHOPAEDIC SURGERY

## 2020-08-18 NOTE — LETTER
September 9, 2020      Tiffanie Magana MD  [address]  [facility] [address]           [facility] Orth[facility] 5th Fl  [address], [address]  [facility] [address]  Phone: [phone]          Patient: Krysta Kam   MR Number: [id number]   YOB: 1945   Date of Visit: 8/18/2020       Dear Dr. Tiffanie Magana:    Thank you for referring Krysta Kam to me for evaluation. Attached you will find relevant portions of my assessment and plan of care.    If you have questions, please do not hesitate to call me. I look forward to following Krysta Kam along with you.    Sincerely,    Tha Wright III, MD    Enclosure  CC:  No Recipients    If you would like to receive this communication electronically, please contact [email or web address] or [phone] to request more information on [employer] access.    For providers and/or their staff who would like to refer a patient to [facility], please contact us through our one-stop-shop provider referral line, [facility], at [phone].    If you feel you have received this communication in error or would no longer like to receive these types of communications, please e-mail [email or web address]

## 2020-08-19 ENCOUNTER — OFFICE VISIT (OUTPATIENT)
Dept: HOME HEALTH SERVICES | Facility: CLINIC | Age: 75
End: 2020-08-19
Payer: MEDICARE

## 2020-08-19 VITALS
BODY MASS INDEX: 23.03 KG/M2 | SYSTOLIC BLOOD PRESSURE: 142 MMHG | HEIGHT: 61 IN | HEART RATE: 78 BPM | WEIGHT: 122 LBS | DIASTOLIC BLOOD PRESSURE: 86 MMHG

## 2020-08-19 DIAGNOSIS — H25.13 NUCLEAR SCLEROTIC CATARACT OF BOTH EYES: ICD-10-CM

## 2020-08-19 DIAGNOSIS — K55.9 ISCHEMIC COLITIS: ICD-10-CM

## 2020-08-19 DIAGNOSIS — G47.00 INSOMNIA, UNSPECIFIED TYPE: ICD-10-CM

## 2020-08-19 DIAGNOSIS — I10 ESSENTIAL HYPERTENSION: ICD-10-CM

## 2020-08-19 DIAGNOSIS — M17.11 OSTEOARTHRITIS OF RIGHT KNEE, UNSPECIFIED OSTEOARTHRITIS TYPE: ICD-10-CM

## 2020-08-19 DIAGNOSIS — H16.223 KERATOCONJUNCTIVITIS SICCA, NOT SPECIFIED AS SJOGREN'S, BILATERAL: ICD-10-CM

## 2020-08-19 DIAGNOSIS — Z00.00 ENCOUNTER FOR PREVENTIVE HEALTH EXAMINATION: Primary | ICD-10-CM

## 2020-08-19 DIAGNOSIS — F41.9 ANXIETY: ICD-10-CM

## 2020-08-19 DIAGNOSIS — E11.36 TYPE 2 DIABETES MELLITUS WITH DIABETIC CATARACT, WITHOUT LONG-TERM CURRENT USE OF INSULIN: ICD-10-CM

## 2020-08-19 PROCEDURE — 3077F SYST BP >= 140 MM HG: CPT | Mod: CPTII,S$GLB,, | Performed by: NURSE PRACTITIONER

## 2020-08-19 PROCEDURE — 3079F PR MOST RECENT DIASTOLIC BLOOD PRESSURE 80-89 MM HG: ICD-10-PCS | Mod: CPTII,S$GLB,, | Performed by: NURSE PRACTITIONER

## 2020-08-19 PROCEDURE — 3077F PR MOST RECENT SYSTOLIC BLOOD PRESSURE >= 140 MM HG: ICD-10-PCS | Mod: CPTII,S$GLB,, | Performed by: NURSE PRACTITIONER

## 2020-08-19 PROCEDURE — G0439 PR MEDICARE ANNUAL WELLNESS SUBSEQUENT VISIT: ICD-10-PCS | Mod: S$GLB,,, | Performed by: NURSE PRACTITIONER

## 2020-08-19 PROCEDURE — 99499 UNLISTED E&M SERVICE: CPT | Mod: S$GLB,,, | Performed by: NURSE PRACTITIONER

## 2020-08-19 PROCEDURE — 99499 RISK ADDL DX/OHS AUDIT: ICD-10-PCS | Mod: S$GLB,,, | Performed by: NURSE PRACTITIONER

## 2020-08-19 PROCEDURE — 3044F HG A1C LEVEL LT 7.0%: CPT | Mod: CPTII,S$GLB,, | Performed by: NURSE PRACTITIONER

## 2020-08-19 PROCEDURE — 3044F PR MOST RECENT HEMOGLOBIN A1C LEVEL <7.0%: ICD-10-PCS | Mod: CPTII,S$GLB,, | Performed by: NURSE PRACTITIONER

## 2020-08-19 PROCEDURE — G0439 PPPS, SUBSEQ VISIT: HCPCS | Mod: S$GLB,,, | Performed by: NURSE PRACTITIONER

## 2020-08-19 PROCEDURE — 3079F DIAST BP 80-89 MM HG: CPT | Mod: CPTII,S$GLB,, | Performed by: NURSE PRACTITIONER

## 2020-08-19 RX ORDER — ASCORBIC ACID 500 MG
500 TABLET ORAL DAILY
COMMUNITY

## 2020-08-19 NOTE — PROGRESS NOTES
"  Krysta Kam presented for a  Medicare AWV and comprehensive Health Risk Assessment today. The following components were reviewed and updated:    · Medical history  · Family History  · Social history  · Allergies and Current Medications  · Health Risk Assessment  · Health Maintenance  · Care Team     ** See Completed Assessments for Annual Wellness Visit within the encounter summary.**         The following assessments were completed:  · Living Situation  · CAGE  · Depression Screening  · Timed Get Up and Go  · Whisper Test  · Cognitive Function Screening  ·   ·   ·   · Nutrition Screening  · ADL Screening  · PAQ Screening        Vitals:    08/19/20 1208   BP: (!) 142/86   Pulse: 78   Weight: 55.3 kg (122 lb)   Height: 5' 1" (1.549 m)     Body mass index is 23.05 kg/m².  Physical Exam  Constitutional:       Appearance: Normal appearance.   HENT:      Head: Normocephalic and atraumatic.      Nose: Nose normal.      Mouth/Throat:      Mouth: Mucous membranes are moist.   Eyes:      Extraocular Movements: Extraocular movements intact.   Neck:      Musculoskeletal: Normal range of motion.   Cardiovascular:      Rate and Rhythm: Normal rate and regular rhythm.      Heart sounds: Normal heart sounds.   Pulmonary:      Effort: Pulmonary effort is normal. No respiratory distress.      Breath sounds: Normal breath sounds.   Abdominal:      General: Bowel sounds are normal. There is no distension.      Palpations: Abdomen is soft.   Musculoskeletal: Normal range of motion.         General: No swelling.   Skin:     General: Skin is warm and dry.   Neurological:      General: No focal deficit present.      Mental Status: She is alert and oriented to person, place, and time.   Psychiatric:         Mood and Affect: Mood normal.         Behavior: Behavior normal.               Diagnoses and health risks identified today and associated recommendations/orders:    1. Encounter for preventive health examination  Assessment " completed. Preventive measures reviewed with patient.    2. Anxiety  Stable, followed by PCP.    3. Keratoconjunctivitis sicca, not specified as Sjogren's, bilateral  Stable, followed by Optometry.    4. Nuclear sclerotic cataract of both eyes  Stable, followed by Optometry.    5. Type 2 diabetes mellitus with diabetic cataract, without long-term current use of insulin  Stable, followed by PCP.    6. Essential hypertension  Stable, followed by PCP.    7. Osteoarthritis of right knee, unspecified osteoarthritis type  Stable, followed by Orthopedics.    8. Insomnia, unspecified type  Stable, followed by PCP.    9. Ischemic colitis  Stable, followed by PCP.    Provided Krysta with a 5-10 year written screening schedule and personal prevention plan. Recommendations were developed using the USPSTF age appropriate recommendations. Education, counseling, and referrals were provided as needed. After Visit Summary printed and given to patient which includes a list of additional screenings\tests needed.    No follow-ups on file.    Kelley Moran NP    I offered to discuss end of life issues, including information on how to make advance directives that the patient could use to name someone who would make medical decisions on their behalf if they became too ill to make themselves.    ___Patient declined  _X_Patient is interested, I provided paper work and offered to discuss.

## 2020-08-19 NOTE — PATIENT INSTRUCTIONS
Counseling and Referral of Other Preventative  (Italic type indicates deductible and co-insurance are waived)    Patient Name: Krysta Kam  Today's Date: 8/19/2020    Health Maintenance       Date Due Completion Date    Mammogram 08/09/2020 8/9/2019    Eye Exam 09/04/2020 (Originally 8/5/2020) 8/5/2019    TETANUS VACCINE 01/06/2021 (Originally 12/30/1963) ---    Pneumococcal Vaccine (65+ Low/Medium Risk) (1 of 2 - PCV13) 01/06/2021 (Originally 12/30/2010) ---    Shingles Vaccine (1 of 2) 01/06/2021 (Originally 12/30/1995) ---    Influenza Vaccine (1) 09/01/2020 10/29/2019    Hemoglobin A1c 01/10/2021 7/10/2020    Foot Exam 07/10/2021 7/10/2020 (Done)    Override on 7/10/2020: Done    Override on 6/13/2019: Done    Lipid Panel 07/10/2021 7/10/2020    DEXA SCAN 08/09/2022 8/9/2019    Colorectal Cancer Screening 10/08/2029 10/8/2019        No orders of the defined types were placed in this encounter.    The following information is provided to all patients.  This information is to help you find resources for any of the problems found today that may be affecting your health:                Living healthy guide: www.[facility].[place].gov      Understanding Diabetes: www.diabetes.org      Eating healthy: www.cdc.gov/healthyweight      CDC home safety checklist: www.cdc.gov/steadi/patient.html      Agency on Aging: www.goea.[place].[facility]      Alcoholics anonymous (AA): www.aa.org      Physical Activity: www.kaleb.nih.gov/be2suuk      Tobacco use: www.quitwithusla.org

## 2020-08-25 RX ORDER — DICLOFENAC SODIUM 10 MG/G
GEL TOPICAL
Qty: 100 G | Refills: 2 | Status: SHIPPED | OUTPATIENT
Start: 2020-08-25 | End: 2020-10-24

## 2020-08-25 RX ORDER — DULOXETIN HYDROCHLORIDE 30 MG/1
CAPSULE, DELAYED RELEASE ORAL
Qty: 90 CAPSULE | Refills: 3 | OUTPATIENT
Start: 2020-08-25

## 2020-08-27 ENCOUNTER — TELEPHONE (OUTPATIENT)
Dept: PREADMISSION TESTING | Facility: HOSPITAL | Age: 75
End: 2020-08-27

## 2020-08-27 DIAGNOSIS — M79.604 PAIN OF RIGHT LOWER EXTREMITY: ICD-10-CM

## 2020-08-27 DIAGNOSIS — Z01.818 PRE-OP TESTING: Primary | ICD-10-CM

## 2020-08-27 NOTE — ANESTHESIA PAT ROS NOTE
08/27/2020  Krysta Kam is a 74 y.o., female.      Pre-op Assessment          Review of Systems         Anesthesia Assessment: Preoperative EQUATION    Planned Procedure: Procedure(s) (LRB):  ARTHROPLASTY, KNEE: DEPUY-ATTUNE (Right)  Requested Anesthesia Type:Regional  Surgeon: Tha Wrgiht III, MD  Service: Orthopedics  Known or anticipated Date of Surgery:9/16/2020    Surgeon notes: reviewed    Previous anesthesia records:EGD 10/8/19    Last PCP note: within [facility] , Dr Bragg 7/10/20    Other important co-morbidities: DM2, GERD, HLD, HTN and fibromyalgia, peripheral neuropathy, ischemic colitis, anemia, depression      Tests already available:  CMP, A1C (6.1), lipids 7/10/20; EKG 4/4/19 (will repeat per written order guidelines)             Plan:    Testing:  CBC, EKG and PT/INR   Pre-anesthesia  visit       Visit focus: possible regional anesthesia and/or nerve block      Consultation:POC NP for anesthesia and clearance      Navigation: Tests to be scheduled.              Consults to be scheduled.             Results will be tracked by Preop Clinic.

## 2020-08-27 NOTE — TELEPHONE ENCOUNTER
----- Message from Conchis Pearce RN sent at 8/27/2020 10:32 AM CDT -----  ORTHO TOTAL KNEE  9/16  DR DAWSON    Please schedule LAB (CBC, PT/INR), EKG, POC NP for anesthesia and clearance.    Thanks,  Nathalia

## 2020-08-27 NOTE — PRE-PROCEDURE INSTRUCTIONS
Chart review; triage plan initiated.  Phone contact-medication reconciliation completed; instructed to stop vitamins, supplements (flaxseed oil, turmeric, cinnamon, etc) and NSAIDs for one week prior to surgery. Informed that remaining medication instructions would be provided at POC visit. POC  will contact her to schedule appointments in lab, POC with NP and EKG. Pt verbalized understanding of information provided.

## 2020-08-31 NOTE — ASSESSMENT & PLAN NOTE
Followed per Ophthalmology; next visit 9/2/20.  Denies: double vision and blindness. Reports: floaters

## 2020-08-31 NOTE — ASSESSMENT & PLAN NOTE
Diet controlled.  Most recent A1c is 6.1.  No home accuchecks. Reports peripheral neuropathy. Reports floaters- eye appt. 9/2/20.   Maintain healthy weight. Exercise at least 150 minutes weekly. Encouraged diet rich in nutrients such as fruits, vegetables, and whole grains; reduce sugar intake from cakes, candy, and sugared drinks.   Has hypertensive retinopathy, Denies  Nephropathy   Denies:  Carotid, Coronary , or  Peripheral disease

## 2020-08-31 NOTE — ASSESSMENT & PLAN NOTE
Denies: facial pain/tenderness/chewing problems/HA or ear discomfort  Has cracking and popping when opening mouth wide  No treatment

## 2020-08-31 NOTE — ASSESSMENT & PLAN NOTE
Patient denies history of ischemic colitis. Old colonoscopy in 2016 had ischemic changes with ulceration in sigmoid colon; possible poor circulation. Had follow-up sigmoidoscopy in 2017 with normal exam. Denies abdominal pain; occasional constipation. Has history of Celiac Disease- not following gluten free diet at this time.   Most recent colonoscopy: 10/8/2019- Tortuous colon, otherwise normal.

## 2020-08-31 NOTE — ASSESSMENT & PLAN NOTE
Reports taking care of 91 year-old mother who suffers with dementia. Not being treated at this time; symptoms stable.   Denies suicidal/ homicidal ideations. Encouraged regular exercise and healthy diet; appropriate sleep. Limit caffeine intake. Followed per PCP.

## 2020-08-31 NOTE — ASSESSMENT & PLAN NOTE
Current BP  not at goal today. /70- did not take BP meds today. Second /62.   Patient reports home BP readings of: none; will check this week and call with results 9/8/20  Encouraged keeping a healthy weight and BMI  Education was provided regarding lifestyle changes to reduce systolic BP;  Exercise 30 minutes per day,  5 days per week or 150 minutes weekly;  Sodium reduction and avoidance of high salt foods such as processed meats, frozen meals, fast foods.

## 2020-08-31 NOTE — ASSESSMENT & PLAN NOTE
Encouraged  healthy diet (DASH/Mediterranean) and exercise. Patient should exercise 30 minutes at least five times weekly. Limit alcohol.  Taking atorvastatin 40 mg daily

## 2020-08-31 NOTE — DISCHARGE INSTRUCTIONS
Your surgery has been scheduled for:____________9/16/20______________________________    You should report to: [facility]  __X__[facility], located on the [place] side of the first floor of the           [facility] ([phone])  __X__The Second Floor Surgery Center, located on the [facility] side of the            Second floor of the [facility] ([phone])  __X__3rd Floor SSCU located on the [facility] side of the [facility] [phone]  Please Note   - Tell your doctor if you take Aspirin, products containing Aspirin, herbal medications  or blood thinners, such as Coumadin, Ticlid, or Plavix.  (Consult your provider regarding holding or stopping before surgery).  - Arrange for someone to drive you home following surgery.  You will not be allowed to leave the surgical facility alone or drive yourself home following sedation and anesthesia.  Before Surgery  - Stop taking all vitamins/ herbal medications 14days prior to surgery  - No Motrin/Advil (Ibuprofen) 1 day before surgery  - No Aleve (Naproxen) 7 days before surgery  - Stop Taking Asprin, products containing Asprin _____days before surgery  - Stop taking blood thinners_______days before surgery  - No Goody's/BC  Powder 7 days before surgery  - Refrain from drinking alcoholic beverages for 24hours before and after surgery  - Stop or limit smoking _________days before surgery  - You may take Tylenol for pain    Night before Surgery  NOTHING TO EAT OR DRINK AFTER MIDNIGHT  - Take a shower or bath (shower is recommended).  Bathe with Hibiclens soap or an antibacterial soap from the neck down.  If not supplied by your surgeon, hibiclens soap will need to be purchased over the counter in pharmacy.  Rinse soap off thoroughly.  - Shampoo your hair with your regular shampoo    The Day of Surgery  ·  If you are told to take  medication on the morning of surgery, it may be taken with a sip of water.   - Take another bath or shower with hibiclens or any antibacterial soap, to reduce the chance of infection.  - Take heart and blood pressure medications with a small sip of water, as advised by the perioperative team.  - Do not take fluid pills  - You may brush your teeth and rinse your mouth, but do not swall any additional water.   - Do not apply perfumes, powder, body lotions or deodorant on the day of surgery.  - Nail polish should be removed.  - Do not wear makeup or moisturizer  - Wear comfortable clothes, such as a button front shirt and loose fitting pants.  - Leave all jewelry, including body piercings, and valuables at home.    - Bring any devices you will neeed after surgery such as crutches or canes.  - If you have sleep apnea, please bring your CPAP machine  In the event that your physical condition changes including the onset of a cold or respiratory illness, or if you have to delay or cancel your surgery, please notify your surgeon.    Anesthesia: Regional Anesthesia    Youre scheduled for surgery. During surgery, youll receive medicine called anesthesia to keep you comfortable and pain-free. Your surgeon has decided that youll receive regional anesthesia. This sheet tells you what to expect with this type of anesthesia.  What is regional anesthesia?  Regional anesthesia numbs one region of your body. The anesthesia may be given around nerves or into veins in your arms, neck, or legs (nerve block or Orion block). Or it may be sent into the spinal fluid (spinal anesthesia) or into the space just outside the spinal fluid (epidural anesthesia). You may also be given sedatives to help you relax.  Nerve block or Orion block  A small area of the body, such as an arm or leg, can be numbed using a nerve block or Orion block.  · Nerve block. During a nerve block, your skin is numbed. A needle is then inserted near nerves that serve the  area to be numbed. Anesthetic is sent through the needle.  · IV regional or [place] block. For this type of block, an IV line is put into a vein. The blood flow to the area to be numbed is blocked for a short time. Anesthetic is sent through the IV.  Spinal anesthesia  Spinal anesthesia numbs your body from about the waist down.  · Anesthetic is injected into the spinal fluid. This is a substance that surrounds the spinal cord in your spinal column. The anesthetic blocks pain traveling from the body to the brain.  · To receive the anesthetic, your skin is numbed at the injection site on your back.  · A needle is then inserted into the spinal space. Anesthetic is sent into the spinal fluid through the needle.  Epidural anesthesia  Epidural anesthesia is most commonly used during childbirth and may also be used after surgical procedures of the chest, belly, and legs.  · Anesthetic is injected into the epidural space. This is just outside the dural sac which contains the spinal fluid.  · To receive the anesthetic, your skin is numbed at the injection site on your back.  · A needle is then inserted into the epidural space. Anesthetic is sent into the epidural space through the needle.  · A small flexible catheter may be attached to the needle and left in place. This allows for continuous injections or infusions of anesthetic.  Anesthesia tools and medicines that might be near you during your procedure  · Local anesthetic. This medicine is given through a needle numbs one region of your body.  · Electrocardiography leads (electrodes). These are used to record your heart rate and rhythm.  · Blood pressure cuff. A cuff is placed on your arm to keep track of your blood pressure.  · Pulse oximeter. This small clip is placed on the end of the finger. It measures your blood oxygen level.  · Sedatives. These medicines may be given through an IV. They help to relax you and keep you comfortable. You may stay awake or sleep  lightly.  · Oxygen. You may be given oxygen through a facemask.  Risks and possible complications  Regional anesthesia carries some risks. These include:  · Nausea and vomiting  · Headache  · Backache  · Decreased blood pressure  · Allergic reaction to the anesthetic  · Ongoing numbness (rare)  · Irregular heartbeat (rare)  · Cardiac arrest (rare)   Date Last Reviewed: 12/1/2016  © [id number] [employer] [address] [place] [address], [place] [id number]. All rights reserved. This information is not intended as a substitute for professional medical care. Always follow your healthcare professional's instructions.

## 2020-09-01 ENCOUNTER — INITIAL CONSULT (OUTPATIENT)
Dept: INTERNAL MEDICINE | Facility: CLINIC | Age: 75
End: 2020-09-01
Payer: MEDICARE

## 2020-09-01 ENCOUNTER — HOSPITAL ENCOUNTER (OUTPATIENT)
Dept: CARDIOLOGY | Facility: CLINIC | Age: 75
Discharge: HOME | End: 2020-09-01
Payer: MEDICARE

## 2020-09-01 ENCOUNTER — HOSPITAL ENCOUNTER (OUTPATIENT)
Dept: RADIOLOGY | Facility: HOSPITAL | Age: 75
Discharge: HOME | End: 2020-09-01
Attending: NURSE PRACTITIONER
Payer: MEDICARE

## 2020-09-01 ENCOUNTER — OFFICE VISIT (OUTPATIENT)
Dept: ORTHOPEDICS | Facility: CLINIC | Age: 75
End: 2020-09-01
Payer: MEDICARE

## 2020-09-01 VITALS
HEART RATE: 70 BPM | WEIGHT: 123.31 LBS | DIASTOLIC BLOOD PRESSURE: 62 MMHG | HEIGHT: 61 IN | SYSTOLIC BLOOD PRESSURE: 146 MMHG | BODY MASS INDEX: 23.28 KG/M2 | OXYGEN SATURATION: 100 % | TEMPERATURE: 98 F

## 2020-09-01 DIAGNOSIS — D50.9 NORMOCYTIC HYPOCHROMIC ANEMIA: ICD-10-CM

## 2020-09-01 DIAGNOSIS — H35.033 HYPERTENSIVE RETINOPATHY OF BOTH EYES: ICD-10-CM

## 2020-09-01 DIAGNOSIS — M25.561 RIGHT KNEE PAIN, UNSPECIFIED CHRONICITY: ICD-10-CM

## 2020-09-01 DIAGNOSIS — M25.561 RIGHT KNEE PAIN, UNSPECIFIED CHRONICITY: Primary | ICD-10-CM

## 2020-09-01 DIAGNOSIS — E11.36 TYPE 2 DIABETES MELLITUS WITH DIABETIC CATARACT, WITHOUT LONG-TERM CURRENT USE OF INSULIN: ICD-10-CM

## 2020-09-01 DIAGNOSIS — M26.609 TMJ DISEASE: ICD-10-CM

## 2020-09-01 DIAGNOSIS — F41.9 ANXIETY: ICD-10-CM

## 2020-09-01 DIAGNOSIS — E78.00 HIGH CHOLESTEROL: ICD-10-CM

## 2020-09-01 DIAGNOSIS — G62.9 PERIPHERAL POLYNEUROPATHY: ICD-10-CM

## 2020-09-01 DIAGNOSIS — I10 ESSENTIAL HYPERTENSION: ICD-10-CM

## 2020-09-01 DIAGNOSIS — K55.9 ISCHEMIC COLITIS: ICD-10-CM

## 2020-09-01 DIAGNOSIS — E11.9 DIABETES MELLITUS WITHOUT COMPLICATION: ICD-10-CM

## 2020-09-01 DIAGNOSIS — Z01.818 PRE-OP TESTING: ICD-10-CM

## 2020-09-01 DIAGNOSIS — Z86.59 HISTORY OF DEPRESSION: ICD-10-CM

## 2020-09-01 DIAGNOSIS — M79.7 FIBROMYALGIA: ICD-10-CM

## 2020-09-01 PROCEDURE — 99999 PR PBB SHADOW E&M-EST. PATIENT-LVL IV: ICD-10-PCS | Mod: PBBFAC,HCNC,, | Performed by: NURSE PRACTITIONER

## 2020-09-01 PROCEDURE — 3008F PR BODY MASS INDEX (BMI) DOCUMENTED: ICD-10-PCS | Mod: HCNC,CPTII,S$GLB, | Performed by: NURSE PRACTITIONER

## 2020-09-01 PROCEDURE — 1159F PR MEDICATION LIST DOCUMENTED IN MEDICAL RECORD: ICD-10-PCS | Mod: HCNC,S$GLB,, | Performed by: NURSE PRACTITIONER

## 2020-09-01 PROCEDURE — 93010 ELECTROCARDIOGRAM REPORT: CPT | Mod: HCNC,S$GLB,, | Performed by: INTERNAL MEDICINE

## 2020-09-01 PROCEDURE — 3078F PR MOST RECENT DIASTOLIC BLOOD PRESSURE < 80 MM HG: ICD-10-PCS | Mod: HCNC,CPTII,S$GLB, | Performed by: NURSE PRACTITIONER

## 2020-09-01 PROCEDURE — 1101F PT FALLS ASSESS-DOCD LE1/YR: CPT | Mod: HCNC,CPTII,S$GLB, | Performed by: NURSE PRACTITIONER

## 2020-09-01 PROCEDURE — 93010 EKG 12-LEAD: ICD-10-PCS | Mod: HCNC,S$GLB,, | Performed by: INTERNAL MEDICINE

## 2020-09-01 PROCEDURE — 73560 X-RAY EXAM OF KNEE 1 OR 2: CPT | Mod: 26,HCNC,RT, | Performed by: RADIOLOGY

## 2020-09-01 PROCEDURE — 3044F PR MOST RECENT HEMOGLOBIN A1C LEVEL <7.0%: ICD-10-PCS | Mod: HCNC,CPTII,S$GLB, | Performed by: NURSE PRACTITIONER

## 2020-09-01 PROCEDURE — 99499 UNLISTED E&M SERVICE: CPT | Mod: HCNC,S$GLB,, | Performed by: NURSE PRACTITIONER

## 2020-09-01 PROCEDURE — 99999 PR PBB SHADOW E&M-EST. PATIENT-LVL IV: CPT | Mod: PBBFAC,HCNC,, | Performed by: NURSE PRACTITIONER

## 2020-09-01 PROCEDURE — 3077F SYST BP >= 140 MM HG: CPT | Mod: HCNC,CPTII,S$GLB, | Performed by: NURSE PRACTITIONER

## 2020-09-01 PROCEDURE — 3044F HG A1C LEVEL LT 7.0%: CPT | Mod: HCNC,CPTII,S$GLB, | Performed by: NURSE PRACTITIONER

## 2020-09-01 PROCEDURE — 99499 NO LOS: ICD-10-PCS | Mod: HCNC,S$GLB,, | Performed by: NURSE PRACTITIONER

## 2020-09-01 PROCEDURE — 3008F BODY MASS INDEX DOCD: CPT | Mod: HCNC,CPTII,S$GLB, | Performed by: NURSE PRACTITIONER

## 2020-09-01 PROCEDURE — 3078F DIAST BP <80 MM HG: CPT | Mod: HCNC,CPTII,S$GLB, | Performed by: NURSE PRACTITIONER

## 2020-09-01 PROCEDURE — 93005 ELECTROCARDIOGRAM TRACING: CPT | Mod: HCNC,S$GLB,, | Performed by: ANESTHESIOLOGY

## 2020-09-01 PROCEDURE — 3077F PR MOST RECENT SYSTOLIC BLOOD PRESSURE >= 140 MM HG: ICD-10-PCS | Mod: HCNC,CPTII,S$GLB, | Performed by: NURSE PRACTITIONER

## 2020-09-01 PROCEDURE — 99499 RISK ADDL DX/OHS AUDIT: ICD-10-PCS | Mod: HCNC,S$GLB,, | Performed by: NURSE PRACTITIONER

## 2020-09-01 PROCEDURE — 93005 EKG 12-LEAD: ICD-10-PCS | Mod: HCNC,S$GLB,, | Performed by: ANESTHESIOLOGY

## 2020-09-01 PROCEDURE — 73560 X-RAY EXAM OF KNEE 1 OR 2: CPT | Mod: TC,HCNC,RT

## 2020-09-01 PROCEDURE — 99214 PR OFFICE/OUTPT VISIT, EST, LEVL IV, 30-39 MIN: ICD-10-PCS | Mod: HCNC,S$GLB,, | Performed by: NURSE PRACTITIONER

## 2020-09-01 PROCEDURE — 1101F PR PT FALLS ASSESS DOC 0-1 FALLS W/OUT INJ PAST YR: ICD-10-PCS | Mod: HCNC,CPTII,S$GLB, | Performed by: NURSE PRACTITIONER

## 2020-09-01 PROCEDURE — 73560 XR KNEE 1 OR 2 VIEW RIGHT: ICD-10-PCS | Mod: 26,HCNC,RT, | Performed by: RADIOLOGY

## 2020-09-01 PROCEDURE — 99214 OFFICE O/P EST MOD 30 MIN: CPT | Mod: HCNC,S$GLB,, | Performed by: NURSE PRACTITIONER

## 2020-09-01 PROCEDURE — 1159F MED LIST DOCD IN RCRD: CPT | Mod: HCNC,S$GLB,, | Performed by: NURSE PRACTITIONER

## 2020-09-01 RX ORDER — MIDAZOLAM HYDROCHLORIDE 1 MG/ML
1 INJECTION INTRAMUSCULAR; INTRAVENOUS EVERY 5 MIN PRN
Status: CANCELLED | OUTPATIENT
Start: 2020-09-01

## 2020-09-01 RX ORDER — OXYCODONE HYDROCHLORIDE 5 MG/1
5 TABLET ORAL
Status: CANCELLED | OUTPATIENT
Start: 2020-09-01

## 2020-09-01 RX ORDER — LIDOCAINE HYDROCHLORIDE 10 MG/ML
1 INJECTION, SOLUTION EPIDURAL; INFILTRATION; INTRACAUDAL; PERINEURAL
Status: CANCELLED | OUTPATIENT
Start: 2020-09-01

## 2020-09-01 RX ORDER — SODIUM CHLORIDE 9 MG/ML
INJECTION, SOLUTION INTRAVENOUS
Status: CANCELLED | OUTPATIENT
Start: 2020-09-01

## 2020-09-01 RX ORDER — CELECOXIB 100 MG/1
400 CAPSULE ORAL
Status: CANCELLED | OUTPATIENT
Start: 2020-09-01

## 2020-09-01 RX ORDER — MORPHINE SULFATE 10 MG/ML
2 INJECTION, SOLUTION INTRAMUSCULAR; INTRAVENOUS
Status: CANCELLED | OUTPATIENT
Start: 2020-09-01

## 2020-09-01 RX ORDER — ONDANSETRON 2 MG/ML
4 INJECTION INTRAMUSCULAR; INTRAVENOUS EVERY 8 HOURS PRN
Status: CANCELLED | OUTPATIENT
Start: 2020-09-01

## 2020-09-01 RX ORDER — PREGABALIN 25 MG/1
75 CAPSULE ORAL
Status: CANCELLED | OUTPATIENT
Start: 2020-09-01

## 2020-09-01 RX ORDER — OXYCODONE HYDROCHLORIDE 5 MG/1
10 TABLET ORAL
Status: CANCELLED | OUTPATIENT
Start: 2020-09-01

## 2020-09-01 RX ORDER — PREGABALIN 25 MG/1
75 CAPSULE ORAL NIGHTLY
Status: CANCELLED | OUTPATIENT
Start: 2020-09-01

## 2020-09-01 RX ORDER — FAMOTIDINE 20 MG/1
20 TABLET, FILM COATED ORAL 2 TIMES DAILY
Status: CANCELLED | OUTPATIENT
Start: 2020-09-01

## 2020-09-01 RX ORDER — ROPIVACAINE HYDROCHLORIDE 2 MG/ML
8 INJECTION, SOLUTION EPIDURAL; INFILTRATION; PERINEURAL CONTINUOUS
Status: CANCELLED | OUTPATIENT
Start: 2020-09-01

## 2020-09-01 RX ORDER — SODIUM CHLORIDE 9 MG/ML
INJECTION, SOLUTION INTRAVENOUS CONTINUOUS
Status: CANCELLED | OUTPATIENT
Start: 2020-09-01 | End: 2020-09-02

## 2020-09-01 RX ORDER — TALC
6 POWDER (GRAM) TOPICAL NIGHTLY PRN
Status: CANCELLED | OUTPATIENT
Start: 2020-09-01

## 2020-09-01 RX ORDER — AMOXICILLIN 250 MG
1 CAPSULE ORAL 2 TIMES DAILY
Status: CANCELLED | OUTPATIENT
Start: 2020-09-01

## 2020-09-01 RX ORDER — NALOXONE HCL 0.4 MG/ML
0.02 VIAL (ML) INJECTION
Status: CANCELLED | OUTPATIENT
Start: 2020-09-01

## 2020-09-01 RX ORDER — CELECOXIB 100 MG/1
200 CAPSULE ORAL DAILY
Status: CANCELLED | OUTPATIENT
Start: 2020-09-01

## 2020-09-01 RX ORDER — ACETAMINOPHEN 500 MG
1000 TABLET ORAL EVERY 6 HOURS
Status: CANCELLED | OUTPATIENT
Start: 2020-09-01 | End: 2020-09-03

## 2020-09-01 RX ORDER — MUPIROCIN 20 MG/G
1 OINTMENT TOPICAL 2 TIMES DAILY
Status: CANCELLED | OUTPATIENT
Start: 2020-09-01 | End: 2020-09-06

## 2020-09-01 RX ORDER — SODIUM CHLORIDE 0.9 % (FLUSH) 0.9 %
10 SYRINGE (ML) INJECTION
Status: CANCELLED | OUTPATIENT
Start: 2020-09-01

## 2020-09-01 RX ORDER — POLYETHYLENE GLYCOL 3350 17 G/17G
17 POWDER, FOR SOLUTION ORAL DAILY
Status: CANCELLED | OUTPATIENT
Start: 2020-09-01

## 2020-09-01 RX ORDER — MUPIROCIN 20 MG/G
1 OINTMENT TOPICAL
Status: CANCELLED | OUTPATIENT
Start: 2020-09-01

## 2020-09-01 RX ORDER — ACETAMINOPHEN 500 MG
1000 TABLET ORAL
Status: CANCELLED | OUTPATIENT
Start: 2020-09-01

## 2020-09-01 RX ORDER — ASPIRIN 81 MG/1
81 TABLET ORAL 2 TIMES DAILY
Status: CANCELLED | OUTPATIENT
Start: 2020-09-01

## 2020-09-01 RX ORDER — FENTANYL CITRATE 50 UG/ML
25 INJECTION, SOLUTION INTRAMUSCULAR; INTRAVENOUS EVERY 5 MIN PRN
Status: CANCELLED | OUTPATIENT
Start: 2020-09-01

## 2020-09-01 RX ORDER — BISACODYL 10 MG
10 SUPPOSITORY, RECTAL RECTAL EVERY 12 HOURS PRN
Status: CANCELLED | OUTPATIENT
Start: 2020-09-01

## 2020-09-01 NOTE — HPI
This is a 74 y.o. female  who presents today for a preoperative evaulation in preparation for Orthopedic  surgery. Scheduled for right total knee arthroplasty .  States  surgery is indicated for right knee pain.   Patient is new to me.  Details of current problem: The duration of problem is 1.5 years .   Reports symptoms of aching right knee pain with swelling.  Aggravating Factors include: walking and ADLs .  Relieving factors are rest.  Does not use assistive devices.  Denies pain at today's visit.   The history has been obtained by speaking with the patient and reviewing the electronic medical record and/or outside health information. Significant health conditions for the perioperative period are discussed below in assessment and plan.     Patient reports current health status to be Good.  Denies any new symptoms before surgery.

## 2020-09-01 NOTE — ASSESSMENT & PLAN NOTE
History of iron deficiency anemia; not currently taking iron tablets. Hgb stable at 11.9, Hct= 39.4.   Denies CP/SOB/fatigue or weakness.

## 2020-09-01 NOTE — PROGRESS NOTES
[facility]  Progress Note    Patient Name: Krysta Kam  MRN: [id number]  Date of Evaluation- 09/09/2020  PCP- Gold Palafox MD    Future cases for Krysta Kam [[id number]]     Case ID Status Date Time Manuel Procedure Provider Location    [id number] [facility] 9/16/2020  9:51  ARTHROPLASTY, KNEE: DEPUY-ATTUNE Tha Wright III, MD [[id number]] [facility]          HPI:  This is a 74 y.o. female  who presents today for a preoperative evaulation in preparation for Orthopedic  surgery. Scheduled for right total knee arthroplasty .  States  surgery is indicated for right knee pain.   Patient is new to me.  Details of current problem: The duration of problem is 1.5 years .   Reports symptoms of aching right knee pain with swelling.  Aggravating Factors include: walking and ADLs .  Relieving factors are rest.  Does not use assistive devices.  Denies pain at today's visit.   The history has been obtained by speaking with the patient and reviewing the electronic medical record and/or outside health information. Significant health conditions for the perioperative period are discussed below in assessment and plan.     Patient reports current health status to be Good.  Denies any new symptoms before surgery.       Subjective/ Objective:     Chief Complaint: Preoperative evaulation, perioperative medical management, and complication reduction plan.     Functional Capacity: : No regular exercise regimen. Can walk one flight of stairs without CP/SOB. Very active taking care of elderly mother.      Anesthesia issues: Nausea with previous colonoscopies; last one did OK.  Difficulty mouth opening: TMJ  Steroid use in the last 12 months: Yes- knee      Family anesthesia difficulty: None       Active Cardiac Conditions: None    Revised Cardiac Risk Index Predictors: None       Family Hx of Thrombosis: None    Past Medical History:   Diagnosis Date    Anemia 6/2012    Anxiety     Cataract     Colon polyps  1/31/2017    Depression     Diabetes mellitus without complication 11/14/2018    Diabetes mellitus, type 2     Fibromyalgia     GERD (gastroesophageal reflux disease)     High cholesterol     Hypertension          Past Medical History Pertinent Negatives:   Diagnosis Date Noted    Amblyopia 07/24/2013    Asthma 09/01/2020    COPD (chronic obstructive pulmonary disease) 09/01/2020    Coronary artery disease 09/01/2020    Deep vein thrombosis 09/01/2020    Diabetes mellitus 07/24/2013    Diabetic retinopathy 07/24/2013    Diabetic retinopathy 02/04/2014    Disorder of kidney and ureter 09/01/2020    Glaucoma 07/24/2013    Glaucoma 02/04/2014    Hypertensive retinopathy 07/24/2013    OU    Macular degeneration 07/24/2013    Myocardial infarction 09/01/2020    Retinal detachment 07/24/2013    Seizures 09/01/2020    Sickle cell anemia 07/11/2016    Sickle cell trait 07/11/2016    Strabismus 07/24/2013    Stroke 09/01/2020    Thyroid disease 09/01/2020    Uveitis 07/24/2013         Past Surgical History:   Procedure Laterality Date    COLONOSCOPY N/A 10/18/2016    Procedure: COLONOSCOPY;  Surgeon: Brittni Edmondson MD;  Location: [address]);  Service: Endoscopy;  Laterality: N/A;    COLONOSCOPY N/A 10/8/2019    Procedure: COLONOSCOPY;  Surgeon: Gold Ravi MD;  [facility]  Service: Endoscopy;  Laterality: N/A;    ESOPHAGOGASTRODUODENOSCOPY N/A 10/8/2019    Procedure: EGD (ESOPHAGOGASTRODUODENOSCOPY);  Surgeon: Gold Ravi MD;  Location: [address]);  Service: Endoscopy;  Laterality: N/A;  Okay for any provider due to KARSTEN    HYSTERECTOMY         Review of Systems   Constitutional: Negative for chills, fatigue, fever and unexpected weight change.   HENT: Negative for dental problem, hearing loss, postnasal drip, rhinorrhea, sore throat, tinnitus and trouble swallowing.    Eyes: Negative for photophobia, pain, discharge and visual disturbance.  "  Respiratory: Negative for apnea, cough, chest tightness, shortness of breath and wheezing.         STOP BANG risk factors:  HTN     Cardiovascular: Positive for leg swelling (right knee). Negative for chest pain and palpitations.   Gastrointestinal: Positive for constipation (occasional). Negative for abdominal pain, blood in stool, nausea and vomiting.        Denies Fatty liver, Hepatitis   Endocrine: Negative for cold intolerance, heat intolerance, polydipsia, polyphagia and polyuria.   Genitourinary: Negative for decreased urine volume, difficulty urinating, dysuria, frequency, hematuria and urgency.   Musculoskeletal: Positive for back pain (occasional lower). Negative for arthralgias, neck pain and neck stiffness.   Skin: Negative for rash and wound.   Allergic/Immunologic: Negative for immunocompromised state.   Neurological: Positive for numbness. Negative for dizziness, tremors, seizures, syncope, weakness and headaches.   Hematological: Negative for adenopathy. Does not bruise/bleed easily.   Psychiatric/Behavioral: Negative for confusion, hallucinations, sleep disturbance and suicidal ideas.              VITALS  Visit Vitals  BP (!) 146/62 (BP Location: Left arm, Patient Position: Sitting)   Pulse 70   Temp 98.1 °F (36.7 °C) (Oral)   Ht 5' 1" (1.549 m)   Wt 55.9 kg (123 lb 4.8 oz)   SpO2 100%   BMI 23.30 kg/m²          Physical Exam  Vitals signs reviewed.   Constitutional:       General: She is not in acute distress.     Appearance: She is well-developed.   HENT:      Head: Normocephalic.      Nose: Nose normal.      Mouth/Throat:      Pharynx: No oropharyngeal exudate.   Eyes:      General:         Right eye: No discharge.         Left eye: No discharge.      Conjunctiva/sclera: Conjunctivae normal.      Pupils: Pupils are equal, round, and reactive to light.   Neck:      Musculoskeletal: Normal range of motion.      Thyroid: No thyromegaly.      Vascular: No carotid bruit or JVD.      Trachea: No " tracheal deviation.   Cardiovascular:      Rate and Rhythm: Normal rate and regular rhythm.      Pulses:           Carotid pulses are 2+ on the right side and 2+ on the left side.       Dorsalis pedis pulses are 2+ on the right side and 2+ on the left side.        Posterior tibial pulses are 2+ on the right side and 2+ on the left side.      Heart sounds: Normal heart sounds.   Pulmonary:      Effort: Pulmonary effort is normal. No respiratory distress.      Breath sounds: Normal breath sounds. No stridor. No wheezing, rhonchi or rales.   Abdominal:      General: Bowel sounds are normal. There is no distension.      Palpations: Abdomen is soft.      Tenderness: There is no guarding.   Lymphadenopathy:      Cervical: No cervical adenopathy.   Skin:     General: Skin is warm and dry.      Capillary Refill: Capillary refill takes less than 2 seconds.      Findings: No erythema or rash.   Neurological:      Mental Status: She is alert and oriented to person, place, and time.      Coordination: Coordination normal.          Significant Labs:  Lab Results   Component Value Date    WBC 5.19 09/01/2020    HGB 11.9 (L) 09/01/2020    HCT 39.3 09/01/2020     09/01/2020    CHOL 202 (H) 07/10/2020    TRIG 67 07/10/2020    HDL 75 07/10/2020    ALT 14 07/10/2020    AST 22 07/10/2020     07/10/2020    K 4.4 07/10/2020     07/10/2020    CREATININE 1.0 07/10/2020    BUN 21 07/10/2020    CO2 30 (H) 07/10/2020    TSH 1.485 02/14/2014    INR 0.9 09/01/2020    HGBA1C 6.1 (H) 07/10/2020       Diagnostic Studies: No relevant studies.    EKG:   Results for orders placed or performed during the hospital encounter of 09/01/20   EKG 12-lead    Collection Time: 09/01/20  2:19 PM    Narrative    Test Reason : Z01.818,    Vent. Rate : 064 BPM     Atrial Rate : 064 BPM     P-R Int : 132 ms          QRS Dur : 072 ms      QT Int : 398 ms       P-R-T Axes : 057 048 034 degrees     QTc Int : 410 ms    Normal sinus rhythm  Normal  ECG  When compared with ECG of 04-APR-2019 00:49,  No significant change was found  Confirmed by Elvia Hidalgo MD ([id number]) on 9/1/2020 8:18:33 PM    Referred By: RHONDA LEOPOLD           Confirmed By:Elvia Hidalgo MD       2D ECHO:  TTE:  No results found for this or any previous visit.    SHIVAM:  No results found for this or any previous visit.     Imaging   old L-Spine xray  IMPRESSION:    Unremarkable two view examination of lumbar spine.  ______________________________________      Electronically signed by: Dr. Martinez Singer MD  Date:                                            05/02/16  Time:                                           12:02       Old C-Spine xray 2015    Findings: There is moderate DJD at C5 -- C6 mild elsewhere.  Prevertebral soft tissues are normal.  Odontoid is intact.  No fracture-dislocation bone destruction seen.  IMPRESSION:    DJD.        Electronically signed by: ABRIL TRAVIS  Date:                                            01/24/15  Time:                                           10:26         Revised Cardiac Risk Index   High -Risk Surgery  Intraperitoneal; Intrathoracic; suprainguinal vascular Yes- + 1 No- 0   History of Ischemic Heart Disease   (Hx of MI/positive exercise test/current chest pain due to ischemia/use of nitrate therapy/EKG with pathological Q waves) Yes- + 1 No- 0   History of CHF  (Pulmonary edema/bilateral rales or S3 gallop/PND/CXR showing pulmonary vascular redistribution) Yes- + 1 No- 0   History of CVA   (Prior stroke or TIA) Yes- + 1 No- 0   Pre-operative treatment with insulin Yes- + 1 No- 0   Pre-operative creatinine > 2mg/dl Yes- + 1 No- 0   Total: 0      Risk Status:  Estimated risk of cardiac complications after non-cardiac surgery using the Revised Cardiac Risk Index for Preoperative risk is 3.9 %      ARISCAT ([place]) risk index: Intermediate    American Society of Anesthesiologists Physical Status classification (ASA): 3    [place] respiratory failure  index: 0.5 %           No further cardiac workup needed prior to surgery.               Assessment/Plan:     Peripheral neuropathy  Occasional bilateral hands- does not bother patient much.  Taking gabapentin prn.      Anxiety  Reports taking care of 91 year-old mother who suffers with dementia. Not being treated at this time; symptoms stable.   Denies suicidal/ homicidal ideations. Encouraged regular exercise and healthy diet; appropriate sleep. Limit caffeine intake. Followed per PCP.     History of depression  Not being treated at this time; reports doing better.   Followed per PCP    Hypertensive retinopathy of both eyes  Followed per Ophthalmology; next visit 9/2/20.  Denies: double vision and blindness. Reports: floaters    TMJ disease  Denies: facial pain/tenderness/chewing problems/HA or ear discomfort  Has cracking and popping when opening mouth wide  No treatment    High cholesterol  Encouraged  healthy diet (DASH/Mediterranean) and exercise. Patient should exercise 30 minutes at least five times weekly. Limit alcohol.  Taking atorvastatin 40 mg daily    Hypertension  Current BP  not at goal today. /70- did not take BP meds today. Second /62.   Patient reports home BP readings of: none; will check this week and call with results 9/8/20  Encouraged keeping a healthy weight and BMI  Education was provided regarding lifestyle changes to reduce systolic BP;  Exercise 30 minutes per day,  5 days per week or 150 minutes weekly;  Sodium reduction and avoidance of high salt foods such as processed meats, frozen meals, fast foods.         Diabetes mellitus without complication  Diet controlled.  Most recent A1c is 6.1.  No home accuchecks. Reports peripheral neuropathy. Reports floaters- eye appt. 9/2/20.   Maintain healthy weight. Exercise at least 150 minutes weekly. Encouraged diet rich in nutrients such as fruits, vegetables, and whole grains; reduce sugar intake from cakes, candy, and sugared  drinks.   Has hypertensive retinopathy, Denies  Nephropathy   Denies:  Carotid, Coronary , or  Peripheral disease       Ischemic colitis  Patient denies history of ischemic colitis. Old colonoscopy in 2016 had ischemic changes with ulceration in sigmoid colon; possible poor circulation. Had follow-up sigmoidoscopy in 2017 with normal exam. Denies abdominal pain; occasional constipation. Has history of Celiac Disease- not following gluten free diet at this time.   Most recent colonoscopy: 10/8/2019- Tortuous colon, otherwise normal.        Fibromyalgia  Treated with: Diclofenac 1% gel  Locations: bilateral shoulder, back     Type 2 diabetes mellitus with diabetic cataract, without long-term current use of insulin  See DM without complication    Normocytic hypochromic anemia  History of iron deficiency anemia; not currently taking iron tablets. Hgb stable at 11.9, Hct= 39.4.   Denies CP/SOB/fatigue or weakness.        Discussion/Management of Perioperative Care    Thromboembolic prophylaxis (VTE) Care: Risk factors for thrombosis include: surgical procedure.  I recommend prophylaxis of thromboembolism with the use of compression stockings/pneumatic devices, and/or pharmacologic agents. The benefits should outweigh the risks for pharmacologic prophylaxis in the perioperative period. I also encourage early ambulation if not contraindicated during the post-operative period.    Risk factors for post-operative pulmonary complications include:diabetes mellitus and HTN. To reduce the risk of pulmonary complications, prophylactic recommendations include: incentive spirometry use/deep breathing, early ambulation and pain control.      Risk factors for renal complications include: diabetes mellitus and HTN. To reduce the risk of postoperative renal complications, I recommend the patient maintain adequate fluid volume status by drinking 2 liters of water daily.  Avoid/reduce NSAIDS and ELIZABETH-2 inhibitors use as well as IV contrast  for renal protection.    I recommend the use of appropriate prophylactic antibiotics to reduce the risk of surgical site infections.    Delirium risk factors include decreased mobility. I recommend to avoid/reduce use of benzodiazepine use (not for patients who take on a regular basis), anticholinergics, Benadryl,  and agents that may cause postoperative serotonin syndrome.  Controlled pain can decrease the risk for postop delirium and since opioids are used for postoperative pain control, I suggest using the lowest dose for the shortest amount of time necessary for pain management.       This visit was focused on Preoperative evaluation, Perioperative Medical management, complication reduction plans. I suggest that the patient follows up with primary care or relevant sub specialists for ongoing health care.    I appreciate the opportunity to be involved in this patients care. Please feel free to contact me if there were any questions about this consultation.    Patient is optimized for surgery with Dr. Wright.    20: One home BP readin/66- acceptable for surgery.    Shona Vinson NP  Perioperative Medicine  [facility]

## 2020-09-01 NOTE — PATIENT INSTRUCTIONS
Anesthesia: Regional Anesthesia    Youre scheduled for surgery. During surgery, youll receive medicine called anesthesia to keep you comfortable and pain-free. Your surgeon has decided that youll receive regional anesthesia. This sheet tells you what to expect with this type of anesthesia.  What is regional anesthesia?  Regional anesthesia numbs one region of your body. The anesthesia may be given around nerves or into veins in your arms, neck, or legs (nerve block or [place] block). Or it may be sent into the spinal fluid (spinal anesthesia) or into the space just outside the spinal fluid (epidural anesthesia). You may also be given sedatives to help you relax.  Nerve block or [place] block  A small area of the body, such as an arm or leg, can be numbed using a nerve block or [place] block.  · Nerve block. During a nerve block, your skin is numbed. A needle is then inserted near nerves that serve the area to be numbed. Anesthetic is sent through the needle.  · IV regional or [place] block. For this type of block, an IV line is put into a vein. The blood flow to the area to be numbed is blocked for a short time. Anesthetic is sent through the IV.  Spinal anesthesia  Spinal anesthesia numbs your body from about the waist down.  · Anesthetic is injected into the spinal fluid. This is a substance that surrounds the spinal cord in your spinal column. The anesthetic blocks pain traveling from the body to the brain.  · To receive the anesthetic, your skin is numbed at the injection site on your back.  · A needle is then inserted into the spinal space. Anesthetic is sent into the spinal fluid through the needle.  Epidural anesthesia  Epidural anesthesia is most commonly used during childbirth and may also be used after surgical procedures of the chest, belly, and legs.  · Anesthetic is injected into the epidural space. This is just outside the dural sac which contains the spinal fluid.  · To receive the anesthetic, your skin is  numbed at the injection site on your back.  · A needle is then inserted into the epidural space. Anesthetic is sent into the epidural space through the needle.  · A small flexible catheter may be attached to the needle and left in place. This allows for continuous injections or infusions of anesthetic.  Anesthesia tools and medicines that might be near you during your procedure  · Local anesthetic. This medicine is given through a needle numbs one region of your body.  · Electrocardiography leads (electrodes). These are used to record your heart rate and rhythm.  · Blood pressure cuff. A cuff is placed on your arm to keep track of your blood pressure.  · Pulse oximeter. This small clip is placed on the end of the finger. It measures your blood oxygen level.  · Sedatives. These medicines may be given through an IV. They help to relax you and keep you comfortable. You may stay awake or sleep lightly.  · Oxygen. You may be given oxygen through a facemask.  Risks and possible complications  Regional anesthesia carries some risks. These include:  · Nausea and vomiting  · Headache  · Backache  · Decreased blood pressure  · Allergic reaction to the anesthetic  · Ongoing numbness (rare)  · Irregular heartbeat (rare)  · Cardiac arrest (rare)   Date Last Reviewed: 12/1/2016  © 1141-7377 [employer]. [address] [place] [address], [place] [address]. All rights reserved. This information is not intended as a substitute for professional medical care. Always follow your healthcare professional's instructions.

## 2020-09-01 NOTE — LETTER
September 1, 2020      Tha Wright III, MD  [address] [facility] [address]           [facility]  [address] [facility] [address]  Phone: [phone]          Patient: Krysta Kam   MR Number: [id number]   YOB: 1945   Date of Visit: 9/1/2020       Dear Dr. Tha Wright III:    Thank you for referring Krysta Kam to me for evaluation. Attached you will find relevant portions of my assessment and plan of care.    If you have questions, please do not hesitate to call me. I look forward to following Krysta Kam along with you.    Sincerely,    Shona Vinson, NP    Enclosure  CC:  No Recipients    If you would like to receive this communication electronically, please contact [email or web address] or [phone] to request more information on [employer] Link access.    For providers and/or their staff who would like to refer a patient to [facility], please contact us through our one-stop-shop provider referral line, [facility] , at [phone].    If you feel you have received this communication in error or would no longer like to receive these types of communications, please e-mail [email or web address]

## 2020-09-01 NOTE — PROGRESS NOTES
Krysta Kam is a 74 y.o. year old here today for a pre-operative visit in preparation for a Right total knee arthroplasty to be performed by Dr. Wright  on 9/16/2020.  she was last seen and treated in the clinic on 8/18/2020. she will be medically optimized by the pre op center. There has been no significant change in medical status since last visit. No fever, chills, malaise, or unexplained weight change.      Allergies, Medications, past medical and surgical history reviewed.    Focused examination performed.    Patient declined to see surgeon today. All questions answered. Patient encouraged to call with questions. Contact information given.     Pre, saulo, and post operative procedures and expectations discussed. Questions were answered. Krysta Kam has been educated and is ready to proceed with surgery. Approximately 30 minutes was spent discussing surgical outcomes, plans, procedures pre, saulo, and post operative expections and care.  Surgical consent signed.    Krsyta Kam will contact us if there are any questions, concerns, or changes in medical status prior to surgery.       Joint class done during ortho clinic visit    COVID-19 test date: 9/13/2020     patient will be scheduled with Home Health during hospitalization.

## 2020-09-01 NOTE — H&P (VIEW-ONLY)
CC: Right knee pain    Krysta Kam is a 74 y.o. female with history of Right knee pain. Pain is worse with activity and weight bearing.  Patient has experienced interference of activities of daily living due to decreased range of motion and an increase in joint pain and swelling.  Patient has failed non-operative treatment including NSAIDs, corticosteroid injections, viscosupplement injections, and activity modification.  Krysta Kam currently ambulates independently.     Relevant medical conditions of significance in perioperative period:  DM- managed with diet  HTN- on medication managed by pcp  HLD- on medication managed by pcp  Fibromyalgia- on gabapentin managed by pcp      Past Medical History:   Diagnosis Date    Anemia 6/2012    Cataract     Colon polyps 1/31/2017    Diabetes mellitus without complication 11/14/2018    Fibromyalgia     GERD (gastroesophageal reflux disease)     High cholesterol     Hypertension        Past Surgical History:   Procedure Laterality Date    COLONOSCOPY N/A 10/18/2016    Procedure: COLONOSCOPY;  Surgeon: Brittni Edmondson MD;  Location: [facility] ([address] [place] [address]);  Service: Endoscopy;  Laterality: N/A;    COLONOSCOPY N/A 10/8/2019    Procedure: COLONOSCOPY;  Surgeon: Gold Ravi MD;  Location: [facility] ([address] [place] [address]);  Service: Endoscopy;  Laterality: N/A;    ESOPHAGOGASTRODUODENOSCOPY N/A 10/8/2019    Procedure: EGD (ESOPHAGOGASTRODUODENOSCOPY);  Surgeon: Gold Ravi MD;  Location: [facility] ([address] [place] [address]);  Service: Endoscopy;  Laterality: N/A;  Okay for any provider due to KARSTEN    HYSTERECTOMY         Family History   Problem Relation Age of Onset    Hypertension Mother     Stroke Mother     Heart disease Father     Cataracts Neg Hx     Cancer Neg Hx     Diabetes Neg Hx     Glaucoma Neg Hx     Retinal detachment Neg Hx     Celiac disease Neg Hx     Cirrhosis Neg Hx     Colon cancer Neg Hx     Cystic fibrosis Neg Hx     Esophageal cancer  Neg Hx     Inflammatory bowel disease Neg Hx     Liver disease Neg Hx     Stomach cancer Neg Hx     Amblyopia Neg Hx     Blindness Neg Hx     Macular degeneration Neg Hx     Strabismus Neg Hx     Thyroid disease Neg Hx        Review of patient's allergies indicates:   Allergen Reactions    Ondansetron      Other reaction(s): Flushing (Skin)    Savella [milnacipran] Nausea Only         Current Outpatient Medications:     amLODIPine (NORVASC) 10 MG tablet, take 1 tablet by mouth once daily, Disp: 90 tablet, Rfl: 2    ascorbic acid, vitamin C, (VITAMIN C) 500 MG tablet, Take 500 mg by mouth once daily., Disp: , Rfl:     atorvastatin (LIPITOR) 40 MG tablet, TAKE 1 TABLET EVERY DAY, Disp: 90 tablet, Rfl: 3    baclofen (LIORESAL) 10 MG tablet, Take 1 tablet (10 mg total) by mouth 3 (three) times daily as needed., Disp: 90 tablet, Rfl: 11    chlorthalidone (HYGROTEN) 25 MG Tab, TAKE 1 TABLET (25 MG TOTAL) BY MOUTH ONCE DAILY., Disp: 90 tablet, Rfl: 3    cinnamon bark (CINNAMON ORAL), Take by mouth., Disp: , Rfl:     diclofenac sodium (VOLTAREN) 1 % Gel, APPLY 2 GRAM TOPICALLY AS DIRECTED ONCE DAILY, Disp: 100 g, Rfl: 2    escitalopram oxalate (LEXAPRO) 10 MG tablet, TAKE 1 TABLET BY MOUTH ONCE DAILY, Disp: 90 tablet, Rfl: 3    FLAXSEED OIL ORAL, Take by mouth., Disp: , Rfl:     gabapentin (NEURONTIN) 300 MG capsule, TAKE 1 CAPSULE TWICE DAILY, Disp: 180 capsule, Rfl: 11    lidocaine (LIDODERM) 5 %, Place 1 patch onto the skin once daily. Remove & Discard patch within 12 hours or as directed by MD, Disp: 30 patch, Rfl: 2    losartan (COZAAR) 100 MG tablet, TAKE 1 TABLET BY MOUTH ONCE DAILY, Disp: 90 tablet, Rfl: 3    multivit-min/iron/folic/lutein (CENTRUM SILVER WOMEN ORAL), Take 1 tablet by mouth once daily., Disp: , Rfl:     omeprazole (PRILOSEC) 40 MG capsule, Take 1 capsule (40 mg total) by mouth once daily., Disp: 30 capsule, Rfl: 11    TURMERIC ORAL, Take by mouth., Disp: , Rfl:     vitamin  D (VITAMIN D3) 1000 units Tab, Take 1,000 Units by mouth once daily., Disp: , Rfl:     aspirin (ECOTRIN) 81 MG EC tablet, Take 1 tablet (81 mg total) by mouth once daily., Disp: , Rfl: 0    azelastine (ASTELIN) 137 mcg (0.1 %) nasal spray, 1 spray (137 mcg total) by Nasal route 2 (two) times daily., Disp: 30 mL, Rfl: 3    calcium carbonate-vit D3-min (CALTRATE 600+D PLUS MINERALS) 600 mg calcium- 400 unit Tab, Take 1 tablet by mouth once daily. (Patient not taking: Reported on 8/19/2020), Disp: 60 tablet, Rfl: 11    cycloSPORINE (RESTASIS) 0.05 % ophthalmic emulsion, Place 0.4 mLs (1 drop total) into both eyes 2 (two) times daily., Disp: 60 each, Rfl: 12    Review of Systems:  Constitutional: no fever or chills  Eyes: no visual changes  ENT: no nasal congestion or sore throat  Respiratory: no cough or shortness of breath  Cardiovascular: no chest pain or palpitations  Gastrointestinal: no nausea or vomiting, tolerating diet  Genitourinary: no hematuria or dysuria  Integument/Breast: no rash or pruritis  Hematologic/Lymphatic: no easy bruising or lymphadenopathy  Musculoskeletal: positive for knee pain  Neurological: no seizures or tremors  Behavioral/Psych: no auditory or visual hallucinations  Endocrine: no heat or cold intolerance    PE:  There were no vitals taken for this visit.  General: Pleasant, cooperative, NAD   Gait: antalgic  HEENT: NCAT, sclera nonicteric   Lungs: Respirations clear bilaterally; equal and unlabored.   CV: S1S2; 2+ bilateral upper and lower extremity pulses.   Skin: Intact throughout with no rashes, erythema, or lesions  Extremities: No LE edema,  no erythema or warmth of the skin in either lower extremity.    Right knee exam:  Knee Range of Motion:normal, pain with passive range of motion  Effusion:none  Condition of skin:intact  Location of tenderness:lateral joint line   Strength:normal  Stability: stable to testing    Hip Examination: painless PROM of hip     Radiographs:  Radiographs reveal advanced degenerative changes including subchondral cyst formation, subchondral sclerosis, osteophyte formation, joint space narrowing.     Knee Alignment: normal    Diagnosis: osteoarthritis Right knee    Plan: Right total knee arthroplasty    Due to the serious nature of total joint infection and the high prevalence of community acquired MRSA, vancomycin will be used perioperatively.

## 2020-09-01 NOTE — OUTPATIENT SUBJECTIVE & OBJECTIVE
Outpatient Subjective & Objective      Chief Complaint: Preoperative evaulation, perioperative medical management, and complication reduction plan.     Functional Capacity: : No regular exercise regimen. Can walk one flight of stairs without CP/SOB. Very active taking care of elderly mother.      Anesthesia issues: Nausea with previous colonoscopies; last one did OK.  Difficulty mouth opening: TMJ  Steroid use in the last 12 months: Yes- knee      Family anesthesia difficulty: None       Active Cardiac Conditions: None    Revised Cardiac Risk Index Predictors: None       Family Hx of Thrombosis: None    Past Medical History:   Diagnosis Date    Anemia 6/2012    Anxiety     Cataract     Colon polyps 1/31/2017    Depression     Diabetes mellitus without complication 11/14/2018    Diabetes mellitus, type 2     Fibromyalgia     GERD (gastroesophageal reflux disease)     High cholesterol     Hypertension          Past Medical History Pertinent Negatives:   Diagnosis Date Noted    Amblyopia 07/24/2013    Asthma 09/01/2020    COPD (chronic obstructive pulmonary disease) 09/01/2020    Coronary artery disease 09/01/2020    Deep vein thrombosis 09/01/2020    Diabetes mellitus 07/24/2013    Diabetic retinopathy 07/24/2013    Diabetic retinopathy 02/04/2014    Disorder of kidney and ureter 09/01/2020    Glaucoma 07/24/2013    Glaucoma 02/04/2014    Hypertensive retinopathy 07/24/2013    OU    Macular degeneration 07/24/2013    Myocardial infarction 09/01/2020    Retinal detachment 07/24/2013    Seizures 09/01/2020    Sickle cell anemia 07/11/2016    Sickle cell trait 07/11/2016    Strabismus 07/24/2013    Stroke 09/01/2020    Thyroid disease 09/01/2020    Uveitis 07/24/2013         Past Surgical History:   Procedure Laterality Date    COLONOSCOPY N/A 10/18/2016    Procedure: COLONOSCOPY;  Surgeon: Brittni Edmondson MD;  Location: [address];  Service: Endoscopy;  Laterality: N/A;     COLONOSCOPY N/A 10/8/2019    Procedure: COLONOSCOPY;  Surgeon: Gold Ravi MD;  Location: [facility] ([address] [place] [address]);  Service: Endoscopy;  Laterality: N/A;    ESOPHAGOGASTRODUODENOSCOPY N/A 10/8/2019    Procedure: EGD (ESOPHAGOGASTRODUODENOSCOPY);  Surgeon: Gold Ravi MD;  Location: [facility] ([address] [place] [address]);  Service: Endoscopy;  Laterality: N/A;  Okay for any provider due to KARSTEN    HYSTERECTOMY         Review of Systems   Constitutional: Negative for chills, fatigue, fever and unexpected weight change.   HENT: Negative for dental problem, hearing loss, postnasal drip, rhinorrhea, sore throat, tinnitus and trouble swallowing.    Eyes: Negative for photophobia, pain, discharge and visual disturbance.   Respiratory: Negative for apnea, cough, chest tightness, shortness of breath and wheezing.         STOP BANG risk factors:  HTN     Cardiovascular: Positive for leg swelling (right knee). Negative for chest pain and palpitations.   Gastrointestinal: Positive for constipation (occasional). Negative for abdominal pain, blood in stool, nausea and vomiting.        Denies Fatty liver, Hepatitis   Endocrine: Negative for cold intolerance, heat intolerance, polydipsia, polyphagia and polyuria.   Genitourinary: Negative for decreased urine volume, difficulty urinating, dysuria, frequency, hematuria and urgency.   Musculoskeletal: Positive for back pain (occasional lower). Negative for arthralgias, neck pain and neck stiffness.   Skin: Negative for rash and wound.   Allergic/Immunologic: Negative for immunocompromised state.   Neurological: Positive for numbness. Negative for dizziness, tremors, seizures, syncope, weakness and headaches.   Hematological: Negative for adenopathy. Does not bruise/bleed easily.   Psychiatric/Behavioral: Negative for confusion, hallucinations, sleep disturbance and suicidal ideas.              VITALS  Visit Vitals  BP (!) 146/62 (BP Location: Left arm, Patient Position: Sitting)   Pulse 70   Temp  "98.1 °F (36.7 °C) (Oral)   Ht 5' 1" (1.549 m)   Wt 55.9 kg (123 lb 4.8 oz)   SpO2 100%   BMI 23.30 kg/m²          Physical Exam  Vitals signs reviewed.   Constitutional:       General: She is not in acute distress.     Appearance: She is well-developed.   HENT:      Head: Normocephalic.      Nose: Nose normal.      Mouth/Throat:      Pharynx: No oropharyngeal exudate.   Eyes:      General:         Right eye: No discharge.         Left eye: No discharge.      Conjunctiva/sclera: Conjunctivae normal.      Pupils: Pupils are equal, round, and reactive to light.   Neck:      Musculoskeletal: Normal range of motion.      Thyroid: No thyromegaly.      Vascular: No carotid bruit or JVD.      Trachea: No tracheal deviation.   Cardiovascular:      Rate and Rhythm: Normal rate and regular rhythm.      Pulses:           Carotid pulses are 2+ on the right side and 2+ on the left side.       Dorsalis pedis pulses are 2+ on the right side and 2+ on the left side.        Posterior tibial pulses are 2+ on the right side and 2+ on the left side.      Heart sounds: Normal heart sounds.   Pulmonary:      Effort: Pulmonary effort is normal. No respiratory distress.      Breath sounds: Normal breath sounds. No stridor. No wheezing, rhonchi or rales.   Abdominal:      General: Bowel sounds are normal. There is no distension.      Palpations: Abdomen is soft.      Tenderness: There is no guarding.   Lymphadenopathy:      Cervical: No cervical adenopathy.   Skin:     General: Skin is warm and dry.      Capillary Refill: Capillary refill takes less than 2 seconds.      Findings: No erythema or rash.   Neurological:      Mental Status: She is alert and oriented to person, place, and time.      Coordination: Coordination normal.          Significant Labs:  Lab Results   Component Value Date    WBC 5.19 09/01/2020    HGB 11.9 (L) 09/01/2020    HCT 39.3 09/01/2020     09/01/2020    CHOL 202 (H) 07/10/2020    TRIG 67 07/10/2020    HDL 75 " 07/10/2020    ALT 14 07/10/2020    AST 22 07/10/2020     07/10/2020    K 4.4 07/10/2020     07/10/2020    CREATININE 1.0 07/10/2020    BUN 21 07/10/2020    CO2 30 (H) 07/10/2020    TSH 1.485 02/14/2014    INR 0.9 09/01/2020    HGBA1C 6.1 (H) 07/10/2020       Diagnostic Studies: No relevant studies.    EKG:   Results for orders placed or performed during the hospital encounter of 09/01/20   EKG 12-lead    Collection Time: 09/01/20  2:19 PM    Narrative    Test Reason : Z01.818,    Vent. Rate : 064 BPM     Atrial Rate : 064 BPM     P-R Int : 132 ms          QRS Dur : 072 ms      QT Int : 398 ms       P-R-T Axes : 057 048 034 degrees     QTc Int : 410 ms    Normal sinus rhythm  Normal ECG  When compared with ECG of 04-APR-2019 00:49,  No significant change was found  Confirmed by Elvia Hidalgo MD ([id number]) on 9/1/2020 8:18:33 PM    Referred By: RHONDA LEOPOLD           Confirmed By:Elvia Hidalgo MD       2D ECHO:  TTE:  No results found for this or any previous visit.    SHIVAM:  No results found for this or any previous visit.     Imaging   old L-Spine xray  IMPRESSION:    Unremarkable two view examination of lumbar spine.  ______________________________________      Electronically signed by: Dr. Martinez Singer MD  Date:                                            05/02/16  Time:                                           12:02       Old C-Spine xray 2015    Findings: There is moderate DJD at C5 -- C6 mild elsewhere.  Prevertebral soft tissues are normal.  Odontoid is intact.  No fracture-dislocation bone destruction seen.  IMPRESSION:    DJD.        Electronically signed by: ABRIL TRAVIS  Date:                                            01/24/15  Time:                                           10:26         Revised Cardiac Risk Index   High -Risk Surgery  Intraperitoneal; Intrathoracic; suprainguinal vascular Yes- + 1 No- 0   History of Ischemic Heart Disease   (Hx of MI/positive exercise test/current chest pain  due to ischemia/use of nitrate therapy/EKG with pathological Q waves) Yes- + 1 No- 0   History of CHF  (Pulmonary edema/bilateral rales or S3 gallop/PND/CXR showing pulmonary vascular redistribution) Yes- + 1 No- 0   History of CVA   (Prior stroke or TIA) Yes- + 1 No- 0   Pre-operative treatment with insulin Yes- + 1 No- 0   Pre-operative creatinine > 2mg/dl Yes- + 1 No- 0   Total: 0      Risk Status:  Estimated risk of cardiac complications after non-cardiac surgery using the Revised Cardiac Risk Index for Preoperative risk is 3.9 %      ARISCAT (Canet) risk index: Intermediate    American Society of Anesthesiologists Physical Status classification (ASA): 3    [facility] index: 0.5 %           No further cardiac workup needed prior to surgery.    Outpatient Subjective & Objective

## 2020-09-01 NOTE — H&P
CC: Right knee pain    Krysta Kam is a 74 y.o. female with history of Right knee pain. Pain is worse with activity and weight bearing.  Patient has experienced interference of activities of daily living due to decreased range of motion and an increase in joint pain and swelling.  Patient has failed non-operative treatment including NSAIDs, corticosteroid injections, viscosupplement injections, and activity modification.  Krysta Kam currently ambulates independently.     Relevant medical conditions of significance in perioperative period:  DM- managed with diet  HTN- on medication managed by pcp  HLD- on medication managed by pcp  Fibromyalgia- on gabapentin managed by pcp      Past Medical History:   Diagnosis Date    Anemia 6/2012    Cataract     Colon polyps 1/31/2017    Diabetes mellitus without complication 11/14/2018    Fibromyalgia     GERD (gastroesophageal reflux disease)     High cholesterol     Hypertension        Past Surgical History:   Procedure Laterality Date    COLONOSCOPY N/A 10/18/2016    Procedure: COLONOSCOPY;  Surgeon: Brittni Edmondson MD;  Location: [facility] ([address] [place] [address]);  Service: Endoscopy;  Laterality: N/A;    COLONOSCOPY N/A 10/8/2019    Procedure: COLONOSCOPY;  Surgeon: Gold Ravi MD;  Location: [facility] ([address] [place] [address]);  Service: Endoscopy;  Laterality: N/A;    ESOPHAGOGASTRODUODENOSCOPY N/A 10/8/2019    Procedure: EGD (ESOPHAGOGASTRODUODENOSCOPY);  Surgeon: Gold Ravi MD;  Location: [facility] ([address] [place] [address]);  Service: Endoscopy;  Laterality: N/A;  Okay for any provider due to KARSTEN    HYSTERECTOMY         Family History   Problem Relation Age of Onset    Hypertension Mother     Stroke Mother     Heart disease Father     Cataracts Neg Hx     Cancer Neg Hx     Diabetes Neg Hx     Glaucoma Neg Hx     Retinal detachment Neg Hx     Celiac disease Neg Hx     Cirrhosis Neg Hx     Colon cancer Neg Hx     Cystic fibrosis Neg Hx     Esophageal cancer  Neg Hx     Inflammatory bowel disease Neg Hx     Liver disease Neg Hx     Stomach cancer Neg Hx     Amblyopia Neg Hx     Blindness Neg Hx     Macular degeneration Neg Hx     Strabismus Neg Hx     Thyroid disease Neg Hx        Review of patient's allergies indicates:   Allergen Reactions    Ondansetron      Other reaction(s): Flushing (Skin)    Savella [milnacipran] Nausea Only         Current Outpatient Medications:     amLODIPine (NORVASC) 10 MG tablet, take 1 tablet by mouth once daily, Disp: 90 tablet, Rfl: 2    ascorbic acid, vitamin C, (VITAMIN C) 500 MG tablet, Take 500 mg by mouth once daily., Disp: , Rfl:     atorvastatin (LIPITOR) 40 MG tablet, TAKE 1 TABLET EVERY DAY, Disp: 90 tablet, Rfl: 3    baclofen (LIORESAL) 10 MG tablet, Take 1 tablet (10 mg total) by mouth 3 (three) times daily as needed., Disp: 90 tablet, Rfl: 11    chlorthalidone (HYGROTEN) 25 MG Tab, TAKE 1 TABLET (25 MG TOTAL) BY MOUTH ONCE DAILY., Disp: 90 tablet, Rfl: 3    cinnamon bark (CINNAMON ORAL), Take by mouth., Disp: , Rfl:     diclofenac sodium (VOLTAREN) 1 % Gel, APPLY 2 GRAM TOPICALLY AS DIRECTED ONCE DAILY, Disp: 100 g, Rfl: 2    escitalopram oxalate (LEXAPRO) 10 MG tablet, TAKE 1 TABLET BY MOUTH ONCE DAILY, Disp: 90 tablet, Rfl: 3    FLAXSEED OIL ORAL, Take by mouth., Disp: , Rfl:     gabapentin (NEURONTIN) 300 MG capsule, TAKE 1 CAPSULE TWICE DAILY, Disp: 180 capsule, Rfl: 11    lidocaine (LIDODERM) 5 %, Place 1 patch onto the skin once daily. Remove & Discard patch within 12 hours or as directed by MD, Disp: 30 patch, Rfl: 2    losartan (COZAAR) 100 MG tablet, TAKE 1 TABLET BY MOUTH ONCE DAILY, Disp: 90 tablet, Rfl: 3    multivit-min/iron/folic/lutein (CENTRUM SILVER WOMEN ORAL), Take 1 tablet by mouth once daily., Disp: , Rfl:     omeprazole (PRILOSEC) 40 MG capsule, Take 1 capsule (40 mg total) by mouth once daily., Disp: 30 capsule, Rfl: 11    TURMERIC ORAL, Take by mouth., Disp: , Rfl:     vitamin  D (VITAMIN D3) 1000 units Tab, Take 1,000 Units by mouth once daily., Disp: , Rfl:     aspirin (ECOTRIN) 81 MG EC tablet, Take 1 tablet (81 mg total) by mouth once daily., Disp: , Rfl: 0    azelastine (ASTELIN) 137 mcg (0.1 %) nasal spray, 1 spray (137 mcg total) by Nasal route 2 (two) times daily., Disp: 30 mL, Rfl: 3    calcium carbonate-vit D3-min (CALTRATE 600+D PLUS MINERALS) 600 mg calcium- 400 unit Tab, Take 1 tablet by mouth once daily. (Patient not taking: Reported on 8/19/2020), Disp: 60 tablet, Rfl: 11    cycloSPORINE (RESTASIS) 0.05 % ophthalmic emulsion, Place 0.4 mLs (1 drop total) into both eyes 2 (two) times daily., Disp: 60 each, Rfl: 12    Review of Systems:  Constitutional: no fever or chills  Eyes: no visual changes  ENT: no nasal congestion or sore throat  Respiratory: no cough or shortness of breath  Cardiovascular: no chest pain or palpitations  Gastrointestinal: no nausea or vomiting, tolerating diet  Genitourinary: no hematuria or dysuria  Integument/Breast: no rash or pruritis  Hematologic/Lymphatic: no easy bruising or lymphadenopathy  Musculoskeletal: positive for knee pain  Neurological: no seizures or tremors  Behavioral/Psych: no auditory or visual hallucinations  Endocrine: no heat or cold intolerance    PE:  There were no vitals taken for this visit.  General: Pleasant, cooperative, NAD   Gait: antalgic  HEENT: NCAT, sclera nonicteric   Lungs: Respirations clear bilaterally; equal and unlabored.   CV: S1S2; 2+ bilateral upper and lower extremity pulses.   Skin: Intact throughout with no rashes, erythema, or lesions  Extremities: No LE edema,  no erythema or warmth of the skin in either lower extremity.    Right knee exam:  Knee Range of Motion:normal, pain with passive range of motion  Effusion:none  Condition of skin:intact  Location of tenderness:lateral joint line   Strength:normal  Stability: stable to testing    Hip Examination: painless PROM of hip     Radiographs:  Radiographs reveal advanced degenerative changes including subchondral cyst formation, subchondral sclerosis, osteophyte formation, joint space narrowing.     Knee Alignment: normal    Diagnosis: osteoarthritis Right knee    Plan: Right total knee arthroplasty    Due to the serious nature of total joint infection and the high prevalence of community acquired MRSA, vancomycin will be used perioperatively.

## 2020-09-02 ENCOUNTER — OFFICE VISIT (OUTPATIENT)
Dept: OPTOMETRY | Facility: CLINIC | Age: 75
End: 2020-09-02
Payer: MEDICARE

## 2020-09-02 DIAGNOSIS — H25.13 NUCLEAR SCLEROSIS OF BOTH EYES: ICD-10-CM

## 2020-09-02 DIAGNOSIS — H52.4 HYPEROPIA WITH ASTIGMATISM AND PRESBYOPIA, BILATERAL: ICD-10-CM

## 2020-09-02 DIAGNOSIS — H16.223 KERATOCONJUNCTIVITIS SICCA, NOT SPECIFIED AS SJOGREN'S, BILATERAL: ICD-10-CM

## 2020-09-02 DIAGNOSIS — E11.9 DIABETES MELLITUS WITHOUT COMPLICATION: ICD-10-CM

## 2020-09-02 DIAGNOSIS — E11.9 TYPE 2 DIABETES MELLITUS WITHOUT RETINOPATHY: Primary | ICD-10-CM

## 2020-09-02 DIAGNOSIS — H52.03 HYPEROPIA WITH ASTIGMATISM AND PRESBYOPIA, BILATERAL: ICD-10-CM

## 2020-09-02 DIAGNOSIS — H52.203 HYPEROPIA WITH ASTIGMATISM AND PRESBYOPIA, BILATERAL: ICD-10-CM

## 2020-09-02 DIAGNOSIS — H02.889 MGD (MEIBOMIAN GLAND DYSFUNCTION): ICD-10-CM

## 2020-09-02 PROCEDURE — 99499 UNLISTED E&M SERVICE: CPT | Mod: HCNC,S$GLB,, | Performed by: OPTOMETRIST

## 2020-09-02 PROCEDURE — 99999 PR PBB SHADOW E&M-EST. PATIENT-LVL IV: ICD-10-PCS | Mod: PBBFAC,HCNC,, | Performed by: OPTOMETRIST

## 2020-09-02 PROCEDURE — 99499 RISK ADDL DX/OHS AUDIT: ICD-10-PCS | Mod: HCNC,S$GLB,, | Performed by: OPTOMETRIST

## 2020-09-02 PROCEDURE — 92015 DETERMINE REFRACTIVE STATE: CPT | Mod: HCNC,S$GLB,, | Performed by: OPTOMETRIST

## 2020-09-02 PROCEDURE — 99999 PR PBB SHADOW E&M-EST. PATIENT-LVL IV: CPT | Mod: PBBFAC,HCNC,, | Performed by: OPTOMETRIST

## 2020-09-02 PROCEDURE — 92014 PR EYE EXAM, EST PATIENT,COMPREHESV: ICD-10-PCS | Mod: HCNC,S$GLB,, | Performed by: OPTOMETRIST

## 2020-09-02 PROCEDURE — 92015 PR REFRACTION: ICD-10-PCS | Mod: HCNC,S$GLB,, | Performed by: OPTOMETRIST

## 2020-09-02 PROCEDURE — 92014 COMPRE OPH EXAM EST PT 1/>: CPT | Mod: HCNC,S$GLB,, | Performed by: OPTOMETRIST

## 2020-09-02 RX ORDER — CYCLOSPORINE 0.5 MG/ML
1 EMULSION OPHTHALMIC 2 TIMES DAILY
Qty: 60 EACH | Refills: 12 | Status: SHIPPED | OUTPATIENT
Start: 2020-09-02 | End: 2021-07-06

## 2020-09-02 NOTE — LETTER
September 2, 2020      Gold Palafox Jr., MD  [address]  [facility] [address]           [facility]  [address]  [facility] [address]  Phone: [phone]          Patient: Krysta Kam   MR Number: [id number]   YOB: 1945   Date of Visit: 9/2/2020       Dear Dr. Gold Palafox Jr.:    Thank you for referring Krysta Kam to me for evaluation. Attached you will find relevant portions of my assessment and plan of care.    If you have questions, please do not hesitate to call me. I look forward to following Krysta Kam along with you.    Sincerely,    Aye Arrieta, OD    Enclosure  CC:  No Recipients    If you would like to receive this communication electronically, please contact [email or web address] or [phone] to request more information on [facility] Link access.    For providers and/or their staff who would like to refer a patient to [facility], please contact us through our one-stop-shop provider referral line, [facility], at [phone].    If you feel you have received this communication in error or would no longer like to receive these types of communications, please e-mail [email or web address]

## 2020-09-02 NOTE — PROGRESS NOTES
HPI     Ms. Krysta Kam was referred by PCP for an annual eye exam.    Patient complains of dry eyes. Uses restasis QHS, but not every day. No   other AT.   Floaters OU. No light flashes.    Would patient like a refraction today? yes     Paitent denies diplopia, headaches, flashes, itching, tearing, burning,   redness, and pain.    (+)drops, see above   (+)diabetes      Hemoglobin A1C       Date                     Value               Ref Range             Status                07/10/2020               6.1 (H)             4.0 - 5.6 %           Final                  10/14/2019               5.7 (H)             4.0 - 5.6 %           Final                   06/13/2019               5.8 (H)             4.0 - 5.6 %           Final                   OCULAR HISTORY  Last Eye Exam: 8/5/19 with Dr Shah  (-)eye surgery   (+)diagnosed or treated for any eye conditions or diseases, dry eyes    FAMILY HISTORY  (-)Glaucoma        Last edited by Aye Arrieta, OD on 9/2/2020 11:12 AM. (History)            Assessment /Plan     For exam results, see Encounter Report.    Type 2 diabetes mellitus without retinopathy    Diabetes mellitus without complication  -     Ambulatory referral/consult to Optometry    Nuclear sclerosis of both eyes    Keratoconjunctivitis sicca, not specified as Sjogren's, bilateral  -     cycloSPORINE (RESTASIS) 0.05 % ophthalmic emulsion; Place 1 drop into both eyes 2 (two) times daily.  Dispense: 60 each; Refill: 12    MGD (meibomian gland dysfunction)    Hyperopia with astigmatism and presbyopia, bilateral      1-2. Educated pt on findings. Not visually significant. No need for removal at this time. Monitor yearly.     3. Educated pt on findings. Not visually significant. No need for removal at this time. Monitor yearly.     4-5. Educated pt on findings. Continue restasis 1gtt OU BID, discussed importance of using gtt as directed and not prn. Called in refills. May use ATs prn. Monitor yearly.      6. Updated SRx. No change OD, mild change OS. Recommend a smaller frame or rasing frame in order for patient to get into add. Monitor yearly.       RTC in 1 year for annual DM eye exam or sooner if needed.

## 2020-09-10 NOTE — PROGRESS NOTES
Subjective:     HPI:   Krysta Kam is a 74 y.o. female who presents for evaluation of right total knee replacement    She complains of years of moderate to severe right knee pain activity related and relieved with rest who complains of global medial lateral anterior knee pain.    Primary care as prescribed Voltaren gel.  She has also tried gabapentin Tylenol and lidocaine patches.  She has had 3 steroid injections last was in July 2019 which provided about a month or 2 relief.  She has tried home exercise conditioning program for therapy and walking.  She has tried a compressive sleeve.  No assistive devices.  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  Her daughter is a  at [employer].   she thinks her daughter's can help her out after surgery.  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 A review of systems is updated and there is no reported vision changes, ear/nose/mouth/throat complaints,  chest pain, shortness of breath, abdominal pain, urological complaints, fevers or chills, psychiatric complaints. Musculoskeletal and neurologcial symptoms are as documented. All other systems are negative.      Objective:   Exam:  There were no vitals filed for this visit.  Body mass index is 23.16 kg/m².    Physical examination included assessment of the patient's general appearance with particular attention to development, nutrition, body habitus, attention to grooming, and any evidence of distress.  Constitutional: The patient is a well-developed, well-nourished patient in no acute distress.   Cardiovascular: Vascular examination included warmth and capillary refill as well inspection for edema and assessment of pedal pulses. Pulses are palpable and regular.  Musculoskeletal: Gait was  assessed as to whether it was steady, non-antalgic, and/or required the use of an assist device. The patient was also asked to walk independently and get onto the examination table.  Skin: The skin was examined for any obvious rashes or lesions in the extremity.  Neurologic: Sensation is intact to light touch in the extremity. The patient has good coordination without hyperreflexia and is alert and oriented to person, place and time and has normal mood and affect.     All of the above were examined and found to be within normal limits except for the following pertinent clinical findings:    Antalgic gait with a limp favoring the right knee.  5-110 degree knee range of motion told degrees valgus aligned which corrects to about 6° tender palpation medial lateral patellofemoral joint lines with moderate effusion knees are stable anterior-posterior varus valgus stresses with flexion contracture but no extensor lag      Imaging:  Indication:  Right knee pain  Exam Ordered: Radiographs of the right knee include a standing anteroposterior view, a standing posterioanterior view, a lateral view in full flexion, and a sunrise view  Details of Examination: Todays exam shows evidence of joint space narrowing, osteophyte formation, and subchondral sclerosis, all consistent with degenerative arthritis of the knee.  No other significant findings are noted.  Impression:  Degenerative Arthritis, Right Knee  Severe grade 4 valgus      Assessment:       ICD-10-CM ICD-9-CM   1. Primary osteoarthritis of right knee  M17.11 715.16      Anemia but last hemoglobin in 2019 was 12.3  Diabetes, last A1c 6.1  Fibromyalgia  Ischemic colitis  Chronic low back pain     Plan:       This patient has significant symptoms in their knee that are affecting their quality of life and daily activities.  They have tried non-operative treatment including analgesics, an exercise program, and activity modification, but the symptoms have persisted. I believe  they make a good candidate for knee arthroplasty.     The risks and benefits of knee arthroplasty have been discussed with the patient which include, but are not limited to infections (including severe sequelae), potential component failure, fracture, DVT, pulmonary embolus, nerve palsy, poor range of motion, the possibility of bone grafting, persistent pain, wound healing complications, transfusions, medical complications and death.     Pre-operative planning will include the following:  A pre-surgical evaluation by will be arranged.  Pre-op orders will be placed.  We will make arrangements with the operating room for proper time and staffing.  We will make Social Service arrangements for the patient.    Implants:   Company: [employer]  System: [employer]    DVT prophylaxis: ASA 81mg BID x1 month  Dispo: outpatient PT    Admission status: Outpatient/23hr OBS                  Location: [facility]                                 Will plan on a right total knee replacement September 16th.  Given her potential postop care logistical issues will set up home health therapy for the 1st 2 weeks after surgery and then hopefully transition outpatient therapy      No orders of the defined types were placed in this encounter.            Past Medical History:   Diagnosis Date    Anemia 6/2012    Anxiety     Cataract     Colon polyps 1/31/2017    Depression     Diabetes mellitus without complication 11/14/2018    Diabetes mellitus, type 2     Fibromyalgia     GERD (gastroesophageal reflux disease)     High cholesterol     Hypertension        Past Surgical History:   Procedure Laterality Date    COLONOSCOPY N/A 10/18/2016    Procedure: COLONOSCOPY;  Surgeon: Brittni Edmondson MD;  Location: [address]);  Service: Endoscopy;  Laterality: N/A;    COLONOSCOPY N/A 10/8/2019    Procedure: COLONOSCOPY;  Surgeon: Gold Ravi MD;  Location: [facility] ([address] [place] [address]);  Service: Endoscopy;  Laterality: N/A;     ESOPHAGOGASTRODUODENOSCOPY N/A 10/8/2019    Procedure: EGD (ESOPHAGOGASTRODUODENOSCOPY);  Surgeon: Gold Ravi MD;  Location: [address];  Service: Endoscopy;  Laterality: N/A;  Okay for any provider due to KARSTEN    HYSTERECTOMY         Family History   Problem Relation Age of Onset    Stroke Mother     Heart disease Father     No Known Problems Daughter     No Known Problems Son     No Known Problems Daughter     No Known Problems Daughter     No Known Problems Son     Cataracts Neg Hx     Cancer Neg Hx     Diabetes Neg Hx     Glaucoma Neg Hx     Retinal detachment Neg Hx     Celiac disease Neg Hx     Cirrhosis Neg Hx     Colon cancer Neg Hx     Cystic fibrosis Neg Hx     Esophageal cancer Neg Hx     Inflammatory bowel disease Neg Hx     Liver disease Neg Hx     Stomach cancer Neg Hx     Amblyopia Neg Hx     Blindness Neg Hx     Macular degeneration Neg Hx     Strabismus Neg Hx     Thyroid disease Neg Hx        Social History     Socioeconomic History    Marital status: Single     Spouse name: Not on file    Number of children: 5    Years of education: Not on file    Highest education level: Not on file   Occupational History    Not on file   Social Needs    Financial resource strain: Not hard at all    Food insecurity     Worry: Never true     Inability: Never true    Transportation needs     Medical: No     Non-medical: No   Tobacco Use    Smoking status: Former Smoker     Packs/day: 0.25     Quit date: 1975     Years since quittin.7    Smokeless tobacco: Never Used    Tobacco comment: occasional social smoker   Substance and Sexual Activity    Alcohol use: No     Frequency: Never     Binge frequency: Never    Drug use: No    Sexual activity: Not Currently   Lifestyle    Physical activity     Days per week: 4 days     Minutes per session: 10 min    Stress: Only a little   Relationships    Social connections     Talks on phone: More than three times a  week     Gets together: Once a week     Attends Oriental orthodox service: Never     Active member of club or organization: No     Attends meetings of clubs or organizations: Never     Relationship status:    Other Topics Concern    Not on file   Social History Narrative    Retired

## 2020-09-11 ENCOUNTER — TELEPHONE (OUTPATIENT)
Dept: ORTHOPEDICS | Facility: CLINIC | Age: 75
End: 2020-09-11

## 2020-09-11 NOTE — TELEPHONE ENCOUNTER
I called the patient today regarding surgery on 9/16/2020 with Dr. Tha Wright. I informed the patient that her surgery will be at  [facility]. I informed the patient they must arrive at 10:00am and their surgery will start at 12:00pm.     I informed the patient about the current visitor policy. Currently only 1 visitor may assist them in pre-op, post-op holding area, and where they will be spending the night. I told the patient to check with the  the day of surgery to find out what the visiting hours are. The patient is aware that it is a fluid situation due to the COVID-19 pandemic and our visitor policies and procedures may change between now and when they have surgery.     I reminded the patient about her COVID-19 swab test. The patient is aware that her must complete the test on 9/16/20 at [facility].     Informed the patient that they cannot eat or drink after midnight, the night before surgery.     The patient verbalized understanding and has no further questions.

## 2020-09-13 ENCOUNTER — LAB VISIT (OUTPATIENT)
Dept: URGENT CARE | Facility: CLINIC | Age: 75
End: 2020-09-13
Payer: MEDICARE

## 2020-09-13 DIAGNOSIS — Z01.818 PRE-OP TESTING: ICD-10-CM

## 2020-09-13 LAB — SARS-COV-2 RNA RESP QL NAA+PROBE: NOT DETECTED

## 2020-09-13 PROCEDURE — U0003 INFECTIOUS AGENT DETECTION BY NUCLEIC ACID (DNA OR RNA); SEVERE ACUTE RESPIRATORY SYNDROME CORONAVIRUS 2 (SARS-COV-2) (CORONAVIRUS DISEASE [COVID-19]), AMPLIFIED PROBE TECHNIQUE, MAKING USE OF HIGH THROUGHPUT TECHNOLOGIES AS DESCRIBED BY CMS-2020-01-R: HCPCS | Mod: HCNC

## 2020-09-14 ENCOUNTER — ANESTHESIA EVENT (OUTPATIENT)
Dept: SURGERY | Facility: HOSPITAL | Age: 75
End: 2020-09-14
Payer: MEDICARE

## 2020-09-15 ENCOUNTER — TELEPHONE (OUTPATIENT)
Dept: ORTHOPEDICS | Facility: CLINIC | Age: 75
End: 2020-09-15

## 2020-09-15 DIAGNOSIS — Z96.651 STATUS POST TOTAL RIGHT KNEE REPLACEMENT: Primary | ICD-10-CM

## 2020-09-15 RX ORDER — DOCUSATE SODIUM 100 MG/1
100 CAPSULE, LIQUID FILLED ORAL 2 TIMES DAILY PRN
Qty: 60 CAPSULE | Refills: 0 | Status: SHIPPED | OUTPATIENT
Start: 2020-09-15 | End: 2022-01-14

## 2020-09-15 RX ORDER — OXYCODONE HYDROCHLORIDE 5 MG/1
TABLET ORAL
Qty: 50 TABLET | Refills: 0 | Status: SHIPPED | OUTPATIENT
Start: 2020-09-15 | End: 2021-05-24

## 2020-09-15 RX ORDER — CELECOXIB 200 MG/1
200 CAPSULE ORAL DAILY
Qty: 30 CAPSULE | Refills: 0 | Status: SHIPPED | OUTPATIENT
Start: 2020-09-15 | End: 2020-10-16

## 2020-09-15 RX ORDER — ASPIRIN 81 MG/1
81 TABLET ORAL 2 TIMES DAILY
Qty: 60 TABLET | Refills: 0 | Status: SHIPPED | OUTPATIENT
Start: 2020-09-15 | End: 2021-04-16

## 2020-09-15 RX ORDER — DEXTROMETHORPHAN HYDROBROMIDE, GUAIFENESIN 5; 100 MG/5ML; MG/5ML
650 LIQUID ORAL EVERY 8 HOURS PRN
Qty: 120 TABLET | Refills: 0 | Status: SHIPPED | OUTPATIENT
Start: 2020-09-15

## 2020-09-15 NOTE — TELEPHONE ENCOUNTER
I called the patient regarding her time of arrival for surgery tomorrow. The patient confirmed that she can arrive at 7:30am for surgery. The patient verbalized understanding and has no further questions.

## 2020-09-15 NOTE — TELEPHONE ENCOUNTER
I called the patient today regarding her surgery. I left a message for the patient to call me back. I left my name and phone number.

## 2020-09-16 ENCOUNTER — ANESTHESIA (OUTPATIENT)
Dept: SURGERY | Facility: HOSPITAL | Age: 75
End: 2020-09-16
Payer: MEDICARE

## 2020-09-16 ENCOUNTER — HOSPITAL ENCOUNTER (OUTPATIENT)
Facility: HOSPITAL | Age: 75
Discharge: HOME | End: 2020-09-17
Attending: ORTHOPAEDIC SURGERY | Admitting: ORTHOPAEDIC SURGERY
Payer: MEDICARE

## 2020-09-16 DIAGNOSIS — Z96.651 STATUS POST TOTAL RIGHT KNEE REPLACEMENT: Primary | ICD-10-CM

## 2020-09-16 DIAGNOSIS — M25.561 RIGHT KNEE PAIN, UNSPECIFIED CHRONICITY: ICD-10-CM

## 2020-09-16 LAB
POCT GLUCOSE: 114 MG/DL (ref 70–110)
POCT GLUCOSE: 130 MG/DL (ref 70–110)
POCT GLUCOSE: 170 MG/DL (ref 70–110)
POCT GLUCOSE: 218 MG/DL (ref 70–110)

## 2020-09-16 PROCEDURE — 97116 GAIT TRAINING THERAPY: CPT

## 2020-09-16 PROCEDURE — 36000710: Performed by: ORTHOPAEDIC SURGERY

## 2020-09-16 PROCEDURE — C1713 ANCHOR/SCREW BN/BN,TIS/BN: HCPCS | Performed by: ORTHOPAEDIC SURGERY

## 2020-09-16 PROCEDURE — 27447 PR TOTAL KNEE ARTHROPLASTY: ICD-10-PCS | Mod: RT,,, | Performed by: ORTHOPAEDIC SURGERY

## 2020-09-16 PROCEDURE — 27447 TOTAL KNEE ARTHROPLASTY: CPT | Mod: RT,,, | Performed by: ORTHOPAEDIC SURGERY

## 2020-09-16 PROCEDURE — 94799 UNLISTED PULMONARY SVC/PX: CPT

## 2020-09-16 PROCEDURE — 63600175 PHARM REV CODE 636 W HCPCS: Performed by: STUDENT IN AN ORGANIZED HEALTH CARE EDUCATION/TRAINING PROGRAM

## 2020-09-16 PROCEDURE — D9220A PRA ANESTHESIA: Mod: ANES,,, | Performed by: ANESTHESIOLOGY

## 2020-09-16 PROCEDURE — 63600175 PHARM REV CODE 636 W HCPCS: Performed by: ANESTHESIOLOGY

## 2020-09-16 PROCEDURE — 27100025 HC TUBING, SET FLUID WARMER: Performed by: ANESTHESIOLOGY

## 2020-09-16 PROCEDURE — 25000003 PHARM REV CODE 250: Performed by: ORTHOPAEDIC SURGERY

## 2020-09-16 PROCEDURE — 76942 PR U/S GUIDANCE FOR NEEDLE GUIDANCE: ICD-10-PCS | Mod: 26,,, | Performed by: ANESTHESIOLOGY

## 2020-09-16 PROCEDURE — 37000009 HC ANESTHESIA EA ADD 15 MINS: Performed by: ORTHOPAEDIC SURGERY

## 2020-09-16 PROCEDURE — D9220A PRA ANESTHESIA: ICD-10-PCS | Mod: CRNA,,, | Performed by: NURSE ANESTHETIST, CERTIFIED REGISTERED

## 2020-09-16 PROCEDURE — 63600175 PHARM REV CODE 636 W HCPCS: Performed by: NURSE PRACTITIONER

## 2020-09-16 PROCEDURE — 63600175 PHARM REV CODE 636 W HCPCS: Performed by: NURSE ANESTHETIST, CERTIFIED REGISTERED

## 2020-09-16 PROCEDURE — 27447 TOTAL KNEE ARTHROPLASTY: CPT | Mod: AS,RT,, | Performed by: PHYSICIAN ASSISTANT

## 2020-09-16 PROCEDURE — 99900035 HC TECH TIME PER 15 MIN (STAT)

## 2020-09-16 PROCEDURE — 25000003 PHARM REV CODE 250: Performed by: NURSE PRACTITIONER

## 2020-09-16 PROCEDURE — 64448 NJX AA&/STRD FEM NRV NFS IMG: CPT | Performed by: ANESTHESIOLOGY

## 2020-09-16 PROCEDURE — 27200688 HC TRAY, SPINAL-HYPER/ ISOBARIC: Performed by: ANESTHESIOLOGY

## 2020-09-16 PROCEDURE — 94761 N-INVAS EAR/PLS OXIMETRY MLT: CPT

## 2020-09-16 PROCEDURE — 82962 GLUCOSE BLOOD TEST: CPT | Performed by: ORTHOPAEDIC SURGERY

## 2020-09-16 PROCEDURE — C1751 CATH, INF, PER/CENT/MIDLINE: HCPCS | Performed by: ANESTHESIOLOGY

## 2020-09-16 PROCEDURE — 37000008 HC ANESTHESIA 1ST 15 MINUTES: Performed by: ORTHOPAEDIC SURGERY

## 2020-09-16 PROCEDURE — D9220A PRA ANESTHESIA: Mod: CRNA,,, | Performed by: NURSE ANESTHETIST, CERTIFIED REGISTERED

## 2020-09-16 PROCEDURE — 94770 HC EXHALED C02 TEST: CPT

## 2020-09-16 PROCEDURE — 76942 ECHO GUIDE FOR BIOPSY: CPT | Performed by: ANESTHESIOLOGY

## 2020-09-16 PROCEDURE — 97535 SELF CARE MNGMENT TRAINING: CPT

## 2020-09-16 PROCEDURE — 71000033 HC RECOVERY, INTIAL HOUR: Performed by: ORTHOPAEDIC SURGERY

## 2020-09-16 PROCEDURE — D9220A PRA ANESTHESIA: ICD-10-PCS | Mod: ANES,,, | Performed by: ANESTHESIOLOGY

## 2020-09-16 PROCEDURE — 27201423 OPTIME MED/SURG SUP & DEVICES STERILE SUPPLY: Performed by: ORTHOPAEDIC SURGERY

## 2020-09-16 PROCEDURE — 36000711: Performed by: ORTHOPAEDIC SURGERY

## 2020-09-16 PROCEDURE — 25000003 PHARM REV CODE 250: Performed by: PHYSICIAN ASSISTANT

## 2020-09-16 PROCEDURE — C1776 JOINT DEVICE (IMPLANTABLE): HCPCS | Performed by: ORTHOPAEDIC SURGERY

## 2020-09-16 PROCEDURE — 64448 NJX AA&/STRD FEM NRV NFS IMG: CPT | Mod: 59,RT,, | Performed by: ANESTHESIOLOGY

## 2020-09-16 PROCEDURE — 63600175 PHARM REV CODE 636 W HCPCS: Performed by: ORTHOPAEDIC SURGERY

## 2020-09-16 PROCEDURE — 25000003 PHARM REV CODE 250: Performed by: NURSE ANESTHETIST, CERTIFIED REGISTERED

## 2020-09-16 PROCEDURE — 64448 PR NERVE BLOCK INJ, ANES/STEROID, FEMORAL, CONT INFUSION, INCL IMAG GUIDANCE: ICD-10-PCS | Mod: 59,RT,, | Performed by: ANESTHESIOLOGY

## 2020-09-16 PROCEDURE — S0028 INJECTION, FAMOTIDINE, 20 MG: HCPCS | Performed by: NURSE ANESTHETIST, CERTIFIED REGISTERED

## 2020-09-16 PROCEDURE — 27447 PR TOTAL KNEE ARTHROPLASTY: ICD-10-PCS | Mod: AS,RT,, | Performed by: PHYSICIAN ASSISTANT

## 2020-09-16 PROCEDURE — 76942 ECHO GUIDE FOR BIOPSY: CPT | Mod: 26,,, | Performed by: ANESTHESIOLOGY

## 2020-09-16 PROCEDURE — 99212 OFFICE O/P EST SF 10 MIN: CPT | Mod: ,,, | Performed by: ANESTHESIOLOGY

## 2020-09-16 PROCEDURE — 97161 PT EVAL LOW COMPLEX 20 MIN: CPT

## 2020-09-16 PROCEDURE — 99212 PR OFFICE/OUTPT VISIT, EST, LEVL II, 10-19 MIN: ICD-10-PCS | Mod: ,,, | Performed by: ANESTHESIOLOGY

## 2020-09-16 DEVICE — PATELLA ATTUNE MEDLZD DOME 35: Type: IMPLANTABLE DEVICE | Site: KNEE | Status: FUNCTIONAL

## 2020-09-16 DEVICE — CEMENT BONE SURG SMPLX P RADPQ: Type: IMPLANTABLE DEVICE | Site: KNEE | Status: FUNCTIONAL

## 2020-09-16 DEVICE — IMPLANTABLE DEVICE: Type: IMPLANTABLE DEVICE | Site: KNEE | Status: FUNCTIONAL

## 2020-09-16 RX ORDER — DEXAMETHASONE SODIUM PHOSPHATE 4 MG/ML
INJECTION, SOLUTION INTRA-ARTICULAR; INTRALESIONAL; INTRAMUSCULAR; INTRAVENOUS; SOFT TISSUE
Status: DISCONTINUED | OUTPATIENT
Start: 2020-09-16 | End: 2020-09-16

## 2020-09-16 RX ORDER — PREGABALIN 75 MG/1
75 CAPSULE ORAL
Status: COMPLETED | OUTPATIENT
Start: 2020-09-16 | End: 2020-09-16

## 2020-09-16 RX ORDER — PREGABALIN 75 MG/1
75 CAPSULE ORAL NIGHTLY
Status: DISCONTINUED | OUTPATIENT
Start: 2020-09-16 | End: 2020-09-17 | Stop reason: HOSPADM

## 2020-09-16 RX ORDER — OXYCODONE HYDROCHLORIDE 5 MG/1
5 TABLET ORAL
Status: DISCONTINUED | OUTPATIENT
Start: 2020-09-16 | End: 2020-09-17 | Stop reason: HOSPADM

## 2020-09-16 RX ORDER — SODIUM CHLORIDE 9 MG/ML
INJECTION, SOLUTION INTRAVENOUS
Status: COMPLETED | OUTPATIENT
Start: 2020-09-16 | End: 2020-09-16

## 2020-09-16 RX ORDER — ONDANSETRON 2 MG/ML
4 INJECTION INTRAMUSCULAR; INTRAVENOUS EVERY 8 HOURS PRN
Status: DISCONTINUED | OUTPATIENT
Start: 2020-09-16 | End: 2020-09-16

## 2020-09-16 RX ORDER — SODIUM CHLORIDE 0.9 % (FLUSH) 0.9 %
3 SYRINGE (ML) INJECTION EVERY 6 HOURS
Status: DISCONTINUED | OUTPATIENT
Start: 2020-09-16 | End: 2020-09-16 | Stop reason: HOSPADM

## 2020-09-16 RX ORDER — NALOXONE HCL 0.4 MG/ML
0.02 VIAL (ML) INJECTION
Status: DISCONTINUED | OUTPATIENT
Start: 2020-09-16 | End: 2020-09-17 | Stop reason: HOSPADM

## 2020-09-16 RX ORDER — CELECOXIB 200 MG/1
200 CAPSULE ORAL DAILY
Status: DISCONTINUED | OUTPATIENT
Start: 2020-09-17 | End: 2020-09-17 | Stop reason: HOSPADM

## 2020-09-16 RX ORDER — CELECOXIB 200 MG/1
400 CAPSULE ORAL
Status: COMPLETED | OUTPATIENT
Start: 2020-09-16 | End: 2020-09-16

## 2020-09-16 RX ORDER — ROPIVACAINE HYDROCHLORIDE 2 MG/ML
8 INJECTION, SOLUTION EPIDURAL; INFILTRATION; PERINEURAL CONTINUOUS
Status: DISCONTINUED | OUTPATIENT
Start: 2020-09-16 | End: 2020-09-16

## 2020-09-16 RX ORDER — TRANEXAMIC ACID 100 MG/ML
1000 INJECTION, SOLUTION INTRAVENOUS
Status: COMPLETED | OUTPATIENT
Start: 2020-09-16 | End: 2020-09-16

## 2020-09-16 RX ORDER — KETAMINE HCL IN 0.9 % NACL 50 MG/5 ML
SYRINGE (ML) INTRAVENOUS
Status: DISCONTINUED | OUTPATIENT
Start: 2020-09-16 | End: 2020-09-16

## 2020-09-16 RX ORDER — EPHEDRINE SULFATE 50 MG/ML
INJECTION, SOLUTION INTRAVENOUS
Status: DISCONTINUED | OUTPATIENT
Start: 2020-09-16 | End: 2020-09-16

## 2020-09-16 RX ORDER — PROPOFOL 10 MG/ML
INJECTION, EMULSION INTRAVENOUS CONTINUOUS PRN
Status: DISCONTINUED | OUTPATIENT
Start: 2020-09-16 | End: 2020-09-16

## 2020-09-16 RX ORDER — FAMOTIDINE 10 MG/ML
INJECTION INTRAVENOUS
Status: DISCONTINUED | OUTPATIENT
Start: 2020-09-16 | End: 2020-09-16

## 2020-09-16 RX ORDER — VANCOMYCIN HCL IN 5 % DEXTROSE 1G/250ML
1000 PLASTIC BAG, INJECTION (ML) INTRAVENOUS
Status: COMPLETED | OUTPATIENT
Start: 2020-09-16 | End: 2020-09-16

## 2020-09-16 RX ORDER — LOSARTAN POTASSIUM 25 MG/1
100 TABLET ORAL DAILY
Status: DISCONTINUED | OUTPATIENT
Start: 2020-09-16 | End: 2020-09-17 | Stop reason: HOSPADM

## 2020-09-16 RX ORDER — FAMOTIDINE 20 MG/1
20 TABLET, FILM COATED ORAL 2 TIMES DAILY PRN
Status: DISCONTINUED | OUTPATIENT
Start: 2020-09-16 | End: 2020-09-17 | Stop reason: HOSPADM

## 2020-09-16 RX ORDER — ROPIVACAINE HYDROCHLORIDE 2 MG/ML
4 INJECTION, SOLUTION EPIDURAL; INFILTRATION; PERINEURAL CONTINUOUS
Status: DISCONTINUED | OUTPATIENT
Start: 2020-09-16 | End: 2020-09-17 | Stop reason: HOSPADM

## 2020-09-16 RX ORDER — SODIUM CHLORIDE 9 MG/ML
INJECTION, SOLUTION INTRAVENOUS CONTINUOUS
Status: DISCONTINUED | OUTPATIENT
Start: 2020-09-16 | End: 2020-09-17 | Stop reason: HOSPADM

## 2020-09-16 RX ORDER — TRANEXAMIC ACID 100 MG/ML
INJECTION, SOLUTION INTRAVENOUS
Status: DISCONTINUED | OUTPATIENT
Start: 2020-09-16 | End: 2020-09-16 | Stop reason: HOSPADM

## 2020-09-16 RX ORDER — OXYCODONE HYDROCHLORIDE 10 MG/1
10 TABLET ORAL
Status: DISCONTINUED | OUTPATIENT
Start: 2020-09-16 | End: 2020-09-17 | Stop reason: HOSPADM

## 2020-09-16 RX ORDER — TRANEXAMIC ACID 100 MG/ML
1000 INJECTION, SOLUTION INTRAVENOUS
Status: DISCONTINUED | OUTPATIENT
Start: 2020-09-16 | End: 2020-09-16 | Stop reason: HOSPADM

## 2020-09-16 RX ORDER — LIDOCAINE HYDROCHLORIDE 10 MG/ML
1 INJECTION, SOLUTION EPIDURAL; INFILTRATION; INTRACAUDAL; PERINEURAL
Status: DISCONTINUED | OUTPATIENT
Start: 2020-09-16 | End: 2020-09-16 | Stop reason: HOSPADM

## 2020-09-16 RX ORDER — AMOXICILLIN 250 MG
1 CAPSULE ORAL 2 TIMES DAILY
Status: DISCONTINUED | OUTPATIENT
Start: 2020-09-16 | End: 2020-09-17 | Stop reason: HOSPADM

## 2020-09-16 RX ORDER — CEFAZOLIN SODIUM 1 G/3ML
2 INJECTION, POWDER, FOR SOLUTION INTRAMUSCULAR; INTRAVENOUS
Status: COMPLETED | OUTPATIENT
Start: 2020-09-16 | End: 2020-09-17

## 2020-09-16 RX ORDER — SODIUM CHLORIDE 0.9 % (FLUSH) 0.9 %
10 SYRINGE (ML) INJECTION
Status: DISCONTINUED | OUTPATIENT
Start: 2020-09-16 | End: 2020-09-16 | Stop reason: HOSPADM

## 2020-09-16 RX ORDER — ACETAMINOPHEN 500 MG
1000 TABLET ORAL
Status: COMPLETED | OUTPATIENT
Start: 2020-09-16 | End: 2020-09-16

## 2020-09-16 RX ORDER — VANCOMYCIN HYDROCHLORIDE 1 G/20ML
INJECTION, POWDER, LYOPHILIZED, FOR SOLUTION INTRAVENOUS
Status: DISCONTINUED | OUTPATIENT
Start: 2020-09-16 | End: 2020-09-16 | Stop reason: HOSPADM

## 2020-09-16 RX ORDER — ATORVASTATIN CALCIUM 20 MG/1
40 TABLET, FILM COATED ORAL DAILY
Status: DISCONTINUED | OUTPATIENT
Start: 2020-09-17 | End: 2020-09-17 | Stop reason: HOSPADM

## 2020-09-16 RX ORDER — ACETAMINOPHEN 500 MG
1000 TABLET ORAL EVERY 6 HOURS
Status: DISCONTINUED | OUTPATIENT
Start: 2020-09-16 | End: 2020-09-17 | Stop reason: HOSPADM

## 2020-09-16 RX ORDER — FENTANYL CITRATE 50 UG/ML
25 INJECTION, SOLUTION INTRAMUSCULAR; INTRAVENOUS EVERY 5 MIN PRN
Status: DISCONTINUED | OUTPATIENT
Start: 2020-09-16 | End: 2020-09-16 | Stop reason: SDUPTHER

## 2020-09-16 RX ORDER — FENTANYL CITRATE 50 UG/ML
25 INJECTION, SOLUTION INTRAMUSCULAR; INTRAVENOUS EVERY 5 MIN PRN
Status: DISCONTINUED | OUTPATIENT
Start: 2020-09-16 | End: 2020-09-16 | Stop reason: HOSPADM

## 2020-09-16 RX ORDER — TALC
6 POWDER (GRAM) TOPICAL NIGHTLY PRN
Status: DISCONTINUED | OUTPATIENT
Start: 2020-09-16 | End: 2020-09-16 | Stop reason: HOSPADM

## 2020-09-16 RX ORDER — HALOPERIDOL 5 MG/ML
0.5 INJECTION INTRAMUSCULAR EVERY 6 HOURS PRN
Status: DISCONTINUED | OUTPATIENT
Start: 2020-09-16 | End: 2020-09-17 | Stop reason: HOSPADM

## 2020-09-16 RX ORDER — ROPIVACAINE/EPI/CLONIDINE/KET 2.46-0.005
SYRINGE (ML) INJECTION
Status: DISCONTINUED | OUTPATIENT
Start: 2020-09-16 | End: 2020-09-16 | Stop reason: HOSPADM

## 2020-09-16 RX ORDER — MUPIROCIN 20 MG/G
1 OINTMENT TOPICAL 2 TIMES DAILY
Status: DISCONTINUED | OUTPATIENT
Start: 2020-09-16 | End: 2020-09-17 | Stop reason: HOSPADM

## 2020-09-16 RX ORDER — MUPIROCIN 20 MG/G
1 OINTMENT TOPICAL
Status: COMPLETED | OUTPATIENT
Start: 2020-09-16 | End: 2020-09-16

## 2020-09-16 RX ORDER — MIDAZOLAM HYDROCHLORIDE 1 MG/ML
0.5 INJECTION INTRAMUSCULAR; INTRAVENOUS
Status: DISCONTINUED | OUTPATIENT
Start: 2020-09-16 | End: 2023-01-20

## 2020-09-16 RX ORDER — SODIUM CHLORIDE 9 MG/ML
INJECTION, SOLUTION INTRAVENOUS CONTINUOUS PRN
Status: DISCONTINUED | OUTPATIENT
Start: 2020-09-16 | End: 2020-09-16

## 2020-09-16 RX ORDER — CHLORTHALIDONE 25 MG/1
25 TABLET ORAL DAILY
Status: DISCONTINUED | OUTPATIENT
Start: 2020-09-17 | End: 2020-09-17 | Stop reason: HOSPADM

## 2020-09-16 RX ORDER — MIDAZOLAM HYDROCHLORIDE 1 MG/ML
1 INJECTION INTRAMUSCULAR; INTRAVENOUS EVERY 5 MIN PRN
Status: DISCONTINUED | OUTPATIENT
Start: 2020-09-16 | End: 2020-09-16 | Stop reason: HOSPADM

## 2020-09-16 RX ORDER — POLYETHYLENE GLYCOL 3350 17 G/17G
17 POWDER, FOR SOLUTION ORAL DAILY
Status: DISCONTINUED | OUTPATIENT
Start: 2020-09-16 | End: 2020-09-17 | Stop reason: HOSPADM

## 2020-09-16 RX ORDER — LIDOCAINE HCL/PF 100 MG/5ML
SYRINGE (ML) INTRAVENOUS
Status: DISCONTINUED | OUTPATIENT
Start: 2020-09-16 | End: 2020-09-16

## 2020-09-16 RX ORDER — CEFAZOLIN SODIUM 1 G/3ML
2 INJECTION, POWDER, FOR SOLUTION INTRAMUSCULAR; INTRAVENOUS
Status: COMPLETED | OUTPATIENT
Start: 2020-09-16 | End: 2020-09-16

## 2020-09-16 RX ORDER — ASPIRIN 81 MG/1
81 TABLET ORAL 2 TIMES DAILY
Status: DISCONTINUED | OUTPATIENT
Start: 2020-09-16 | End: 2020-09-17 | Stop reason: HOSPADM

## 2020-09-16 RX ORDER — ROPIVACAINE HYDROCHLORIDE 5 MG/ML
INJECTION, SOLUTION EPIDURAL; INFILTRATION; PERINEURAL
Status: COMPLETED | OUTPATIENT
Start: 2020-09-16 | End: 2020-09-16

## 2020-09-16 RX ORDER — FAMOTIDINE 20 MG/1
20 TABLET, FILM COATED ORAL 2 TIMES DAILY
Status: DISCONTINUED | OUTPATIENT
Start: 2020-09-16 | End: 2020-09-17 | Stop reason: HOSPADM

## 2020-09-16 RX ORDER — MORPHINE SULFATE 2 MG/ML
2 INJECTION, SOLUTION INTRAMUSCULAR; INTRAVENOUS
Status: DISCONTINUED | OUTPATIENT
Start: 2020-09-16 | End: 2020-09-17 | Stop reason: HOSPADM

## 2020-09-16 RX ORDER — PROPOFOL 10 MG/ML
INJECTION, EMULSION INTRAVENOUS
Status: DISCONTINUED | OUTPATIENT
Start: 2020-09-16 | End: 2020-09-16

## 2020-09-16 RX ORDER — BISACODYL 10 MG
10 SUPPOSITORY, RECTAL RECTAL EVERY 12 HOURS PRN
Status: DISCONTINUED | OUTPATIENT
Start: 2020-09-16 | End: 2020-09-17 | Stop reason: HOSPADM

## 2020-09-16 RX ADMIN — ROPIVACAINE HYDROCHLORIDE 4 ML/HR: 2 INJECTION, SOLUTION EPIDURAL; INFILTRATION at 12:09

## 2020-09-16 RX ADMIN — FAMOTIDINE 20 MG: 10 INJECTION, SOLUTION INTRAVENOUS at 10:09

## 2020-09-16 RX ADMIN — ACETAMINOPHEN 1000 MG: 500 TABLET ORAL at 07:09

## 2020-09-16 RX ADMIN — DOCUSATE SODIUM 50MG AND SENNOSIDES 8.6MG 1 TABLET: 8.6; 5 TABLET, FILM COATED ORAL at 08:09

## 2020-09-16 RX ADMIN — SODIUM CHLORIDE, SODIUM GLUCONATE, SODIUM ACETATE, POTASSIUM CHLORIDE, MAGNESIUM CHLORIDE, SODIUM PHOSPHATE, DIBASIC, AND POTASSIUM PHOSPHATE: .53; .5; .37; .037; .03; .012; .00082 INJECTION, SOLUTION INTRAVENOUS at 11:09

## 2020-09-16 RX ADMIN — CEFAZOLIN 2 G: 330 INJECTION, POWDER, FOR SOLUTION INTRAMUSCULAR; INTRAVENOUS at 06:09

## 2020-09-16 RX ADMIN — OXYCODONE HYDROCHLORIDE 5 MG: 5 TABLET ORAL at 12:09

## 2020-09-16 RX ADMIN — LIDOCAINE HYDROCHLORIDE 40 MG: 20 INJECTION, SOLUTION INTRAVENOUS at 10:09

## 2020-09-16 RX ADMIN — CELECOXIB 400 MG: 200 CAPSULE ORAL at 07:09

## 2020-09-16 RX ADMIN — PREGABALIN 75 MG: 75 CAPSULE ORAL at 07:09

## 2020-09-16 RX ADMIN — VANCOMYCIN HYDROCHLORIDE 1000 MG: 1 INJECTION, POWDER, LYOPHILIZED, FOR SOLUTION INTRAVENOUS at 08:09

## 2020-09-16 RX ADMIN — SODIUM CHLORIDE: 0.9 INJECTION, SOLUTION INTRAVENOUS at 12:09

## 2020-09-16 RX ADMIN — Medication 10 MG: at 10:09

## 2020-09-16 RX ADMIN — ROPIVACAINE HYDROCHLORIDE 7.5 ML: 5 INJECTION, SOLUTION EPIDURAL; INFILTRATION; PERINEURAL at 09:09

## 2020-09-16 RX ADMIN — PREGABALIN 75 MG: 75 CAPSULE ORAL at 08:09

## 2020-09-16 RX ADMIN — SODIUM CHLORIDE 500 ML: 0.9 INJECTION, SOLUTION INTRAVENOUS at 11:09

## 2020-09-16 RX ADMIN — FAMOTIDINE 20 MG: 20 TABLET, FILM COATED ORAL at 08:09

## 2020-09-16 RX ADMIN — ACETAMINOPHEN 1000 MG: 500 TABLET ORAL at 08:09

## 2020-09-16 RX ADMIN — MUPIROCIN 1 G: 20 OINTMENT TOPICAL at 08:09

## 2020-09-16 RX ADMIN — SODIUM CHLORIDE: 0.9 INJECTION, SOLUTION INTRAVENOUS at 08:09

## 2020-09-16 RX ADMIN — MUPIROCIN 1 G: 20 OINTMENT TOPICAL at 07:09

## 2020-09-16 RX ADMIN — LOSARTAN POTASSIUM 100 MG: 25 TABLET, FILM COATED ORAL at 02:09

## 2020-09-16 RX ADMIN — MEPIVACAINE HYDROCHLORIDE 3 ML: 15 INJECTION, SOLUTION EPIDURAL; INFILTRATION at 10:09

## 2020-09-16 RX ADMIN — PROPOFOL 50 MCG/KG/MIN: 10 INJECTION, EMULSION INTRAVENOUS at 10:09

## 2020-09-16 RX ADMIN — CEFAZOLIN 2 G: 330 INJECTION, POWDER, FOR SOLUTION INTRAMUSCULAR; INTRAVENOUS at 10:09

## 2020-09-16 RX ADMIN — HALOPERIDOL LACTATE 0.5 MG: 5 INJECTION, SOLUTION INTRAMUSCULAR at 06:09

## 2020-09-16 RX ADMIN — POLYETHYLENE GLYCOL 3350 17 G: 17 POWDER, FOR SOLUTION ORAL at 02:09

## 2020-09-16 RX ADMIN — EPHEDRINE SULFATE 10 MG: 50 INJECTION INTRAVENOUS at 11:09

## 2020-09-16 RX ADMIN — SODIUM CHLORIDE: 0.9 INJECTION, SOLUTION INTRAVENOUS at 07:09

## 2020-09-16 RX ADMIN — TRANEXAMIC ACID 1000 MG: 100 INJECTION, SOLUTION INTRAVENOUS at 11:09

## 2020-09-16 RX ADMIN — ASPIRIN 81 MG: 81 TABLET, COATED ORAL at 08:09

## 2020-09-16 RX ADMIN — ACETAMINOPHEN 1000 MG: 500 TABLET ORAL at 02:09

## 2020-09-16 RX ADMIN — PROPOFOL 20 MG: 10 INJECTION, EMULSION INTRAVENOUS at 10:09

## 2020-09-16 RX ADMIN — FENTANYL CITRATE 50 MCG: 50 INJECTION INTRAMUSCULAR; INTRAVENOUS at 08:09

## 2020-09-16 RX ADMIN — TRANEXAMIC ACID 1000 MG: 100 INJECTION, SOLUTION INTRAVENOUS at 10:09

## 2020-09-16 RX ADMIN — MIDAZOLAM HYDROCHLORIDE 1 MG: 1 INJECTION, SOLUTION INTRAMUSCULAR; INTRAVENOUS at 08:09

## 2020-09-16 RX ADMIN — EPHEDRINE SULFATE 15 MG: 50 INJECTION INTRAVENOUS at 10:09

## 2020-09-16 RX ADMIN — DEXAMETHASONE SODIUM PHOSPHATE 8 MG: 4 INJECTION, SOLUTION INTRAMUSCULAR; INTRAVENOUS at 10:09

## 2020-09-16 NOTE — PLAN OF CARE
09/16/20 1557   COLLINS Message   Medicare Outpatient and Observation Notification regarding financial responsibility Given to patient/caregiver;Explained to patient/caregiver;Signed/date by patient/caregiver   Date COLLINS was signed 09/16/20   Time COLLINS was signed [id number]

## 2020-09-16 NOTE — PLAN OF CARE
Problem: Physical Therapy Goal  Goal: Physical Therapy Goal  Description: Goals to be met by: 2020     Patient will increase functional independence with mobility by performin. Supine to sit with supervision  2. Sit to stand transfer with Supervision  3. Gait  x 175 feet with Stand-by Assistance using Rolling Walker.   4. Ascend/Descend 6 inch curb step with Contact Guard Assistance using Rolling Walker.  5. Ascend/descend 4 steps with one handrail with min A  6. Lower extremity exercise program x20 reps per handout, with assistance as needed    Outcome: Ongoing, Progressing  PT evaluation was performed and plan of care initiated.    Katya Hall PT  2020

## 2020-09-16 NOTE — TRANSFER OF CARE
Anesthesia Transfer of Care Note    Patient: Krysta Kam    Procedure(s) Performed: Procedure(s) (LRB):  ARTHROPLASTY, KNEE: DEPUY-ATTUNE (Right)    Patient location: PACU    Anesthesia Type: general and spinal    Transport from OR: Transported from OR on room air with adequate spontaneous ventilation    Post pain: adequate analgesia    Post assessment: no apparent anesthetic complications    Post vital signs: stable    Level of consciousness: awake    Nausea/Vomiting: no nausea/vomiting    Complications: none    Transfer of care protocol was followed      Last vitals:   Visit Vitals  /60 (BP Location: Right arm, Patient Position: Lying)   Pulse 73   Temp 36.7 °C (98 °F) (Oral)   Resp 20   Wt 55.8 kg (123 lb)   SpO2 98%   Breastfeeding No   BMI 23.24 kg/m²

## 2020-09-16 NOTE — INTERVAL H&P NOTE
The patient has been examined and the H&P has been reviewed:    I concur with the findings and no changes have occurred since H+P was written    Surgery risks, benefits and alternative options discussed and understood by patient/family.    In accordance with [facility] notifications, [facility] policy (tier 2 orthopedic condition causing severe pain), and guidance from my /section head, I believe this to be a time sensitive procedure that needs to be performed because delay will cause pain, distress, worsening of the underlying disease process, and/or further negative outcomes for the patient.    I discussed COVID-19 with the patient, they are aware of our current policies and procedures, were given the option of delaying surgery, and they elect to proceed

## 2020-09-16 NOTE — PLAN OF CARE
Pt will d/c to home - DME in place. Pt's dgtr Priyanka will pickup and assist pt. Pt' therapy appt at [facility] and msg left to change location for pt convenience.    BRIGITTE EVANS [id number]     09/16/20 1558   Discharge Assessment   Assessment Type Discharge Planning Assessment   Confirmed/corrected address and phone number on facesheet? Yes   Assessment information obtained from? Patient;Caregiver   Expected Length of Stay (days) 1   Communicated expected length of stay with patient/caregiver yes   Prior to hospitilization cognitive status: Alert/Oriented   Prior to hospitalization functional status: Independent   Current cognitive status: Alert/Oriented   Current Functional Status: Assistive Equipment;Needs Assistance   Facility Arrived From: N/A   Lives With alone   Able to Return to Prior Arrangements yes   Is patient able to care for self after discharge? Unable to determine at this time (comments)   Who are your caregiver(s) and their phone number(s)? 2 dglloyd - Debbi [phone]; Priyanka [phone]   Patient's perception of discharge disposition home or selfcare   Readmission Within the Last 30 Days no previous admission in last 30 days   Patient currently being followed by outpatient case management? No   Patient currently receives any other outside agency services? No   Equipment Currently Used at Home bedside commode;walker, rolling  (DME just delivered to home)   Do you have any problems affording any of your prescribed medications? No   Is the patient taking medications as prescribed? yes   Does the patient have transportation home? Yes  (pt's dgtr Priyanka will pickup pt at d/c)   Transportation Anticipated family or friend will provide   Dialysis Name and Scheduled days N/A   Does the patient receive services at the [facility]? No   Discharge Plan A Home   Discharge Plan B Home;Home Health   DME Needed Upon Discharge  other (see comments)  (TBD)   Patient/Family in Agreement with Plan yes

## 2020-09-16 NOTE — ADDENDUM NOTE
Addendum  created 09/16/20 1422 by Delfin Betancourt MD    Actions taken from a BestPractice Advisory, New alternative orders accepted, Order list changed

## 2020-09-16 NOTE — PT/OT/SLP EVAL
"Occupational Therapy   Evaluation    Name: Krysta Kam  MRN: [id number]  Admitting Diagnosis:  Right knee pain Day of Surgery    Recommendations:     Discharge Recommendations: home  Discharge Equipment Recommendations:  none  Barriers to discharge:       Assessment:     Krysta Kam is a 74 y.o. female with a medical diagnosis of Right knee pain.  Patient is s/p right TKA. Patient completed bed mobility and toilet task at contact guard assistance. She would benefit from skilled OT needs s/p right TKA to increase independence with ADLs and ADL transfers.  Performance deficits affecting function: weakness, impaired self care skills, impaired functional mobilty, gait instability, impaired balance, decreased lower extremity function, decreased ROM, orthopedic precautions.      Rehab Prognosis: Good; patient would benefit from acute skilled OT services to address these deficits and reach maximum level of function.       Plan:     Patient to be seen daily to address the above listed problems via self-care/home management, therapeutic activities, therapeutic exercises  · Plan of Care Expires: 09/18/20  · Plan of Care Reviewed with: patient, daughter    Subjective     Chief Complaint: "I need to go to bathroom"  Patient/Family Comments/goals: "walk again"    Occupational Profile:  Living Environment: lives alone (family members staying with her after surgery) 2 level home with half bathroom on first floor, tub/shower combo on second floor, has bedside commode and rolling walker   Previous level of function: independent with ADLs   Roles and Routines: retired and enjoys walking   Equipment Used at Home:  walker, rolling, bedside commode  Assistance upon Discharge: family     Pain/Comfort:  · Pain Rating 1: 3/10  · Location - Side 1: Right  · Location - Orientation 1: generalized  · Location 1: knee  · Pain Addressed 1: Pre-medicate for activity, Reposition, Distraction    Patients cultural, spiritual, " Faith conflicts given the current situation: no    Objective:     Communicated with: RN prior to session.  Patient found supine with cryotherapy, FCD, perineural catheter, peripheral IV upon OT entry to room.    General Precautions: Standard, fall   Orthopedic Precautions:RLE weight bearing as tolerated   Braces: N/A     Occupational Performance:    Bed Mobility:    · Patient completed Scooting/Bridging with contact guard assistance  · Patient completed Supine to Sit with contact guard assistance    Functional Mobility/Transfers:  · Patient completed Sit <> Stand Transfer with contact guard assistance  with  rolling walker   · Patient completed Toilet Transfer Step Transfer technique with contact guard assistance with  rolling walker   · Patient completed chair transfer with step transfer technique with contact guard assistance with rolling walker   · Functional Mobility: patient ambulated to/from bathroom with use of rolling walker at contact guard assistance     Activities of Daily Living:  · Grooming: contact guard assistance standing at sink to wash hands  · Upper Body Dressing: minimum assistance sitting edge of bed to don gown for back, IV in hand  · Toileting: contact guard assistance completed on bedside commode placed over toilet    Cognitive/Visual Perceptual:  Cognitive/Psychosocial Skills:     -       Oriented to: Person, Place and Time   -       Follows Commands/attention:Follows multistep  commands    Physical Exam:  Upper Extremity Range of Motion:     -       Right Upper Extremity: WFL  -       Left Upper Extremity: WFL  Upper Extremity Strength:    -       Right Upper Extremity: WFL  -       Left Upper Extremity: WFL    AMPAC 6 Click ADL:  AMPAC Total Score: 19    Treatment & Education:  -Patient educated on hand placement for transfers   -Patient educated on rolling walker placement for ADLs  -Patient educated on polar ice use  -Patient educated on role OT and plan of care s/p surgery at  [facility], white board updated as needed   Education:    Patient left up in chair with all lines intact, call button in reach, RN notified and daughter present    GOALS:   Multidisciplinary Problems     Occupational Therapy Goals        Problem: Occupational Therapy Goal    Goal Priority Disciplines Outcome Interventions   Occupational Therapy Goal     OT, PT/OT Ongoing, Progressing    Description: Goals to be met by: 9-18-20     Patient will increase functional independence with ADLs by performing:    UE Dressing with Modified [place].  LE Dressing with Supervision.  Grooming while standing at sink with Modified [place].  Toileting from toilet with Modified [place] for hygiene and clothing management.   Toilet transfer to toilet with Modified [place].                     History:     Past Medical History:   Diagnosis Date    Anemia 6/2012    Anxiety     Cataract     Colon polyps 1/31/2017    Depression     Diabetes mellitus without complication 11/14/2018    Diabetes mellitus, type 2     Fibromyalgia     GERD (gastroesophageal reflux disease)     High cholesterol     Hypertension        Past Surgical History:   Procedure Laterality Date    COLONOSCOPY N/A 10/18/2016    Procedure: COLONOSCOPY;  Surgeon: Brittni Edmondson MD;  Location: [address]);  Service: Endoscopy;  Laterality: N/A;    COLONOSCOPY N/A 10/8/2019    Procedure: COLONOSCOPY;  Surgeon: Gold Ravi MD;  Location: [address]);  Service: Endoscopy;  Laterality: N/A;    ESOPHAGOGASTRODUODENOSCOPY N/A 10/8/2019    Procedure: EGD (ESOPHAGOGASTRODUODENOSCOPY);  Surgeon: Gold Rvai MD;  Location: [address]);  Service: Endoscopy;  Laterality: N/A;  Okay for any provider due to KARSTEN    HYSTERECTOMY         Time Tracking:     OT Date of Treatment: 09/16/20  OT Start Time: 1350  OT Stop Time: 1412  OT Total Time (min): 22 min    Billable Minutes:Evaluation 10  Self Care/Home  Management 12    Melinda Rodríguez, OT  9/16/2020

## 2020-09-16 NOTE — PLAN OF CARE
Problem: Occupational Therapy Goal  Goal: Occupational Therapy Goal  Description: Goals to be met by: 9-18-20     Patient will increase functional independence with ADLs by performing:    UE Dressing with Modified [place].  LE Dressing with Supervision.  Grooming while standing at sink with Modified [place].  Toileting from toilet with Modified [place] for hygiene and clothing management.   Toilet transfer to toilet with Modified [place].    Outcome: Ongoing, Progressing    OT evaluation with goals established. Patient completed bed mobility and toilet transfer at contact guard assistance.     Melinda Rodríguez, OT  9/16/2020

## 2020-09-16 NOTE — ANESTHESIA PROCEDURE NOTES
Spinal    Diagnosis: Right knee OA  Patient location during procedure: OR  Start time: 9/16/2020 9:56 AM  Timeout: 9/16/2020 9:54 AM  End time: 9/16/2020 10:02 AM    Staffing  Authorizing Provider: Soren Storey MD  Performing Provider: Soren Storey MD    Preanesthetic Checklist  Completed: patient identified, site marked, surgical consent, pre-op evaluation, timeout performed, IV checked, risks and benefits discussed and monitors and equipment checked  Spinal Block  Patient position: sitting  Prep: ChloraPrep  Patient monitoring: heart rate, continuous pulse ox and frequent blood pressure checks  Approach: midline  Location: L3-4  Injection technique: single shot  CSF Fluid: clear free-flowing CSF  Needle  Needle type: Eugene   Needle gauge: 25 G  Needle length: 3.5 in  Additional Documentation: negative aspiration for heme and no paresthesia on injection  Needle localization: anatomical landmarks  Assessment  Sensory level: T7   Dermatomal levels determined by pinch or prick  Ease of block: moderate  Patient's tolerance of the procedure: comfortable throughout block and no complaints  Additional Notes  No complications  Tight spaces

## 2020-09-16 NOTE — HPI
Krysta Kam is a 74 y.o. female who presents for evaluation of right total knee replacement     She complains of years of moderate to severe right knee pain activity related and relieved with rest who complains of global medial lateral anterior knee pain.     Primary care as prescribed Voltaren gel.  She has also tried gabapentin Tylenol and lidocaine patches.  She has had 3 steroid injections last was in July 2019 which provided about a month or 2 relief.  She has tried home exercise conditioning program for therapy and walking.  She has tried a compressive sleeve.  No assistive devices.  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  Her daughter is a  at [employer].   she thinks her daughter's can help her out after surgery.  She is retired she did dry cleaning at a [employer]

## 2020-09-16 NOTE — ASSESSMENT & PLAN NOTE
74 y.o. female POD1 s/p R TKA    Pain control: multimodal per surgical home  PT/OT: WBAT RLE  DVT PPx: ASA 81mg BID, FCDs at all times when not ambulating  Abx: postop Ancef  Ace wrap removed this am    Dispo: d/c home with HH PT after PT/OT

## 2020-09-16 NOTE — HOSPITAL COURSE
Patient presented for R TKA.  Tolerated it well and was discharged home POD1 after voiding, tolerating diet, ambulating, pain controlled.Discharge instructions, follow-up appointment, and med rec are below.

## 2020-09-16 NOTE — ANESTHESIA PREPROCEDURE EVALUATION
09/16/2020  Krysta Kam is a 74 y.o., female.      Anesthesia Evaluation    I have reviewed the Patient Summary Reports.    I have reviewed the Nursing Notes.       Review of Systems  Anesthesia Hx:  Denies Hx of Anesthetic complications    Social:  Former Smoker    Hematology/Oncology:         -- Anemia:   Cardiovascular:   Exercise tolerance: good Hypertension Denies CAD.     Denies Angina.        Pulmonary:   Denies COPD.  Denies Asthma.  Denies Shortness of breath.  Denies Sleep Apnea.    Renal/:   Denies Chronic Renal Disease.     Hepatic/GI:   GERD, well controlled Denies Liver Disease.    Musculoskeletal:   Arthritis  Fibromyalgia   Neurological:   Denies CVA. Denies Seizures.   Peripheral Neuropathy (occasional b/l hands)    Endocrine:   Diabetes ([place] controlled), well controlled, type 2 Denies Hypothyroidism.    Psych:   anxiety depression          Physical Exam  General:  Well nourished    Airway/Jaw/Neck:  Airway Findings: Mallampati: II TM Distance: Normal, at least 6 cm  Jaw/Neck Findings:  Neck ROM: Normal ROM       Chest/Lungs:  Chest/Lungs Clear    Heart/Vascular:  Heart Findings: Normal       Mental Status:  Mental Status Findings:  Cooperative, Alert and Oriented         Anesthesia Assessment: Preoperative EQUATION    Planned Procedure: Procedure(s) (LRB):  ARTHROPLASTY, KNEE: DEPUY-ATTUNE (Right)  Requested Anesthesia Type:Regional  Surgeon: Tha Wright III, MD  Service: Orthopedics  Known or anticipated Date of Surgery:9/16/2020    Surgeon notes: reviewed    Previous anesthesia records:EGD 10/8/19    Last PCP note: within [facility] , Dr Bragg 7/10/20    Other important co-morbidities: DM2, GERD, HLD, HTN and fibromyalgia, peripheral neuropathy, ischemic colitis, anemia, depression      Tests already available:  CMP, A1C (6.1), lipids 7/10/20; EKG 4/4/19 (will repeat per  written order guidelines)             Plan:    Testing:  CBC, EKG and PT/INR   Pre-anesthesia  visit       Visit focus: possible regional anesthesia and/or nerve block      Consultation:POC NP for anesthesia and clearance      Navigation: Tests to be scheduled.              Consults to be scheduled.             Results will be tracked by Preop Clinic.                       Anesthesia Plan  Type of Anesthesia, risks & benefits discussed:  Anesthesia Type:  CSE, spinal, general  Patient's Preference: Neuraxial vs GA  Intra-op Monitoring Plan: standard ASA monitors  Intra-op Monitoring Plan Comments:   Post Op Pain Control Plan: multimodal analgesia, IV/PO Opiods PRN and peripheral nerve block  Post Op Pain Control Plan Comments:   Induction:   IV  Beta Blocker:  Patient is not currently on a Beta-Blocker (No further documentation required).       Informed Consent: Patient understands risks and agrees with Anesthesia plan.  Questions answered. Anesthesia consent signed with patient.  ASA Score: 2     Day of Surgery Review of History & Physical:    H&P update referred to the surgeon.     Anesthesia Plan Notes: Discussed Risks/Benefits of anesthetic plan  PMH was reviewed and PE was performed  Will plan for neuraxial technique and convert to GA if needed        Ready For Surgery From Anesthesia Perspective.

## 2020-09-16 NOTE — PROGRESS NOTES
Acute Pain Service and Perioperative Surgical Home Progress Note    HPI  Krysta Kam is a 74 y.o., female,     Interval history      Surgery:  Procedure(s) (LRB):  ARTHROPLASTY, KNEE: DEPUY-ATTUNE (Right)    Post Op Day #: 1    Catheter type: Perineural Adductor Canal    Infusion type: Ropivacaine 0.2%  4 mL Q  hour    Problem List:    Active Hospital Problems    Diagnosis  POA    *Right knee pain [M25.561]  Yes      Resolved Hospital Problems   No resolved problems to display.       Subjective:       General appearance of alert, oriented, no complaints   Pain with rest: 1    Numbers   Pain with movement: 3    Numbers   Side Effects    1. Pruritis No    2. Nausea Yes    3. Motor Blockade No    4. Sedation No, 1=awake and alert    Schedule Medications:    acetaminophen  1,000 mg Oral Q6H    aspirin  81 mg Oral BID    atorvastatin  40 mg Oral Daily    celecoxib  200 mg Oral Daily    chlorthalidone  25 mg Oral Daily    famotidine  20 mg Oral BID    losartan  100 mg Oral Daily    mupirocin  1 g Nasal BID    polyethylene glycol  17 g Oral Daily    pregabalin  75 mg Oral QHS    senna-docusate 8.6-50 mg  1 tablet Oral BID        Continuous Infusions:   sodium chloride 0.9% 150 mL/hr at 09/17/20 0150    ropivacaine (PF) 2 mg/ml (0.2%) 4 mL/hr (09/16/20 1237)    ropivacaine          PRN Medications:  bisacodyL, famotidine, haloperidol lactate, methocarbamol (ROBAXIN) IVPB, midazolam, morphine, naloxone, oxyCODONE, oxyCODONE, promethazine (PHENERGAN) IVPB, ropivacaine       Antibiotics:  Antibiotics (From admission, onward)    Start     Stop Route Frequency Ordered    09/16/20 2100  mupirocin 2 % ointment 1 g      09/21 2059 Nasl 2 times daily 09/16/20 1340             Objective:     Catheter site clean, dry, intact          Vital Signs (Most Recent):  Temp: 97.6 °F (36.4 °C) (09/17/20 0001)  Pulse: 64 (09/17/20 0001)  Resp: 16 (09/17/20 0001)  BP: (!) 121/56 (09/17/20 0001)  SpO2: 97 % (09/17/20  0001) Vital Signs Range (Last 24H):  Temp:  [97.1 °F (36.2 °C)-98 °F (36.7 °C)]   Pulse:  [60-78]   Resp:  [11-26]   BP: (105-168)/(54-94)   SpO2:  [97 %-100 %]          I & O (Last 24H):    Intake/Output Summary (Last 24 hours) at 9/17/2020 0242  Last data filed at 9/16/2020 1835  Gross per 24 hour   Intake 2016.53 ml   Output 500 ml   Net 1516.53 ml       Physical Exam:    GA: Alert, comfortable, no acute distress.   Pulmonary: Clear to auscultation A/P/L. No wheezing, crackles, or rhonchi.  Cardiac: RRR S1 & S2 w/o rubs/murmurs/gallops.   Abdominal:Bowel sounds present. No tenderness to palpation or distension. No appreciable hepatosplenomegaly.   Skin: No jaundice, rashes, or visible lesions.         Laboratory:  CBC: No results for input(s): WBC, RBC, HGB, HCT, PLT, MCV, MCH, MCHC in the last 72 hours.    BMP: No results for input(s): NA, K, CO2, CL, BUN, CREATININE, GLU, MG, PHOS, CALCIUM in the last 72 hours.       Anticoagulant dose ASA at 81mg BID.    Assessment:         Pain control adequate    Plan:     1) Pain:    Adductor canal perineural catheter in place and infusing. Good level. Multimodal pain regimen with acetaminophen, Celebrex, Lyrica, and prn oxycodone given  Will continue to monitor. Plan to discharge with OnQ on POD #1.   2) HTN: Continue home regimen of chlorthalidone and losartan   3) DM: Diet controlled.    4) FEN/GI: Tolerating liquids, advance diet as tolerated. Denies flatus. Denies BM.   5) Dispo: Pt working well with PT/OT. Case management and SW for placement. Plan for discharge POD #1.        Evaluator Elvia Hannon PA-C

## 2020-09-16 NOTE — NURSING TRANSFER
Nursing Transfer Note      9/16/2020     Transfer To: [id number]    Transfer via bed    Transfer with FCD    Transported by RN/PCT     Medicines sent: MIVF , PNC @ 4    Chart send with patient: Yes    Notified: FAMILY VIA TEXT    Patient reassessed at: 12:53 PM 9/16/2020

## 2020-09-16 NOTE — ANESTHESIA PROCEDURE NOTES
Adductor Canal Catheter    Patient location during procedure: pre-op   Block not for primary anesthetic.  Reason for block: at surgeon's request and post-op pain management   Post-op Pain Location: Right knee Pain  Start time: 9/16/2020 8:55 AM  Timeout: 9/16/2020 8:50 AM   End time: 9/16/2020 9:05 AM    Staffing  Authorizing Provider: Soren Storey MD  Performing Provider: Soren Storey MD    Preanesthetic Checklist  Completed: patient identified, site marked, surgical consent, pre-op evaluation, timeout performed, IV checked, risks and benefits discussed and monitors and equipment checked  Peripheral Block  Patient position: supine  Prep: ChloraPrep and site prepped and draped  Patient monitoring: heart rate, cardiac monitor, continuous pulse ox, continuous capnometry and frequent blood pressure checks  Block type: adductor canal  Laterality: right  Injection technique: continuous  Needle  Needle type: Tuohy   Needle gauge: 17 G  Needle length: 3.5 in  Needle localization: anatomical landmarks and ultrasound guidance  Catheter type: spring wound  Catheter size: 19 G  Catheter at skin depth: 10 cm  Test dose: lidocaine 1.5% with Epi 1-to-200,000 and negative   -ultrasound image captured on disc.  Assessment  Injection assessment: negative aspiration, negative parasthesia and local visualized surrounding nerve  Paresthesia pain: none  Heart rate change: no  Slow fractionated injection: yes  Additional Notes  VSS.  [facility] RN monitoring vitals throughout procedure.  Patient tolerated procedure well.     Ropivacaine 0.25% with Epi 1:300K x 15 ml used for the block

## 2020-09-16 NOTE — OPERATIVE NOTE ADDENDUM
Certification of Assistant at Surgery       Surgery Date: 9/16/2020     Participating Surgeons:  Surgeon(s) and Role:     * Tha Wright III, MD - Primary    Procedures:  Procedure(s) (LRB):  ARTHROPLASTY, KNEE: DEPUY-ATTUNE (Right)    Assistant Surgeon's Certification of Necessity:  I understand that section 1842 (b) (6) (d) of the Social Security Act generally prohibits Medicare Part B reasonable charge payment for the services of assistants at surgery in teaching hospitals when qualified residents are available to furnish such services. I certify that the services for which payment is claimed were medically necessary, and that no qualified resident was available to perform the services. I further understand that these services are subject to post-payment review by the Medicare carrier.      Kaylyn Argueta PA-C    09/16/2020  12:35 PM

## 2020-09-16 NOTE — ANESTHESIA POSTPROCEDURE EVALUATION
Anesthesia Post Evaluation    Patient: Krysta Kam    Procedure(s) Performed: Procedure(s) (LRB):  ARTHROPLASTY, KNEE: DEPUY-ATTUNE (Right)    Final Anesthesia Type: spinal    Patient location during evaluation: PACU  Patient participation: Yes- Able to Participate  Level of consciousness: awake and alert  Post-procedure vital signs: reviewed and stable  Pain management: adequate  Airway patency: patent    PONV status at discharge: No PONV  Anesthetic complications: no      Cardiovascular status: blood pressure returned to baseline  Respiratory status: unassisted and spontaneous ventilation  Hydration status: euvolemic  Follow-up not needed.          Vitals Value Taken Time   /72 09/16/20 1335   Temp 36.5 °C (97.7 °F) 09/16/20 1335   Pulse 67 09/16/20 1335   Resp 18 09/16/20 1335   SpO2 98 % 09/16/20 1335         Event Time   Out of Recovery 13:10:00         Pain/Marito Score: Pain Rating Prior to Med Admin: 3 (9/16/2020  2:16 PM)  Marito Score: 9 (9/16/2020 12:30 PM)

## 2020-09-16 NOTE — PT/OT/SLP EVAL
Physical Therapy Evaluation    Patient Name:  Krysta Kam   MRN:  [id number]    Recommendations:     Discharge Recommendations:  home, outpatient PT   Discharge Equipment Recommendations: none   Barriers to discharge: None    Assessment:     Krysta Kam is a 74 y.o. female admitted with a medical diagnosis of Right knee pain.  She presents with the following impairments/functional limitations:  impaired self care skills, decreased lower extremity function, decreased ROM, edema, orthopedic precautions, impaired balance, gait instability, pain, impaired functional mobilty, weakness. Pt tolerated PT session fair. She experienced nausea and vomited after amb with SBA 25ft with RW WBAT RLE. She reported feeling better after vomiting and declined meds for nausea offered by RN. After resting in sitting she amb with  ft with RW. Afterwards she reported feeling a little dizzy which resolved reclined in bedside chair with FCDs and performing APs.  No nausea reported. Pt is okay to amb with RW and assist of 1 person in room and to bathroom overnight.    Rehab Prognosis: Good; patient would benefit from acute skilled PT services to address these deficits and reach maximum level of function.    Recent Surgery: Procedure(s) (LRB):  ARTHROPLASTY, KNEE: DEPUY-ATTUNE (Right) Day of Surgery    Plan:     During this hospitalization, patient to be seen daily to address the identified rehab impairments via gait training, therapeutic activities, therapeutic exercises and progress toward the following goals:    · Plan of Care Expires:  09/18/20    Subjective     Chief Complaint: Thinks nausea is from taking pills on an empty stomach and then eating sandwich for lunch  Patient/Family Comments/goals: Daughter came in town to help and has large family support system   Pain/Comfort:  Pain Rating 1: 3/10  Location - Side 1: Right  Location - Orientation 1: generalized  Location 1: knee  Pain Addressed 1:  Pre-medicate for activity, Reposition, Distraction, Cessation of Activity  Pain Rating Post-Intervention 1: 4/10    Patients cultural, spiritual, Adventism conflicts given the current situation: no    Living Environment:  Pt lives in a 2 story house with 2 railings to upstairs with only 1 handrail reachable at a time. 4 steps at entrance with B handrails but can only reach one at a timeHouse is set up to live downstairs during recovery. Half bath downstairs and 2 full baths upstairs with tub shower combos.    Prior to admission, patients level of function was amb IND at community level without AD.  Equipment used at home: bedside commode, walker, rolling.  DME owned (not currently used): none.  Upon discharge, patient will have assistance from family.    Objective:     Communicated with NSG, pt and pt's daughter prior to session.  Patient found up in chair with peripheral IV, cryotherapy, FCD  upon PT entry to room.    General Precautions: Standard, fall   Orthopedic Precautions:RLE weight bearing as tolerated   Braces: N/A     Exams:  · Cognitive Exam:  Patient is oriented to Person, Place, Time and Situation  · Gross Motor Coordination:  WFL  · Postural Exam:  Patient presented with the following abnormalities:    · -       No postural abnormalities identified  · Sensation:    · -       Intact  · RLE ROM: Deficits: knee flexion and ext due to pain and surgical dressings  · RLE Strength: WFL  · LLE ROM: WNL  · LLE Strength: WNL    Functional Mobility:  · Transfers:     · Sit to Stand: bedside chair with stand by assistance with rolling walker  · Gait:  She experienced nausea and vomited after amb with SBA 25ft with RW WBAT RLE. She reported feeling better after vomiting and declined meds for nausea offered by RN. After resting in sitting she amb with  ft with RW WBAT RLE    Therapeutic Activities and Exercises:   Patient and pt's daughter were educated in:  - PT role, Plan of Care and goals prior to  discharge  -safety with transfers including hand placement  -gait sequencing and RW management  -OOB activity to maximize recovery including ambulating with nursing staff assistance and RW while in the hospital  -polar ice use and management  -performing ankle pumps and quad sets  overnight  -verbal instruction in car transfer      AM-PAC 6 CLICK MOBILITY  Total Score:18     Patient left up in chair with all lines intact, call button in reach and RN and daughter present.    GOALS:   Multidisciplinary Problems     Physical Therapy Goals        Problem: Physical Therapy Goal    Goal Priority Disciplines Outcome Goal Variances Interventions   Physical Therapy Goal     PT, PT/OT Ongoing, Progressing     Description: Goals to be met by: 2020     Patient will increase functional independence with mobility by performin. Supine to sit with supervision  2. Sit to stand transfer with Supervision  3. Gait  x 175 feet with Stand-by Assistance using Rolling Walker.   4. Ascend/Descend 6 inch curb step with Contact Guard Assistance using Rolling Walker.  5. Ascend/descend 4 steps with 1 handrail with min A  6. Lower extremity exercise program x20 reps per handout, with assistance as needed                     History:     Past Medical History:   Diagnosis Date    Anemia 2012    Anxiety     Cataract     Colon polyps 2017    Depression     Diabetes mellitus without complication 2018    Diabetes mellitus, type 2     Fibromyalgia     GERD (gastroesophageal reflux disease)     High cholesterol     Hypertension        Past Surgical History:   Procedure Laterality Date    COLONOSCOPY N/A 10/18/2016    Procedure: COLONOSCOPY;  Surgeon: Brittni Edmondson MD;  Location: [address]);  Service: Endoscopy;  Laterality: N/A;    COLONOSCOPY N/A 10/8/2019    Procedure: COLONOSCOPY;  Surgeon: Gold Ravi MD;  Location: [address]);  Service: Endoscopy;  Laterality: N/A;     ESOPHAGOGASTRODUODENOSCOPY N/A 10/8/2019    Procedure: EGD (ESOPHAGOGASTRODUODENOSCOPY);  Surgeon: Gold Ravi MD;  Location: [facility] ([address] [place] [address]);  Service: Endoscopy;  Laterality: N/A;  Okay for any provider due to KARSTEN    HYSTERECTOMY         Time Tracking:     PT Received On: 09/16/20  PT Start Time: 1432     PT Stop Time: 1455  PT Total Time (min): 23 min     Billable Minutes: Evaluation 8 and Gait Training 15      Katya Hall, PT  09/16/2020

## 2020-09-17 VITALS
HEIGHT: 61 IN | DIASTOLIC BLOOD PRESSURE: 67 MMHG | TEMPERATURE: 98 F | HEART RATE: 69 BPM | BODY MASS INDEX: 23.22 KG/M2 | SYSTOLIC BLOOD PRESSURE: 151 MMHG | OXYGEN SATURATION: 100 % | WEIGHT: 123 LBS | RESPIRATION RATE: 18 BRPM

## 2020-09-17 LAB — POCT GLUCOSE: 161 MG/DL (ref 70–110)

## 2020-09-17 PROCEDURE — 97535 SELF CARE MNGMENT TRAINING: CPT

## 2020-09-17 PROCEDURE — 97116 GAIT TRAINING THERAPY: CPT | Mod: CQ

## 2020-09-17 PROCEDURE — 25000003 PHARM REV CODE 250: Performed by: NURSE PRACTITIONER

## 2020-09-17 PROCEDURE — 97110 THERAPEUTIC EXERCISES: CPT | Mod: CQ

## 2020-09-17 PROCEDURE — 63600175 PHARM REV CODE 636 W HCPCS: Performed by: NURSE PRACTITIONER

## 2020-09-17 PROCEDURE — 99900035 HC TECH TIME PER 15 MIN (STAT)

## 2020-09-17 PROCEDURE — 25000003 PHARM REV CODE 250: Performed by: PHYSICIAN ASSISTANT

## 2020-09-17 PROCEDURE — 97530 THERAPEUTIC ACTIVITIES: CPT | Mod: CQ

## 2020-09-17 PROCEDURE — 94761 N-INVAS EAR/PLS OXIMETRY MLT: CPT

## 2020-09-17 RX ADMIN — POLYETHYLENE GLYCOL 3350 17 G: 17 POWDER, FOR SOLUTION ORAL at 08:09

## 2020-09-17 RX ADMIN — ACETAMINOPHEN 1000 MG: 500 TABLET ORAL at 01:09

## 2020-09-17 RX ADMIN — DOCUSATE SODIUM 50MG AND SENNOSIDES 8.6MG 1 TABLET: 8.6; 5 TABLET, FILM COATED ORAL at 08:09

## 2020-09-17 RX ADMIN — LOSARTAN POTASSIUM 100 MG: 25 TABLET, FILM COATED ORAL at 08:09

## 2020-09-17 RX ADMIN — MUPIROCIN 1 G: 20 OINTMENT TOPICAL at 08:09

## 2020-09-17 RX ADMIN — CELECOXIB 200 MG: 200 CAPSULE ORAL at 08:09

## 2020-09-17 RX ADMIN — CEFAZOLIN 2 G: 330 INJECTION, POWDER, FOR SOLUTION INTRAMUSCULAR; INTRAVENOUS at 01:09

## 2020-09-17 RX ADMIN — CHLORTHALIDONE 25 MG: 25 TABLET ORAL at 08:09

## 2020-09-17 RX ADMIN — ATORVASTATIN CALCIUM 40 MG: 20 TABLET, FILM COATED ORAL at 08:09

## 2020-09-17 RX ADMIN — FAMOTIDINE 20 MG: 20 TABLET, FILM COATED ORAL at 08:09

## 2020-09-17 RX ADMIN — SODIUM CHLORIDE: 0.9 INJECTION, SOLUTION INTRAVENOUS at 01:09

## 2020-09-17 RX ADMIN — ACETAMINOPHEN 1000 MG: 500 TABLET ORAL at 08:09

## 2020-09-17 RX ADMIN — ASPIRIN 81 MG: 81 TABLET, COATED ORAL at 08:09

## 2020-09-17 NOTE — PLAN OF CARE
Patient on fall precautions. Yellow fall risk band and non skid socks on pt. Instructed pt to call for assistance as needed and pt voiced understanding. Complains of mild intermittent pain and is being medicated as needed.  Afebrile.

## 2020-09-17 NOTE — PROGRESS NOTES
Patient discharged to home. Discharge instructions given to pt and daughter who voiced understanding.  IV removed catheter intact. Complains of a dull pain to the right knee. Respirations even and unlabored. No distress noted. Patient stable. Left unit in wheelchair with nurses x 2 for discharge home with daughter.

## 2020-09-17 NOTE — PROGRESS NOTES
On-Q teaching done w/patient & patients     daughter       , Priyanka           .Verbalized understanding.    Priyanka            agrees to stay with pt for next       48   hrs while med infusing. All questions answered. On-Q contract signed, home care instxn pamphlet, home care supplies provided to patient upon discharge. Encouraged to contact anesthesia using # on back of brochure with any questions/concerns. Informed that pt/fmly will receive follow up calls on the next 2 days.

## 2020-09-17 NOTE — PLAN OF CARE
Problem: Physical Therapy Goal  Goal: Physical Therapy Goal  Description: Goals to be met by: 2020     Patient will increase functional independence with mobility by performin. Supine to sit with supervision Met  2. Sit to stand transfer with Supervision Met  3. Gait  x 175 feet with Stand-by Assistance using Rolling Walker. Met  4. Ascend/Descend 6 inch curb step with Contact Guard Assistance using Rolling Walker. Met  5. Lower extremity exercise program x20 reps per handout, with assistance as needed    Outcome: Ongoing, Progressing   Ignacio Hodges,PTA

## 2020-09-17 NOTE — PT/OT/SLP PROGRESS
Occupational Therapy   Treatment/Discharge    Name: Krysta Kam  MRN: [id number]  Admitting Diagnosis:  Right knee pain  1 Day Post-Op    Recommendations:     Discharge Recommendations: home  Discharge Equipment Recommendations:  none  Barriers to discharge:       Assessment:     Krysta Kam is a 74 y.o. female with a medical diagnosis of Right knee pain.  Patient is s/p right TKA. She completed lower body dressing at supervision. She is functioning near baseline level of function s/p right TKA for discharge home with assistance of family as needed. Performance deficits affecting function are weakness, impaired self care skills, impaired functional mobilty, gait instability, impaired balance, decreased lower extremity function, decreased ROM.     Rehab Prognosis:  Good; patient would benefit from acute skilled OT services to address these deficits and reach maximum level of function.       Plan:     · DC from OT    Subjective     Patient stated she had to go to bathroom   Pain/Comfort:  · Pain Rating 1: 1/10(soreness in back)  · Location - Side 1: Right  · Location - Orientation 1: posterior  · Location 1: knee  · Pain Addressed 1: Pre-medicate for activity, Reposition, Distraction    Objective:     Communicated with: RN prior to session.  Patient found sitting edge of bed with cryotherapy, FCD, perineural catheter upon OT entry to room.    General Precautions: Standard, fall   Orthopedic Precautions:RLE weight bearing as tolerated   Braces: N/A     Occupational Performance:     Functional Mobility/Transfers:  · Patient completed Sit <> Stand Transfer with modified independence  with  rolling walker   · Patient completed Toilet Transfer Step Transfer technique with modified independence with  rolling walker   · Patient completed chair transfer with step transfer technique with modified independence with rolling walker   · Functional Mobility: patient ambulated to/from bathroom with use of  rolling walker at supervision     Activities of Daily Living:  · Grooming: modified independence standing at sink to complete oral care, wash face, wash hands  · Upper Body Dressing: modified independence sitting in chair to don button up dress  · Lower Body Dressing: supervision sitting in chair to don underwear  · Toileting: modified independence completed on bedside commode placed over toilet      [facility] 6 Click ADL: 23    Treatment & Education:  -Patient educated to dress surgical side first and further dressing techniques s/p surgery   -Patient educated on hand placement for transfers   -Patient educated on rolling walker placement for ADLs  -Patient educated on proper foot wear s/p surgery   -Patient educated on polar ice use  -Patient educated on car transfers s/p surgery  -Patient educated on role OT and plan of care s/p surgery at [facility], white board updated as needed     Patient left up in chair with all lines intact, call button in reach and RN notifiedEducation:      GOALS:   Multidisciplinary Problems     Occupational Therapy Goals     Not on file          Multidisciplinary Problems (Resolved)        Problem: Occupational Therapy Goal    Goal Priority Disciplines Outcome Interventions   Occupational Therapy Goal   (Resolved)     OT, PT/OT Met    Description: Goals to be met by: 9-18-20     Patient will increase functional independence with ADLs by performing:    UE Dressing with Modified [place].  LE Dressing with Supervision.  Grooming while standing at sink with Modified [place].  Toileting from toilet with Modified [place] for hygiene and clothing management.   Toilet transfer to toilet with Modified [place].                     Time Tracking:     OT Date of Treatment: 09/17/20  OT Start Time: 0755  OT Stop Time: 0818  OT Total Time (min): 23 min    Billable Minutes:Self Care/Home Management 23    Melinda Rodríguez OT  9/17/2020

## 2020-09-17 NOTE — PROGRESS NOTES
Patient BG at 22:41 resulted as 218 mg/dL. Notified Elvia Hannon PA-C. Elvia ordered a one time 500 mL NS bolus as a means to lower patient's BG considering her recent issues with N/V and recent hemoglobin A1C. Will recheck patient's BG around 02:00 9/17.

## 2020-09-17 NOTE — PLAN OF CARE
[facility]    HOME HEALTH ORDERS  FACE TO FACE ENCOUNTER    Patient Name: Krysta Kam  YOB: 1945    PCP: Gold Palafox MD   PCP Address: [address] / [place] [address]  PCP Phone Number: [phone]  PCP Fax: [phone]    Encounter Date: 09/17/2020    Admit to Home Health    Diagnoses:  Active Hospital Problems    Diagnosis  POA    *Right knee pain [M25.561]  Yes      Resolved Hospital Problems   No resolved problems to display.       Future Appointments   Date Time Provider Department Center   10/1/2020 10:30 AM Kaylyn Argueta PA-C [facility]   1/11/2021 10:00 AM Gold Palafox Jr., MD [facility]     Follow-up Information     Call Gold Palafox MD.    Specialty: Internal Medicine  Why: As needed  Contact information:  [address]  [facility] [address]  [phone]             [facility].    Specialty: Physical Therapy  Contact information:  [address] [place] [id number]  [phone]  Additional information:  Hospital Building           Kaylyn Argueta PA-C On 10/1/2020.    Specialty: Orthopedic Surgery  Why: Post op Thursday 10/1 @ 10:30am  Contact information:  [address]  [facility] [address]  [phone]                     I have seen and examined this patient face to face today. My clinical findings that support the need for the home health skilled services and home bound status are the following:  Weakness/numbness causing balance and gait disturbance due to Joint Replacement making it taxing to leave home.    Allergies:  Review of patient's allergies indicates:   Allergen Reactions    Ondansetron      Other reaction(s): Flushing (Skin)    Savella [milnacipran] Nausea Only       Diet: regular diet    Activities: activity as tolerated and WBAT RLE with walker    Nursing:   SN to complete comprehensive assessment including routine vital signs.  Instruct on disease process and s/s of complications to report to MD. Follow specific home health arthoplasty protocol. Review/verify medication list sent home with the patient at time of discharge  and instruct patient/caregiver as needed. If coumadin ordered, coumadin clinic to manage INR with INR draws 2x per week with a goal to maintain INR between 1.8 and 2.2. Frequency may be adjusted depending on start of care date.    Notify MD if SBP > 160 or < 90; DBP > 90 or < 50; HR > 120 or < 50; Temp > 101    Home Medical Equipment:  Walker, 3-1 bedside commode, transfer tub bench    CONSULTS:    Physical Therapy to evaluate and treat. Evaluate for home safety and equipment needs; Establish/upgrade home exercise program. Perform / instruct on therapeutic exercises, gait training, transfer training, and Range of Motion.    OTHER:  Occupational Therapy to evaluate and treat. Evaluate home environment for safety and equipment needs. Perform/Instruct on transfers, ADL training, ROM, and therapeutic exercises.   to evaluate for community resources/long-range planning.  Aide to provide assistance with personal care, ADLs, and vital signs.      WOUND CARE ORDERS  Do not remove surgical dressing for 2 weeks post-op. This will be done only by MD at initial post-op visit.    Patient may shower at home. Dry area around surgical dressing well afterward. No submerging underwater (bathing, swimming, hot tub) for 1 month.     If dressing becomes saturated with drainage or there are signs of infection, please call [facility] [phone] for further instructions and to make appt to be seen.           Medications: Review discharge medications with patient and family and provide education.      Current Discharge Medication List      CONTINUE these medications which have NOT CHANGED    Details   ascorbic acid, vitamin C, (VITAMIN C) 500 MG tablet Take 500 mg by mouth once daily.      aspirin (ECOTRIN) 81 MG EC tablet Take  1 tablet (81 mg total) by mouth 2 (two) times daily.  Qty: 60 tablet, Refills: 0    Associated Diagnoses: Status post total right knee replacement      atorvastatin (LIPITOR) 40 MG tablet TAKE 1 TABLET EVERY DAY  Qty: 90 tablet, Refills: 3      azelastine (ASTELIN) 137 mcg (0.1 %) nasal spray 1 spray (137 mcg total) by Nasal route 2 (two) times daily.  Qty: 30 mL, Refills: 3    Associated Diagnoses: Acute non-recurrent maxillary sinusitis      chlorthalidone (HYGROTEN) 25 MG Tab TAKE 1 TABLET (25 MG TOTAL) BY MOUTH ONCE DAILY.  Qty: 90 tablet, Refills: 3      cinnamon bark (CINNAMON ORAL) Take by mouth.      diclofenac sodium (VOLTAREN) 1 % Gel APPLY 2 GRAM TOPICALLY AS DIRECTED ONCE DAILY  Qty: 100 g, Refills: 2      FLAXSEED OIL ORAL Take by mouth.      gabapentin (NEURONTIN) 300 MG capsule TAKE 1 CAPSULE TWICE DAILY  Qty: 180 capsule, Refills: 11      lidocaine (LIDODERM) 5 % Place 1 patch onto the skin once daily. Remove & Discard patch within 12 hours or as directed by MD  Qty: 30 patch, Refills: 2    Associated Diagnoses: Primary osteoarthritis of both knees      losartan (COZAAR) 100 MG tablet TAKE 1 TABLET BY MOUTH ONCE DAILY  Qty: 90 tablet, Refills: 3      multivit-min/iron/folic/lutein (CENTRUM SILVER WOMEN ORAL) Take 1 tablet by mouth once daily.      TURMERIC ORAL Take by mouth.      vitamin D (VITAMIN D3) 1000 units Tab Take 1,000 Units by mouth once daily.      acetaminophen (TYLENOL) 650 MG TbSR Take 1 tablet (650 mg total) by mouth every 8 (eight) hours as needed.  Qty: 120 tablet, Refills: 0    Associated Diagnoses: Status post total right knee replacement      amLODIPine (NORVASC) 10 MG tablet take 1 tablet by mouth once daily  Qty: 90 tablet, Refills: 2      baclofen (LIORESAL) 10 MG tablet Take 1 tablet (10 mg total) by mouth 3 (three) times daily as needed.  Qty: 90 tablet, Refills: 11      calcium carbonate-vit D3-min (CALTRATE 600+D PLUS MINERALS) 600 mg calcium- 400 unit Tab Take 1 tablet by  mouth once daily.  Qty: 60 tablet, Refills: 11      celecoxib (CELEBREX) 200 MG capsule Take 1 capsule (200 mg total) by mouth once daily.  Qty: 30 capsule, Refills: 0    Associated Diagnoses: Status post total right knee replacement      cycloSPORINE (RESTASIS) 0.05 % ophthalmic emulsion Place 1 drop into both eyes 2 (two) times daily.  Qty: 60 each, Refills: 12    Associated Diagnoses: Keratoconjunctivitis sicca, not specified as Sjogren's, bilateral      docusate sodium (COLACE) 100 MG capsule Take 1 capsule (100 mg total) by mouth 2 (two) times daily as needed for Constipation.  Qty: 60 capsule, Refills: 0    Associated Diagnoses: Status post total right knee replacement      escitalopram oxalate (LEXAPRO) 10 MG tablet TAKE 1 TABLET BY MOUTH ONCE DAILY  Qty: 90 tablet, Refills: 3      omeprazole (PRILOSEC) 40 MG capsule Take 1 capsule (40 mg total) by mouth once daily.  Qty: 30 capsule, Refills: 11      oxyCODONE (ROXICODONE) 5 MG immediate release tablet Take 1-2 tabs by mouth every 4-6 hours as needed for pain  Qty: 50 tablet, Refills: 0    Comments: Greater than 7 day supply is medically necessary. Deliver to [place] for surgery 9/16/2020.  Associated Diagnoses: Status post total right knee replacement             I certify that this patient is confined to her home and needs intermittent skilled nursing care, physical therapy and occupational therapy.

## 2020-09-17 NOTE — PT/OT/SLP PROGRESS
Physical Therapy Treatment    Patient Name:  Krysta Kam   MRN:  [id number]    Recommendations:     Discharge Recommendations:  home, outpatient PT   Discharge Equipment Recommendations: none   Barriers to discharge: None    Assessment:     Krysta Kam is a 74 y.o. female admitted with a medical diagnosis of Right knee pain.  She presents with the following impairments/functional limitations:  weakness, impaired self care skills, impaired functional mobilty, gait instability, decreased ROM, decreased lower extremity function, impaired balance, pain, edema, orthopedic precautions. Patient progressing well with therapy as evidence by 4/5 goals met.  Miss. Kam tolerated increase distance with gait training well.  She demonstrated good balance and R LE stability with OOB mobility.  Noted decrease strength and ROM with exercises.  Miss. Kam will require family assistance upon discharge from hospital.     Rehab Prognosis: Good; patient would benefit from acute skilled PT services to address these deficits and reach maximum level of function.    Recent Surgery: Procedure(s) (LRB):  ARTHROPLASTY, KNEE: DEPUY-ATTUNE (Right) 1 Day Post-Op    Plan:     During this hospitalization, patient to be seen daily to address the identified rehab impairments via gait training, therapeutic activities, therapeutic exercises and progress toward the following goals:    · Plan of Care Expires:  09/18/20    Subjective     Chief Complaint: minimal R knee pain  Patient/Family Comments/goals: My Daughter is going to help me.  Pain/Comfort:  · Pain Rating 1: 3/10  · Location - Side 1: Right  · Location 1: knee      Objective:     Communicated with NSG prior to session.  Patient found up in chair with cryotherapy, FCD, perineural catheter upon PT entry to room.     General Precautions: Standard, fall   Orthopedic Precautions:RLE weight bearing as tolerated   Braces:       Functional Mobility:  · Bed Mobility:      · Supine to Sit: modified independence  · Sit to Supine: modified independence  · Transfers:     · Sit to Stand:  supervision with rolling walker  · Gait: Amb 140 feet x2 trials with RW CGA/SBA. No LOB or SOB.  Patient demonstrated B heel strike, step to gait pattern and decrease toe off.  VC's for safety and walker management.  · Balance: Good  Stairs:   Ascend and descend 6 steps with R HR B UE support CGA. VC's for sequence and technique               Ascend and descend 6inch curb step with RW CGA. VC's for sequence and technique        AM-PAC 6 CLICK MOBILITY  Turning over in bed (including adjusting bedclothes, sheets and blankets)?: 4  Sitting down on and standing up from a chair with arms (e.g., wheelchair, bedside commode, etc.): 4  Moving from lying on back to sitting on the side of the bed?: 4  Moving to and from a bed to a chair (including a wheelchair)?: 4  Need to walk in hospital room?: 4  Climbing 3-5 steps with a railing?: 3  Basic Mobility Total Score: 23       Therapeutic Activities and Exercises:   Patient performed established TKR exercises x10-15 reps Ap,GS,QS, heel slides, SAQ, LAQ, SLR, and hip ABD/ADD  Patient given HEP  Patient and patient's daughter educated on care transfers  Patient and Patient's Daughter educated on transfers, gait, bed mobility, stair climbing, curb step, HEP and safety with OOB mobility  Patient stated she felt comfortable going home and had no concerns         Patient left up in chair with all lines intact and call button in reach..    GOALS:   Multidisciplinary Problems     Physical Therapy Goals        Problem: Physical Therapy Goal    Goal Priority Disciplines Outcome Goal Variances Interventions   Physical Therapy Goal     PT, PT/OT Ongoing, Progressing     Description: Goals to be met by: 2020     Patient will increase functional independence with mobility by performin. Supine to sit with supervision  2. Sit to stand transfer with Supervision  3.  Gait  x 175 feet with Stand-by Assistance using Rolling Walker.   4. Ascend/Descend 6 inch curb step with Contact Guard Assistance using Rolling Walker.  5. Lower extremity exercise program x20 reps per handout, with assistance as needed                     Time Tracking:     PT Received On: 09/17/20  PT Start Time: 0930     PT Stop Time: 1010  PT Total Time (min): 40 min     Billable Minutes: Gait Training 15, Therapeutic Activity 15 and Therapeutic Exercise 10    Treatment Type: Treatment  PT/PTA: PTA     PTA Visit Number: 1     Ignacio Hodges II, PTA  09/17/2020

## 2020-09-17 NOTE — PLAN OF CARE
Problem: Occupational Therapy Goal  Goal: Occupational Therapy Goal  Description: Goals to be met by: 9-18-20     Patient will increase functional independence with ADLs by performing:    UE Dressing with Modified [place].  LE Dressing with Supervision.  Grooming while standing at sink with Modified [place].  Toileting from toilet with Modified [place] for hygiene and clothing management.   Toilet transfer to toilet with Modified [place].    Outcome: Met    OT goals met for discharge.     Melinda Rodríguez, OT  9/17/2020

## 2020-09-17 NOTE — PLAN OF CARE
Patient on fall precautions.  Fall risk band and non skid socks on pt. Instructed pt to call for assistance as needed and pt voiced understanding. Complains of tolerable pain.  Patient having N/V and was medicated. Afebrile.

## 2020-09-17 NOTE — PROGRESS NOTES
Received pt to floor from PACU via bed accompanied by nurse.  AAOX 4. Complains of a 4/10 pain rating to the right knee.  Dressing to the right knee clean, dry and intact.  Polar Ice intact.  Respirations even and unlabored. No distress noted. Pt stable. Pt oriented to room and call bell placed within reach. Daughter at bedside.  Will continue to monitor.

## 2020-09-17 NOTE — PROGRESS NOTES
Patient AAOx4 and VSS throughout the night. Q2H rounding and white board updating maintained. Call bell within reach. Pain well controlled with scheduled medication. No PRN medication necessary. Patient ambulated to the bathroom with a walker x 1 assist to void. No falls or skin breakdown noted. FCDs on at all times when not ambulating. Polar ice to right knee continuously. Will continue plan of care.

## 2020-09-17 NOTE — ADDENDUM NOTE
Addendum  created 09/17/20 0628 by Mitzy Cantor MD    Charge Capture section accepted, Clinical Note Signed

## 2020-09-17 NOTE — SUBJECTIVE & OBJECTIVE
"Principal Problem:Right knee pain    Principal Orthopedic Problem: s/p R TKA    Interval History: Patient seen and examined at bedside.  No acute events overnight. VSS postop.  Pain controlled.  Walked 155+ feet with PT yesterday. Denies CP/SOB/N/V.      Review of patient's allergies indicates:   Allergen Reactions    Ondansetron      Other reaction(s): Flushing (Skin)    Savella [milnacipran] Nausea Only       Current Facility-Administered Medications   Medication    0.9%  NaCl infusion    acetaminophen tablet 1,000 mg    aspirin EC tablet 81 mg    atorvastatin tablet 40 mg    bisacodyL suppository 10 mg    celecoxib capsule 200 mg    chlorthalidone tablet 25 mg    famotidine tablet 20 mg    famotidine tablet 20 mg    haloperidol lactate injection 0.5 mg    losartan tablet 100 mg    methocarbamoL (ROBAXIN) 500 mg in dextrose 5 % 100 mL IVPB    midazolam (VERSED) 1 mg/mL injection 0.5 mg    morphine injection 2 mg    mupirocin 2 % ointment 1 g    naloxone 0.4 mg/mL injection 0.02 mg    oxyCODONE immediate release tablet 10 mg    oxyCODONE immediate release tablet 5 mg    polyethylene glycol packet 17 g    pregabalin capsule 75 mg    promethazine (PHENERGAN) 6.25 mg in dextrose 5 % 50 mL IVPB    ropivacaine (PF) 2 mg/ml (0.2%) infusion    ropivacaine 0.2% ON-Q C-BLOC 400 ML (SELECT A FLOW)    senna-docusate 8.6-50 mg per tablet 1 tablet     Objective:     Vital Signs (Most Recent):  Temp: 97.4 °F (36.3 °C) (09/17/20 0425)  Pulse: 61 (09/17/20 0425)  Resp: 16 (09/17/20 0425)  BP: (!) 123/58 (09/17/20 0425)  SpO2: 99 % (09/17/20 0425) Vital Signs (24h Range):  Temp:  [97.1 °F (36.2 °C)-98 °F (36.7 °C)] 97.4 °F (36.3 °C)  Pulse:  [60-78] 61  Resp:  [11-26] 16  SpO2:  [97 %-100 %] 99 %  BP: (105-168)/(54-94) 123/58     Weight: 55.8 kg (123 lb)  Height: 5' 1" (154.9 cm)  Body mass index is 23.24 kg/m².      Intake/Output Summary (Last 24 hours) at 9/17/2020 0644  Last data filed at 9/16/2020 " 1835  Gross per 24 hour   Intake 2016.53 ml   Output 500 ml   Net 1516.53 ml       Ortho/SPM Exam   AAOx4  NAD  Reg rate  No increased WOB  Dressing c/d/i, CURTIS hose in place  Polar ice in place  SILT T/SP/DP/Hall/Sa  Motor intact T/SP/DP  WWP extremities  FCDs in place and functioning      Significant Labs: None    Significant Imaging: I have reviewed and interpreted all pertinent imaging results/findings.

## 2020-09-17 NOTE — PROGRESS NOTES
Patient's BG after the 500 mL NS bolus resulted as 161 mg/dL at 01:47. Elvia stated that is result is OK and that no further intervention is necessary.

## 2020-09-17 NOTE — PROGRESS NOTES
[facility]  Orthopedics  Progress Note    Patient Name: Krysta Kam  MRN: [id number]  Admission Date: 9/16/2020  Hospital Length of Stay: 0 days  Attending Provider: Tha Wright III, MD  Primary Care Provider: Gold Palafox MD  Follow-up For: Procedure(s) (LRB):  ARTHROPLASTY, KNEE: DEPUY-ATTUNE (Right)    Post-Operative Day: 1 Day Post-Op  Subjective:     Principal Problem:Right knee pain    Principal Orthopedic Problem: s/p R TKA    Interval History: Patient seen and examined at bedside.  No acute events overnight. VSS postop.  Pain controlled.  Walked 155+ feet with PT yesterday. Denies CP/SOB/N/V.      Review of patient's allergies indicates:   Allergen Reactions    Ondansetron      Other reaction(s): Flushing (Skin)    Savella [milnacipran] Nausea Only       Current Facility-Administered Medications   Medication    0.9%  NaCl infusion    acetaminophen tablet 1,000 mg    aspirin EC tablet 81 mg    atorvastatin tablet 40 mg    bisacodyL suppository 10 mg    celecoxib capsule 200 mg    chlorthalidone tablet 25 mg    famotidine tablet 20 mg    famotidine tablet 20 mg    haloperidol lactate injection 0.5 mg    losartan tablet 100 mg    methocarbamoL (ROBAXIN) 500 mg in dextrose 5 % 100 mL IVPB    midazolam (VERSED) 1 mg/mL injection 0.5 mg    morphine injection 2 mg    mupirocin 2 % ointment 1 g    naloxone 0.4 mg/mL injection 0.02 mg    oxyCODONE immediate release tablet 10 mg    oxyCODONE immediate release tablet 5 mg    polyethylene glycol packet 17 g    pregabalin capsule 75 mg    promethazine (PHENERGAN) 6.25 mg in dextrose 5 % 50 mL IVPB    ropivacaine (PF) 2 mg/ml (0.2%) infusion    ropivacaine 0.2% ON-Q C-BLOC 400 ML (SELECT A FLOW)    senna-docusate 8.6-50 mg per tablet 1 tablet     Objective:     Vital Signs (Most Recent):  Temp: 97.4 °F (36.3 °C) (09/17/20 0425)  Pulse: 61 (09/17/20 0425)  Resp: 16 (09/17/20 0425)  BP: (!) 123/58 (09/17/20  "0425)  SpO2: 99 % (09/17/20 0425) Vital Signs (24h Range):  Temp:  [97.1 °F (36.2 °C)-98 °F (36.7 °C)] 97.4 °F (36.3 °C)  Pulse:  [60-78] 61  Resp:  [11-26] 16  SpO2:  [97 %-100 %] 99 %  BP: (105-168)/(54-94) 123/58     Weight: 55.8 kg (123 lb)  Height: 5' 1" (154.9 cm)  Body mass index is 23.24 kg/m².      Intake/Output Summary (Last 24 hours) at 9/17/2020 0644  Last data filed at 9/16/2020 1835  Gross per 24 hour   Intake 2016.53 ml   Output 500 ml   Net 1516.53 ml       Ortho/SPM Exam   AAOx4  NAD  Reg rate  No increased WOB  Dressing c/d/i, CURTIS hose in place  Polar ice in place  SILT T/SP/DP/Hall/Sa  Motor intact T/SP/DP  WWP extremities  FCDs in place and functioning      Significant Labs: None    Significant Imaging: I have reviewed and interpreted all pertinent imaging results/findings.    Assessment/Plan:     * Right knee pain  74 y.o. female POD1 s/p R TKA    Pain control: multimodal per surgical home  PT/OT: WBAT RLE  DVT PPx: ASA 81mg BID, FCDs at all times when not ambulating  Abx: postop Ancef  Ace wrap removed this am    Dispo: d/c home with  PT after PT/OT           Jaimie Castellano MD  Orthopedics  [facility]  "

## 2020-09-17 NOTE — PLAN OF CARE
Pt will discharge to home. Pt's DME in place. Pt's 1st outpt PT appt was changed to [place] [facility] location and is tmw @ 1:30pm.    Future Appointments   Date Time Provider Department Center   9/18/2020  1:30 PM Maye Licona, PT [facility] OP [facility] [place]   10/1/2020 10:30 AM Kaylyn Argueta PA-C [facility] [address]   1/11/2021 10:00 AM Gold Palafox Jr., MD [facility] IM [address] [facility]        09/17/20 0916   Final Note   Assessment Type Final Discharge Note   Anticipated Discharge Disposition Home   Hospital Follow Up  Appt(s) scheduled? Yes   Right Care Referral Info   Post Acute Recommendation No Care   Post-Acute Status   Post-Acute Authorization Other   Other Status No Post-Acute Service Needs   Discharge Delays None known at this time

## 2020-09-18 ENCOUNTER — CLINICAL SUPPORT (OUTPATIENT)
Dept: REHABILITATION | Facility: HOSPITAL | Age: 75
End: 2020-09-18
Payer: MEDICARE

## 2020-09-18 DIAGNOSIS — M17.11 PRIMARY OSTEOARTHRITIS OF RIGHT KNEE: ICD-10-CM

## 2020-09-18 DIAGNOSIS — M25.561 RIGHT KNEE PAIN, UNSPECIFIED CHRONICITY: Primary | ICD-10-CM

## 2020-09-18 PROCEDURE — 97110 THERAPEUTIC EXERCISES: CPT | Mod: HCNC

## 2020-09-18 PROCEDURE — 97161 PT EVAL LOW COMPLEX 20 MIN: CPT | Mod: HCNC

## 2020-09-18 NOTE — PROGRESS NOTES
Patient called at home.  Pain controlled with OnQ.  Patient denies signs of local anesthetic toxicity.  Dressing clean , dry, and intact.    Discussed discontinuation of perineural catheter with patient on Sunday.  Patient instructed to take something for pain before removing catheter.  All questions answered.

## 2020-09-18 NOTE — PROGRESS NOTES
Physical Therapy Initial Evaluation     Name: Krysta Pimentel [facility] Number: [id number]  Auth Exp: 9/21/20  Eval Date; 9/18/20  POC Exp: 12/18/20  Visit #: 1/20    Krysta is a 74 y.o. female evaluated on 09/18/2020.     Diagnosis:   Encounter Diagnoses   Name Primary?    Primary osteoarthritis of right knee     Right knee pain, unspecified chronicity Yes     Physician: Tha Wright III, *  Treatment Orders: PT Eval and Treat    Past Medical History:   Diagnosis Date    Anemia 6/2012    Anxiety     Cataract     Colon polyps 1/31/2017    Depression     Diabetes mellitus without complication 11/14/2018    Diabetes mellitus, type 2     Fibromyalgia     GERD (gastroesophageal reflux disease)     High cholesterol     Hypertension      Current Outpatient Medications   Medication Sig    acetaminophen (TYLENOL) 650 MG TbSR Take 1 tablet (650 mg total) by mouth every 8 (eight) hours as needed.    amLODIPine (NORVASC) 10 MG tablet take 1 tablet by mouth once daily    ascorbic acid, vitamin C, (VITAMIN C) 500 MG tablet Take 500 mg by mouth once daily.    aspirin (ECOTRIN) 81 MG EC tablet Take 1 tablet (81 mg total) by mouth 2 (two) times daily.    atorvastatin (LIPITOR) 40 MG tablet TAKE 1 TABLET EVERY DAY    azelastine (ASTELIN) 137 mcg (0.1 %) nasal spray 1 spray (137 mcg total) by Nasal route 2 (two) times daily. (Patient taking differently: 1 spray by Nasal route nightly as needed. )    baclofen (LIORESAL) 10 MG tablet Take 1 tablet (10 mg total) by mouth 3 (three) times daily as needed.    calcium carbonate-vit D3-min (CALTRATE 600+D PLUS MINERALS) 600 mg calcium- 400 unit Tab Take 1 tablet by mouth once daily. (Patient not taking: Reported on 8/19/2020)    celecoxib (CELEBREX) 200 MG capsule Take 1 capsule (200 mg total) by mouth once daily.    chlorthalidone (HYGROTEN) 25 MG Tab TAKE 1 TABLET (25 MG TOTAL) BY MOUTH ONCE DAILY.     cinnamon bark (CINNAMON ORAL) Take by mouth.    cycloSPORINE (RESTASIS) 0.05 % ophthalmic emulsion Place 1 drop into both eyes 2 (two) times daily.    diclofenac sodium (VOLTAREN) 1 % Gel APPLY 2 GRAM TOPICALLY AS DIRECTED ONCE DAILY    docusate sodium (COLACE) 100 MG capsule Take 1 capsule (100 mg total) by mouth 2 (two) times daily as needed for Constipation.    escitalopram oxalate (LEXAPRO) 10 MG tablet TAKE 1 TABLET BY MOUTH ONCE DAILY    FLAXSEED OIL ORAL Take by mouth.    gabapentin (NEURONTIN) 300 MG capsule TAKE 1 CAPSULE TWICE DAILY    lidocaine (LIDODERM) 5 % Place 1 patch onto the skin once daily. Remove & Discard patch within 12 hours or as directed by MD    losartan (COZAAR) 100 MG tablet TAKE 1 TABLET BY MOUTH ONCE DAILY    multivit-min/iron/folic/lutein (CENTRUM SILVER WOMEN ORAL) Take 1 tablet by mouth once daily.    omeprazole (PRILOSEC) 40 MG capsule Take 1 capsule (40 mg total) by mouth once daily.    oxyCODONE (ROXICODONE) 5 MG immediate release tablet Take 1-2 tabs by mouth every 4-6 hours as needed for pain    TURMERIC ORAL Take by mouth.    vitamin D (VITAMIN D3) 1000 units Tab Take 1,000 Units by mouth once daily.     No current facility-administered medications for this visit.      Facility-Administered Medications Ordered in Other Visits   Medication    midazolam (VERSED) 1 mg/mL injection 0.5 mg     Review of patient's allergies indicates:   Allergen Reactions    Ondansetron      Other reaction(s): Flushing (Skin)    Savella [milnacipran] Nausea Only     Precautions: Fall        Subjective     Patient States: she is doing pretty good after surgery. Pt states she is moving around ok, keep ice on knee intermittently   Onset: post op  Involved Area/Location: right  Current Symptoms: swelling, pain     Aggravating Factors: moving, standing, sit-stand, bending knee  Relieving Factors: ice    Pain Scale: Krysta rates pain on a scale of 0-10 to be 3 at worst; 0 currently; 0 at best  .    Patient Goals: return to PLOF      Objective     Observation: Pt arrived in WC    Posture: trunk flexion with standing    Range of Motion: Knee, Passive   Left Right   Flexion: WNL 80 deg   Extension WNL -5     Strength: Knee   Left Right   Quadriceps 4+/5 N/T   Hamstrings 4/5 N/T       Strength: Hip    Left Right   Iliopsoas N/T N/T   IR N/T N/T   ER N/T N/T   Ext  N/T N/T       Joint Mobility: N/T  Palpation: Tender R knee joint line, swelling noted      Sensation: intact to light touch    Edema:  Left: absent  Right: moderate    Girth   Left Right   10 cm above joint line 34 cm 42 cm   Joint Line 36 cm 43 cm   10 cm below joint line 33 cm 36.6 cm     Transfer:  SBA  Gait: Eddy ambulated with rolling walker.for 30 feet with fatigue.    Level of Assistance: CGA  Patient displays decreased weight shift.   Balance: fair    DTR's:   Left Right   Patella Tendon N/T N/T   Achilles Tendon N/T N/T       TREATMENT     Time In: 2:00  Time Out: 2:30  Time in Eval: 15 min    PT Evaluation Completed? Yes  Discussed Plan of Care with patient: Yes    Krysta received 15 minutes of therapeutic exercise including:   Quad sets with towel roll under knee 20X 2 sec  SAQ 2X10  Heel Slides 10X 10 sec  Seated gastroc stretch 3X 30 sec  Long sitting hip abd 2X10  Glut sets 2X10        Written Home Exercises Provided:   Krysta demonstrated good understanding of the education provided. Patient demonstrated good return demonstration of skill of exercises.    ASSESSMENT   Pt prognosis is Good.  Pt will benefit from skilled outpatient physical therapy to address the above stated deficits, provide pt/family education and to maximize pt's level of independence.     Medical necessity is demonstrated by the following IMPAIRMENTS/PROBLEMS:  1. Impaired R knee AROM  2. Impaired gait  3. Impaired R LE strength  4. Impaired ability to perform functional/daily acitivites      Pt's spiritual, cultural and educational needs considered and pt  agreeable to plan of care and goals as stated below:     Anticipated Barriers for physical therapy:     Short Term GOALS: 6 weeks. Pt agrees with goals set.  1. Patient demonstrates independence with HEP.   2. Pt to exhibit AROM R knee flex 115 deg, -1 deg ext  3. Pt to exhibit R LE gross strength of 4/5  4. Pt to ambulate independently without  AD for > 100 feet  5. Pt to report pain level <4/10 @ worst  6. Pt with fair-good balance in SLS    Long Term GOALS: 12  weeks. Pt agrees with goals set.  1. Pt to exhibit R knee AROM 120 deg flx, 0 deg ext  2. Pt to demonstrate R LE gross strength fo 4+/5  3. Pt to demonstrate improved gait quality and improved gait speed  4. Pt to return to daily/recreational activites with 0/10 pain  5. Pt to exhibit good balance in SLS          PLAN     Outpatient physical therapy 2-3 times weekly to include: pt ed, hep, therapeutic exercises, neuromuscular re-education/ balance exercises, joint mobilizations, aquatic therapy and modalities prn. Cont PT for  12 weeks. Pt may be seen by PTA as part of the rehabilitation team.     Therapist: Lynda Licona, PT, DPT    I certify the need for these services furnished under this plan of treatment and while under my care.  ____________________________________ Physician/Referring Practitioner   Date of Signature

## 2020-09-18 NOTE — PLAN OF CARE
Physical Therapy Initial Evaluation     Name: Krysta Pimentel [facility] Number: [id number]  Auth Exp: 9/21/20  Eval Date; 9/18/20  POC Exp: 12/18/20  Visit #: 1/20    Krysta is a 74 y.o. female evaluated on 09/18/2020.     Diagnosis:   Encounter Diagnoses   Name Primary?    Primary osteoarthritis of right knee     Right knee pain, unspecified chronicity Yes     Physician: Tha Wright III, *  Treatment Orders: PT Eval and Treat    Past Medical History:   Diagnosis Date    Anemia 6/2012    Anxiety     Cataract     Colon polyps 1/31/2017    Depression     Diabetes mellitus without complication 11/14/2018    Diabetes mellitus, type 2     Fibromyalgia     GERD (gastroesophageal reflux disease)     High cholesterol     Hypertension      Current Outpatient Medications   Medication Sig    acetaminophen (TYLENOL) 650 MG TbSR Take 1 tablet (650 mg total) by mouth every 8 (eight) hours as needed.    amLODIPine (NORVASC) 10 MG tablet take 1 tablet by mouth once daily    ascorbic acid, vitamin C, (VITAMIN C) 500 MG tablet Take 500 mg by mouth once daily.    aspirin (ECOTRIN) 81 MG EC tablet Take 1 tablet (81 mg total) by mouth 2 (two) times daily.    atorvastatin (LIPITOR) 40 MG tablet TAKE 1 TABLET EVERY DAY    azelastine (ASTELIN) 137 mcg (0.1 %) nasal spray 1 spray (137 mcg total) by Nasal route 2 (two) times daily. (Patient taking differently: 1 spray by Nasal route nightly as needed. )    baclofen (LIORESAL) 10 MG tablet Take 1 tablet (10 mg total) by mouth 3 (three) times daily as needed.    calcium carbonate-vit D3-min (CALTRATE 600+D PLUS MINERALS) 600 mg calcium- 400 unit Tab Take 1 tablet by mouth once daily. (Patient not taking: Reported on 8/19/2020)    celecoxib (CELEBREX) 200 MG capsule Take 1 capsule (200 mg total) by mouth once daily.    chlorthalidone (HYGROTEN) 25 MG Tab TAKE 1 TABLET (25 MG TOTAL) BY MOUTH ONCE DAILY.     cinnamon bark (CINNAMON ORAL) Take by mouth.    cycloSPORINE (RESTASIS) 0.05 % ophthalmic emulsion Place 1 drop into both eyes 2 (two) times daily.    diclofenac sodium (VOLTAREN) 1 % Gel APPLY 2 GRAM TOPICALLY AS DIRECTED ONCE DAILY    docusate sodium (COLACE) 100 MG capsule Take 1 capsule (100 mg total) by mouth 2 (two) times daily as needed for Constipation.    escitalopram oxalate (LEXAPRO) 10 MG tablet TAKE 1 TABLET BY MOUTH ONCE DAILY    FLAXSEED OIL ORAL Take by mouth.    gabapentin (NEURONTIN) 300 MG capsule TAKE 1 CAPSULE TWICE DAILY    lidocaine (LIDODERM) 5 % Place 1 patch onto the skin once daily. Remove & Discard patch within 12 hours or as directed by MD    losartan (COZAAR) 100 MG tablet TAKE 1 TABLET BY MOUTH ONCE DAILY    multivit-min/iron/folic/lutein (CENTRUM SILVER WOMEN ORAL) Take 1 tablet by mouth once daily.    omeprazole (PRILOSEC) 40 MG capsule Take 1 capsule (40 mg total) by mouth once daily.    oxyCODONE (ROXICODONE) 5 MG immediate release tablet Take 1-2 tabs by mouth every 4-6 hours as needed for pain    TURMERIC ORAL Take by mouth.    vitamin D (VITAMIN D3) 1000 units Tab Take 1,000 Units by mouth once daily.     No current facility-administered medications for this visit.      Facility-Administered Medications Ordered in Other Visits   Medication    midazolam (VERSED) 1 mg/mL injection 0.5 mg     Review of patient's allergies indicates:   Allergen Reactions    Ondansetron      Other reaction(s): Flushing (Skin)    Savella [milnacipran] Nausea Only     Precautions: Fall        Subjective     Patient States: she is doing pretty good after surgery. Pt states she is moving around ok, keep ice on knee intermittently   Onset: post op  Involved Area/Location: right  Current Symptoms: swelling, pain     Aggravating Factors: moving, standing, sit-stand, bending knee  Relieving Factors: ice    Pain Scale: Krysta rates pain on a scale of 0-10 to be 3 at worst; 0 currently; 0 at best  .    Patient Goals: return to PLOF      Objective     Observation: Pt arrived in WC    Posture: trunk flexion with standing    Range of Motion: Knee, Passive   Left Right   Flexion: WNL 80 deg   Extension WNL -5     Strength: Knee   Left Right   Quadriceps 4+/5 N/T   Hamstrings 4/5 N/T       Strength: Hip    Left Right   Iliopsoas N/T N/T   IR N/T N/T   ER N/T N/T   Ext  N/T N/T       Joint Mobility: N/T  Palpation: Tender R knee joint line, swelling noted      Sensation: intact to light touch    Edema:  Left: absent  Right: moderate    Girth   Left Right   10 cm above joint line 34 cm 42 cm   Joint Line 36 cm 43 cm   10 cm below joint line 33 cm 36.6 cm     Transfer:  SBA  Gait: Eddy ambulated with rolling walker.for 30 feet with fatigue.    Level of Assistance: CGA  Patient displays decreased weight shift.   Balance: fair    DTR's:   Left Right   Patella Tendon N/T N/T   Achilles Tendon N/T N/T       TREATMENT     Time In: 2:00  Time Out: 2:30  Time in Eval: 15 min    PT Evaluation Completed? Yes  Discussed Plan of Care with patient: Yes    Krysta received 15 minutes of therapeutic exercise including:   Quad sets with towel roll under knee 20X 2 sec  SAQ 2X10  Heel Slides 10X 10 sec  Seated gastroc stretch 3X 30 sec  Long sitting hip abd 2X10  Glut sets 2X10        Written Home Exercises Provided:   Krysta demonstrated good understanding of the education provided. Patient demonstrated good return demonstration of skill of exercises.    ASSESSMENT   Pt prognosis is Good.  Pt will benefit from skilled outpatient physical therapy to address the above stated deficits, provide pt/family education and to maximize pt's level of independence.     Medical necessity is demonstrated by the following IMPAIRMENTS/PROBLEMS:  1. Impaired R knee AROM  2. Impaired gait  3. Impaired R LE strength  4. Impaired ability to perform functional/daily acitivites      Pt's spiritual, cultural and educational needs considered and pt  agreeable to plan of care and goals as stated below:     Anticipated Barriers for physical therapy:     Short Term GOALS: 6 weeks. Pt agrees with goals set.  1. Patient demonstrates independence with HEP.   2. Pt to exhibit AROM R knee flex 115 deg, -1 deg ext  3. Pt to exhibit R LE gross strength of 4/5  4. Pt to ambulate independently without  AD for > 100 feet  5. Pt to report pain level <4/10 @ worst  6. Pt with fair-good balance in SLS    Long Term GOALS: 12  weeks. Pt agrees with goals set.  1. Pt to exhibit R knee AROM 120 deg flx, 0 deg ext  2. Pt to demonstrate R LE gross strength fo 4+/5  3. Pt to demonstrate improved gait quality and improved gait speed  4. Pt to return to daily/recreational activites with 0/10 pain  5. Pt to exhibit good balance in SLS          PLAN     Outpatient physical therapy 2-3 times weekly to include: pt ed, hep, therapeutic exercises, neuromuscular re-education/ balance exercises, joint mobilizations, aquatic therapy and modalities prn. Cont PT for  12 weeks. Pt may be seen by PTA as part of the rehabilitation team.     Therapist: Lynda Licona, PT, DPT    I certify the need for these services furnished under this plan of treatment and while under my care.  ____________________________________ Physician/Referring Practitioner   Date of Signature

## 2020-09-18 NOTE — DISCHARGE SUMMARY
[facility]  Orthopedics  Discharge Summary      Patient Name: Krysta Kam  MRN: [id number]  Admission Date: 9/16/2020  Hospital Length of Stay: 0 days  Discharge Date and Time: 9/17/2020 11:01 AM  Attending Physician: Jocelin att. providers found   Discharging Provider: Jaimie Castellano MD  Primary Care Provider: Gold Palafox MD    HPI:   Krysta Kam is a 74 y.o. female who presents for evaluation of right total knee replacement     She complains of years of moderate to severe right knee pain activity related and relieved with rest who complains of global medial lateral anterior knee pain.     Primary care as prescribed Voltaren gel.  She has also tried gabapentin Tylenol and lidocaine patches.  She has had 3 steroid injections last was in July 2019 which provided about a month or 2 relief.  She has tried home exercise conditioning program for therapy and walking.  She has tried a compressive sleeve.  No assistive devices.  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  Her daughter is a  at [employer].   she thinks her daughter's can help her out after surgery.  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  Tolerated it well and was discharged home POD1 after voiding, tolerating diet, ambulating, pain controlled.Discharge instructions, follow-up appointment, and med rec are below.          Significant Diagnostic Studies: No pertinent studies.    Pending Diagnostic Studies:     None        Final Active Diagnoses:    Diagnosis Date Noted POA    PRINCIPAL PROBLEM:  Right knee pain [M25.561] 09/16/2020 Yes      Problems Resolved During this Admission:      Discharged Condition: stable    Disposition: Home or Self  Care    Follow Up:  Follow-up Information     Call Gold Palafox MD.    Specialty: Internal Medicine  Why: As needed  Contact information:  [address]  [place] [address]  [phone]             Kaylyn Argueta PA-C On 10/1/2020.    Specialty: Orthopedic Surgery  Why: Post op Thursday 10/1 @ 10:30am  Contact information:  [address] [place] [address]  [phone]                 Patient Instructions:   No discharge procedures on file.  Medications:  Reconciled Home Medications:      Medication List      CHANGE how you take these medications    azelastine 137 mcg (0.1 %) nasal spray  Commonly known as: ASTELIN  1 spray (137 mcg total) by Nasal route 2 (two) times daily.  What changed:   · when to take this  · reasons to take this        CONTINUE taking these medications    amLODIPine 10 MG tablet  Commonly known as: NORVASC  take 1 tablet by mouth once daily     aspirin 81 MG EC tablet  Commonly known as: ECOTRIN  Take 1 tablet (81 mg total) by mouth 2 (two) times daily.     atorvastatin 40 MG tablet  Commonly known as: LIPITOR  TAKE 1 TABLET EVERY DAY     baclofen 10 MG tablet  Commonly known as: LIORESAL  Take 1 tablet (10 mg total) by mouth 3 (three) times daily as needed.     calcium carbonate-vit D3-min 600 mg calcium- 400 unit Tab  Commonly known as: CALTRATE 600-D PLUS MINERALS  Take 1 tablet by mouth once daily.     celecoxib 200 MG capsule  Commonly known as: CeleBREX  Take 1 capsule (200 mg total) by mouth once daily.     CENTRUM SILVER WOMEN ORAL  Take 1 tablet by mouth once daily.     chlorthalidone 25 MG Tab  Commonly known as: HYGROTEN  TAKE 1 TABLET (25 MG TOTAL) BY MOUTH ONCE DAILY.     CINNAMON ORAL  Take by mouth.     diclofenac sodium 1 % Gel  Commonly known as: VOLTAREN  APPLY 2 GRAM TOPICALLY AS DIRECTED ONCE DAILY     escitalopram oxalate 10 MG tablet  Commonly known as: LEXAPRO  TAKE 1 TABLET BY MOUTH ONCE DAILY     FLAXSEED OIL ORAL  Take by mouth.     gabapentin  300 MG capsule  Commonly known as: NEURONTIN  TAKE 1 CAPSULE TWICE DAILY     lidocaine 5 %  Commonly known as: LIDODERM  Place 1 patch onto the skin once daily. Remove & Discard patch within 12 hours or as directed by MD     losartan 100 MG tablet  Commonly known as: COZAAR  TAKE 1 TABLET BY MOUTH ONCE DAILY     MAPAP ARTHRITIS PAIN 650 MG Tbsr  Generic drug: acetaminophen  Take 1 tablet (650 mg total) by mouth every 8 (eight) hours as needed.     omeprazole 40 MG capsule  Commonly known as: PRILOSEC  Take 1 capsule (40 mg total) by mouth once daily.     oxyCODONE 5 MG immediate release tablet  Commonly known as: ROXICODONE  Take 1-2 tabs by mouth every 4-6 hours as needed for pain     RESTASIS 0.05 % ophthalmic emulsion  Generic drug: cycloSPORINE  Place 1 drop into both eyes 2 (two) times daily.     STOOL SOFTENER 100 MG capsule  Generic drug: docusate sodium  Take 1 capsule (100 mg total) by mouth 2 (two) times daily as needed for Constipation.     TURMERIC ORAL  Take by mouth.     VITAMIN C 500 MG tablet  Generic drug: ascorbic acid (vitamin C)  Take 500 mg by mouth once daily.     vitamin D 1000 units Tab  Commonly known as: VITAMIN D3  Take 1,000 Units by mouth once daily.            Jaimie Castellano MD  Orthopedics  [facility]

## 2020-09-19 NOTE — PROGRESS NOTES
Contacted Krysta Kam at home regarding her OnQ ball. Patient reports tolerable pain level, well controlled w/ supplemental PO meds. Patient states she will remove her PNC tonight as there is some medication left in the ball. I reinforced instructions regarding removal and asked the patient to please call our number if there are any issues with removal.  All questions answered, all concerns addressed during telephone conversation.      Juan Alberto Duncan MD  9/19/20  3:55 PM

## 2020-09-19 NOTE — ADDENDUM NOTE
Addendum  created 09/19/20 1556 by Juan Alberto Duncan MD    Clinical Note Signed, Intraprocedure Event edited

## 2020-09-21 ENCOUNTER — CLINICAL SUPPORT (OUTPATIENT)
Dept: REHABILITATION | Facility: HOSPITAL | Age: 75
End: 2020-09-21
Payer: MEDICARE

## 2020-09-21 DIAGNOSIS — M25.561 RIGHT KNEE PAIN, UNSPECIFIED CHRONICITY: Primary | ICD-10-CM

## 2020-09-21 DIAGNOSIS — Z96.651 STATUS POST TOTAL RIGHT KNEE REPLACEMENT: ICD-10-CM

## 2020-09-21 PROCEDURE — 97110 THERAPEUTIC EXERCISES: CPT | Mod: HCNC

## 2020-09-21 NOTE — PROGRESS NOTES
Physical Therapy Daily Note     Name: Krysta Pimentel Eddy  Clinic Number: [id number]  Diagnosis:   Encounter Diagnoses   Name Primary?    Right knee pain, unspecified chronicity Yes    Status post total right knee replacement 9/16/2020      Physician: Tha Wright III, *  Precautions: Fall/Standard  Visit #: 2/20  Auth Exp: 9/21/20  Eval Date; 9/18/20  POC Exp: 12/18/20  Visits Date: 9/21/20  PTA Visit #: 0  Time In: 8:00 am  Time Out: 8:45 am    Subjective     Pt reports: she is doing ok today, had difficulty completing exercises at home due to lack of space. Bed is on 2nd floor.   Pain Scale: Krysta rates pain on a scale of 0-10 to be 4 currently.    Objective     Krysta received individual therapeutic exercises to develop strength, endurance, ROM and flexibility for 45  minutes including:  Quad sets with towel roll under knee 20X 2 sec  SAQ 2X10  Heel Slides 10X 10 sec  Seated gastroc stretch 3X 30 sec  Long sitting hip abd 2X10  Long sitting hip add iso with ball 2X10 2 sec  Glut sets 2X10  Bike 5 min, fwrd/bkwrd   PT passive flex/ext stretch 2/2 min       Ice 10 min at end of session.     Education provided re:  Krysta verbalized good understanding of education provided.   No spiritual or educational barriers to learning provided    Assessment     Patient tolerated tx well. Pt needed some assistance with SAQ and hip abd to begin movement, but was able to complete exercises independently after first few sets.  Pt was able to ambulate 15 ft x 2 with standard walker with CGA.    . Pt will continue to benefit from skilled outpatient physical therapy to address the deficits listed in the problem list, provide pt/family education and to maximize pt's level of independence in the home and community environment.     Goals as follows:     Short Term GOALS: 6 weeks. Pt agrees with goals set.  1. Patient demonstrates independence with HEP.   2. Pt to exhibit AROM R  knee flex 115 deg, -1 deg ext  3. Pt to exhibit R LE gross strength of 4/5  4. Pt to ambulate independently without  AD for > 100 feet  5. Pt to report pain level <4/10 @ worst  6. Pt with fair-good balance in SLS     Long Term GOALS: 12  weeks. Pt agrees with goals set.  1. Pt to exhibit R knee AROM 120 deg flx, 0 deg ext  2. Pt to demonstrate R LE gross strength fo 4+/5  3. Pt to demonstrate improved gait quality and improved gait speed  4. Pt to return to daily/recreational activites with 0/10 pain  5. Pt to exhibit good balance in SLS        Plan     Continue with established Plan of Care towards PT goals.    Therapist: Lynda Licona, PT, DPT  9/21/2020

## 2020-09-23 ENCOUNTER — CLINICAL SUPPORT (OUTPATIENT)
Dept: REHABILITATION | Facility: HOSPITAL | Age: 75
End: 2020-09-23
Payer: MEDICARE

## 2020-09-23 DIAGNOSIS — M25.561 RIGHT KNEE PAIN, UNSPECIFIED CHRONICITY: Primary | ICD-10-CM

## 2020-09-23 PROCEDURE — 97110 THERAPEUTIC EXERCISES: CPT | Mod: HCNC,CQ

## 2020-09-23 PROCEDURE — 97140 MANUAL THERAPY 1/> REGIONS: CPT | Mod: HCNC,CQ

## 2020-09-23 NOTE — PROGRESS NOTES
Physical Therapy Daily Note     Name: Krysta Pimentel [facility] Number: [id number]  Diagnosis:   Encounter Diagnosis   Name Primary?    Right knee pain, unspecified chronicity Yes     Physician: Tha Wright III, *  Precautions: Fall/Standard  Visit #: 3/20  Auth Exp: 9/21/20  Eval Date; 9/18/20  POC Exp: 12/18/20  Visits Date: 9/21/20    Time In: 10:02 am  Time Out: 11:00am    Subjective     Pt reports: Knee is stiff.   Pain Scale: Krysta rates pain on a scale of 0-10 to be 4 currently.    Objective   Knee ext: -5  Knee FL: 85    Krysta received individual therapeutic exercises to develop strength, endurance, ROM and flexibility for 50 minutes including:  Heel prop knee ext stretch x 5 min  Quad series  Standing TKE  STS to chair w/ airex 3x8  Bike x 5 min  Gait training for proper mm activation    MT: x8 min  Patella mobs  AP femoral mobs       Education provided re:  Krysta verbalized good understanding of education provided.   No spiritual or educational barriers to learning provided    Assessment     Patient started session off with 12 degrees of hypoext. States she is sleeping w/ pillow under knee. ED on not placing any object under knee that we need to get full ext as soon as possible. Pt was unable to herlidna MT 2* low herlinda. Poor quad activation. Slightly better w/ activation of contralateral quad.      . Pt will continue to benefit from skilled outpatient physical therapy to address the deficits listed in the problem list, provide pt/family education and to maximize pt's level of independence in the home and community environment.     Goals as follows:     Short Term GOALS: 6 weeks. Pt agrees with goals set.  1. Patient demonstrates independence with HEP.   2. Pt to exhibit AROM R knee flex 115 deg, -1 deg ext  3. Pt to exhibit R LE gross strength of 4/5  4. Pt to ambulate independently without  AD for > 100 feet  5. Pt to report pain level <4/10 @  worst  6. Pt with fair-good balance in SLS     Long Term GOALS: 12  weeks. Pt agrees with goals set.  1. Pt to exhibit R knee AROM 120 deg flx, 0 deg ext  2. Pt to demonstrate R LE gross strength fo 4+/5  3. Pt to demonstrate improved gait quality and improved gait speed  4. Pt to return to daily/recreational activites with 0/10 pain  5. Pt to exhibit good balance in SLS        Plan     Continue with established Plan of Care towards PT goals.    Therapist: Chelsea Garg PTA  9/23/2020

## 2020-09-28 ENCOUNTER — CLINICAL SUPPORT (OUTPATIENT)
Dept: REHABILITATION | Facility: HOSPITAL | Age: 75
End: 2020-09-28
Payer: MEDICARE

## 2020-09-28 DIAGNOSIS — M25.561 RIGHT KNEE PAIN, UNSPECIFIED CHRONICITY: Primary | ICD-10-CM

## 2020-09-28 PROCEDURE — 97140 MANUAL THERAPY 1/> REGIONS: CPT | Mod: HCNC,CQ

## 2020-09-28 PROCEDURE — 97110 THERAPEUTIC EXERCISES: CPT | Mod: HCNC,CQ

## 2020-09-28 NOTE — PROGRESS NOTES
"                                                      Physical Therapy Daily Note     Name: Krysta Kam  Clinic Number: [id number]  Diagnosis:   No diagnosis found.  Physician: Tha Wright III, *  Precautions: Fall/Standard  Visit #: 4/20  Auth Exp: 9/21/20  Eval Date; 9/18/20  POC Exp: 12/18/20  Visits Date: 9/21/20    Time In: 1:35 pm  Time Out: 2:40 pm  Billed time: 60    Subjective     Pt reports: Knee is feeling better. Needle sensation around quad and medial knee.   Pain Scale: Krysta rates pain on a scale of 0-10 to be 4 currently.    Objective   Knee ext: -5  Knee FL: 85    Krysta received individual therapeutic exercises to develop strength, endurance, ROM and flexibility for 47 minutes including:  Bike "rocking" x 10 min  Heel prop knee ext stretch x 5 min #4 above/below  Quad series  Heel slides  Standing TKE  STS to chair w/ airex 3x8-NP  Heel slides 20x5" hold    Neuromuscular re-ed x 5 min  Tuvaluan w/ QS    MT: x 8 min  Patella mobs  AP femoral mobs       Education provided re:  Krysta verbalized good understanding of education provided.   No spiritual or educational barriers to learning provided    Assessment     Pt started session off w/ -5 hypo ext which has improved from previous session. Cont swelling noted at knee. Pt has difficulty w/ QS, uses HS and glut more. Able to perform strong quad contraction after NMES. Pt given updated HEP to work on quad strength and knee ROM.      . Pt will continue to benefit from skilled outpatient physical therapy to address the deficits listed in the problem list, provide pt/family education and to maximize pt's level of independence in the home and community environment.     Goals as follows:     Short Term GOALS: 6 weeks. Pt agrees with goals set.  1. Patient demonstrates independence with HEP.   2. Pt to exhibit AROM R knee flex 115 deg, -1 deg ext  3. Pt to exhibit R LE gross strength of 4/5  4. Pt to ambulate independently without  AD for > 100 " feet  5. Pt to report pain level <4/10 @ worst  6. Pt with fair-good balance in SLS     Long Term GOALS: 12  weeks. Pt agrees with goals set.  1. Pt to exhibit R knee AROM 120 deg flx, 0 deg ext  2. Pt to demonstrate R LE gross strength fo 4+/5  3. Pt to demonstrate improved gait quality and improved gait speed  4. Pt to return to daily/recreational activites with 0/10 pain  5. Pt to exhibit good balance in SLS        Plan     Continue with established Plan of Care towards PT goals.    Therapist: Chelsea Garg PTA  9/28/2020

## 2020-09-29 ENCOUNTER — PATIENT MESSAGE (OUTPATIENT)
Dept: OTHER | Facility: OTHER | Age: 75
End: 2020-09-29

## 2020-09-30 ENCOUNTER — CLINICAL SUPPORT (OUTPATIENT)
Dept: REHABILITATION | Facility: HOSPITAL | Age: 75
End: 2020-09-30
Payer: MEDICARE

## 2020-09-30 DIAGNOSIS — Z96.651 STATUS POST TOTAL RIGHT KNEE REPLACEMENT: Primary | ICD-10-CM

## 2020-09-30 DIAGNOSIS — M25.561 RIGHT KNEE PAIN, UNSPECIFIED CHRONICITY: Primary | ICD-10-CM

## 2020-09-30 PROCEDURE — 97110 THERAPEUTIC EXERCISES: CPT | Mod: HCNC

## 2020-09-30 PROCEDURE — 97124 MASSAGE THERAPY: CPT | Mod: HCNC

## 2020-09-30 NOTE — PROGRESS NOTES
"                                                  Physical Therapy Daily Note      Name: Krysta Kam  Clinic Number: [id number]  Diagnosis:   No diagnosis found.  Physician: Tha Wright III, *  Precautions: Fall/Standard  Visit #: 5/20  Auth Exp: 9/21/20  Eval Date; 9/18/20  POC Exp: 12/18/20  Visits Date: 9/30/20     Time In: 1:45 pm  Time Out: 2:30 pm  Billed time: 45     Subjective      Pt reports: no recent change in knee, reports she is consistent with HEP  Pain Scale: Krysta rates pain on a scale of 0-10 to be 4 currently.     Objective   Knee ext: -3  Knee FL: 85     Krysta received individual therapeutic exercises to develop strength, endurance, ROM and flexibility for 30 minutes including:  Bike "rocking" x 10 min  Heel prop knee ext stretch x 5 min #4 above/below  Quad series  Standing TKE  STS to chair w/ airex 3x8-NP  Heel slides 20x5" hold  SLR 3 X 5     Neuromuscular re-ed x 5 min  Russian w/ QS     MT: x 10 min  Patella mobs  AP femoral mobs        Education provided re:  Krysta verbalized good understanding of education provided.   No spiritual or educational barriers to learning provided     Assessment      Pt with marginal improvement of PROM today to 85 deg flexion.  Pt continues to demonstrate impaired quad contraction, but is gradually improving.      . Pt will continue to benefit from skilled outpatient physical therapy to address the deficits listed in the problem list, provide pt/family education and to maximize pt's level of independence in the home and community environment.      Goals as follows:     Short Term GOALS: 6 weeks. Pt agrees with goals set.  1. Patient demonstrates independence with HEP.   2. Pt to exhibit AROM R knee flex 115 deg, -1 deg ext (In Progress)     3. Pt to exhibit R LE gross strength of 4/5 (In Progress)  4. Pt to ambulate independently without  AD for > 100 feet (In Progress)  5. Pt to report pain level <4/10 @ worst (In Progress)  6. Pt with fair-good " balance in SLS (In Progress)     Long Term GOALS: 12  weeks. Pt agrees with goals set.  1. Pt to exhibit R knee AROM 120 deg flx, 0 deg ext (In Progress)  2. Pt to demonstrate R LE gross strength fo 4+/5 (In Progress)  3. Pt to demonstrate improved gait quality and improved gait speed (In Progress)  4. Pt to return to daily/recreational activites with 0/10 pain (In Progress)  5. Pt to exhibit good balance in SLS (In Progress)        Plan      Continue with established Plan of Care towards PT goals.     Therapist: Lynda Licona, PT, DPT  9/30/2020

## 2020-10-01 ENCOUNTER — PATIENT MESSAGE (OUTPATIENT)
Dept: ADMINISTRATIVE | Facility: OTHER | Age: 75
End: 2020-10-01

## 2020-10-01 ENCOUNTER — OFFICE VISIT (OUTPATIENT)
Dept: ORTHOPEDICS | Facility: CLINIC | Age: 75
End: 2020-10-01
Payer: MEDICARE

## 2020-10-01 VITALS — HEIGHT: 61 IN | WEIGHT: 119 LBS | BODY MASS INDEX: 22.47 KG/M2

## 2020-10-01 DIAGNOSIS — Z96.651 STATUS POST TOTAL RIGHT KNEE REPLACEMENT: Primary | ICD-10-CM

## 2020-10-01 PROCEDURE — 99999 PR PBB SHADOW E&M-EST. PATIENT-LVL V: ICD-10-PCS | Mod: PBBFAC,HCNC,, | Performed by: PHYSICIAN ASSISTANT

## 2020-10-01 PROCEDURE — 99024 POSTOP FOLLOW-UP VISIT: CPT | Mod: HCNC,S$GLB,, | Performed by: PHYSICIAN ASSISTANT

## 2020-10-01 PROCEDURE — 99024 PR POST-OP FOLLOW-UP VISIT: ICD-10-PCS | Mod: HCNC,S$GLB,, | Performed by: PHYSICIAN ASSISTANT

## 2020-10-01 PROCEDURE — 99999 PR PBB SHADOW E&M-EST. PATIENT-LVL V: CPT | Mod: PBBFAC,HCNC,, | Performed by: PHYSICIAN ASSISTANT

## 2020-10-01 RX ORDER — TRAMADOL HYDROCHLORIDE 50 MG/1
50 TABLET ORAL EVERY 8 HOURS PRN
Qty: 21 TABLET | Refills: 0 | Status: SHIPPED | OUTPATIENT
Start: 2020-10-01 | End: 2020-10-08

## 2020-10-01 NOTE — PROGRESS NOTES
"Krysta Dina Kam presents for initial post-operative visit following a right total knee arthroplasty performed by Dr. Wright on 9/16/2020. Patient is doing well. In PT at [facility]. Not using pain medication because it is too strong. Taking aspirin twice daily.    Exam:   Height 5' 1" (1.549 m), weight 54 kg (119 lb).   Ambulating well with walker  Incision is clean and dry without drainage or erythema.   ROM:0-90    Initial post-operative radiographs reviewed today revealing a well fixed and aligned prosthesis.    A/P:  2 weeks s/p right total knee replacement  - The patient was advised to keep the incision clean and dry for the next 24 hours after which she may wash the area with antibacterial soap in the shower. Will not submerge incision until one month post op.  - Outpatient PT: Ongoing at [facility]  - Continue ASA for 1 month from surgery.  - Pain medication refilled: tramadol  - Reviewed antibiotic prophylaxis   - Follow up in 4 weeks with surgeon. Pt will call clinic with problems/concerns.     "

## 2020-10-02 ENCOUNTER — CLINICAL SUPPORT (OUTPATIENT)
Dept: REHABILITATION | Facility: HOSPITAL | Age: 75
End: 2020-10-02
Payer: MEDICARE

## 2020-10-02 PROCEDURE — 97110 THERAPEUTIC EXERCISES: CPT | Mod: HCNC,CQ

## 2020-10-02 PROCEDURE — 97140 MANUAL THERAPY 1/> REGIONS: CPT | Mod: HCNC,CQ

## 2020-10-02 NOTE — PROGRESS NOTES
"                                                  Physical Therapy Daily Note      Name: Krysta Kam  Clinic Number: [id number]  Diagnosis:   No diagnosis found.  Physician: Tha Wright III, *  Precautions: Fall/Standard  Visit #: 5/20  Auth Exp: 9/21/20  Eval Date; 9/18/20  POC Exp: 12/18/20  Visits Date: 9/30/20     Time In: 1310  Time Out: 1355  Billed time: 45     Subjective      Pt reports: not really hurting its just sore. Stating 13 steps in house to room, "I want to get up to my room"   Pain Scale: 3/10  Somewhat compliant with HEP but stated Icing daily        Objective   Knee ext: -3  Knee FL: 85     Krysta received individual therapeutic exercises to develop strength, endurance, ROM and flexibility for 30 minutes including:  Recumbent bike  "rocking" x 10 min increased ROM, circulation and mm strengthening/endurance  Heel prop knee ext stretch x 5 min #4 above/below  QS into hyperext  3x10 w/BPC   QS heel prop 3x10  SAQ 3x10/3"  SLR w/strap 2x10    Stair trg - up/down 8 step with handrail     Not today  Standing TKE  STS to chair w/ airex 3x8-NP  Heel slides 20x5" hold  SLR 3 X 5     Neuromuscular re-ed x 0 min  Mongolian w/ QS     Manual therapy to R knee for 15' including patellar mobs, overpressure ext, quad stretching    Education provided re:  Krysta verbalized good understanding of education provided.   No spiritual or educational barriers to learning provided     Assessment   Pt tolerating tx fair today. Increased pain and mm guarding during manual therapy. Pt with poor quad strength. Lacking TKE but achieved 0' at end of therapy. VC/TC for correcting form/technique.     . Pt will continue to benefit from skilled outpatient physical therapy to address the deficits listed in the problem list, provide pt/family education and to maximize pt's level of independence in the home and community environment.      Goals as follows:     Short Term GOALS: 6 weeks. Pt agrees with goals set.  1. Patient " demonstrates independence with HEP.   2. Pt to exhibit AROM R knee flex 115 deg, -1 deg ext (In Progress)     3. Pt to exhibit R LE gross strength of 4/5 (In Progress)  4. Pt to ambulate independently without  AD for > 100 feet (In Progress)  5. Pt to report pain level <4/10 @ worst (In Progress)  6. Pt with fair-good balance in SLS (In Progress)     Long Term GOALS: 12  weeks. Pt agrees with goals set.  1. Pt to exhibit R knee AROM 120 deg flx, 0 deg ext (In Progress)  2. Pt to demonstrate R LE gross strength fo 4+/5 (In Progress)  3. Pt to demonstrate improved gait quality and improved gait speed (In Progress)  4. Pt to return to daily/recreational activites with 0/10 pain (In Progress)  5. Pt to exhibit good balance in SLS (In Progress)        Plan      Continue with established Plan of Care towards PT goals.     Rudy Cabrera PTA, STS

## 2020-10-05 ENCOUNTER — CLINICAL SUPPORT (OUTPATIENT)
Dept: REHABILITATION | Facility: HOSPITAL | Age: 75
End: 2020-10-05
Attending: INTERNAL MEDICINE
Payer: MEDICARE

## 2020-10-05 DIAGNOSIS — M25.561 RIGHT KNEE PAIN, UNSPECIFIED CHRONICITY: ICD-10-CM

## 2020-10-05 DIAGNOSIS — Z96.651 STATUS POST TOTAL RIGHT KNEE REPLACEMENT: Primary | ICD-10-CM

## 2020-10-05 PROCEDURE — 97110 THERAPEUTIC EXERCISES: CPT | Mod: HCNC

## 2020-10-05 NOTE — PROGRESS NOTES
"                                                  Physical Therapy Daily Note      Name: Krysta Kam  Clinic Number: [id number]  Diagnosis:   S/P R TKA   Physician: Tha Wright III, *  Precautions: Fall/Standard  Visit #: 6/20  Auth Exp: 9/21/20  Eval Date; 9/18/20  POC Exp: 12/18/20  Visits Date: 9/30/20     Time In: 1100  Time Out: 1145  Billed time: 45     Subjective      Pt reports: She has been doing the stairs much better at home. Pt has been tiptoeing on her foot at home and afraid of "damaging" her knee  Pain Scale: 0/10  Somewhat compliant with HEP but stated Icing daily        Objective   Knee ext: -3 AROM ( -1 PROM)  Knee FL: 90 AROM (100 AROM after heel slides, 105 PROM)     Krysta received individual therapeutic exercises to develop strength, endurance, ROM and flexibility for 45 minutes including:  Recumbent bike  "rocking" x 10 min increased ROM, circulation and mm strengthening/endurance  Heel prop knee ext stretch x 5 min #4 above/below  QS into hyperext  3x10 w/BPC   QS heel prop 3x10, 5" hold  SLR 2x10  Heel slides 20x5" hold (100 deg R knee flexion after)    Stair trg - up/down 10x with handrail     Pt received cold pack on R knee for 10 min.     Not today  Standing TKE  STS to chair w/ airex 3x8-NP  SAQ 3x10/3"  SLR 3 X 5     Neuromuscular re-ed x 0 min  Mexican w/ QS     Manual therapy to R knee for 0' including patellar mobs, overpressure ext, quad stretching    Education provided re:  Krysta verbalized good understanding of education provided.   No spiritual or educational barriers to learning provided     Assessment   Pt was able to tolerate all exercises during today's session without any c/o increased pain. Pt felt mild soreness on the medial side of her knee but reports she always feels that. After performing heel slides, pt was able to achieve 100 deg R Knee Flex Actively, and 105 deg Passively. With min-mod verbal and tactile cuing and increased time, pt was able to perform " SLR without strap today. Pt also progressed to step ups/downs 10x using BUE support with handrail. PT educated pt on confidence to trust herself and her knee, that she does not need to tiptoe around. Pt verbalized understanding.     . Pt will continue to benefit from skilled outpatient physical therapy to address the deficits listed in the problem list, provide pt/family education and to maximize pt's level of independence in the home and community environment.      Goals as follows:     Short Term GOALS: 6 weeks. Pt agrees with goals set.  1. Patient demonstrates independence with HEP.   2. Pt to exhibit AROM R knee flex 115 deg, -1 deg ext (In Progress)     3. Pt to exhibit R LE gross strength of 4/5 (In Progress)  4. Pt to ambulate independently without  AD for > 100 feet (In Progress)  5. Pt to report pain level <4/10 @ worst (In Progress)  6. Pt with fair-good balance in SLS (In Progress)     Long Term GOALS: 12  weeks. Pt agrees with goals set.  1. Pt to exhibit R knee AROM 120 deg flx, 0 deg ext (In Progress)  2. Pt to demonstrate R LE gross strength fo 4+/5 (In Progress)  3. Pt to demonstrate improved gait quality and improved gait speed (In Progress)  4. Pt to return to daily/recreational activites with 0/10 pain (In Progress)  5. Pt to exhibit good balance in SLS (In Progress)        Plan      Continue with established Plan of Care towards PT goals.     Devaughn Oliver, PT, DPT

## 2020-10-07 ENCOUNTER — CLINICAL SUPPORT (OUTPATIENT)
Dept: REHABILITATION | Facility: HOSPITAL | Age: 75
End: 2020-10-07
Payer: MEDICARE

## 2020-10-07 DIAGNOSIS — M25.561 RIGHT KNEE PAIN, UNSPECIFIED CHRONICITY: Primary | ICD-10-CM

## 2020-10-07 PROCEDURE — 97110 THERAPEUTIC EXERCISES: CPT | Mod: HCNC

## 2020-10-07 NOTE — PROGRESS NOTES
"                                                  Physical Therapy Daily Note      Name: Krysta Kam  Clinic Number: [id number]  Diagnosis:   S/P R TKA   Physician: Tha Wright III, *  Precautions: Fall/Standard  Visit #: 7/20  Auth Exp: 9/21/20  Eval Date; 9/18/20  POC Exp: 12/18/20  Visits Date: 10/07/20     Time In: 1300  Time Out: 1345  Billed time: 45     Subjective      Pt reports: She has been doing the stairs much better at home. Pt has been tiptoeing on her foot at home and afraid of "damaging" her knee  Pain Scale: 0/10  Somewhat compliant with HEP but stated Icing daily         Objective   Knee ext: -3 AROM ( -1 PROM)  Knee FL: 90 AROM (100 AROM after heel slides, 105 PROM)     Krysta received individual therapeutic exercises to develop strength, endurance, ROM and flexibility for 45 minutes including:  Recumbent bike  "rocking" x 10 min increased ROM, circulation and mm strengthening/endurance  Heel prop knee ext stretch x 5 min #4 above/below  QS into hyperext  3x10 w/BPC   QS heel prop 3x10, 5" hold  SLR 2x10  SAQ 3x10/3"  Heel slides 20x5" hold (100 deg R knee flexion after)  Step up 4 inch, 2X10  Heel raises 2X10     Pt received cold pack on R knee for 10 min.      Not today  Standing TKE  STS to chair w/ airex 3x8-NP  Stair trg - up/down 10x with handrail          Neuromuscular re-ed x 0 min  [place]orran w/ QS     Manual therapy to R knee for 0' including patellar mobs, overpressure ext, quad stretching     Education provided re:  Krysta verbalized good understanding of education provided.   No spiritual or educational barriers to learning provided     Assessment   Pt tolerated tx well today, was able to perform quad sets with improved quad contraction.  Pt exhibits improved gait quality with walker today with increased speed.  Pt needed verbal cueing to extend knee to end range during 4 inch step ups.     . Pt will continue to benefit from skilled outpatient physical therapy to address the " deficits listed in the problem list, provide pt/family education and to maximize pt's level of independence in the home and community environment.      Goals as follows:     Short Term GOALS: 6 weeks. Pt agrees with goals set.  1. Patient demonstrates independence with HEP.   2. Pt to exhibit AROM R knee flex 115 deg, -1 deg ext (In Progress)     3. Pt to exhibit R LE gross strength of 4/5 (In Progress)  4. Pt to ambulate independently without  AD for > 100 feet (In Progress)  5. Pt to report pain level <4/10 @ worst (In Progress)  6. Pt with fair-good balance in SLS (In Progress)     Long Term GOALS: 12  weeks. Pt agrees with goals set.  1. Pt to exhibit R knee AROM 120 deg flx, 0 deg ext (In Progress)  2. Pt to demonstrate R LE gross strength fo 4+/5 (In Progress)  3. Pt to demonstrate improved gait quality and improved gait speed (In Progress)  4. Pt to return to daily/recreational activites with 0/10 pain (In Progress)  5. Pt to exhibit good balance in SLS (In Progress)        Plan      Continue with established Plan of Care towards PT goals.     Lynda Licona, PT, DPT

## 2020-10-09 ENCOUNTER — CLINICAL SUPPORT (OUTPATIENT)
Dept: REHABILITATION | Facility: HOSPITAL | Age: 75
End: 2020-10-09
Payer: MEDICARE

## 2020-10-09 PROCEDURE — 97110 THERAPEUTIC EXERCISES: CPT | Mod: HCNC,CQ

## 2020-10-09 PROCEDURE — 97140 MANUAL THERAPY 1/> REGIONS: CPT | Mod: HCNC,CQ

## 2020-10-09 NOTE — PROGRESS NOTES
"                                                  Physical Therapy Daily Note      Name: Krysta Kam  Clinic Number: [id number]  Diagnosis:   S/P R TKA   Physician: Tha Wright III, *  Precautions: Fall/Standard  Visit #: 7/20  Auth Exp: 9/21/20  Eval Date; 9/18/20  POC Exp: 12/18/20  Visits Date: 10/07/20     Time In: 1310  Time Out: 1355  Billed time: 45     Subjective      Pt reports: doing better. No pain just soreness.   Pain Scale: 0/10  Somewhat compliant with HEP but stated Icing daily           Objective   10/9/2020  Knee ext: -3 AROM ( -1 PROM)  Knee FL: 110AROM (120 PROM)     Krysta received individual therapeutic exercises to develop strength, endurance, ROM and flexibility for 25 minutes including:    Recumbent bike  "rocking" > full revolution bwd 10 min increased ROM, circulation and mm strengthening/endurance  Heel slides with AA strap 3x10  QS heel prop 3x10, 5" hold  QS into hyperext AA w/strap 3x10/3"   SLR R 3x10/3"    Not today   SAQ 3x10/3"  Step up 4 inch, 2X10  Heel raises 2X10    Manual therapy to R knee for 10' including patellar mobs, overpressure ext, quad stretching.    Pt received cold pack on R knee for 10 min for decreased swelling, pain, and circulation    Education provided re:  Krysta verbalized good understanding of education provided.   No spiritual or educational barriers to learning provided     Assessment   Pt tolerated tx well today, demonstrating improved ROM with full bwd revolution on recumbent bike today and with measurements. Improved gait pattern and balance ambulating into therapy w/o AD. VC/TC for correcting form/technique with therex and during gait.    . Pt will continue to benefit from skilled outpatient physical therapy to address the deficits listed in the problem list, provide pt/family education and to maximize pt's level of independence in the home and community environment.      Goals as follows:     Short Term GOALS: 6 weeks. Pt agrees with goals " set.  1. Patient demonstrates independence with HEP.   2. Pt to exhibit AROM R knee flex 115 deg, -1 deg ext (In Progress)     3. Pt to exhibit R LE gross strength of 4/5 (In Progress)  4. Pt to ambulate independently without  AD for > 100 feet (In Progress)  5. Pt to report pain level <4/10 @ worst (In Progress)  6. Pt with fair-good balance in SLS (In Progress)     Long Term GOALS: 12  weeks. Pt agrees with goals set.  1. Pt to exhibit R knee AROM 120 deg flx, 0 deg ext (In Progress)  2. Pt to demonstrate R LE gross strength fo 4+/5 (In Progress)  3. Pt to demonstrate improved gait quality and improved gait speed (In Progress)  4. Pt to return to daily/recreational activites with 0/10 pain (In Progress)  5. Pt to exhibit good balance in SLS (In Progress)        Plan      Continue with established Plan of Care towards PT goals.     Rudy Cabrera PTA, STS

## 2020-10-12 ENCOUNTER — CLINICAL SUPPORT (OUTPATIENT)
Dept: REHABILITATION | Facility: HOSPITAL | Age: 75
End: 2020-10-12
Payer: MEDICARE

## 2020-10-12 PROCEDURE — 97140 MANUAL THERAPY 1/> REGIONS: CPT | Mod: HCNC,CQ

## 2020-10-12 PROCEDURE — 97110 THERAPEUTIC EXERCISES: CPT | Mod: HCNC,CQ

## 2020-10-12 NOTE — PROGRESS NOTES
"                                                  Physical Therapy Daily Note      Name: Krysta Kam  Clinic Number: [id number]  Diagnosis:   S/P R TKA   Physician: Tha Wright III, *  Precautions: Fall/Standard  Visit #: 7/20  Auth Exp: 9/21/20  Eval Date; 9/18/20  POC Exp: 12/18/20  Visits Date: 10/07/20     Time In: 1230  Time Out: 1315  Billed time: 45     Subjective      Pt reports: doing better. No pain today. Stating sore yesterday  Pain Scale: 0/10  Somewhat compliant with HEP but stated Icing daily     Objective   No TTP, mild swelling, limp noted in gait     Krysta received individual therapeutic exercises to develop strength, endurance, ROM and flexibility for 25 minutes including:    Recumbent bike full revolution bwd > fwd 10 min increased ROM, circulation and mm strengthening/endurance  Heel prop 1/2 bolster into ext 5# above/below knee 5'  QS heel prop 2x10, 5" hold  QS into hyperext AA w/strap 2x10/3"   Lateral step up 6" 3x10    Not today   SLR R 3x10/3"  Heel slides with AA strap 3x10    Manual therapy to R knee for 10' including patellar mobs, overpressure ext, quad stretching.    Pt received cold pack on R knee for 10 min for decreased swelling, pain, and circulation    Education provided re:  Krysta verbalized good understanding of education provided.   No spiritual or educational barriers to learning provided     Assessment   Pt tolerated tx well today, demonstrating improved ROM with full revolution bwd then fwd on recumbent bike today. Improved gait pattern with VC and demonstration correcting pattern. VC/TC for correcting form/technique with therex.  Pt will continue to benefit from skilled outpatient physical therapy to address the deficits listed in the problem list, provide pt/family education and to maximize pt's level of independence in the home and community environment.      Goals as follows:     Short Term GOALS: 6 weeks. Pt agrees with goals set.  1. Patient demonstrates " independence with HEP.   2. Pt to exhibit AROM R knee flex 115 deg, -1 deg ext (In Progress)     3. Pt to exhibit R LE gross strength of 4/5 (In Progress)  4. Pt to ambulate independently without  AD for > 100 feet (In Progress)  5. Pt to report pain level <4/10 @ worst (In Progress)  6. Pt with fair-good balance in SLS (In Progress)     Long Term GOALS: 12  weeks. Pt agrees with goals set.  1. Pt to exhibit R knee AROM 120 deg flx, 0 deg ext (In Progress)  2. Pt to demonstrate R LE gross strength fo 4+/5 (In Progress)  3. Pt to demonstrate improved gait quality and improved gait speed (In Progress)  4. Pt to return to daily/recreational activites with 0/10 pain (In Progress)  5. Pt to exhibit good balance in SLS (In Progress)        Plan      Continue with established Plan of Care towards PT goals.     Rudy Cabrera PTA, STS

## 2020-10-14 ENCOUNTER — CLINICAL SUPPORT (OUTPATIENT)
Dept: REHABILITATION | Facility: HOSPITAL | Age: 75
End: 2020-10-14
Attending: INTERNAL MEDICINE
Payer: MEDICARE

## 2020-10-14 PROCEDURE — 97140 MANUAL THERAPY 1/> REGIONS: CPT | Mod: HCNC,CQ

## 2020-10-14 PROCEDURE — 97110 THERAPEUTIC EXERCISES: CPT | Mod: HCNC,CQ

## 2020-10-14 PROCEDURE — 97014 ELECTRIC STIMULATION THERAPY: CPT | Mod: HCNC,CQ

## 2020-10-14 NOTE — PROGRESS NOTES
"                                                  Physical Therapy Daily Note      Name: Krysta Kam  Clinic Number: [id number]  Diagnosis:   S/P R TKA   Physician: Tha Wright III, *  Precautions: Fall/Standard  Visit #: 7/20  Auth Exp: 9/21/20  Eval Date; 9/18/20  POC Exp: 12/18/20  Visits Date: 10/07/20     Time In: 1215  Time Out: 1300  Billed time: 45     Subjective      Pt reports: doing better. No pain, just soreness   Pain Scale: 0/10  Somewhat compliant with HEP but stated Icing daily     Objective   No TTP, mild swelling, limp noted in gait, extensor lag still noted    Krysta received individual therapeutic exercises to develop strength, endurance, ROM and flexibility for 25 minutes including:    Recumbent bike full revolution bwd <> fwd 10 min increased ROM, circulation and mm strengthening/endurance  Prone knee ext stretch 5' 5#  QS into hyperext AA w/strap 2x10/3"   Lateral step up 6" 3x10 np  LAQ 10x    QS heel prop Russian estim 10' 10/10     Not today   SLR R 3x10/3"  Heel slides with AA strap 3x10    Manual therapy to R knee for 10' including patellar mobs, overpressure ext, quad stretching.    Pt received cold pack on R knee for 0 min for decreased swelling, pain, and circulation    Education provided re:  Krysta verbalized good understanding of education provided.   No spiritual or educational barriers to learning provided     Assessment   Pt tolerated tx well today, demonstrating improved ROM with full revolution bwd then fwd on recumbent bike today. Improved gait pattern with VC and demonstration correcting pattern. VC/TC for correcting form/technique with therex. Tolerating estim well with assistance in quad activation and achieving TKE.    Pt will continue to benefit from skilled outpatient physical therapy to address the deficits listed in the problem list, provide pt/family education and to maximize pt's level of independence in the home and community environment.      Goals as " follows:     Short Term GOALS: 6 weeks. Pt agrees with goals set.  1. Patient demonstrates independence with HEP.   2. Pt to exhibit AROM R knee flex 115 deg, -1 deg ext (In Progress)     3. Pt to exhibit R LE gross strength of 4/5 (In Progress)  4. Pt to ambulate independently without  AD for > 100 feet (In Progress)  5. Pt to report pain level <4/10 @ worst (In Progress)  6. Pt with fair-good balance in SLS (In Progress)     Long Term GOALS: 12  weeks. Pt agrees with goals set.  1. Pt to exhibit R knee AROM 120 deg flx, 0 deg ext (In Progress)  2. Pt to demonstrate R LE gross strength fo 4+/5 (In Progress)  3. Pt to demonstrate improved gait quality and improved gait speed (In Progress)  4. Pt to return to daily/recreational activites with 0/10 pain (In Progress)  5. Pt to exhibit good balance in SLS (In Progress)        Plan      Continue with established Plan of Care towards PT goals.     Rudy Cabrera PTA, STS

## 2020-10-16 ENCOUNTER — CLINICAL SUPPORT (OUTPATIENT)
Dept: REHABILITATION | Facility: HOSPITAL | Age: 75
End: 2020-10-16
Payer: MEDICARE

## 2020-10-16 DIAGNOSIS — G89.29 CHRONIC PAIN OF RIGHT KNEE: Primary | ICD-10-CM

## 2020-10-16 DIAGNOSIS — M25.561 CHRONIC PAIN OF RIGHT KNEE: Primary | ICD-10-CM

## 2020-10-16 PROCEDURE — 97140 MANUAL THERAPY 1/> REGIONS: CPT | Mod: HCNC

## 2020-10-16 PROCEDURE — 97110 THERAPEUTIC EXERCISES: CPT | Mod: HCNC

## 2020-10-16 NOTE — PROGRESS NOTES
"                                                  Physical Therapy Daily Note      Name: Krysta Kam  Clinic Number: [id number]  Diagnosis:   S/P R TKA   Physician: Tha Wright III, *  Precautions: Fall/Standard  Visit #: 8/20  Auth Exp: 9/21/20  Eval Date; 9/18/20  POC Exp: 12/18/20  Visits Date: 10/16/20     Time In: 1200  Time Out: 1245  Billed time: 45     Subjective      Pt reports: no pain, doing well.    Pain Scale: 0/10  Somewhat compliant with HEP but stated Icing daily      Objective   10/16/20 PROM -1-115, AROM -3-110  No TTP, mild swelling, limp noted in gait, extensor lag still noted     Krysta received individual therapeutic exercises to develop strength, endurance, ROM and flexibility for 30 minutes including:     Recumbent bike full revolution bwd <> fwd 10 min increased ROM, circulation and mm strengthening/endurance  Prone knee ext stretch 5' 5# NP  QS into hyperext AA w/strap 2x10/3"   Lateral step up 6" 3x10   Flexion stretch on step 10X 5 sec hold  LAQ 2X10     QS heel prop Russian estim 10' 10/10  NP     Not today   SLR R 3x10/3"  Heel slides with AA strap 3x10     Manual therapy to R knee for 15' including patellar mobs, overpressure ext, quad stretching.     Pt received cold pack on R knee for 10 min for decreased swelling, pain, and circulation     Education provided re:  Krysta verbalized good understanding of education provided.   No spiritual or educational barriers to learning provided     Assessment   Pt tolerated tx well today. Pt with improved A/PROM today. Pt needed some verbal cueing to engage quad during SLR and LAQ.    Pt will continue to benefit from skilled outpatient physical therapy to address the deficits listed in the problem list, provide pt/family education and to maximize pt's level of independence in the home and community environment.      Goals as follows:     Short Term GOALS: 6 weeks. Pt agrees with goals set.  1. Patient demonstrates independence with " HEP.   2. Pt to exhibit AROM R knee flex 115 deg, -1 deg ext (In Progress)     3. Pt to exhibit R LE gross strength of 4/5 (In Progress)  4. Pt to ambulate independently without  AD for > 100 feet (In Progress)  5. Pt to report pain level <4/10 @ worst (In Progress)  6. Pt with fair-good balance in SLS (In Progress)     Long Term GOALS: 12  weeks. Pt agrees with goals set.  1. Pt to exhibit R knee AROM 120 deg flx, 0 deg ext (In Progress)  2. Pt to demonstrate R LE gross strength fo 4+/5 (In Progress)  3. Pt to demonstrate improved gait quality and improved gait speed (In Progress)  4. Pt to return to daily/recreational activites with 0/10 pain (In Progress)  5. Pt to exhibit good balance in SLS (In Progress)        Plan      Continue with established Plan of Care towards PT goals.     Lynda Licona, PT, DPT

## 2020-10-19 ENCOUNTER — CLINICAL SUPPORT (OUTPATIENT)
Dept: REHABILITATION | Facility: HOSPITAL | Age: 75
End: 2020-10-19
Payer: MEDICARE

## 2020-10-19 PROCEDURE — 97014 ELECTRIC STIMULATION THERAPY: CPT | Mod: HCNC,CQ

## 2020-10-19 PROCEDURE — 97110 THERAPEUTIC EXERCISES: CPT | Mod: HCNC,CQ

## 2020-10-19 PROCEDURE — 97140 MANUAL THERAPY 1/> REGIONS: CPT | Mod: HCNC,CQ

## 2020-10-19 NOTE — PROGRESS NOTES
"                                                  Physical Therapy Daily Note      Name: Krysta Kam  Clinic Number: [id number]  Diagnosis:   S/P R TKA   Physician: Tha Wright III, *  Precautions: Fall/Standard  Visit #: 8/20  Auth Exp: 9/21/20  Eval Date; 9/18/20  POC Exp: 12/18/20  Visits Date: 10/16/20     Time In: 1230  Time Out: 1315  Billed time: 45     Subjective      Pt reports: no pain, doing well, just stiffness  Pain Scale: 0/10  Somewhat compliant with HEP but stated Icing daily      Objective   10/19/20 TKE end of tx.     No TTP, mild swelling, limp noted in gait, extensor lag still noted     Krysta received individual therapeutic exercises to develop strength, endurance, ROM and flexibility for 15 minutes including:     Recumbent bike full revolution bwd <> fwd 10 min increased ROM, circulation and mm strengthening/endurance  SLR AA strap 3x10    Not today   QS into hyperext AA w/strap 2x10/3"   Lateral step up 6" 3x10   Flexion stretch on step 10X 5 sec hold  LAQ 2X10     [place]ian estim 10' 10/10 performing quad sets with heel propped     Manual therapy to R knee for 10' including patellar mobs, overpressure ext, quad stretching.     Pt received cold pack on R knee for 10 min for decreased swelling, pain, and circulation     Education provided re:  Krysta verbalized good understanding of education provided.   No spiritual or educational barriers to learning provided     Assessment   Pt tolerated tx well today. Improved quad activation noted with use of Estim. Pt continues to lack TKE due to quad weakness/poor activation. VC/TC for correcting form/technique. Pt achieving TKE at the end of therapy. Pt will continue to benefit from skilled outpatient physical therapy to address the deficits listed in the problem list, provide pt/family education and to maximize pt's level of independence in the home and community environment.      Goals as follows:     Short Term GOALS: 6 weeks. Pt agrees with " goals set.  1. Patient demonstrates independence with HEP.   2. Pt to exhibit AROM R knee flex 115 deg, -1 deg ext (In Progress)     3. Pt to exhibit R LE gross strength of 4/5 (In Progress)  4. Pt to ambulate independently without  AD for > 100 feet (met)  5. Pt to report pain level <4/10 @ worst (In Progress)  6. Pt with fair-good balance in SLS (In Progress)     Long Term GOALS: 12  weeks. Pt agrees with goals set.  1. Pt to exhibit R knee AROM 120 deg flx, 0 deg ext (In Progress)  2. Pt to demonstrate R LE gross strength fo 4+/5 (In Progress)  3. Pt to demonstrate improved gait quality and improved gait speed (In Progress)  4. Pt to return to daily/recreational activites with 0/10 pain (In Progress)  5. Pt to exhibit good balance in SLS (In Progress)        Plan      Continue with established Plan of Care towards PT goals.     Rudy Cabrera PTA, STS

## 2020-10-21 ENCOUNTER — CLINICAL SUPPORT (OUTPATIENT)
Dept: REHABILITATION | Facility: HOSPITAL | Age: 75
End: 2020-10-21
Payer: MEDICARE

## 2020-10-21 PROCEDURE — 97110 THERAPEUTIC EXERCISES: CPT | Mod: HCNC,CQ

## 2020-10-21 PROCEDURE — 97140 MANUAL THERAPY 1/> REGIONS: CPT | Mod: HCNC,CQ

## 2020-10-21 NOTE — PROGRESS NOTES
"                                                  Physical Therapy Daily Note      Name: Krysta Kam  Clinic Number: [id number]  Diagnosis:   S/P R TKA   Physician: Tha Wright III, *  Precautions: Fall/Standard  Visit #: 8/20  Auth Exp: 9/21/20  Eval Date; 9/18/20  POC Exp: 12/18/20  Visits Date: 10/16/20     Time In: 1215  Time Out: 1300  Billed time: 45     Subjective      Pt reports: no pain when arrived for tx. "its just sore".   Pain Scale: 0/10  Compliant with HEP      Objective   10/19/20 TKE end of tx.     No TTP, mild swelling, limp noted in gait, extensor lag still noted     Krysta received individual therapeutic exercises to develop strength, endurance, ROM and flexibility for 35 minutes including:     Recumbent bike full revolution bwd <> fwd 10 min increased ROM, circulation and mm strengthening/endurance  [place]ijani estim 10' 10/10 performing quad sets with heel propped   QS into hyperext AA w/strap 2x10/3"   SLR  2x10  SAQ 2x10  LAQ 2X10  Standing heel raises 20x       Not today   Lateral step up 6" 3x10   Flexion stretch on step 10X 5 sec hold     Manual therapy to R knee for 10' including patellar mobs, overpressure ext, quad stretching.     Pt received cold pack on R knee for 0 min for decreased swelling, pain, and circulation     Education provided re:  Krysta verbalized good understanding of education provided.   No spiritual or educational barriers to learning provided     Assessment   Pt tolerated tx well today. Improved quad activation noted with use of Estim but still lacking TKE due to quad weakness/poor activation. Pt compensating with glut squeeze vs quad activation.  VC/TC for correcting form/technique. Pt achieving TKE at the end of therapy. Pt will continue to benefit from skilled outpatient physical therapy to address the deficits listed in the problem list, provide pt/family education and to maximize pt's level of independence in the home and community environment.      Goals " as follows:     Short Term GOALS: 6 weeks. Pt agrees with goals set.  1. Patient demonstrates independence with HEP.   2. Pt to exhibit AROM R knee flex 115 deg, -1 deg ext (In Progress)     3. Pt to exhibit R LE gross strength of 4/5 (In Progress)  4. Pt to ambulate independently without  AD for > 100 feet (met)  5. Pt to report pain level <4/10 @ worst (In Progress)  6. Pt with fair-good balance in SLS (In Progress)     Long Term GOALS: 12  weeks. Pt agrees with goals set.  1. Pt to exhibit R knee AROM 120 deg flx, 0 deg ext (In Progress)  2. Pt to demonstrate R LE gross strength fo 4+/5 (In Progress)  3. Pt to demonstrate improved gait quality and improved gait speed (In Progress)  4. Pt to return to daily/recreational activites with 0/10 pain (In Progress)  5. Pt to exhibit good balance in SLS (In Progress)        Plan      Continue with established Plan of Care towards PT goals.     Rudy Cabrera PTA, STS

## 2020-10-23 ENCOUNTER — CLINICAL SUPPORT (OUTPATIENT)
Dept: REHABILITATION | Facility: HOSPITAL | Age: 75
End: 2020-10-23
Payer: MEDICARE

## 2020-10-23 PROCEDURE — 97110 THERAPEUTIC EXERCISES: CPT | Mod: HCNC,CQ

## 2020-10-23 PROCEDURE — 97140 MANUAL THERAPY 1/> REGIONS: CPT | Mod: HCNC,CQ

## 2020-10-23 NOTE — PROGRESS NOTES
"                                                  Physical Therapy Daily Note      Name: Krysta Kam  Clinic Number: [id number]  Diagnosis:   S/P R TKA   Physician: Tha Wright III, *  Precautions: Fall/Standard  Visit #: 8/20  Auth Exp: 9/21/20  Eval Date; 9/18/20  POC Exp: 12/18/20  Visits Date: 10/16/20     Time In: 0920  Time Out: 1020  Billed time: 60     Subjective      Pt reports: no pain when arrived for tx. "I've been walking a lot".   Pain Scale: 0/10  Compliant with HEP      Objective   10/23/20 PROM -1 hyperext -120'    No TTP, mild swelling, limp noted in gait, extensor lag still noted     Krysta received individual therapeutic exercises to develop strength, endurance, ROM and flexibility for 50 minutes including:     Recumbent bike full revolution fwd 10 min increased ROM, circulation and mm strengthening/endurance  HS roller 5'  Russian estim 10' 10/10 performing quad sets with heel propped   Prone QS with bolster 3x10  Prone knee hang 5' 3#  QS into hyperext 3x10  SAQ 3x10  SLR  2x10    Not today  LAQ 2X10  Standing heel raises 20x    Lateral step up 6" 3x10   Flexion stretch on step 10X 5 sec hold     Manual therapy to R knee for 10' including patellar mobs, overpressure ext, quad stretching.     Pt received cold pack on R knee for 0 min for decreased swelling, pain, and circulation     Education provided re:  Krysta verbalized good understanding of education provided on achieving TKE and knee flexion at home.    No spiritual or educational barriers to learning provided     Assessment   Pt tolerated tx well today. Improved quad activation noted with use of Estim but still with difficulty with quad activation. Continues to compensate with glut squeeze vs quad activation. Improved quad activation noted toward end of tx today. Good results with prone QS. VC/TC for correcting form/technique. Improved PROM noted today.  Pt will continue to benefit from skilled outpatient physical therapy to " address the deficits listed in the problem list, provide pt/family education and to maximize pt's level of independence in the home and community environment.      Goals as follows:     Short Term GOALS: 6 weeks. Pt agrees with goals set.  1. Patient demonstrates independence with HEP.   2. Pt to exhibit AROM R knee flex 115 deg, -1 deg ext (In Progress)     3. Pt to exhibit R LE gross strength of 4/5 (In Progress)  4. Pt to ambulate independently without  AD for > 100 feet (met)  5. Pt to report pain level <4/10 @ worst (In Progress)  6. Pt with fair-good balance in SLS (In Progress)     Long Term GOALS: 12  weeks. Pt agrees with goals set.  1. Pt to exhibit R knee AROM 120 deg flx, 0 deg ext (In Progress)  2. Pt to demonstrate R LE gross strength fo 4+/5 (In Progress)  3. Pt to demonstrate improved gait quality and improved gait speed (In Progress)  4. Pt to return to daily/recreational activites with 0/10 pain (In Progress)  5. Pt to exhibit good balance in SLS (In Progress)        Plan      Continue with established Plan of Care towards PT goals.     Rudy Cabrera PTA, STS

## 2020-10-26 ENCOUNTER — CLINICAL SUPPORT (OUTPATIENT)
Dept: REHABILITATION | Facility: HOSPITAL | Age: 75
End: 2020-10-26
Payer: MEDICARE

## 2020-10-26 DIAGNOSIS — M25.561 CHRONIC PAIN OF RIGHT KNEE: Primary | ICD-10-CM

## 2020-10-26 DIAGNOSIS — G89.29 CHRONIC PAIN OF RIGHT KNEE: Primary | ICD-10-CM

## 2020-10-26 PROCEDURE — 97110 THERAPEUTIC EXERCISES: CPT | Mod: HCNC,CQ

## 2020-10-26 PROCEDURE — 97140 MANUAL THERAPY 1/> REGIONS: CPT | Mod: HCNC,CQ

## 2020-10-26 NOTE — PROGRESS NOTES
"                                                  Physical Therapy Daily Note      Name: Krysta Kam  Clinic Number: [id number]  Diagnosis:   S/P R TKA   Physician: Tha Wright III, *  Precautions: Fall/Standard  Visit #: 8/20  Auth Exp: 9/21/20  Eval Date; 9/18/20  POC Exp: 12/18/20  Visits Date: 10/16/20     Time In: 1:15 p  Time Out: 2:00 p  Billed time: 45     Subjective      Pt reports: Knee is coming along  Pain Scale: 0/10  Compliant with HEP      Objective   10/23/20 AROM :0 hyperext -120'    No TTP, mild swelling, limp noted in gait, extensor lag still noted     Krysta received individual therapeutic exercises to develop strength, endurance, ROM and flexibility for 37 minutes including:     Recumbent bike full revolution fwd 10 min increased ROM, circulation and mm strengthening/endurance  QS w/ heel on 1/2 foam x 5 min 10" hold  SLR w/ QS x3'  Knee ext S #5 above/below x 5 min  SAQ x 5min  SL leg press #40 4x8    Not today  LAQ 2X10  Standing heel raises 20x    Lateral step up 6" 3x10   Flexion stretch on step 10X 5 sec hold     Manual therapy to R knee for 8' including patellar mobs, overpressure ext, quad stretching.     Pt received cold pack on R knee for 0 min for decreased swelling, pain, and circulation     Education provided re:  Krysta verbalized good understanding of education provided on achieving TKE and knee flexion at home.    No spiritual or educational barriers to learning provided     Assessment   Pt able to perform quad set w/o glut activation in long sitting position not resting back on table. Able to achieve full knee ext actively. No ext lag w/ SLR.      Goals as follows:     Short Term GOALS: 6 weeks. Pt agrees with goals set.  1. Patient demonstrates independence with HEP.   2. Pt to exhibit AROM R knee flex 115 deg, -1 deg ext (In Progress)     3. Pt to exhibit R LE gross strength of 4/5 (In Progress)  4. Pt to ambulate independently without  AD for > 100 feet (met)  5. Pt " to report pain level <4/10 @ worst (In Progress)  6. Pt with fair-good balance in SLS (In Progress)     Long Term GOALS: 12  weeks. Pt agrees with goals set.  1. Pt to exhibit R knee AROM 120 deg flx, 0 deg ext (In Progress)  2. Pt to demonstrate R LE gross strength fo 4+/5 (In Progress)  3. Pt to demonstrate improved gait quality and improved gait speed (In Progress)  4. Pt to return to daily/recreational activites with 0/10 pain (In Progress)  5. Pt to exhibit good balance in SLS (In Progress)        Plan      Continue with established Plan of Care towards PT goals.     Chelsea Garg, PTA

## 2020-10-29 ENCOUNTER — CLINICAL SUPPORT (OUTPATIENT)
Dept: REHABILITATION | Facility: HOSPITAL | Age: 75
End: 2020-10-29
Payer: MEDICARE

## 2020-10-29 DIAGNOSIS — M25.561 CHRONIC PAIN OF RIGHT KNEE: Primary | ICD-10-CM

## 2020-10-29 DIAGNOSIS — G89.29 CHRONIC PAIN OF RIGHT KNEE: Primary | ICD-10-CM

## 2020-10-29 PROCEDURE — 97110 THERAPEUTIC EXERCISES: CPT | Mod: HCNC,CQ

## 2020-10-29 NOTE — PROGRESS NOTES
"                                                  Physical Therapy Daily Note      Name: Krysta Kam  Clinic Number: [id number]  Diagnosis:   S/P R TKA   Physician: Tha Wright III, *  Precautions: Fall/Standard  Visit #: 8/20  Auth Exp: 9/21/20  Eval Date; 9/18/20  POC Exp: 12/18/20  Visits Date: 10/16/20     Time In: 1:18 p ( pt was late 2* traffic)  Time Out: 1:45 p  Billed time: 27     Subjective      Pt reports: Knee is coming along  Pain Scale: 0/10  Compliant with HEP      Objective   10/23/20 AROM :0 hyperext -120'    No TTP, mild swelling, limp noted in gait, extensor lag still noted     Krysta received individual therapeutic exercises to develop strength, endurance, ROM and flexibility for 27 minutes including:     Recumbent bike full revolution fwd 10 min increased ROM, circulation and mm strengthening/endurance-NT  QS w/ heel on 1/2 foam 20x 5" hold  SLR w/ QS 20x   Knee ext S #5 above/below x 5 min  SAQ 20x 5" hold  SL leg press #40 4x8-NP  STS 30x  Step up on 4" 3x10    Not today  LAQ 2X10  Standing heel raises 20x    Lateral step up 6" 3x10   Flexion stretch on step 10X 5 sec hold     Manual therapy to R knee for 8' including patellar mobs, overpressure ext, quad stretching.     Pt received cold pack on R knee for 0 min for decreased swelling, pain, and circulation     Education provided re:  Krysta verbalized good understanding of education provided on achieving TKE and knee flexion at home.    No spiritual or educational barriers to learning provided     Assessment   Pt has a better understanding of how to contract quad. Able to amb post session w/ TKE.      Goals as follows:     Short Term GOALS: 6 weeks. Pt agrees with goals set.  1. Patient demonstrates independence with HEP.   2. Pt to exhibit AROM R knee flex 115 deg, -1 deg ext (In Progress)     3. Pt to exhibit R LE gross strength of 4/5 (In Progress)  4. Pt to ambulate independently without  AD for > 100 feet (met)  5. Pt to " report pain level <4/10 @ worst (In Progress)  6. Pt with fair-good balance in SLS (In Progress)     Long Term GOALS: 12  weeks. Pt agrees with goals set.  1. Pt to exhibit R knee AROM 120 deg flx, 0 deg ext (In Progress)  2. Pt to demonstrate R LE gross strength fo 4+/5 (In Progress)  3. Pt to demonstrate improved gait quality and improved gait speed (In Progress)  4. Pt to return to daily/recreational activites with 0/10 pain (In Progress)  5. Pt to exhibit good balance in SLS (In Progress)        Plan      Continue with established Plan of Care towards PT goals.     Chelsea Garg, PTA

## 2020-10-30 ENCOUNTER — OFFICE VISIT (OUTPATIENT)
Dept: ORTHOPEDICS | Facility: CLINIC | Age: 75
End: 2020-10-30
Payer: MEDICARE

## 2020-10-30 ENCOUNTER — HOSPITAL ENCOUNTER (OUTPATIENT)
Dept: RADIOLOGY | Facility: HOSPITAL | Age: 75
Discharge: HOME | End: 2020-10-30
Attending: ORTHOPAEDIC SURGERY
Payer: MEDICARE

## 2020-10-30 VITALS — WEIGHT: 119.06 LBS | HEIGHT: 61 IN | BODY MASS INDEX: 22.48 KG/M2

## 2020-10-30 DIAGNOSIS — Z96.651 STATUS POST TOTAL RIGHT KNEE REPLACEMENT: Primary | ICD-10-CM

## 2020-10-30 DIAGNOSIS — Z96.651 STATUS POST TOTAL RIGHT KNEE REPLACEMENT: ICD-10-CM

## 2020-10-30 PROCEDURE — 99999 PR PBB SHADOW E&M-EST. PATIENT-LVL III: CPT | Mod: PBBFAC,HCNC,, | Performed by: ORTHOPAEDIC SURGERY

## 2020-10-30 PROCEDURE — 73562 XR KNEE 3 VIEW RIGHT: ICD-10-PCS | Mod: 26,HCNC,RT, | Performed by: RADIOLOGY

## 2020-10-30 PROCEDURE — 99999 PR PBB SHADOW E&M-EST. PATIENT-LVL III: ICD-10-PCS | Mod: PBBFAC,HCNC,, | Performed by: ORTHOPAEDIC SURGERY

## 2020-10-30 PROCEDURE — 73562 X-RAY EXAM OF KNEE 3: CPT | Mod: TC,HCNC,RT

## 2020-10-30 PROCEDURE — 99024 PR POST-OP FOLLOW-UP VISIT: ICD-10-PCS | Mod: HCNC,S$GLB,, | Performed by: ORTHOPAEDIC SURGERY

## 2020-10-30 PROCEDURE — 99024 POSTOP FOLLOW-UP VISIT: CPT | Mod: HCNC,S$GLB,, | Performed by: ORTHOPAEDIC SURGERY

## 2020-10-30 PROCEDURE — 73562 X-RAY EXAM OF KNEE 3: CPT | Mod: 26,HCNC,RT, | Performed by: RADIOLOGY

## 2020-10-30 RX ORDER — CELECOXIB 200 MG/1
200 CAPSULE ORAL DAILY
Qty: 30 CAPSULE | Refills: 0 | Status: SHIPPED | OUTPATIENT
Start: 2020-10-30 | End: 2020-11-02

## 2020-10-30 NOTE — PROGRESS NOTES
Subjective:     HPI:   Krysta Kam is a 74 y.o. female who presents 6 weeks out from right TKA    Date of surgery: 9/16/20    Medications: tramadol Rx 10/1 using only for PT, cbrex but only a few left, tylenol. On gabapentin QHS at baseline for fibromyalgia    Assistive Devices: none    PT: ongoing 3x/week    Limitations: none, [place]    No pain, just sore medially, no hypersensitivity       Objective:   Body mass index is 22.49 kg/m².  Exam:    Gait: moderate limp non antalgic    Incision: healed, scar adherent distal 1/2 of incision    Stability:  Knee stable anterior-posterior varus and valgus stresses, no extensor lag    Extension: 8    Flexion: 105    Valgus angle: 7    Pre-op 5-110, 10 deg valgus      Imaging:  Indication:  Exam status post right total knee arthroplasty  Exam Ordered: Radiographs of the right knee include a standing anteroposterior view, a lateral view in full flexion, and a sunrise view  Details of Examination: Todays exam show a well fixed, well positioned total knee arthroplasty with no evidence of wear, osteolysis, or loosening.  Impression:  Status post right total knee arthroplasty, implant in good position with no abnormality      Assessment:       ICD-10-CM ICD-9-CM   1. Status post total right knee replacement 9/16/2020  Z96.651 V43.65      Developing arthrofibrosis     Plan:       Patient is doing very well with their total knee arthroplasty.  They will continue with their routine care of the knee replacement and see me back for their follow-up at the routine interval.  If there are problems in the interim they will see me back sooner.    Encouraged aggressive ROM with PT  Scar massage     Refill celebrex #30  Increase neurontin to BID  Tylenol 650 TID  Rx compound cream    2 week f/u for ROM check, ?ALIN    11/9: Rx tramadol Q12hr prn #30      No orders of the defined types were placed in this encounter.      Medications Ordered This Encounter    Medications    celecoxib (CELEBREX) 200 MG capsule     Sig: Take 1 capsule (200 mg total) by mouth once daily.     Dispense:  30 capsule     Refill:  0        Past Medical History:   Diagnosis Date    Anemia 6/2012    Anxiety     Cataract     Colon polyps 1/31/2017    Depression     Diabetes mellitus without complication 11/14/2018    Diabetes mellitus, type 2     Fibromyalgia     GERD (gastroesophageal reflux disease)     High cholesterol     Hypertension        Past Surgical History:   Procedure Laterality Date    COLONOSCOPY N/A 10/18/2016    Procedure: COLONOSCOPY;  Surgeon: Brittni Edmondson MD;  [facility]  Service: Endoscopy;  Laterality: N/A;    COLONOSCOPY N/A 10/8/2019    Procedure: COLONOSCOPY;  Surgeon: Gold Ravi MD;  [facility]  Service: Endoscopy;  Laterality: N/A;    ESOPHAGOGASTRODUODENOSCOPY N/A 10/8/2019    Procedure: EGD (ESOPHAGOGASTRODUODENOSCOPY);  Surgeon: Gold Ravi MD;  Location: [facility] ([address] [place] [address]);  Service: Endoscopy;  Laterality: N/A;  Okay for any provider due to KARSTEN    HYSTERECTOMY      KNEE ARTHROPLASTY Right 9/16/2020    Procedure: ARTHROPLASTY, KNEE: DEPUY-ATTUNE;  Surgeon: Tha Wright III, MD;  Location: [facility];  Service: Orthopedics;  Laterality: Right;       Family History   Problem Relation Age of Onset    Stroke Mother     Heart disease Father     No Known Problems Daughter     No Known Problems Son     No Known Problems Daughter     No Known Problems Daughter     No Known Problems Son     Cataracts Neg Hx     Cancer Neg Hx     Diabetes Neg Hx     Glaucoma Neg Hx     Retinal detachment Neg Hx     Celiac disease Neg Hx     Cirrhosis Neg Hx     Colon cancer Neg Hx     Cystic fibrosis Neg Hx     Esophageal cancer Neg Hx     Inflammatory bowel disease Neg Hx     Liver disease Neg Hx     Stomach cancer Neg Hx     Amblyopia Neg Hx     Blindness Neg Hx     Macular degeneration Neg Hx      Strabismus Neg Hx     Thyroid disease Neg Hx        Social History     Socioeconomic History    Marital status: Single     Spouse name: Not on file    Number of children: 5    Years of education: Not on file    Highest education level: Not on file   Occupational History    Not on file   Social Needs    Financial resource strain: Not hard at all    Food insecurity     Worry: Never true     Inability: Never true    Transportation needs     Medical: No     Non-medical: No   Tobacco Use    Smoking status: Former Smoker     Packs/day: 0.25     Quit date: 1975     Years since quittin.8    Smokeless tobacco: Never Used    Tobacco comment: occasional social smoker   Substance and Sexual Activity    Alcohol use: No     Frequency: Never     Binge frequency: Never    Drug use: No    Sexual activity: Not Currently   Lifestyle    Physical activity     Days per week: 4 days     Minutes per session: 10 min    Stress: Only a little   Relationships    Social connections     Talks on phone: More than three times a week     Gets together: Once a week     Attends Islam service: Never     Active member of club or organization: No     Attends meetings of clubs or organizations: Never     Relationship status:    Other Topics Concern    Not on file   Social History Narrative    Retired

## 2020-11-02 ENCOUNTER — CLINICAL SUPPORT (OUTPATIENT)
Dept: REHABILITATION | Facility: HOSPITAL | Age: 75
End: 2020-11-02
Payer: MEDICARE

## 2020-11-02 DIAGNOSIS — M25.561 RIGHT KNEE PAIN, UNSPECIFIED CHRONICITY: Primary | ICD-10-CM

## 2020-11-02 PROCEDURE — 97110 THERAPEUTIC EXERCISES: CPT | Mod: HCNC,CQ

## 2020-11-02 NOTE — PROGRESS NOTES
"                                                  Physical Therapy Daily Note      Name: Krysta Kam  Clinic Number: [id number]  Diagnosis:   S/P R TKA   Physician: Tha Wright III, *  Precautions: Fall/Standard  Visit #: 9/20  Auth Exp: 9/21/20  Eval Date; 9/18/20  POC Exp: 12/18/20  Visits Date: 10/16/20     Time In: 1:15 p  Time Out: 2:06 p  Billed time: 45     Subjective      Pt reports: Knee is better  Pain Scale: 0/10  Compliant with HEP      Objective   11/2/20 AROM : 2-0-120    No TTP, mild swelling, limp noted in gait, extensor lag still noted     Krysta received individual therapeutic exercises to develop strength, endurance, ROM and flexibility for 45 minutes including:     Recumbent bike full revolution fwd 10 min increased ROM, circulation and mm strengthening/endurance-NT  QS w/ heel on 1/2 foam 5x 20" hold  SLR w/ QS 30x 3" hold  Knee ext S #5 above/below x 5 min  SAQ 20x 5" hold  SL leg press #40 6x5  STS 30x  Step up on 4" 3x10       Manual therapy to R knee for 5' including patellar mobs, overpressure ext, quad stretching.     Pt received cold pack on R knee for 0 min for decreased swelling, pain, and circulation     Education provided re:  Krysta verbalized good understanding of education provided on achieving TKE and knee flexion at home.    No spiritual or educational barriers to learning provided     Assessment   Able to achieve AROM hyperext. Still presents w/ quad weakness w/ at sifts to L w. STS. Quad set ability is improving. Improved TKE w/ heel strike.      Goals as follows:     Short Term GOALS: 6 weeks. Pt agrees with goals set.  1. Patient demonstrates independence with HEP.   2. Pt to exhibit AROM R knee flex 115 deg, -1 deg ext (In Progress)     3. Pt to exhibit R LE gross strength of 4/5 (In Progress)  4. Pt to ambulate independently without  AD for > 100 feet (met)  5. Pt to report pain level <4/10 @ worst (In Progress)  6. Pt with fair-good balance in SLS (In " Progress)     Long Term GOALS: 12  weeks. Pt agrees with goals set.  1. Pt to exhibit R knee AROM 120 deg flx, 0 deg ext (In Progress)  2. Pt to demonstrate R LE gross strength fo 4+/5 (In Progress)  3. Pt to demonstrate improved gait quality and improved gait speed (In Progress)  4. Pt to return to daily/recreational activites with 0/10 pain (In Progress)  5. Pt to exhibit good balance in SLS (In Progress)        Plan      Continue with established Plan of Care towards PT goals.     Chelsea Garg, PTA

## 2020-11-04 ENCOUNTER — CLINICAL SUPPORT (OUTPATIENT)
Dept: REHABILITATION | Facility: HOSPITAL | Age: 75
End: 2020-11-04
Payer: MEDICARE

## 2020-11-04 DIAGNOSIS — M25.561 CHRONIC PAIN OF RIGHT KNEE: Primary | ICD-10-CM

## 2020-11-04 DIAGNOSIS — G89.29 CHRONIC PAIN OF RIGHT KNEE: Primary | ICD-10-CM

## 2020-11-04 PROCEDURE — 97110 THERAPEUTIC EXERCISES: CPT | Mod: HCNC

## 2020-11-04 NOTE — PROGRESS NOTES
"                                                  Physical Therapy Daily Note      Name: Krysta Kam  Clinic Number: [id number]  Diagnosis:   S/P R TKA   Physician: Tha Wright III, *  Precautions: Fall/Standard  Visit #: 10/20  Auth Exp: 9/21/20  Eval Date; 9/18/20  POC Exp: 12/18/20  Visits Date: 11/4/20     Time In: 1:15 p  Time Out: 2:05 p  Billed time: 45     Subjective      Pt reports: she is doing very well, knee is improving  Pain Scale: 0/10  Compliant with HEP      Objective   11/4/20 AROM : 2-0-120     No TTP, mild swelling, limp noted in gait, extensor lag still noted     Krysta received individual therapeutic exercises to develop strength, endurance, ROM and flexibility for 45 minutes including:     Recumbent bike full revolution fwd 10 min increased ROM, circulation and mm strengthening/endurance-NT  QS w/ heel on 1/2 foam 5x 20" hold  SLR w/ QS 30x 3" hold  Knee ext S #5 above/below x 5 min  SAQ 20x 5" hold  Shuttle 37.5# SL squat 20X  SLS 2X30' with UE support  STS 30x  Step up on 4" 3x10  Lateral walk YTB 2 laps        Manual therapy to R knee for 5' including patellar mobs, overpressure ext, quad stretching.     Pt received cold pack on R knee for 0 min for decreased swelling, pain, and circulation     Education provided re:  Krysta verbalized good understa nding of education provided on achieving TKE and knee flexion at home.    No spiritual or educational barriers to learning provided     Assessment   Pt is progressing well and demonstrates improved quality of gait with decreased limp and improved TKE but still lacking with gait. Pt exhibit improved balance as was able to perform lateral side step with theraband with good dynamic stability. Pt needed few verbal cues to perform exercises correctly.        Goals as follows:     Short Term GOALS: 6 weeks. Pt agrees with goals set.  1. Patient demonstrates independence with HEP.   2. Pt to exhibit AROM R knee flex 115 deg, -1 deg ext (In " Progress)     3. Pt to exhibit R LE gross strength of 4/5 (MET)  4. Pt to ambulate independently without  AD for > 100 feet (MET  5. Pt to report pain level <4/10 @ worst (MET)  6. Pt with fair-good balance in SLS (MET)     Long Term GOALS: 12  weeks. Pt agrees with goals set.  1. Pt to exhibit R knee AROM 120 deg flx, 0 deg ext (In Progress)  2. Pt to demonstrate R LE gross strength fo 4+/5 (In Progress)  3. Pt to demonstrate improved gait quality and improved gait speed (MET)  4. Pt to return to daily/recreational activites with 0/10 pain (In Progress)  5. Pt to exhibit good balance in SLS (In Progress)        Plan      Continue with established Plan of Care towards PT goals.     Lynda Licona, PT, DPT

## 2020-11-06 ENCOUNTER — CLINICAL SUPPORT (OUTPATIENT)
Dept: REHABILITATION | Facility: HOSPITAL | Age: 75
End: 2020-11-06
Payer: MEDICARE

## 2020-11-06 PROCEDURE — 97014 ELECTRIC STIMULATION THERAPY: CPT | Mod: HCNC,CQ

## 2020-11-06 PROCEDURE — 97110 THERAPEUTIC EXERCISES: CPT | Mod: HCNC,CQ

## 2020-11-06 PROCEDURE — 97140 MANUAL THERAPY 1/> REGIONS: CPT | Mod: HCNC,CQ

## 2020-11-06 NOTE — PROGRESS NOTES
"                                                  Physical Therapy Daily Note      Name: Krysta Kam  Clinic Number: [id number]  Diagnosis:   S/P R TKA   Physician: Tha Wright III, *  Precautions: Fall/Standard  Visit #: 10/20  Auth Exp: 9/21/20  Eval Date; 9/18/20  POC Exp: 12/18/20  Visits Date: 11/4/20     Time In: 1305  Time Out: 1450  Billed time: 45     Subjective      Pt reports: feeling better min pain today  Pain Scale: 2/10  Compliant with HEP      Objective   11/4/20 AROM : 2-0-120     No TTP, mild swelling, extensor lag still noted, improved gait pattern with minimal deviation     Krysta received individual therapeutic exercises to develop strength, endurance, ROM and flexibility for 20 minutes including:     Recumbent bike full revolution fwd 10 min increased ROM, circulation and mm strengthening/endurance  QS w/ heel on 1/2 foam 3x10/3"  R HSS with strap 3x30"  Bridges 3x10/3"  SLR w/ QS 30x 3" hold    Norwegian Estim for 10' with RICE for increased quad activation, decreased pain and edema.       Not today   Knee ext S #5 above/below x 5 min  SAQ 20x 5" hold  Shuttle 37.5# SL squat 20X  SLS 2X30' with UE support  STS 30x  Step up on 4" 3x10  Lateral walk YTB 2 laps     Manual therapy to R knee for 15' including patellar mobs, overpressure ext, quad stretching.     Pt received cold pack on R knee for 0 min for decreased swelling, pain, and circulation     Education provided re:  Krysta verbalized good understa nding of education provided on achieving TKE and knee flexion at home.    No spiritual or educational barriers to learning provided     Assessment   Pt is progressing well and demonstrates improved gait pattern with decreased deviation.  improved TKE. VC/TC for correcting form/technique with therex and quad activation.       Goals as follows:     Short Term GOALS: 6 weeks. Pt agrees with goals set.  1. Patient demonstrates independence with HEP.   2. Pt to exhibit AROM R knee flex 115 " deg, -1 deg ext (In Progress)     3. Pt to exhibit R LE gross strength of 4/5 (MET)  4. Pt to ambulate independently without  AD for > 100 feet (MET  5. Pt to report pain level <4/10 @ worst (MET)  6. Pt with fair-good balance in SLS (MET)     Long Term GOALS: 12  weeks. Pt agrees with goals set.  1. Pt to exhibit R knee AROM 120 deg flx, 0 deg ext (In Progress)  2. Pt to demonstrate R LE gross strength fo 4+/5 (In Progress)  3. Pt to demonstrate improved gait quality and improved gait speed (MET)  4. Pt to return to daily/recreational activites with 0/10 pain (In Progress)  5. Pt to exhibit good balance in SLS (In Progress)        Plan      Continue with established Plan of Care towards PT goals.     Rudy Cabrera PTA, STS

## 2020-11-09 ENCOUNTER — CLINICAL SUPPORT (OUTPATIENT)
Dept: REHABILITATION | Facility: HOSPITAL | Age: 75
End: 2020-11-09
Payer: MEDICARE

## 2020-11-09 ENCOUNTER — TELEPHONE (OUTPATIENT)
Dept: ORTHOPEDICS | Facility: CLINIC | Age: 75
End: 2020-11-09

## 2020-11-09 DIAGNOSIS — G89.29 CHRONIC PAIN OF RIGHT KNEE: Primary | ICD-10-CM

## 2020-11-09 DIAGNOSIS — M25.561 CHRONIC PAIN OF RIGHT KNEE: Primary | ICD-10-CM

## 2020-11-09 PROCEDURE — 97110 THERAPEUTIC EXERCISES: CPT | Mod: HCNC

## 2020-11-09 PROCEDURE — 97140 MANUAL THERAPY 1/> REGIONS: CPT | Mod: HCNC

## 2020-11-09 RX ORDER — TRAMADOL HYDROCHLORIDE 50 MG/1
50 TABLET ORAL EVERY 12 HOURS PRN
Qty: 30 TABLET | Refills: 0 | Status: SHIPPED | OUTPATIENT
Start: 2020-11-09 | End: 2021-05-24

## 2020-11-09 NOTE — TELEPHONE ENCOUNTER
I called the patient regarding her voice message. I told the patient that Dr. Wright sent in a prescription for her tramadol. The patient verbalized understanding and has no further questions.    ----- Message from Tha Wright III, MD sent at 11/9/2020  3:05 PM CST -----  I sent her in one more Rx  Normally no but she was getting stiff and we need her to keep working with PT    ----- Message -----  From: Carlos Jeffrey  Sent: 11/9/2020   2:30 PM CST  To: MD Dr. Kyle Jamil III,     I cannot remember if I sent an inbasket for this patient yet or not.       But she wants tramadol. Do you want that to come from the PCP now?    Sincerely,   Evangelista Jeffrey  Clinical Assistant to Dr. Tha Wright III  Phone: [phone]    ----- Message -----  From: Laurie Pappas  Sent: 11/9/2020   8:55 AM CST  To: [facility] Staff    Pt is requesting a call back, she would like to have some pain medication prescribed. Pt states she has tried reaching out to the office prior too. Have not heard anything back. Please call.      Contact Info [phone] (home)

## 2020-11-09 NOTE — PROGRESS NOTES
"                                                  Physical Therapy Daily Note      Name: Krysta Kam  Clinic Number: [id number]  Diagnosis:   S/P R TKA   Physician: Tha Wright III, *  Precautions: Fall/Standard  Visit #: 11/20  Auth Exp: 9/21/20  Eval Date; 9/18/20  POC Exp: 12/18/20  Visits Date: 11/9/20     Time In: 1310  Time Out: 1400  Billed time: 50     Subjective      Pt reports: thigh/leg is feeling somewhat painful today  Pain Scale: 3-4/10  Compliant with HEP      Objective   11/4/20 AROM : 2-0-120     No TTP, mild swelling, extensor lag still noted, improved gait pattern with minimal deviation     Krysta received individual therapeutic exercises to develop strength, endurance, ROM and flexibility for 25 minutes including:     Recumbent bike full revolution fwd 10 min increased ROM, circulation and mm strengthening/endurance  QS w/ heel on 1/2 foam 3x10/3"  R HSS with strap 3x30"  Bridges 3x10/3"  SLR w/ QS 30x 3" hold   Step up on 4" 3x10     Russian Estim for 10', 5/5 with SAQ for increased quad activation,        Not today   Knee ext S #5 above/below x 5 min  SAQ 20x 5" hold  Shuttle 37.5# SL squat 20X  SLS 2X30' with UE support  STS 30x  Step up on 4" 3x10  Lateral walk YTB 2 laps     Manual therapy to R knee for 10' including patellar mobs, overpressure ext, quad stretching.     Pt received cold pack on R knee for 0 min for decreased swelling, pain, and circulation     Education provided re:  Krysta verbalized good understa nding of education provided on achieving TKE and knee flexion at home.    No spiritual or educational barriers to learning provided     Assessment   Pt demonstrates fair R quad activation with atrophy.  Pt tolerated tx well today, need some verbal cueing to perform exercises correctly. PT encourage patient to consistently perform quad sets and stretch into extension at home.       Goals as follows:     Short Term GOALS: 6 weeks. Pt agrees with goals set.  1. Patient " demonstrates independence with HEP.   2. Pt to exhibit AROM R knee flex 115 deg, -1 deg ext (In Progress)     3. Pt to exhibit R LE gross strength of 4/5 (MET)  4. Pt to ambulate independently without  AD for > 100 feet (MET  5. Pt to report pain level <4/10 @ worst (MET)  6. Pt with fair-good balance in SLS (MET)     Long Term GOALS: 12  weeks. Pt agrees with goals set.  1. Pt to exhibit R knee AROM 120 deg flx, 0 deg ext (In Progress)  2. Pt to demonstrate R LE gross strength fo 4+/5 (In Progress)  3. Pt to demonstrate improved gait quality and improved gait speed (MET)  4. Pt to return to daily/recreational activites with 0/10 pain (In Progress)  5. Pt to exhibit good balance in SLS (In Progress)        Plan      Continue with established Plan of Care towards PT goals.     Lynda Licona, PT, DPT

## 2020-11-11 ENCOUNTER — CLINICAL SUPPORT (OUTPATIENT)
Dept: REHABILITATION | Facility: HOSPITAL | Age: 75
End: 2020-11-11
Payer: MEDICARE

## 2020-11-11 DIAGNOSIS — M25.561 CHRONIC PAIN OF RIGHT KNEE: Primary | ICD-10-CM

## 2020-11-11 DIAGNOSIS — G89.29 CHRONIC PAIN OF RIGHT KNEE: Primary | ICD-10-CM

## 2020-11-11 NOTE — PROGRESS NOTES
"                                                  Physical Therapy Daily Note      Name: Krysta Kam  Clinic Number: [id number]  Diagnosis:   S/P R TKA   Physician: Tha Wright III, *  Precautions: Fall/Standard  Visit #: 12/20  Auth Exp: 9/21/20  Eval Date; 9/18/20  POC Exp: 12/18/20  Visits Date: 11/9/20     Time In: 1:15  Time Out: 2:00 p  Billed time: 45     Subjective      Pt reports: quad gets tight sometimes   Pain Scale: 3-4/10  Compliant with HEP      Objective   11/4/20 AROM : 2-0-120     No TTP, mild swelling, extensor lag still noted, improved gait pattern with minimal deviation     Krysta received individual therapeutic exercises to develop strength, endurance, ROM and flexibility for 40 minutes including:     Recumbent bike full revolution fwd 10 min increased ROM, circulation and mm strengthening/endurance  Heel prop on 1/2 foam x 3'  QS w/ heel on 1/2 foam 10x10" hold  SAQ #2 5" hold 20x  SLR w/ QS 20x 3" hold   HSS 2x1'  GSS x1'  Lateral Step up on 4" 3x10  Shuttle 37.5# SL squat 4x8  STS 30x    [place]oc[place] Estim for 00', 5/5 with SAQ for increased quad activation,-NP       Manual therapy to R knee for 05' including patellar mobs, overpressure ext, scar massage     Pt received cold pack on R knee for 0 min for decreased swelling, pain, and circulation     Education provided re:  Krysta verbalized good understa nding of education provided on achieving TKE and knee flexion at home.    No spiritual or educational barriers to learning provided     Assessment   Started session w/ 1 degree hypo ext but ended session being able to achieve full ext. Quad strength is slowly progressing. No ext lag w/ SLR. Gastroc is tight. Fair ecc control on lat step ups. Pt would like to know if she can drop freq from 3x/wk     Goals as follows:     Short Term GOALS: 6 weeks. Pt agrees with goals set.  1. Patient demonstrates independence with HEP.   2. Pt to exhibit AROM R knee flex 115 deg, -1 deg ext (In " Progress)     3. Pt to exhibit R LE gross strength of 4/5 (MET)  4. Pt to ambulate independently without  AD for > 100 feet (MET  5. Pt to report pain level <4/10 @ worst (MET)  6. Pt with fair-good balance in SLS (MET)     Long Term GOALS: 12  weeks. Pt agrees with goals set.  1. Pt to exhibit R knee AROM 120 deg flx, 0 deg ext (In Progress)  2. Pt to demonstrate R LE gross strength fo 4+/5 (In Progress)  3. Pt to demonstrate improved gait quality and improved gait speed (MET)  4. Pt to return to daily/recreational activites with 0/10 pain (In Progress)  5. Pt to exhibit good balance in SLS (In Progress)        Plan      Continue with established Plan of Care towards PT goals.     Chelsea Garg

## 2020-11-13 ENCOUNTER — CLINICAL SUPPORT (OUTPATIENT)
Dept: REHABILITATION | Facility: HOSPITAL | Age: 75
End: 2020-11-13
Payer: MEDICARE

## 2020-11-13 PROCEDURE — 97140 MANUAL THERAPY 1/> REGIONS: CPT | Mod: HCNC,CQ

## 2020-11-13 PROCEDURE — 97110 THERAPEUTIC EXERCISES: CPT | Mod: HCNC,CQ

## 2020-11-13 NOTE — PROGRESS NOTES
"                                                    Physical Therapy Daily Note      Name: Krysta Kam  Clinic Number: [id number]  Diagnosis:   S/P R TKA   Physician: Tha Wright III, *  Precautions: Fall/Standard  Visit #: 12/20  Auth Exp: 9/21/20  Eval Date; 9/18/20  POC Exp: 12/18/20  Visits Date: 11/9/20     Time In: 1300  Time Out: 1345  Billed time: 45     Subjective      Pt reports: a little tightness   Pain Scale: 2/10  Compliant with HEP      Objective   11/4/20 AROM : 2-0-120     No TTP, mild swelling, extensor lag still noted, improved gait pattern with minimal deviation     Krysta received individual therapeutic exercises to develop strength, endurance, ROM and flexibility for 25 minutes including:     Recumbent bike full revolution fwd 10 min increased ROM, circulation and mm strengthening/endurance  QS w/ heel on 1/2 foam 3x10/3"  SAQ #2 20x   SLR 1# 20x   Bridges 2x10  Prone knee flexion 2# 20x     Russian Estim for 10' 5/5 QS 15x,  SLR 15x, SAQ 15x    Manual therapy to R knee for 10' including patellar mobs, overpressure ext,      Pt received cold pack on R knee for 0 min for decreased swelling, pain, and circulation     Education provided re:  Krysta verbalized good understa nding of education provided on achieving TKE and knee flexion at home.    No spiritual or educational barriers to learning provided     Assessment   Pt tolerating tx well with improved quad activation with SLR with increased weight with several exercise.  Quad strength is slowly progressing. Pt would like to know if she can drop freq from 3x/wk     Goals as follows:     Short Term GOALS: 6 weeks. Pt agrees with goals set.  1. Patient demonstrates independence with HEP.   2. Pt to exhibit AROM R knee flex 115 deg, -1 deg ext (In Progress)     3. Pt to exhibit R LE gross strength of 4/5 (MET)  4. Pt to ambulate independently without  AD for > 100 feet (MET  5. Pt to report pain level <4/10 @ worst (MET)  6. Pt with " fair-good balance in SLS (MET)     Long Term GOALS: 12  weeks. Pt agrees with goals set.  1. Pt to exhibit R knee AROM 120 deg flx, 0 deg ext (In Progress)  2. Pt to demonstrate R LE gross strength fo 4+/5 (In Progress)  3. Pt to demonstrate improved gait quality and improved gait speed (MET)  4. Pt to return to daily/recreational activites with 0/10 pain (In Progress)  5. Pt to exhibit good balance in SLS (In Progress)        Plan      Continue with established Plan of Care towards PT goals.     Rudy Cabrera PTA, STS

## 2020-11-16 ENCOUNTER — CLINICAL SUPPORT (OUTPATIENT)
Dept: REHABILITATION | Facility: HOSPITAL | Age: 75
End: 2020-11-16
Payer: MEDICARE

## 2020-11-16 PROCEDURE — 97140 MANUAL THERAPY 1/> REGIONS: CPT | Mod: HCNC,CQ

## 2020-11-16 PROCEDURE — 97110 THERAPEUTIC EXERCISES: CPT | Mod: HCNC,CQ

## 2020-11-16 NOTE — PROGRESS NOTES
"                                                    Physical Therapy Daily Note      Name: Krysta Kam  [facility] Number: [id number]  Diagnosis:   S/P R TKA   Physician: Tha Wright III, *  Precautions: Fall/Standard  Visit #: 12/20  Auth Exp: 9/21/20  Eval Date; 9/18/20  POC Exp: 12/18/20  Visits Date: 11/9/20     Time In: 1055  Time Out: 1140  Billed time: 45     Subjective      Pt reports: no pain in R knee at present time due to pain medication prior to tx.   Pain Scale: 0/10  Compliant with HEP      Objective   11/4/20 AROM : 2-0-120     No TTP, mild swelling, extensor lag still noted, improved gait pattern with minimal deviation     Krysta received individual therapeutic exercises to develop strength, endurance, ROM and flexibility for 25 minutes including:     Recumbent bike full revolution fwd 10 min increased ROM, circulation and mm strengthening/endurance  QS w/ heel on towel  3x10/3"  QS 3x10/3"  SAQ #2 30x  SLR 1# 30x   Bridges 2x10  Prone knee flexion 1# 30x        Manual therapy to R knee for 10' including patellar mobs, overpressure ext, prone quad and hip flexor stretch.     Pt received cold pack on R knee for 10 min for decreased swelling, pain, and circulation     Education provided re:  Krysta verbalized good understa nding of education provided on achieving TKE and knee flexion at home.    No spiritual or educational barriers to learning provided     Assessment   Pt tolerating tx well. Improved quad activation with SLR and QS.  Quad strength continues to  slowly progress. VC/TC for correcting form/technique with therex. Continue to progress as tolerated.      Goals as follows:     Short Term GOALS: 6 weeks. Pt agrees with goals set.  1. Patient demonstrates independence with HEP.   2. Pt to exhibit AROM R knee flex 115 deg, -1 deg ext (In Progress)     3. Pt to exhibit R LE gross strength of 4/5 (MET)  4. Pt to ambulate independently without  AD for > 100 feet (MET  5. Pt to report pain " level <4/10 @ worst (MET)  6. Pt with fair-good balance in SLS (MET)     Long Term GOALS: 12  weeks. Pt agrees with goals set.  1. Pt to exhibit R knee AROM 120 deg flx, 0 deg ext (In Progress)  2. Pt to demonstrate R LE gross strength fo 4+/5 (In Progress)  3. Pt to demonstrate improved gait quality and improved gait speed (MET)  4. Pt to return to daily/recreational activites with 0/10 pain (In Progress)  5. Pt to exhibit good balance in SLS (In Progress)        Plan      Continue with established Plan of Care towards PT goals.     Rudy Cabrera PTA, STS

## 2020-11-17 ENCOUNTER — PATIENT OUTREACH (OUTPATIENT)
Dept: ADMINISTRATIVE | Facility: HOSPITAL | Age: 75
End: 2020-11-17

## 2020-11-17 ENCOUNTER — OFFICE VISIT (OUTPATIENT)
Dept: ORTHOPEDICS | Facility: CLINIC | Age: 75
End: 2020-11-17
Payer: MEDICARE

## 2020-11-17 ENCOUNTER — PATIENT MESSAGE (OUTPATIENT)
Dept: ADMINISTRATIVE | Facility: HOSPITAL | Age: 75
End: 2020-11-17

## 2020-11-17 DIAGNOSIS — Z96.651 STATUS POST TOTAL RIGHT KNEE REPLACEMENT: Primary | ICD-10-CM

## 2020-11-17 PROCEDURE — 99999 PR PBB SHADOW E&M-EST. PATIENT-LVL III: CPT | Mod: PBBFAC,HCNC,, | Performed by: ORTHOPAEDIC SURGERY

## 2020-11-17 PROCEDURE — 1101F PR PT FALLS ASSESS DOC 0-1 FALLS W/OUT INJ PAST YR: ICD-10-PCS | Mod: HCNC,CPTII,S$GLB, | Performed by: ORTHOPAEDIC SURGERY

## 2020-11-17 PROCEDURE — 99999 PR PBB SHADOW E&M-EST. PATIENT-LVL III: ICD-10-PCS | Mod: PBBFAC,HCNC,, | Performed by: ORTHOPAEDIC SURGERY

## 2020-11-17 PROCEDURE — 3072F LOW RISK FOR RETINOPATHY: CPT | Mod: HCNC,S$GLB,, | Performed by: ORTHOPAEDIC SURGERY

## 2020-11-17 PROCEDURE — 3072F PR LOW RISK FOR RETINOPATHY: ICD-10-PCS | Mod: HCNC,S$GLB,, | Performed by: ORTHOPAEDIC SURGERY

## 2020-11-17 PROCEDURE — 1101F PT FALLS ASSESS-DOCD LE1/YR: CPT | Mod: HCNC,CPTII,S$GLB, | Performed by: ORTHOPAEDIC SURGERY

## 2020-11-17 PROCEDURE — 99024 POSTOP FOLLOW-UP VISIT: CPT | Mod: HCNC,S$GLB,, | Performed by: ORTHOPAEDIC SURGERY

## 2020-11-17 PROCEDURE — 3288F FALL RISK ASSESSMENT DOCD: CPT | Mod: HCNC,CPTII,S$GLB, | Performed by: ORTHOPAEDIC SURGERY

## 2020-11-17 PROCEDURE — 99024 PR POST-OP FOLLOW-UP VISIT: ICD-10-PCS | Mod: HCNC,S$GLB,, | Performed by: ORTHOPAEDIC SURGERY

## 2020-11-17 PROCEDURE — 3288F PR FALLS RISK ASSESSMENT DOCUMENTED: ICD-10-PCS | Mod: HCNC,CPTII,S$GLB, | Performed by: ORTHOPAEDIC SURGERY

## 2020-11-17 NOTE — PROGRESS NOTES
Health Maintenance Due   Topic Date Due    Influenza Vaccine (1) 08/01/2020     I called and spoke with pt.  Pt stated that she will take her BP when she gets home and contact us with the new BP reading to update her chart.  Chart review completed.

## 2020-11-18 ENCOUNTER — CLINICAL SUPPORT (OUTPATIENT)
Dept: REHABILITATION | Facility: HOSPITAL | Age: 75
End: 2020-11-18
Payer: MEDICARE

## 2020-11-18 PROCEDURE — 97110 THERAPEUTIC EXERCISES: CPT | Mod: HCNC,CQ

## 2020-11-18 NOTE — PROGRESS NOTES
"                                                    Physical Therapy Daily Note      Name: Krysta Kam  Clinic Number: [id number]  Diagnosis:   S/P R TKA   Physician: Tha Wright III, *  Precautions: Fall/Standard  Visit #: 12/20  Auth Exp: 9/21/20  Eval Date; 9/18/20  POC Exp: 12/18/20  Visits Date: 11/9/20     Time In: 1:15  Time Out: 2:00 p  Billed time: 45     Subjective      Pt reports:quad is always sore  Pain Scale: 0/10  Compliant with HEP      Objective   11/4/20 AROM : 2-0-120     No ext lag     Krysta received individual therapeutic exercises to develop strength, endurance, ROM and flexibility for 45 minutes including:     Recumbent bike full revolution fwd 10 min increased ROM, circulation and mm strengthening/endurance  Heel prop ext S #10 above/below x5 min  QS w/ heel prop 20" hold x20  LAQ #2 30x Tempo 3/3/3  SLR 1# 30x   Standing HS curl #2 3x10  Lat Step up lat 6" step 3x10  Tandem stance 2x30"    Manual therapy to R knee for 00' including patellar mobs, overpressure ext, prone quad and hip flexor stretch.     Pt received cold pack on R knee for 00 min for decreased swelling, pain, and circulation     Education provided re:  Krysta verbalized good understa nding of education provided on achieving TKE and knee flexion at home.    No spiritual or educational barriers to learning provided     Assessment   Pt started session w/ decreased TKE which improved upon heel prop s. Cont to show weak quad strength but has improved from start of therapy. Cont to progress as herlinda.      Goals as follows:     Short Term GOALS: 6 weeks. Pt agrees with goals set.  1. Patient demonstrates independence with HEP.   2. Pt to exhibit AROM R knee flex 115 deg, -1 deg ext (In Progress)     3. Pt to exhibit R LE gross strength of 4/5 (MET)  4. Pt to ambulate independently without  AD for > 100 feet (MET  5. Pt to report pain level <4/10 @ worst (MET)  6. Pt with fair-good balance in SLS (MET)     Long Term " GOALS: 12  weeks. Pt agrees with goals set.  1. Pt to exhibit R knee AROM 120 deg flx, 0 deg ext (In Progress)  2. Pt to demonstrate R LE gross strength fo 4+/5 (In Progress)  3. Pt to demonstrate improved gait quality and improved gait speed (MET)  4. Pt to return to daily/recreational activites with 0/10 pain (In Progress)  5. Pt to exhibit good balance in SLS (In Progress)        Plan      Continue with established Plan of Care towards PT goals.     Chelsea Garg PTA

## 2020-11-18 NOTE — PROGRESS NOTES
Subjective:     HPI:   Krysta Kam is a 74 y.o. female who presents 8 weeks out right total knee replacement September 16, 2020 for range of motion check    At 6 weeks she was getting a little stiff we did Celebrex Tylenol compound cream and increase her baseline gabapentin to twice a day    Overall she says she is significantly better therapy is helping she is happy with her knee she gladly she is glad she had done and has not been as bad as she thought it would be     Objective:   There is no height or weight on file to calculate BMI.  Exam:  Walking much better no limp nonantalgic gait 4-110 degree knee range of motion 7° valgus alignment    Scar still very adherent to the underlying tissues at the distal half of the incision    Pre-op 5-110, 10 deg valgus      Imaging:  None today      Assessment:       ICD-10-CM ICD-9-CM   1. Status post total right knee replacement 9/16/2020  Z96.651 V43.65      Doing better with better pain regimen improved range of motion was a stiff knee prior to surgery and was on Neurontin prior to surgery     Plan:       She will stay the course continue her exercises finished physical therapy    We will see her back in a month for 3 month standard visit with 3 month x-rays    No orders of the defined types were placed in this encounter.            Past Medical History:   Diagnosis Date    Anemia 6/2012    Anxiety     Cataract     Colon polyps 1/31/2017    Depression     Diabetes mellitus without complication 11/14/2018    Diabetes mellitus, type 2     Fibromyalgia     GERD (gastroesophageal reflux disease)     High cholesterol     Hypertension        Past Surgical History:   Procedure Laterality Date    COLONOSCOPY N/A 10/18/2016    Procedure: COLONOSCOPY;  Surgeon: Brittni Edmondson MD;  Location: [facility] ([address] [place] [address]);  Service: Endoscopy;  Laterality: N/A;    COLONOSCOPY N/A 10/8/2019    Procedure: COLONOSCOPY;  Surgeon: Gold Ravi MD;  Location: [facility] ODAL[facility]  (4TH FLR);  Service: Endoscopy;  Laterality: N/A;    ESOPHAGOGASTRODUODENOSCOPY N/A 10/8/2019    Procedure: EGD (ESOPHAGOGASTRODUODENOSCOPY);  Surgeon: Gold Ravi MD;  Location: [address]);  Service: Endoscopy;  Laterality: N/A;  Okay for any provider due to KARSTEN    HYSTERECTOMY      KNEE ARTHROPLASTY Right 2020    Procedure: ARTHROPLASTY, KNEE: DEPUY-ATTUNE;  Surgeon: Tha Wright III, MD;  Location: [facility];  Service: Orthopedics;  Laterality: Right;       Family History   Problem Relation Age of Onset    Stroke Mother     Heart disease Father     No Known Problems Daughter     No Known Problems Son     No Known Problems Daughter     No Known Problems Daughter     No Known Problems Son     Cataracts Neg Hx     Cancer Neg Hx     Diabetes Neg Hx     Glaucoma Neg Hx     Retinal detachment Neg Hx     Celiac disease Neg Hx     Cirrhosis Neg Hx     Colon cancer Neg Hx     Cystic fibrosis Neg Hx     Esophageal cancer Neg Hx     Inflammatory bowel disease Neg Hx     Liver disease Neg Hx     Stomach cancer Neg Hx     Amblyopia Neg Hx     Blindness Neg Hx     Macular degeneration Neg Hx     Strabismus Neg Hx     Thyroid disease Neg Hx        Social History     Socioeconomic History    Marital status: Single     Spouse name: Not on file    Number of children: 5    Years of education: Not on file    Highest education level: Not on file   Occupational History    Not on file   Social Needs    Financial resource strain: Not hard at all    Food insecurity     Worry: Never true     Inability: Never true    Transportation needs     Medical: No     Non-medical: No   Tobacco Use    Smoking status: Former Smoker     Packs/day: 0.25     Quit date: 1975     Years since quittin.9    Smokeless tobacco: Never Used    Tobacco comment: occasional social smoker   Substance and Sexual Activity    Alcohol use: No     Frequency: Never     Binge frequency: Never    Drug use:  No    Sexual activity: Not Currently   Lifestyle    Physical activity     Days per week: 4 days     Minutes per session: 10 min    Stress: Only a little   Relationships    Social connections     Talks on phone: More than three times a week     Gets together: Once a week     Attends Mu-ism service: Never     Active member of club or organization: No     Attends meetings of clubs or organizations: Never     Relationship status:    Other Topics Concern    Not on file   Social History Narrative    Retired

## 2020-11-23 ENCOUNTER — TELEPHONE (OUTPATIENT)
Dept: INTERNAL MEDICINE | Facility: CLINIC | Age: 75
End: 2020-11-23

## 2020-11-23 ENCOUNTER — CLINICAL SUPPORT (OUTPATIENT)
Dept: REHABILITATION | Facility: HOSPITAL | Age: 75
End: 2020-11-23
Payer: MEDICARE

## 2020-11-23 DIAGNOSIS — M25.561 CHRONIC PAIN OF RIGHT KNEE: Primary | ICD-10-CM

## 2020-11-23 DIAGNOSIS — Z96.651 STATUS POST TOTAL RIGHT KNEE REPLACEMENT: ICD-10-CM

## 2020-11-23 DIAGNOSIS — G89.29 CHRONIC PAIN OF RIGHT KNEE: Primary | ICD-10-CM

## 2020-11-23 PROCEDURE — 97110 THERAPEUTIC EXERCISES: CPT | Mod: HCNC

## 2020-11-23 NOTE — PROGRESS NOTES
"n                                                  Physical Therapy Daily Note      Name: Krysta Kam  [facility] Number: [id number]  Diagnosis:   S/P R TKA   Physician: Tha Wright III, *  Precautions: Fall/Standard  Visit #: 13/20  Auth Exp: 9/21/20  Eval Date; 9/18/20  POC Exp: 12/18/20  Visits Date: 11/23/20     Time In: 2:45 p  Time Out: 3:30 p  Billed time: 45     Subjective      Pt reports: Knee feels a little sore towards the back side today  Pain Scale: 0/10  Compliant with HEP      Objective   11/23/20 AROM : 2-0-120     No ext lag     Krysta received individual therapeutic exercises to develop strength, endurance, ROM and flexibility for 45 minutes including:     Upright Bike 8 min  Heel prop ext S #10 above/below x5 min  QS w/ heel prop 20" hold x20  LAQ #2 30x Tempo 3/3/3  SLR 1# 30x    Standing HS curl #2 3x10  Lat Step up lat 6" step 3x10  Tandem stance 2x30"     Manual therapy to R knee for 00' including patellar mobs, overpressure ext, prone quad and hip flexor stretch.     Pt received cold pack on R knee for 00 min for decreased swelling, pain, and circulation     Education provided re:  Krysta verbalized good understa nding of education provided on achieving TKE and knee flexion at home.    No spiritual or educational barriers to learning provided     Assessment   Pt was able to perform exercises with minimal verbal cueing for correct form. Pt tolerated tx well without complaint. Pt continues to experience increase in knee pain with end range flexion and reports quad is painful. PT discussed long duration stretching for knee flexion at home.   Goals as follows:     Short Term GOALS: 6 weeks. Pt agrees with goals set.  1. Patient demonstrates independence with HEP.   2. Pt to exhibit AROM R knee flex 115 deg, -1 deg ext (In Progress)     3. Pt to exhibit R LE gross strength of 4/5 (MET)  4. Pt to ambulate independently without  AD for > 100 feet (MET  5. Pt to report pain level <4/10 @ worst " (MET)  6. Pt with fair-good balance in SLS (MET)     Long Term GOALS: 12  weeks. Pt agrees with goals set.  1. Pt to exhibit R knee AROM 120 deg flx, 0 deg ext (In Progress)  2. Pt to demonstrate R LE gross strength fo 4+/5 (In Progress)  3. Pt to demonstrate improved gait quality and improved gait speed (MET)  4. Pt to return to daily/recreational activites with 0/10 pain (In Progress)  5. Pt to exhibit good balance in SLS (In Progress)        Plan      Continue with established Plan of Care towards PT goals.     Lynda Licona, PT, DPT

## 2020-11-23 NOTE — TELEPHONE ENCOUNTER
----- Message from Roxanne Somers sent at 11/23/2020  9:42 AM CST -----  Contact: self/ [phone]  Patient is calling in with her BP for 11/23  which is 138/76

## 2020-12-02 ENCOUNTER — CLINICAL SUPPORT (OUTPATIENT)
Dept: REHABILITATION | Facility: HOSPITAL | Age: 75
End: 2020-12-02
Payer: MEDICARE

## 2020-12-02 DIAGNOSIS — Z96.651 STATUS POST TOTAL RIGHT KNEE REPLACEMENT: Primary | ICD-10-CM

## 2020-12-02 PROCEDURE — 97110 THERAPEUTIC EXERCISES: CPT | Mod: HCNC

## 2020-12-02 NOTE — PROGRESS NOTES
"                                                     Physical Therapy Daily Note      Name: Krysta Kam  Clinic Number: [id number]  Diagnosis:   S/P R TKA   Physician: Tha Wright III, *  Precautions: Fall/Standard  Visit #: 13/20  Auth Exp: 9/21/20  Eval Date; 9/18/20  POC Exp: 12/18/20  Visits Date: 12/2/20     Time In: 1:00  Time Out: 1;45 p  Billed time: 45     Subjective      Pt reports: she is a little sore but knee is improving  Pain Scale: 0/10  Compliant with HEP      Objective   12/2/20 AROM : 2-0-118  MMT-  R quad- 4/5, hip flx- 4-/5, HS 4/5     Krysta received individual therapeutic exercises to develop strength, endurance, ROM and flexibility for 45 minutes including:     Recumbent bike full revolution fwd 10 min increased ROM, circulation and mm strengthening/endurance  Heel prop ext S #10 above/below x5 min  QS w/ heel prop 20" hold x20  LAQ #3 3sets to fatigue  SLR 1# 30x   Shuttle dbl leg 50# 3X20  Shuttle sngl leg 25# 3X20  Standing HS curl #2 3x10 NP  Lat Step up lat 6" step 3x10  Step downs 3" 2X10  Tandem stance 2x30"     Manual therapy to R knee for 00' including patellar mobs, overpressure ext, prone quad and hip flexor stretch.     Pt received cold pack on R knee for 00 min for decreased swelling, pain, and circulation     Education provided re:  Krysta verbalized good understa nding of education provided on achieving TKE and knee flexion at home.    No spiritual or educational barriers to learning provided     Assessment   Pt tolerated tx well today, Pt continues to show improvement with improved gait quality, LE strength and improved R knee range. Pt would benefit from continuing skilled physical to improve quad strength and control.  Pt to continue 1X for 4 weeks.       Goals as follows:     Short Term GOALS: 6 weeks. Pt agrees with goals set.  1. Patient demonstrates independence with HEP.   2. Pt to exhibit AROM R knee flex 115 deg, -1 deg ext (In Progress)     3. Pt to " exhibit R LE gross strength of 4/5 (MET)  4. Pt to ambulate independently without  AD for > 100 feet (MET  5. Pt to report pain level <4/10 @ worst (MET)  6. Pt with fair-good balance in SLS (MET)     Long Term GOALS: 12  weeks. Pt agrees with goals set.  1. Pt to exhibit R knee AROM 120 deg flx, 0 deg ext (In Progress)  2. Pt to demonstrate R LE gross strength fo 4+/5 (In Progress)  3. Pt to demonstrate improved gait quality and improved gait speed (MET)  4. Pt to return to daily/recreational activites with 0/10 pain (In Progress)  5. Pt to exhibit good balance in SLS (In Progress)        Plan      Continue with established Plan of Care towards PT goals. Cont 1X week, 4X weeks     Lynda Licona, PT, DPT  12/2/2020

## 2020-12-11 ENCOUNTER — CLINICAL SUPPORT (OUTPATIENT)
Dept: REHABILITATION | Facility: HOSPITAL | Age: 75
End: 2020-12-11
Payer: MEDICARE

## 2020-12-11 DIAGNOSIS — Z96.651 STATUS POST TOTAL RIGHT KNEE REPLACEMENT: Primary | ICD-10-CM

## 2020-12-11 PROCEDURE — 97140 MANUAL THERAPY 1/> REGIONS: CPT | Mod: HCNC

## 2020-12-11 PROCEDURE — 97110 THERAPEUTIC EXERCISES: CPT | Mod: HCNC

## 2020-12-11 NOTE — PROGRESS NOTES
"     Physical Therapy Daily Treatment Note     Name: Krysta Kam  Clinic Number: [id number]    Therapy Diagnosis: No diagnosis found.  Name: Krysta Kam  Clinic Number: [id number]  Diagnosis:   S/P R TKA   Physician: Tha Wright III, *  Precautions: Fall/Standard  Visit #: 14 /20  Auth Exp: 9/21/20  Eval Date; 9/18/20  POC Exp: 12/18/20  Visits Date: 12/2/20     Time In: 1300   Time Out: 1345  Billed time: 45     Subjective      Pt reports: 16 steps to bedroom: ascending with step-through pattern but requires step-to leading with R LE to descend.   Pain Scale: 0/10  Compliant with HEP      Objective   12/11/20 AROM : 2-0-110  MMT-  R quad- 4/5, hip flx- 4-/5, HS 4/5 (not tested today)     Krysta received individual therapeutic exercises to develop strength, endurance, ROM and flexibility for 35 minutes including:     Upright bike full revolution fwd 10 min increased ROM, circulation and mm strengthening/endurance  Heel prop ext S #10 above/below x5 min -np  QS w/ heel prop 20" hold x20- np  LAQ #3 3sets to fatigue (slow eccentric)  SLR 1# 30x -np  Shuttle dbl leg 3X20 -np  Shuttle single leg 1.5 bands 3X10  Standing HS curl 3x10  Lat Step up lat 6" step 3x10 -np  Step downs 6" 2X10 with B UE (R handrail and SPC to simulate home)  Tandem stance 2x30" -np     Manual therapy to R knee for 10' including   - STM distal quads, HS, fib head mobs, AP knee mobs  Pt received cold pack on R knee for 00 min for decreased swelling, pain, and circulation     Education provided re:  Practice stair descent with R handrail and L hand on SPC practice eccentric lowering  Krysta verbalized good understanding of education provided on achieving TKE and knee flexion at home.    No spiritual or educational barriers to learning provided     Assessment   Lacks eccentric R quad control required for step-through pattern descending stairs. Pt to continue 1X for 4 weeks.       Goals as follows:     Short Term GOALS: 6 weeks. " Pt agrees with goals set.  1. Patient demonstrates independence with HEP.   2. Pt to exhibit AROM R knee flex 115 deg, -1 deg ext (In Progress)     3. Pt to exhibit R LE gross strength of 4/5 (MET)  4. Pt to ambulate independently without  AD for > 100 feet (MET  5. Pt to report pain level <4/10 @ worst (MET)  6. Pt with fair-good balance in SLS (MET)     Long Term GOALS: 12  weeks. Pt agrees with goals set.  1. Pt to exhibit R knee AROM 120 deg flx, 0 deg ext (In Progress)  2. Pt to demonstrate R LE gross strength fo 4+/5 (In Progress)  3. Pt to demonstrate improved gait quality and improved gait speed (MET)  4. Pt to return to daily/recreational activites with 0/10 pain (In Progress)  5. Pt to exhibit good balance in SLS (In Progress)        Plan      Continue with established Plan of Care towards PT goals. Cont 1X week, 4X weeks     Lynda Licona, PT, DPT  12/2/2020

## 2020-12-17 ENCOUNTER — HOSPITAL ENCOUNTER (OUTPATIENT)
Dept: RADIOLOGY | Facility: HOSPITAL | Age: 75
Discharge: HOME | End: 2020-12-17
Attending: ORTHOPAEDIC SURGERY
Payer: MEDICARE

## 2020-12-17 ENCOUNTER — OFFICE VISIT (OUTPATIENT)
Dept: ORTHOPEDICS | Facility: CLINIC | Age: 75
End: 2020-12-17
Payer: MEDICARE

## 2020-12-17 DIAGNOSIS — Z96.651 STATUS POST TOTAL RIGHT KNEE REPLACEMENT: ICD-10-CM

## 2020-12-17 DIAGNOSIS — Z96.651 STATUS POST TOTAL RIGHT KNEE REPLACEMENT: Primary | ICD-10-CM

## 2020-12-17 PROCEDURE — 73560 XR KNEE 1 OR 2 VIEW RIGHT: ICD-10-PCS | Mod: 26,HCNC,RT, | Performed by: RADIOLOGY

## 2020-12-17 PROCEDURE — 73560 X-RAY EXAM OF KNEE 1 OR 2: CPT | Mod: TC,HCNC,RT

## 2020-12-17 PROCEDURE — 3072F LOW RISK FOR RETINOPATHY: CPT | Mod: HCNC,S$GLB,, | Performed by: ORTHOPAEDIC SURGERY

## 2020-12-17 PROCEDURE — 73560 X-RAY EXAM OF KNEE 1 OR 2: CPT | Mod: 26,HCNC,RT, | Performed by: RADIOLOGY

## 2020-12-17 PROCEDURE — 99024 PR POST-OP FOLLOW-UP VISIT: ICD-10-PCS | Mod: HCNC,S$GLB,, | Performed by: ORTHOPAEDIC SURGERY

## 2020-12-17 PROCEDURE — 99999 PR PBB SHADOW E&M-EST. PATIENT-LVL III: ICD-10-PCS | Mod: PBBFAC,HCNC,, | Performed by: ORTHOPAEDIC SURGERY

## 2020-12-17 PROCEDURE — 1101F PR PT FALLS ASSESS DOC 0-1 FALLS W/OUT INJ PAST YR: ICD-10-PCS | Mod: HCNC,CPTII,S$GLB, | Performed by: ORTHOPAEDIC SURGERY

## 2020-12-17 PROCEDURE — 99024 POSTOP FOLLOW-UP VISIT: CPT | Mod: HCNC,S$GLB,, | Performed by: ORTHOPAEDIC SURGERY

## 2020-12-17 PROCEDURE — 1101F PT FALLS ASSESS-DOCD LE1/YR: CPT | Mod: HCNC,CPTII,S$GLB, | Performed by: ORTHOPAEDIC SURGERY

## 2020-12-17 PROCEDURE — 99999 PR PBB SHADOW E&M-EST. PATIENT-LVL III: CPT | Mod: PBBFAC,HCNC,, | Performed by: ORTHOPAEDIC SURGERY

## 2020-12-17 PROCEDURE — 3288F PR FALLS RISK ASSESSMENT DOCUMENTED: ICD-10-PCS | Mod: HCNC,CPTII,S$GLB, | Performed by: ORTHOPAEDIC SURGERY

## 2020-12-17 PROCEDURE — 3072F PR LOW RISK FOR RETINOPATHY: ICD-10-PCS | Mod: HCNC,S$GLB,, | Performed by: ORTHOPAEDIC SURGERY

## 2020-12-17 PROCEDURE — 3288F FALL RISK ASSESSMENT DOCD: CPT | Mod: HCNC,CPTII,S$GLB, | Performed by: ORTHOPAEDIC SURGERY

## 2020-12-22 ENCOUNTER — CLINICAL SUPPORT (OUTPATIENT)
Dept: REHABILITATION | Facility: HOSPITAL | Age: 75
End: 2020-12-22
Payer: MEDICARE

## 2020-12-22 PROCEDURE — 97140 MANUAL THERAPY 1/> REGIONS: CPT | Mod: HCNC,CQ

## 2020-12-22 PROCEDURE — 97110 THERAPEUTIC EXERCISES: CPT | Mod: HCNC,CQ

## 2020-12-22 NOTE — PROGRESS NOTES
"     Physical Therapy Daily Treatment Note     Name: Krysta Kam  Clinic Number: [id number]    Therapy Diagnosis: No diagnosis found.  Name: Krysta Kam  Clinic Number: [id number]  Diagnosis:   S/P R TKA   Physician: Tha Wright III, *  Precautions: Fall/Standard  Visit #: 14 /20  Auth Exp: 9/21/20  Eval Date; 9/18/20  POC Exp: 12/18/20  Visits Date: 12/2/20     Time In: 1250  Time Out: 1335  Billed time: 35     Subjective      Pt reports: np pain at present time. Stated min medial knee pain "from time to time".   Pain Scale: 0/10  Compliant with HEP   Improved gait pattern      Objective   12/11/20 AROM : 2-0-110  MMT-  R quad- 4/5, hip flx- 4-/5, HS 4/5 (not tested today)     Krysta received individual therapeutic exercises to develop strength, endurance, ROM and flexibility for 35 minutes including:     Upright bike full revolution fwd 10 min increased ROM, circulation and mm strengthening/endurance  Shuttle dbl leg 2.5 bands 3x10  Shuttle single leg 1.5 bands 3X10  R LE step down eccentric control 3x10  R QS heel prop 3x10    Not today   LAQ #3 3sets to fatigue (slow eccentric)  SLR 1# 30x -np  Standing HS curl 3x10  Lat Step up lat 6" step 3x10 -np     Manual therapy to R knee for 10' including patellar mobs, Heel cord, HS and quad stretch    Pt received cold pack on R knee for 10 min for decreased swelling, pain, and circulation     Education provided re:  Practice stair descent with R handrail and L hand on SPC practice eccentric lowering  Krysta verbalized good understanding of education provided on achieving TKE and knee flexion at home.    No spiritual or educational barriers to learning provided     Assessment   Pt tolerating tx well with continue work on quad control both concentric and eccentric. VC/TC for correcting form/technique with therex and quad activation.  Continue to progress as tolerated.      Goals as follows:     Short Term GOALS: 6 weeks. Pt agrees with goals set.  1. " Patient demonstrates independence with HEP.   2. Pt to exhibit AROM R knee flex 115 deg, -1 deg ext (In Progress)     3. Pt to exhibit R LE gross strength of 4/5 (MET)  4. Pt to ambulate independently without  AD for > 100 feet (MET  5. Pt to report pain level <4/10 @ worst (MET)  6. Pt with fair-good balance in SLS (MET)     Long Term GOALS: 12  weeks. Pt agrees with goals set.  1. Pt to exhibit R knee AROM 120 deg flx, 0 deg ext (In Progress)  2. Pt to demonstrate R LE gross strength fo 4+/5 (In Progress)  3. Pt to demonstrate improved gait quality and improved gait speed (MET)  4. Pt to return to daily/recreational activites with 0/10 pain (In Progress)  5. Pt to exhibit good balance in SLS (In Progress)        Plan      Continue with established Plan of Care towards PT goals. Cont 1X week, 4X weeks     Rudy Cabrera PTA, STS

## 2021-01-05 ENCOUNTER — TELEPHONE (OUTPATIENT)
Dept: INTERNAL MEDICINE | Facility: CLINIC | Age: 76
End: 2021-01-05

## 2021-01-05 ENCOUNTER — IMMUNIZATION (OUTPATIENT)
Dept: PHARMACY | Facility: CLINIC | Age: 76
End: 2021-01-05
Payer: MEDICARE

## 2021-01-05 ENCOUNTER — LAB VISIT (OUTPATIENT)
Dept: LAB | Facility: HOSPITAL | Age: 76
End: 2021-01-05
Attending: INTERNAL MEDICINE
Payer: MEDICARE

## 2021-01-05 ENCOUNTER — OFFICE VISIT (OUTPATIENT)
Dept: INTERNAL MEDICINE | Facility: CLINIC | Age: 76
End: 2021-01-05
Payer: MEDICARE

## 2021-01-05 VITALS
HEIGHT: 61 IN | SYSTOLIC BLOOD PRESSURE: 138 MMHG | HEART RATE: 64 BPM | OXYGEN SATURATION: 98 % | DIASTOLIC BLOOD PRESSURE: 72 MMHG | WEIGHT: 119.94 LBS | BODY MASS INDEX: 22.64 KG/M2

## 2021-01-05 DIAGNOSIS — E11.9 DIABETES MELLITUS WITHOUT COMPLICATION: ICD-10-CM

## 2021-01-05 DIAGNOSIS — I10 ESSENTIAL HYPERTENSION: ICD-10-CM

## 2021-01-05 DIAGNOSIS — D64.9 ANEMIA, UNSPECIFIED TYPE: Primary | ICD-10-CM

## 2021-01-05 DIAGNOSIS — D50.9 IRON DEFICIENCY ANEMIA, UNSPECIFIED IRON DEFICIENCY ANEMIA TYPE: ICD-10-CM

## 2021-01-05 DIAGNOSIS — M79.7 FIBROMYALGIA: ICD-10-CM

## 2021-01-05 DIAGNOSIS — H02.822 CYST OF RIGHT LOWER EYELID: ICD-10-CM

## 2021-01-05 DIAGNOSIS — F41.9 ANXIETY: Primary | ICD-10-CM

## 2021-01-05 DIAGNOSIS — E11.36 TYPE 2 DIABETES MELLITUS WITH DIABETIC CATARACT, WITHOUT LONG-TERM CURRENT USE OF INSULIN: ICD-10-CM

## 2021-01-05 LAB
ALBUMIN SERPL BCP-MCNC: 3.9 G/DL (ref 3.5–5.2)
ALP SERPL-CCNC: 60 U/L (ref 55–135)
ALT SERPL W/O P-5'-P-CCNC: 11 U/L (ref 10–44)
ANION GAP SERPL CALC-SCNC: 11 MMOL/L (ref 8–16)
AST SERPL-CCNC: 21 U/L (ref 10–40)
BASOPHILS # BLD AUTO: 0.04 K/UL (ref 0–0.2)
BASOPHILS NFR BLD: 0.6 % (ref 0–1.9)
BILIRUB SERPL-MCNC: 0.6 MG/DL (ref 0.1–1)
BUN SERPL-MCNC: 23 MG/DL (ref 8–23)
CALCIUM SERPL-MCNC: 9.6 MG/DL (ref 8.7–10.5)
CHLORIDE SERPL-SCNC: 102 MMOL/L (ref 95–110)
CO2 SERPL-SCNC: 29 MMOL/L (ref 23–29)
CREAT SERPL-MCNC: 1 MG/DL (ref 0.5–1.4)
DIFFERENTIAL METHOD: ABNORMAL
EOSINOPHIL # BLD AUTO: 0.2 K/UL (ref 0–0.5)
EOSINOPHIL NFR BLD: 2.4 % (ref 0–8)
ERYTHROCYTE [DISTWIDTH] IN BLOOD BY AUTOMATED COUNT: 17.6 % (ref 11.5–14.5)
EST. GFR  (AFRICAN AMERICAN): >60 ML/MIN/1.73 M^2
EST. GFR  (NON AFRICAN AMERICAN): 55.2 ML/MIN/1.73 M^2
ESTIMATED AVG GLUCOSE: 131 MG/DL (ref 68–131)
GLUCOSE SERPL-MCNC: 100 MG/DL (ref 70–110)
HBA1C MFR BLD HPLC: 6.2 % (ref 4–5.6)
HCT VFR BLD AUTO: 37.3 % (ref 37–48.5)
HGB BLD-MCNC: 11 G/DL (ref 12–16)
IMM GRANULOCYTES # BLD AUTO: 0.01 K/UL (ref 0–0.04)
IMM GRANULOCYTES NFR BLD AUTO: 0.2 % (ref 0–0.5)
LYMPHOCYTES # BLD AUTO: 1.9 K/UL (ref 1–4.8)
LYMPHOCYTES NFR BLD: 30.2 % (ref 18–48)
MCH RBC QN AUTO: 22.7 PG (ref 27–31)
MCHC RBC AUTO-ENTMCNC: 29.5 G/DL (ref 32–36)
MCV RBC AUTO: 77 FL (ref 82–98)
MONOCYTES # BLD AUTO: 0.6 K/UL (ref 0.3–1)
MONOCYTES NFR BLD: 9.3 % (ref 4–15)
NEUTROPHILS # BLD AUTO: 3.6 K/UL (ref 1.8–7.7)
NEUTROPHILS NFR BLD: 57.3 % (ref 38–73)
NRBC BLD-RTO: 0 /100 WBC
PLATELET # BLD AUTO: 240 K/UL (ref 150–350)
PMV BLD AUTO: 13.1 FL (ref 9.2–12.9)
POTASSIUM SERPL-SCNC: 4 MMOL/L (ref 3.5–5.1)
PROT SERPL-MCNC: 7.6 G/DL (ref 6–8.4)
RBC # BLD AUTO: 4.85 M/UL (ref 4–5.4)
SODIUM SERPL-SCNC: 142 MMOL/L (ref 136–145)
WBC # BLD AUTO: 6.35 K/UL (ref 3.9–12.7)

## 2021-01-05 PROCEDURE — 1101F PT FALLS ASSESS-DOCD LE1/YR: CPT | Mod: HCNC,CPTII,S$GLB, | Performed by: INTERNAL MEDICINE

## 2021-01-05 PROCEDURE — 3078F DIAST BP <80 MM HG: CPT | Mod: HCNC,CPTII,S$GLB, | Performed by: INTERNAL MEDICINE

## 2021-01-05 PROCEDURE — 36415 COLL VENOUS BLD VENIPUNCTURE: CPT | Mod: HCNC

## 2021-01-05 PROCEDURE — 3044F PR MOST RECENT HEMOGLOBIN A1C LEVEL <7.0%: ICD-10-PCS | Mod: HCNC,CPTII,S$GLB, | Performed by: INTERNAL MEDICINE

## 2021-01-05 PROCEDURE — 3072F LOW RISK FOR RETINOPATHY: CPT | Mod: HCNC,S$GLB,, | Performed by: INTERNAL MEDICINE

## 2021-01-05 PROCEDURE — 1126F PR PAIN SEVERITY QUANTIFIED, NO PAIN PRESENT: ICD-10-PCS | Mod: HCNC,S$GLB,, | Performed by: INTERNAL MEDICINE

## 2021-01-05 PROCEDURE — 3288F FALL RISK ASSESSMENT DOCD: CPT | Mod: HCNC,CPTII,S$GLB, | Performed by: INTERNAL MEDICINE

## 2021-01-05 PROCEDURE — 3078F PR MOST RECENT DIASTOLIC BLOOD PRESSURE < 80 MM HG: ICD-10-PCS | Mod: HCNC,CPTII,S$GLB, | Performed by: INTERNAL MEDICINE

## 2021-01-05 PROCEDURE — 99214 OFFICE O/P EST MOD 30 MIN: CPT | Mod: HCNC,S$GLB,, | Performed by: INTERNAL MEDICINE

## 2021-01-05 PROCEDURE — 99999 PR PBB SHADOW E&M-EST. PATIENT-LVL V: ICD-10-PCS | Mod: PBBFAC,HCNC,, | Performed by: INTERNAL MEDICINE

## 2021-01-05 PROCEDURE — 99499 UNLISTED E&M SERVICE: CPT | Mod: S$GLB,,, | Performed by: INTERNAL MEDICINE

## 2021-01-05 PROCEDURE — 1126F AMNT PAIN NOTED NONE PRSNT: CPT | Mod: HCNC,S$GLB,, | Performed by: INTERNAL MEDICINE

## 2021-01-05 PROCEDURE — 3075F SYST BP GE 130 - 139MM HG: CPT | Mod: HCNC,CPTII,S$GLB, | Performed by: INTERNAL MEDICINE

## 2021-01-05 PROCEDURE — 3044F HG A1C LEVEL LT 7.0%: CPT | Mod: HCNC,CPTII,S$GLB, | Performed by: INTERNAL MEDICINE

## 2021-01-05 PROCEDURE — 99999 PR PBB SHADOW E&M-EST. PATIENT-LVL V: CPT | Mod: PBBFAC,HCNC,, | Performed by: INTERNAL MEDICINE

## 2021-01-05 PROCEDURE — 99499 RISK ADDL DX/OHS AUDIT: ICD-10-PCS | Mod: S$GLB,,, | Performed by: INTERNAL MEDICINE

## 2021-01-05 PROCEDURE — 83036 HEMOGLOBIN GLYCOSYLATED A1C: CPT | Mod: HCNC

## 2021-01-05 PROCEDURE — 85025 COMPLETE CBC W/AUTO DIFF WBC: CPT | Mod: HCNC

## 2021-01-05 PROCEDURE — 99214 PR OFFICE/OUTPT VISIT, EST, LEVL IV, 30-39 MIN: ICD-10-PCS | Mod: HCNC,S$GLB,, | Performed by: INTERNAL MEDICINE

## 2021-01-05 PROCEDURE — 3075F PR MOST RECENT SYSTOLIC BLOOD PRESS GE 130-139MM HG: ICD-10-PCS | Mod: HCNC,CPTII,S$GLB, | Performed by: INTERNAL MEDICINE

## 2021-01-05 PROCEDURE — 3072F PR LOW RISK FOR RETINOPATHY: ICD-10-PCS | Mod: HCNC,S$GLB,, | Performed by: INTERNAL MEDICINE

## 2021-01-05 PROCEDURE — 1159F MED LIST DOCD IN RCRD: CPT | Mod: HCNC,S$GLB,, | Performed by: INTERNAL MEDICINE

## 2021-01-05 PROCEDURE — 3288F PR FALLS RISK ASSESSMENT DOCUMENTED: ICD-10-PCS | Mod: HCNC,CPTII,S$GLB, | Performed by: INTERNAL MEDICINE

## 2021-01-05 PROCEDURE — 1159F PR MEDICATION LIST DOCUMENTED IN MEDICAL RECORD: ICD-10-PCS | Mod: HCNC,S$GLB,, | Performed by: INTERNAL MEDICINE

## 2021-01-05 PROCEDURE — 1101F PR PT FALLS ASSESS DOC 0-1 FALLS W/OUT INJ PAST YR: ICD-10-PCS | Mod: HCNC,CPTII,S$GLB, | Performed by: INTERNAL MEDICINE

## 2021-01-05 PROCEDURE — 80053 COMPREHEN METABOLIC PANEL: CPT | Mod: HCNC

## 2021-01-05 RX ORDER — GLUC/MSM/COLGN2/HYAL/ANTIARTH3 375-375-20
1 TABLET ORAL DAILY
Qty: 90 TABLET | Refills: 1 | Status: SHIPPED | OUTPATIENT
Start: 2021-01-05 | End: 2023-11-04 | Stop reason: SDUPTHER

## 2021-01-22 ENCOUNTER — TELEPHONE (OUTPATIENT)
Dept: INTERNAL MEDICINE | Facility: CLINIC | Age: 76
End: 2021-01-22

## 2021-02-19 ENCOUNTER — PES CALL (OUTPATIENT)
Dept: ADMINISTRATIVE | Facility: CLINIC | Age: 76
End: 2021-02-19

## 2021-02-22 ENCOUNTER — PATIENT OUTREACH (OUTPATIENT)
Dept: ADMINISTRATIVE | Facility: OTHER | Age: 76
End: 2021-02-22

## 2021-02-23 ENCOUNTER — OFFICE VISIT (OUTPATIENT)
Dept: OPTOMETRY | Facility: CLINIC | Age: 76
End: 2021-02-23
Payer: MEDICARE

## 2021-02-23 DIAGNOSIS — H02.9 LESION OF RIGHT LOWER EYELID: Primary | ICD-10-CM

## 2021-02-23 PROCEDURE — 1101F PT FALLS ASSESS-DOCD LE1/YR: CPT | Mod: CPTII,S$GLB,, | Performed by: OPTOMETRIST

## 2021-02-23 PROCEDURE — 99999 PR PBB SHADOW E&M-EST. PATIENT-LVL IV: ICD-10-PCS | Mod: PBBFAC,,, | Performed by: OPTOMETRIST

## 2021-02-23 PROCEDURE — 3288F FALL RISK ASSESSMENT DOCD: CPT | Mod: CPTII,S$GLB,, | Performed by: OPTOMETRIST

## 2021-02-23 PROCEDURE — 1101F PR PT FALLS ASSESS DOC 0-1 FALLS W/OUT INJ PAST YR: ICD-10-PCS | Mod: CPTII,S$GLB,, | Performed by: OPTOMETRIST

## 2021-02-23 PROCEDURE — 3288F PR FALLS RISK ASSESSMENT DOCUMENTED: ICD-10-PCS | Mod: CPTII,S$GLB,, | Performed by: OPTOMETRIST

## 2021-02-23 PROCEDURE — 1126F AMNT PAIN NOTED NONE PRSNT: CPT | Mod: S$GLB,,, | Performed by: OPTOMETRIST

## 2021-02-23 PROCEDURE — 92012 PR EYE EXAM, EST PATIENT,INTERMED: ICD-10-PCS | Mod: S$GLB,,, | Performed by: OPTOMETRIST

## 2021-02-23 PROCEDURE — 1126F PR PAIN SEVERITY QUANTIFIED, NO PAIN PRESENT: ICD-10-PCS | Mod: S$GLB,,, | Performed by: OPTOMETRIST

## 2021-02-23 PROCEDURE — 99999 PR PBB SHADOW E&M-EST. PATIENT-LVL IV: CPT | Mod: PBBFAC,,, | Performed by: OPTOMETRIST

## 2021-02-23 PROCEDURE — 92012 INTRM OPH EXAM EST PATIENT: CPT | Mod: S$GLB,,, | Performed by: OPTOMETRIST

## 2021-03-08 ENCOUNTER — TELEPHONE (OUTPATIENT)
Dept: INTERNAL MEDICINE | Facility: CLINIC | Age: 76
End: 2021-03-08

## 2021-03-11 ENCOUNTER — TELEPHONE (OUTPATIENT)
Dept: INTERNAL MEDICINE | Facility: CLINIC | Age: 76
End: 2021-03-11

## 2021-04-16 ENCOUNTER — OFFICE VISIT (OUTPATIENT)
Dept: HOME HEALTH SERVICES | Facility: CLINIC | Age: 76
End: 2021-04-16
Payer: MEDICARE

## 2021-04-16 VITALS
DIASTOLIC BLOOD PRESSURE: 77 MMHG | BODY MASS INDEX: 22.66 KG/M2 | HEART RATE: 67 BPM | HEIGHT: 61 IN | WEIGHT: 120 LBS | SYSTOLIC BLOOD PRESSURE: 154 MMHG | TEMPERATURE: 98 F

## 2021-04-16 DIAGNOSIS — E11.9 DIABETES MELLITUS WITHOUT COMPLICATION: ICD-10-CM

## 2021-04-16 DIAGNOSIS — Z96.651 STATUS POST TOTAL RIGHT KNEE REPLACEMENT: ICD-10-CM

## 2021-04-16 DIAGNOSIS — K55.9 ISCHEMIC COLITIS: ICD-10-CM

## 2021-04-16 DIAGNOSIS — G47.00 INSOMNIA, UNSPECIFIED TYPE: ICD-10-CM

## 2021-04-16 DIAGNOSIS — Z00.00 ENCOUNTER FOR PREVENTIVE HEALTH EXAMINATION: Primary | ICD-10-CM

## 2021-04-16 DIAGNOSIS — F41.9 ANXIETY: ICD-10-CM

## 2021-04-16 DIAGNOSIS — H16.223 KERATOCONJUNCTIVITIS SICCA, NOT SPECIFIED AS SJOGREN'S, BILATERAL: ICD-10-CM

## 2021-04-16 DIAGNOSIS — E11.36 TYPE 2 DIABETES MELLITUS WITH DIABETIC CATARACT, WITHOUT LONG-TERM CURRENT USE OF INSULIN: ICD-10-CM

## 2021-04-16 DIAGNOSIS — I10 ESSENTIAL HYPERTENSION: ICD-10-CM

## 2021-04-16 DIAGNOSIS — M79.7 FIBROMYALGIA: ICD-10-CM

## 2021-04-16 DIAGNOSIS — H25.13 NUCLEAR SCLEROTIC CATARACT OF BOTH EYES: ICD-10-CM

## 2021-04-16 DIAGNOSIS — E78.00 HIGH CHOLESTEROL: ICD-10-CM

## 2021-04-16 PROCEDURE — 99499 UNLISTED E&M SERVICE: CPT | Mod: S$GLB,,, | Performed by: NURSE PRACTITIONER

## 2021-04-16 PROCEDURE — 3288F FALL RISK ASSESSMENT DOCD: CPT | Mod: CPTII,S$GLB,, | Performed by: NURSE PRACTITIONER

## 2021-04-16 PROCEDURE — 3072F PR LOW RISK FOR RETINOPATHY: ICD-10-PCS | Mod: S$GLB,,, | Performed by: NURSE PRACTITIONER

## 2021-04-16 PROCEDURE — 3288F PR FALLS RISK ASSESSMENT DOCUMENTED: ICD-10-PCS | Mod: CPTII,S$GLB,, | Performed by: NURSE PRACTITIONER

## 2021-04-16 PROCEDURE — 99499 RISK ADDL DX/OHS AUDIT: ICD-10-PCS | Mod: S$GLB,,, | Performed by: NURSE PRACTITIONER

## 2021-04-16 PROCEDURE — 1101F PR PT FALLS ASSESS DOC 0-1 FALLS W/OUT INJ PAST YR: ICD-10-PCS | Mod: CPTII,S$GLB,, | Performed by: NURSE PRACTITIONER

## 2021-04-16 PROCEDURE — 1158F PR ADVANCE CARE PLANNING DISCUSS DOCUMENTED IN MEDICAL RECORD: ICD-10-PCS | Mod: S$GLB,,, | Performed by: NURSE PRACTITIONER

## 2021-04-16 PROCEDURE — 1101F PT FALLS ASSESS-DOCD LE1/YR: CPT | Mod: CPTII,S$GLB,, | Performed by: NURSE PRACTITIONER

## 2021-04-16 PROCEDURE — 1126F PR PAIN SEVERITY QUANTIFIED, NO PAIN PRESENT: ICD-10-PCS | Mod: S$GLB,,, | Performed by: NURSE PRACTITIONER

## 2021-04-16 PROCEDURE — 3072F LOW RISK FOR RETINOPATHY: CPT | Mod: S$GLB,,, | Performed by: NURSE PRACTITIONER

## 2021-04-16 PROCEDURE — G0439 PR MEDICARE ANNUAL WELLNESS SUBSEQUENT VISIT: ICD-10-PCS | Mod: S$GLB,,, | Performed by: NURSE PRACTITIONER

## 2021-04-16 PROCEDURE — G0439 PPPS, SUBSEQ VISIT: HCPCS | Mod: S$GLB,,, | Performed by: NURSE PRACTITIONER

## 2021-04-16 PROCEDURE — 1126F AMNT PAIN NOTED NONE PRSNT: CPT | Mod: S$GLB,,, | Performed by: NURSE PRACTITIONER

## 2021-04-16 PROCEDURE — 1158F ADVNC CARE PLAN TLK DOCD: CPT | Mod: S$GLB,,, | Performed by: NURSE PRACTITIONER

## 2021-05-05 DIAGNOSIS — E11.9 TYPE 2 DIABETES MELLITUS WITHOUT COMPLICATION: ICD-10-CM

## 2021-05-06 RX ORDER — LOSARTAN POTASSIUM 100 MG/1
100 TABLET ORAL DAILY
Qty: 90 TABLET | Refills: 3 | Status: SHIPPED | OUTPATIENT
Start: 2021-05-06 | End: 2021-06-17

## 2021-05-06 RX ORDER — ATORVASTATIN CALCIUM 40 MG/1
40 TABLET, FILM COATED ORAL DAILY
Qty: 90 TABLET | Refills: 3 | Status: SHIPPED | OUTPATIENT
Start: 2021-05-06 | End: 2022-01-14 | Stop reason: SDUPTHER

## 2021-05-12 DIAGNOSIS — E11.9 TYPE 2 DIABETES MELLITUS WITHOUT COMPLICATION: ICD-10-CM

## 2021-05-13 DIAGNOSIS — Z96.651 STATUS POST TOTAL RIGHT KNEE REPLACEMENT: ICD-10-CM

## 2021-05-14 RX ORDER — CELECOXIB 200 MG/1
200 CAPSULE ORAL DAILY
Qty: 90 CAPSULE | Refills: 1 | Status: SHIPPED | OUTPATIENT
Start: 2021-05-14 | End: 2021-05-24 | Stop reason: SDUPTHER

## 2021-05-24 ENCOUNTER — TELEPHONE (OUTPATIENT)
Dept: INTERNAL MEDICINE | Facility: CLINIC | Age: 76
End: 2021-05-24

## 2021-05-24 DIAGNOSIS — Z96.651 STATUS POST TOTAL RIGHT KNEE REPLACEMENT: ICD-10-CM

## 2021-05-24 RX ORDER — CELECOXIB 200 MG/1
200 CAPSULE ORAL DAILY
Qty: 90 CAPSULE | Refills: 1 | Status: SHIPPED | OUTPATIENT
Start: 2021-05-24 | End: 2022-01-14

## 2021-06-17 RX ORDER — LOSARTAN POTASSIUM 100 MG/1
TABLET ORAL
Qty: 90 TABLET | Refills: 0 | Status: SHIPPED | OUTPATIENT
Start: 2021-06-17 | End: 2022-01-14 | Stop reason: SDUPTHER

## 2021-07-02 ENCOUNTER — LAB VISIT (OUTPATIENT)
Dept: LAB | Facility: HOSPITAL | Age: 76
End: 2021-07-02
Attending: INTERNAL MEDICINE
Payer: MEDICARE

## 2021-07-02 DIAGNOSIS — E11.36 TYPE 2 DIABETES MELLITUS WITH DIABETIC CATARACT, WITHOUT LONG-TERM CURRENT USE OF INSULIN: ICD-10-CM

## 2021-07-02 DIAGNOSIS — D64.9 ANEMIA, UNSPECIFIED TYPE: ICD-10-CM

## 2021-07-02 LAB
ALBUMIN SERPL BCP-MCNC: 3.5 G/DL (ref 3.5–5.2)
ALP SERPL-CCNC: 70 U/L (ref 55–135)
ALT SERPL W/O P-5'-P-CCNC: 16 U/L (ref 10–44)
ANION GAP SERPL CALC-SCNC: 10 MMOL/L (ref 8–16)
AST SERPL-CCNC: 23 U/L (ref 10–40)
BASOPHILS # BLD AUTO: 0.05 K/UL (ref 0–0.2)
BASOPHILS NFR BLD: 1 % (ref 0–1.9)
BILIRUB SERPL-MCNC: 0.7 MG/DL (ref 0.1–1)
BUN SERPL-MCNC: 17 MG/DL (ref 8–23)
CALCIUM SERPL-MCNC: 10.3 MG/DL (ref 8.7–10.5)
CHLORIDE SERPL-SCNC: 104 MMOL/L (ref 95–110)
CO2 SERPL-SCNC: 27 MMOL/L (ref 23–29)
CREAT SERPL-MCNC: 0.9 MG/DL (ref 0.5–1.4)
DIFFERENTIAL METHOD: ABNORMAL
EOSINOPHIL # BLD AUTO: 0.2 K/UL (ref 0–0.5)
EOSINOPHIL NFR BLD: 3 % (ref 0–8)
ERYTHROCYTE [DISTWIDTH] IN BLOOD BY AUTOMATED COUNT: 14.8 % (ref 11.5–14.5)
EST. GFR  (AFRICAN AMERICAN): >60 ML/MIN/1.73 M^2
EST. GFR  (NON AFRICAN AMERICAN): >60 ML/MIN/1.73 M^2
ESTIMATED AVG GLUCOSE: 114 MG/DL (ref 68–131)
GLUCOSE SERPL-MCNC: 93 MG/DL (ref 70–110)
HBA1C MFR BLD: 5.6 % (ref 4–5.6)
HCT VFR BLD AUTO: 38.2 % (ref 37–48.5)
HGB BLD-MCNC: 12.1 G/DL (ref 12–16)
IMM GRANULOCYTES # BLD AUTO: 0.01 K/UL (ref 0–0.04)
IMM GRANULOCYTES NFR BLD AUTO: 0.2 % (ref 0–0.5)
LYMPHOCYTES # BLD AUTO: 1.7 K/UL (ref 1–4.8)
LYMPHOCYTES NFR BLD: 34.7 % (ref 18–48)
MCH RBC QN AUTO: 25.9 PG (ref 27–31)
MCHC RBC AUTO-ENTMCNC: 31.7 G/DL (ref 32–36)
MCV RBC AUTO: 82 FL (ref 82–98)
MONOCYTES # BLD AUTO: 0.5 K/UL (ref 0.3–1)
MONOCYTES NFR BLD: 10.8 % (ref 4–15)
NEUTROPHILS # BLD AUTO: 2.5 K/UL (ref 1.8–7.7)
NEUTROPHILS NFR BLD: 50.3 % (ref 38–73)
NRBC BLD-RTO: 0 /100 WBC
PLATELET # BLD AUTO: 173 K/UL (ref 150–450)
PMV BLD AUTO: 13.3 FL (ref 9.2–12.9)
POTASSIUM SERPL-SCNC: 4.2 MMOL/L (ref 3.5–5.1)
PROT SERPL-MCNC: 6.9 G/DL (ref 6–8.4)
RBC # BLD AUTO: 4.68 M/UL (ref 4–5.4)
SODIUM SERPL-SCNC: 141 MMOL/L (ref 136–145)
WBC # BLD AUTO: 4.93 K/UL (ref 3.9–12.7)

## 2021-07-02 PROCEDURE — 83036 HEMOGLOBIN GLYCOSYLATED A1C: CPT | Performed by: INTERNAL MEDICINE

## 2021-07-02 PROCEDURE — 80053 COMPREHEN METABOLIC PANEL: CPT | Performed by: INTERNAL MEDICINE

## 2021-07-02 PROCEDURE — 36415 COLL VENOUS BLD VENIPUNCTURE: CPT | Mod: PO | Performed by: INTERNAL MEDICINE

## 2021-07-02 PROCEDURE — 85025 COMPLETE CBC W/AUTO DIFF WBC: CPT | Performed by: INTERNAL MEDICINE

## 2021-07-06 ENCOUNTER — PATIENT MESSAGE (OUTPATIENT)
Dept: ADMINISTRATIVE | Facility: HOSPITAL | Age: 76
End: 2021-07-06

## 2021-07-06 ENCOUNTER — OFFICE VISIT (OUTPATIENT)
Dept: INTERNAL MEDICINE | Facility: CLINIC | Age: 76
End: 2021-07-06
Payer: MEDICARE

## 2021-07-06 VITALS
BODY MASS INDEX: 23.81 KG/M2 | WEIGHT: 126.13 LBS | OXYGEN SATURATION: 99 % | HEIGHT: 61 IN | HEART RATE: 63 BPM | DIASTOLIC BLOOD PRESSURE: 81 MMHG | SYSTOLIC BLOOD PRESSURE: 139 MMHG

## 2021-07-06 DIAGNOSIS — F41.9 ANXIETY: Primary | ICD-10-CM

## 2021-07-06 DIAGNOSIS — E11.36 TYPE 2 DIABETES MELLITUS WITH DIABETIC CATARACT, WITHOUT LONG-TERM CURRENT USE OF INSULIN: ICD-10-CM

## 2021-07-06 DIAGNOSIS — E78.00 HIGH CHOLESTEROL: ICD-10-CM

## 2021-07-06 DIAGNOSIS — I10 ESSENTIAL HYPERTENSION: ICD-10-CM

## 2021-07-06 DIAGNOSIS — M79.7 FIBROMYALGIA: ICD-10-CM

## 2021-07-06 PROCEDURE — 1159F PR MEDICATION LIST DOCUMENTED IN MEDICAL RECORD: ICD-10-PCS | Mod: S$GLB,,, | Performed by: INTERNAL MEDICINE

## 2021-07-06 PROCEDURE — 99499 RISK ADDL DX/OHS AUDIT: ICD-10-PCS | Mod: S$GLB,,, | Performed by: INTERNAL MEDICINE

## 2021-07-06 PROCEDURE — 1159F MED LIST DOCD IN RCRD: CPT | Mod: S$GLB,,, | Performed by: INTERNAL MEDICINE

## 2021-07-06 PROCEDURE — 99999 PR PBB SHADOW E&M-EST. PATIENT-LVL III: CPT | Mod: PBBFAC,,, | Performed by: INTERNAL MEDICINE

## 2021-07-06 PROCEDURE — 3288F PR FALLS RISK ASSESSMENT DOCUMENTED: ICD-10-PCS | Mod: CPTII,S$GLB,, | Performed by: INTERNAL MEDICINE

## 2021-07-06 PROCEDURE — 3072F LOW RISK FOR RETINOPATHY: CPT | Mod: S$GLB,,, | Performed by: INTERNAL MEDICINE

## 2021-07-06 PROCEDURE — 99214 OFFICE O/P EST MOD 30 MIN: CPT | Mod: S$GLB,,, | Performed by: INTERNAL MEDICINE

## 2021-07-06 PROCEDURE — 1125F PR PAIN SEVERITY QUANTIFIED, PAIN PRESENT: ICD-10-PCS | Mod: S$GLB,,, | Performed by: INTERNAL MEDICINE

## 2021-07-06 PROCEDURE — 3072F PR LOW RISK FOR RETINOPATHY: ICD-10-PCS | Mod: S$GLB,,, | Performed by: INTERNAL MEDICINE

## 2021-07-06 PROCEDURE — 99999 PR PBB SHADOW E&M-EST. PATIENT-LVL III: ICD-10-PCS | Mod: PBBFAC,,, | Performed by: INTERNAL MEDICINE

## 2021-07-06 PROCEDURE — 99214 PR OFFICE/OUTPT VISIT, EST, LEVL IV, 30-39 MIN: ICD-10-PCS | Mod: S$GLB,,, | Performed by: INTERNAL MEDICINE

## 2021-07-06 PROCEDURE — 99499 UNLISTED E&M SERVICE: CPT | Mod: S$GLB,,, | Performed by: INTERNAL MEDICINE

## 2021-07-06 PROCEDURE — 1101F PR PT FALLS ASSESS DOC 0-1 FALLS W/OUT INJ PAST YR: ICD-10-PCS | Mod: CPTII,S$GLB,, | Performed by: INTERNAL MEDICINE

## 2021-07-06 PROCEDURE — 3288F FALL RISK ASSESSMENT DOCD: CPT | Mod: CPTII,S$GLB,, | Performed by: INTERNAL MEDICINE

## 2021-07-06 PROCEDURE — 1125F AMNT PAIN NOTED PAIN PRSNT: CPT | Mod: S$GLB,,, | Performed by: INTERNAL MEDICINE

## 2021-07-06 PROCEDURE — 1101F PT FALLS ASSESS-DOCD LE1/YR: CPT | Mod: CPTII,S$GLB,, | Performed by: INTERNAL MEDICINE

## 2021-07-20 ENCOUNTER — PATIENT MESSAGE (OUTPATIENT)
Dept: ORTHOPEDICS | Facility: CLINIC | Age: 76
End: 2021-07-20

## 2021-07-28 RX ORDER — GABAPENTIN 300 MG/1
300 CAPSULE ORAL 2 TIMES DAILY
Qty: 180 CAPSULE | Refills: 11 | Status: SHIPPED | OUTPATIENT
Start: 2021-07-28 | End: 2022-11-15 | Stop reason: SDUPTHER

## 2021-08-11 ENCOUNTER — PATIENT MESSAGE (OUTPATIENT)
Dept: ORTHOPEDICS | Facility: CLINIC | Age: 76
End: 2021-08-11

## 2021-09-13 DIAGNOSIS — Z96.651 STATUS POST TOTAL RIGHT KNEE REPLACEMENT: Primary | ICD-10-CM

## 2021-09-17 ENCOUNTER — OFFICE VISIT (OUTPATIENT)
Dept: OPTOMETRY | Facility: CLINIC | Age: 76
End: 2021-09-17
Payer: COMMERCIAL

## 2021-09-17 DIAGNOSIS — H52.03 HYPEROPIA WITH ASTIGMATISM AND PRESBYOPIA, BILATERAL: ICD-10-CM

## 2021-09-17 DIAGNOSIS — H52.203 HYPEROPIA WITH ASTIGMATISM AND PRESBYOPIA, BILATERAL: ICD-10-CM

## 2021-09-17 DIAGNOSIS — H52.4 HYPEROPIA WITH ASTIGMATISM AND PRESBYOPIA, BILATERAL: ICD-10-CM

## 2021-09-17 DIAGNOSIS — Z01.00 EYE EXAM, ROUTINE: Primary | ICD-10-CM

## 2021-09-17 PROCEDURE — 99999 PR PBB SHADOW E&M-EST. PATIENT-LVL III: ICD-10-PCS | Mod: PBBFAC,,, | Performed by: OPTOMETRIST

## 2021-09-17 PROCEDURE — 92014 COMPRE OPH EXAM EST PT 1/>: CPT | Mod: S$GLB,,, | Performed by: OPTOMETRIST

## 2021-09-17 PROCEDURE — 92014 PR EYE EXAM, EST PATIENT,COMPREHESV: ICD-10-PCS | Mod: S$GLB,,, | Performed by: OPTOMETRIST

## 2021-09-17 PROCEDURE — 92015 DETERMINE REFRACTIVE STATE: CPT | Mod: S$GLB,,, | Performed by: OPTOMETRIST

## 2021-09-17 PROCEDURE — 92015 PR REFRACTION: ICD-10-PCS | Mod: S$GLB,,, | Performed by: OPTOMETRIST

## 2021-09-17 PROCEDURE — 99999 PR PBB SHADOW E&M-EST. PATIENT-LVL III: CPT | Mod: PBBFAC,,, | Performed by: OPTOMETRIST

## 2021-09-22 ENCOUNTER — HOSPITAL ENCOUNTER (OUTPATIENT)
Dept: RADIOLOGY | Facility: HOSPITAL | Age: 76
Discharge: HOME | End: 2021-09-22
Attending: ORTHOPAEDIC SURGERY
Payer: MEDICARE

## 2021-09-22 DIAGNOSIS — Z96.651 STATUS POST TOTAL RIGHT KNEE REPLACEMENT: ICD-10-CM

## 2021-09-22 PROCEDURE — 73562 X-RAY EXAM OF KNEE 3: CPT | Mod: 26,RT,, | Performed by: RADIOLOGY

## 2021-09-22 PROCEDURE — 73562 XR KNEE 3 VIEW RIGHT: ICD-10-PCS | Mod: 26,RT,, | Performed by: RADIOLOGY

## 2021-09-22 PROCEDURE — 73562 X-RAY EXAM OF KNEE 3: CPT | Mod: TC,RT

## 2021-09-23 ENCOUNTER — OFFICE VISIT (OUTPATIENT)
Dept: ORTHOPEDICS | Facility: CLINIC | Age: 76
End: 2021-09-23
Payer: MEDICARE

## 2021-09-23 VITALS — HEIGHT: 61 IN | WEIGHT: 125.69 LBS | BODY MASS INDEX: 23.73 KG/M2

## 2021-09-23 DIAGNOSIS — Z96.651 STATUS POST TOTAL RIGHT KNEE REPLACEMENT: Primary | ICD-10-CM

## 2021-09-23 PROCEDURE — 99213 OFFICE O/P EST LOW 20 MIN: CPT | Mod: S$GLB,,, | Performed by: ORTHOPAEDIC SURGERY

## 2021-09-23 PROCEDURE — 3044F HG A1C LEVEL LT 7.0%: CPT | Mod: CPTII,S$GLB,, | Performed by: ORTHOPAEDIC SURGERY

## 2021-09-23 PROCEDURE — 3288F PR FALLS RISK ASSESSMENT DOCUMENTED: ICD-10-PCS | Mod: CPTII,S$GLB,, | Performed by: ORTHOPAEDIC SURGERY

## 2021-09-23 PROCEDURE — 1101F PR PT FALLS ASSESS DOC 0-1 FALLS W/OUT INJ PAST YR: ICD-10-PCS | Mod: CPTII,S$GLB,, | Performed by: ORTHOPAEDIC SURGERY

## 2021-09-23 PROCEDURE — 1101F PT FALLS ASSESS-DOCD LE1/YR: CPT | Mod: CPTII,S$GLB,, | Performed by: ORTHOPAEDIC SURGERY

## 2021-09-23 PROCEDURE — 1125F AMNT PAIN NOTED PAIN PRSNT: CPT | Mod: CPTII,S$GLB,, | Performed by: ORTHOPAEDIC SURGERY

## 2021-09-23 PROCEDURE — 3072F LOW RISK FOR RETINOPATHY: CPT | Mod: CPTII,S$GLB,, | Performed by: ORTHOPAEDIC SURGERY

## 2021-09-23 PROCEDURE — 3288F FALL RISK ASSESSMENT DOCD: CPT | Mod: CPTII,S$GLB,, | Performed by: ORTHOPAEDIC SURGERY

## 2021-09-23 PROCEDURE — 99213 PR OFFICE/OUTPT VISIT, EST, LEVL III, 20-29 MIN: ICD-10-PCS | Mod: S$GLB,,, | Performed by: ORTHOPAEDIC SURGERY

## 2021-09-23 PROCEDURE — 99999 PR PBB SHADOW E&M-EST. PATIENT-LVL III: ICD-10-PCS | Mod: PBBFAC,,, | Performed by: ORTHOPAEDIC SURGERY

## 2021-09-23 PROCEDURE — 3044F PR MOST RECENT HEMOGLOBIN A1C LEVEL <7.0%: ICD-10-PCS | Mod: CPTII,S$GLB,, | Performed by: ORTHOPAEDIC SURGERY

## 2021-09-23 PROCEDURE — 3072F PR LOW RISK FOR RETINOPATHY: ICD-10-PCS | Mod: CPTII,S$GLB,, | Performed by: ORTHOPAEDIC SURGERY

## 2021-09-23 PROCEDURE — 1159F MED LIST DOCD IN RCRD: CPT | Mod: CPTII,S$GLB,, | Performed by: ORTHOPAEDIC SURGERY

## 2021-09-23 PROCEDURE — 1125F PR PAIN SEVERITY QUANTIFIED, PAIN PRESENT: ICD-10-PCS | Mod: CPTII,S$GLB,, | Performed by: ORTHOPAEDIC SURGERY

## 2021-09-23 PROCEDURE — 99999 PR PBB SHADOW E&M-EST. PATIENT-LVL III: CPT | Mod: PBBFAC,,, | Performed by: ORTHOPAEDIC SURGERY

## 2021-09-23 PROCEDURE — 1159F PR MEDICATION LIST DOCUMENTED IN MEDICAL RECORD: ICD-10-PCS | Mod: CPTII,S$GLB,, | Performed by: ORTHOPAEDIC SURGERY

## 2021-10-04 ENCOUNTER — PATIENT MESSAGE (OUTPATIENT)
Dept: ADMINISTRATIVE | Facility: HOSPITAL | Age: 76
End: 2021-10-04

## 2021-12-27 ENCOUNTER — LAB VISIT (OUTPATIENT)
Dept: PRIMARY CARE CLINIC | Facility: OTHER | Age: 76
End: 2021-12-27
Payer: MEDICARE

## 2021-12-27 DIAGNOSIS — Z20.822 ENCOUNTER FOR LABORATORY TESTING FOR COVID-19 VIRUS: ICD-10-CM

## 2021-12-27 PROCEDURE — U0003 INFECTIOUS AGENT DETECTION BY NUCLEIC ACID (DNA OR RNA); SEVERE ACUTE RESPIRATORY SYNDROME CORONAVIRUS 2 (SARS-COV-2) (CORONAVIRUS DISEASE [COVID-19]), AMPLIFIED PROBE TECHNIQUE, MAKING USE OF HIGH THROUGHPUT TECHNOLOGIES AS DESCRIBED BY CMS-2020-01-R: HCPCS | Mod: HCNC | Performed by: INTERNAL MEDICINE

## 2021-12-29 LAB
SARS-COV-2 RNA RESP QL NAA+PROBE: NOT DETECTED
SARS-COV-2- CYCLE NUMBER: NORMAL

## 2022-01-03 ENCOUNTER — LAB VISIT (OUTPATIENT)
Dept: LAB | Facility: HOSPITAL | Age: 77
End: 2022-01-03
Attending: INTERNAL MEDICINE
Payer: MEDICARE

## 2022-01-03 DIAGNOSIS — E11.36 TYPE 2 DIABETES MELLITUS WITH DIABETIC CATARACT, WITHOUT LONG-TERM CURRENT USE OF INSULIN: ICD-10-CM

## 2022-01-03 DIAGNOSIS — E78.00 HIGH CHOLESTEROL: ICD-10-CM

## 2022-01-03 LAB
ALBUMIN SERPL BCP-MCNC: 3.6 G/DL (ref 3.5–5.2)
ALP SERPL-CCNC: 66 U/L (ref 55–135)
ALT SERPL W/O P-5'-P-CCNC: 16 U/L (ref 10–44)
ANION GAP SERPL CALC-SCNC: 8 MMOL/L (ref 8–16)
AST SERPL-CCNC: 23 U/L (ref 10–40)
BILIRUB SERPL-MCNC: 0.5 MG/DL (ref 0.1–1)
BUN SERPL-MCNC: 16 MG/DL (ref 8–23)
CALCIUM SERPL-MCNC: 9.8 MG/DL (ref 8.7–10.5)
CHLORIDE SERPL-SCNC: 102 MMOL/L (ref 95–110)
CHOLEST SERPL-MCNC: 203 MG/DL (ref 120–199)
CHOLEST/HDLC SERPL: 2.7 {RATIO} (ref 2–5)
CO2 SERPL-SCNC: 27 MMOL/L (ref 23–29)
CREAT SERPL-MCNC: 1 MG/DL (ref 0.5–1.4)
EST. GFR  (AFRICAN AMERICAN): >60 ML/MIN/1.73 M^2
EST. GFR  (NON AFRICAN AMERICAN): 54.9 ML/MIN/1.73 M^2
ESTIMATED AVG GLUCOSE: 120 MG/DL (ref 68–131)
GLUCOSE SERPL-MCNC: 93 MG/DL (ref 70–110)
HBA1C MFR BLD: 5.8 % (ref 4–5.6)
HDLC SERPL-MCNC: 75 MG/DL (ref 40–75)
HDLC SERPL: 36.9 % (ref 20–50)
LDLC SERPL CALC-MCNC: 120 MG/DL (ref 63–159)
NONHDLC SERPL-MCNC: 128 MG/DL
POTASSIUM SERPL-SCNC: 4.8 MMOL/L (ref 3.5–5.1)
PROT SERPL-MCNC: 7.2 G/DL (ref 6–8.4)
SODIUM SERPL-SCNC: 137 MMOL/L (ref 136–145)
TRIGL SERPL-MCNC: 40 MG/DL (ref 30–150)

## 2022-01-03 PROCEDURE — 83036 HEMOGLOBIN GLYCOSYLATED A1C: CPT | Mod: HCNC | Performed by: INTERNAL MEDICINE

## 2022-01-03 PROCEDURE — 36415 COLL VENOUS BLD VENIPUNCTURE: CPT | Mod: HCNC,PO | Performed by: INTERNAL MEDICINE

## 2022-01-03 PROCEDURE — 80053 COMPREHEN METABOLIC PANEL: CPT | Mod: HCNC | Performed by: INTERNAL MEDICINE

## 2022-01-03 PROCEDURE — 80061 LIPID PANEL: CPT | Mod: HCNC | Performed by: INTERNAL MEDICINE

## 2022-01-14 ENCOUNTER — OFFICE VISIT (OUTPATIENT)
Dept: INTERNAL MEDICINE | Facility: CLINIC | Age: 77
End: 2022-01-14
Payer: MEDICARE

## 2022-01-14 VITALS
SYSTOLIC BLOOD PRESSURE: 126 MMHG | HEIGHT: 61 IN | OXYGEN SATURATION: 99 % | BODY MASS INDEX: 24.6 KG/M2 | DIASTOLIC BLOOD PRESSURE: 84 MMHG | WEIGHT: 130.31 LBS | HEART RATE: 70 BPM

## 2022-01-14 DIAGNOSIS — K55.9 ISCHEMIC COLITIS: ICD-10-CM

## 2022-01-14 DIAGNOSIS — E11.9 DIABETES MELLITUS WITHOUT COMPLICATION: ICD-10-CM

## 2022-01-14 DIAGNOSIS — E78.00 HIGH CHOLESTEROL: ICD-10-CM

## 2022-01-14 DIAGNOSIS — M54.9 BACK PAIN, UNSPECIFIED BACK LOCATION, UNSPECIFIED BACK PAIN LATERALITY, UNSPECIFIED CHRONICITY: ICD-10-CM

## 2022-01-14 DIAGNOSIS — Z12.31 OTHER SCREENING MAMMOGRAM: ICD-10-CM

## 2022-01-14 DIAGNOSIS — I10 PRIMARY HYPERTENSION: ICD-10-CM

## 2022-01-14 DIAGNOSIS — M17.11 PRIMARY OSTEOARTHRITIS OF RIGHT KNEE: ICD-10-CM

## 2022-01-14 DIAGNOSIS — F41.9 ANXIETY: Primary | ICD-10-CM

## 2022-01-14 DIAGNOSIS — E11.36 TYPE 2 DIABETES MELLITUS WITH DIABETIC CATARACT, WITHOUT LONG-TERM CURRENT USE OF INSULIN: ICD-10-CM

## 2022-01-14 DIAGNOSIS — M79.7 FIBROMYALGIA: ICD-10-CM

## 2022-01-14 PROCEDURE — 99499 RISK ADDL DX/OHS AUDIT: ICD-10-PCS | Mod: S$GLB,,, | Performed by: INTERNAL MEDICINE

## 2022-01-14 PROCEDURE — 1159F PR MEDICATION LIST DOCUMENTED IN MEDICAL RECORD: ICD-10-PCS | Mod: HCNC,CPTII,S$GLB, | Performed by: INTERNAL MEDICINE

## 2022-01-14 PROCEDURE — 3288F FALL RISK ASSESSMENT DOCD: CPT | Mod: HCNC,CPTII,S$GLB, | Performed by: INTERNAL MEDICINE

## 2022-01-14 PROCEDURE — 3074F PR MOST RECENT SYSTOLIC BLOOD PRESSURE < 130 MM HG: ICD-10-PCS | Mod: HCNC,CPTII,S$GLB, | Performed by: INTERNAL MEDICINE

## 2022-01-14 PROCEDURE — 3072F LOW RISK FOR RETINOPATHY: CPT | Mod: HCNC,CPTII,S$GLB, | Performed by: INTERNAL MEDICINE

## 2022-01-14 PROCEDURE — 99214 OFFICE O/P EST MOD 30 MIN: CPT | Mod: HCNC,S$GLB,, | Performed by: INTERNAL MEDICINE

## 2022-01-14 PROCEDURE — 99999 PR PBB SHADOW E&M-EST. PATIENT-LVL IV: CPT | Mod: PBBFAC,HCNC,, | Performed by: INTERNAL MEDICINE

## 2022-01-14 PROCEDURE — 1101F PT FALLS ASSESS-DOCD LE1/YR: CPT | Mod: HCNC,CPTII,S$GLB, | Performed by: INTERNAL MEDICINE

## 2022-01-14 PROCEDURE — 1159F MED LIST DOCD IN RCRD: CPT | Mod: HCNC,CPTII,S$GLB, | Performed by: INTERNAL MEDICINE

## 2022-01-14 PROCEDURE — 3079F DIAST BP 80-89 MM HG: CPT | Mod: HCNC,CPTII,S$GLB, | Performed by: INTERNAL MEDICINE

## 2022-01-14 PROCEDURE — 99999 PR PBB SHADOW E&M-EST. PATIENT-LVL IV: ICD-10-PCS | Mod: PBBFAC,HCNC,, | Performed by: INTERNAL MEDICINE

## 2022-01-14 PROCEDURE — 1125F AMNT PAIN NOTED PAIN PRSNT: CPT | Mod: HCNC,CPTII,S$GLB, | Performed by: INTERNAL MEDICINE

## 2022-01-14 PROCEDURE — 99214 PR OFFICE/OUTPT VISIT, EST, LEVL IV, 30-39 MIN: ICD-10-PCS | Mod: HCNC,S$GLB,, | Performed by: INTERNAL MEDICINE

## 2022-01-14 PROCEDURE — 1125F PR PAIN SEVERITY QUANTIFIED, PAIN PRESENT: ICD-10-PCS | Mod: HCNC,CPTII,S$GLB, | Performed by: INTERNAL MEDICINE

## 2022-01-14 PROCEDURE — 99499 UNLISTED E&M SERVICE: CPT | Mod: S$GLB,,, | Performed by: INTERNAL MEDICINE

## 2022-01-14 PROCEDURE — 3074F SYST BP LT 130 MM HG: CPT | Mod: HCNC,CPTII,S$GLB, | Performed by: INTERNAL MEDICINE

## 2022-01-14 PROCEDURE — 1101F PR PT FALLS ASSESS DOC 0-1 FALLS W/OUT INJ PAST YR: ICD-10-PCS | Mod: HCNC,CPTII,S$GLB, | Performed by: INTERNAL MEDICINE

## 2022-01-14 PROCEDURE — 3072F PR LOW RISK FOR RETINOPATHY: ICD-10-PCS | Mod: HCNC,CPTII,S$GLB, | Performed by: INTERNAL MEDICINE

## 2022-01-14 PROCEDURE — 3288F PR FALLS RISK ASSESSMENT DOCUMENTED: ICD-10-PCS | Mod: HCNC,CPTII,S$GLB, | Performed by: INTERNAL MEDICINE

## 2022-01-14 PROCEDURE — 3079F PR MOST RECENT DIASTOLIC BLOOD PRESSURE 80-89 MM HG: ICD-10-PCS | Mod: HCNC,CPTII,S$GLB, | Performed by: INTERNAL MEDICINE

## 2022-01-14 RX ORDER — CHLORTHALIDONE 25 MG/1
25 TABLET ORAL DAILY
Qty: 90 TABLET | Refills: 3 | Status: SHIPPED | OUTPATIENT
Start: 2022-01-14 | End: 2023-01-20 | Stop reason: SDUPTHER

## 2022-01-14 RX ORDER — CITALOPRAM 10 MG/1
10 TABLET ORAL DAILY
Qty: 90 TABLET | Refills: 2 | Status: SHIPPED | OUTPATIENT
Start: 2022-01-14 | End: 2022-08-16

## 2022-01-14 RX ORDER — DICLOFENAC SODIUM 50 MG/1
50 TABLET, DELAYED RELEASE ORAL 2 TIMES DAILY PRN
Qty: 60 TABLET | Refills: 2 | Status: SHIPPED | OUTPATIENT
Start: 2022-01-14 | End: 2022-12-13 | Stop reason: SDUPTHER

## 2022-01-14 RX ORDER — AMLODIPINE BESYLATE 10 MG/1
10 TABLET ORAL DAILY
Qty: 90 TABLET | Refills: 2 | Status: SHIPPED | OUTPATIENT
Start: 2022-01-14 | End: 2023-01-20 | Stop reason: SDUPTHER

## 2022-01-14 RX ORDER — LOSARTAN POTASSIUM 100 MG/1
100 TABLET ORAL DAILY
Qty: 90 TABLET | Refills: 0 | Status: SHIPPED | OUTPATIENT
Start: 2022-01-14 | End: 2022-03-22

## 2022-01-14 RX ORDER — ATORVASTATIN CALCIUM 40 MG/1
40 TABLET, FILM COATED ORAL DAILY
Qty: 90 TABLET | Refills: 3 | Status: SHIPPED | OUTPATIENT
Start: 2022-01-14 | End: 2023-01-20 | Stop reason: SDUPTHER

## 2022-01-14 NOTE — PROGRESS NOTES
"  She is a 76-year-old lady coming in today to follow-up her ongoing medical problems.    She has had both covid vaccines since last visit.       She still having a lot of anxiety in dealing with her elderly, demented mother who is 93.   I last saw back in July 2021.  She has htn .  Blood pressure today is 126/84.  She is on amlodipine 10 mg a day, chlorthalidone  and losartan 100 mg a day.      She is not having any type of headache or chest pain at the current time.  She has had hypertension for multiple years.     She has ischemic colitis and has been having some iron deficiency anemia,   started her on iron last visit, she is tolerating it well.  In the past, she has   been on Feldene and she also was on Celebrex, but she is off both of these.  Has returned to normal, and her MCV is now normal..She had a Follow-up with GI about in Oct 2019.  ..  She is not having any blood in the stool.       She says her fibromyalgia has been acting up since she has been off nonsteroidals,   but she is not having any abdominal pain. She complains of a lot of back pain today.  Worse in the morning and she has difficulty getting out of bed.   She has some low back and thoracic  pain.          She has diabetes mellitus type 2.    She has been pretty well controlled.  Last hemoglobin A1c is 5.8 back.   She is not on any medication for her diabetes, but working on a low-glucose   diet.   She is on a statin and last lipid panel was  Check recently.           REVIEW OF SYSTEMS:  No chest pain, shortness of breath, palpitations, nausea,   vomiting, blurriness of vision.  No PND or orthopnea.     PHYSICAL EXAMINATION:   /84   Pulse 70   Ht 5' 1" (1.549 m)   Wt 59.1 kg (130 lb 4.7 oz)   SpO2 99%   BMI 24.62 kg/m²        Constitutional:  PT  is oriented to person, place, and time. He appears well-developed and well-nourished. No distress.   HENT: PERRL  Head: Normocephalic and atraumatic.   Eyes: Conjunctivae, EOM and lids are " normal.  SHe has a small cyst under her right eye.   Neck: Normal range of motion. Neck supple.   Thyroid is not enlarged.    Cardiovascular: Normal rate, regular rhythm, normal heart sounds and intact distal pulses.   No extrasystoles are present. Exam reveals no gallop.    Pulmonary/Chest: Effort normal and breath sounds normal. No respiratory distress. He has no wheezes.  no rales.   Abdominal: Soft.  exhibits no distension. There is no tenderness.  Bowel sound are normal    Musculoskeletal: He exhibits no edema or deformity.   Neurological: He is alert and oriented to person, place, and time.   Skin: Skin is warm, dry and intact. He is not diaphoretic. No pallor.   Walks in without assistance.  Is here with er elderly mother today       Protective Sensation (w/ 10 gram monofilament):  Right: Intact  Left: Intact    Visual Inspection:  Normal -  Bilateral       Pedal Pulses:   Right: Present  Left: Present    Posterior tibialis:   Right:Present  Left: Present                   ASSESSMENT:  1 She has had covid vaccine x 2 and would like to  take flu shot today and booster later.      2. Diabetes, well controlled.  --      3. Anemia.  resolved.  - will recheck   4. Fibromyalgia.  We are going to actually start her back on some diclofenac, Maybe PT for back pain.    .  5. Hypertension blood pressure boaderline but has been better  .     We discussed low-salt diet.  6. Hyperlipidemia.  Continue medications.  7. Anxiety--   Also,restart her  Citalopram, she thinks she needs to restart this.  . SHe is not letting her elderly mother bother her as much anymore.    MMG      Follow up in 6 months

## 2022-01-19 ENCOUNTER — TELEPHONE (OUTPATIENT)
Dept: INTERNAL MEDICINE | Facility: CLINIC | Age: 77
End: 2022-01-19
Payer: MEDICARE

## 2022-01-19 NOTE — OP NOTE
Kyle Right TKA  OPERATIVE NOTE    Date of Operation:   9/16/2020    Providers Performing:   Surgeon(s):  Tha Wright III, MD  Assistant: Kaylyn Argueta PA-C, no qualified resident available      Operative Procedure:   Right Total Knee Arthroplasty, CPT 76484    Preoperative Diagnosis:   Right Knee Osteoarthritis, ICD-10 M17.11    Postoperative Diagnosis:   SAME    Components Used:   Depuy  Femur: Attune PS Size 3N  Tibia: Attune S+ fixed bearing Size 2  Patella: Attune Medialized Dome 35 mm AOX  Tibial Insert: Attune fixed bearing PS inset size 3 x 6 mm AOX  2 packs cement: Simplex    Attune components used due to patients small stature and need for smaller components. she templated below the smallest sigma 1.5 sizes and attune has multiple smaller size options.     Implant Name Type Inv. Item Serial No.  Lot No. LRB No. Used Action   CEMENT BONE SURG SMPLX P RADPQ - [id number]  CEMENT BONE SURG SMPLX P RADPQ  NINI [employer] MELINDA. [id number] Right 1 Implanted   CEMENT BONE SURG SMPLX P RADPQ - [id number]  CEMENT BONE SURG SMPLX P RADPQ  [employer]IN[employer] SALES MELINDA. [id number] Right 1 Implanted   PATELLA ATTUNE MEDLZD DOME 35 - [id number]  PATELLA ATTUNE MEDLZD DOME 35  [employer] [id number] Right 1 Implanted   TIBIAL BASE FIXED BEARING ATTUNE KNEE SYSTEM     [employer] [id number] Right 1 Implanted   FEMORAL POSTERIOR STABILIZED NARROW ATTUNE    [employer] [id number] Right 1 Implanted   TIBIAL INSERT, FIXED BEARING POSTERIOR STABILIZED, SIZE 3, 6 MM AOX    [employer] [id number] Right 1 Implanted       Anesthesia:   Spinal  Adductor canal block with catheter  TIARA cocktail: OSCAR    Estimated Blood Loss:   200 cc    Specimens:   None    Complications:   None    Indications:   The patient has failed non-operative measures including medications, injections and activity modifications for their knee.  After an explanation of risks and benefits of knee arthroplasty surgery, the patient wishes to proceed with surgery.    Operative Notes:    The patient was greeted in the pre-op holding area.  The patients knee was marked with a surgical marker by the surgeon.  Spinal-Epidural anesthesia was administered by the anesthesia team.  The patient was placed in the supine position on the OR table with all bony prominences padded.  A tourniquet was not used.  IV antibiotics were administered.  The leg was prepped and draped in the usual sterile fashion.  A timeout was performed and the correct patient, site and procedure were identified.    A midline incision was made with a standard medical parapatellar arthrotomy.  The standard medial capsular release was performed along with the lateral gutter.  The patella was mobilized from the lateral parapatellar ligament and bony osteophytes were removed.  The intercondylar notch was cleared of the ACL/PCL.    The extramedullary tibial alignment guide was placed in the appropriate slope and varus/valgus orientation and the proximal cutting block secured by pins.  Measured resection with a 8mm cut was accounted for from the high side of the plateau, perpendicular to the ankle.  The cut was made with an oscillating saw.  We then obtained access to the femoral canal with the stepped drill.  The intramedullary susan was placed in 7 degrees of valgus with a 9mm planned resection.  Our distal femoral cuts were made.  The knee was placed in extension and the medical and lateral meniscal remnants removed.  A spacer block was placed with the knee in extension checking for alignment and balance which we were happy with.    The knee was then brought into flexion and the distal femoral gap assessed for appropriate rotation.  Our distal femoral sizing guide was placed and sized appropriately.  Guide pins were placed in the appropriate external rotation.  This was assessed with the 4 in 1 cutting block and the flexion gap sizing block which was appropriate.  The distal femoral preparation was completed after the anterior, posterior  and chamfer cuts were made.  The box cutting guide was placed and assessed.  The box cut was made.    At this time the trial implants were placed and knee assessed for stability, and flexion arc. The patella was prepared using free-hand technique and the three-holed lug drill guide was sized and appropriately assessed. Patella tracking was found to be acceptable. The tibial component rotation was marked. The trials were removed. The tibial component was positioned and the keel was drilled and punched.    The wound was copiously irrigated with pulsitile lavage and the bony surfaces dried.  Cement was mixed on the back table and applied to all components.  Cementation of the femoral, tibial and patellar components was performed sequentially with removal of all excess cement. A trial insert was placed to provide compression while the cement dried and a 0.35% betadine solution was left to soak in the surgical field.     After the cement was dry, the trial poly insert was removed. Hemostasis was obtained with bovie electrocautery.  The knee was again copiously irrigated with pulsatile lavage. The final polyethylene insert was placed and the knee reduced.      1 gram of vancomycin powder was placed in the knee. The arthrotomy was closed with interrupted #1 vicryl suture and #2 quill, the subcuticular layer was closed with 2-0 vicryl suture and a running 4-0 monocryl was used to closed the skin surface.  Surgicel sealant was placed over the top of the incision and sterile dressing placed over the wound.    Sponge and needle counts were correct.    The first-assistant was critical to all steps of the operation, including retraction and leg stabilization during exposure and bone preparation, as well as the deep and superficial wound closure.  Dr. Wright performed and/or supervised the key portions of this surgical procedure, including evaluation of the bone cuts, reshaping of the bony elements, and insertion of the  provisional and final components.    Postoperative Plan:  The patient will be anticoagulated with 81mg aspirin twice daily for one month.   They will receive 24 hours of post-operative antibiotics.    Activity will be weight bearing as tolerated.    Signed by: Tha Wright III, MD       Skyrizi Counseling: I discussed with the patient the risks of risankizumab-rzaa including but not limited to immunosuppression, and serious infections.  The patient understands that monitoring is required including a PPD at baseline and must alert us or the primary physician if symptoms of infection or other concerning signs are noted.

## 2022-02-15 ENCOUNTER — PATIENT MESSAGE (OUTPATIENT)
Dept: INTERNAL MEDICINE | Facility: CLINIC | Age: 77
End: 2022-02-15
Payer: MEDICARE

## 2022-03-10 ENCOUNTER — HOSPITAL ENCOUNTER (OUTPATIENT)
Dept: RADIOLOGY | Facility: HOSPITAL | Age: 77
Discharge: HOME | End: 2022-03-10
Attending: INTERNAL MEDICINE
Payer: MEDICARE

## 2022-03-10 VITALS — HEIGHT: 59 IN | BODY MASS INDEX: 27.21 KG/M2 | WEIGHT: 135 LBS

## 2022-03-10 DIAGNOSIS — Z12.31 OTHER SCREENING MAMMOGRAM: ICD-10-CM

## 2022-03-10 PROCEDURE — 77067 SCR MAMMO BI INCL CAD: CPT | Mod: TC

## 2022-03-10 PROCEDURE — 77063 BREAST TOMOSYNTHESIS BI: CPT | Mod: 26,,, | Performed by: RADIOLOGY

## 2022-03-10 PROCEDURE — 77067 MAMMO DIGITAL SCREENING BILAT WITH TOMO: ICD-10-PCS | Mod: 26,,, | Performed by: RADIOLOGY

## 2022-03-10 PROCEDURE — 77063 BREAST TOMOSYNTHESIS BI: CPT | Mod: TC

## 2022-03-10 PROCEDURE — 77067 SCR MAMMO BI INCL CAD: CPT | Mod: 26,,, | Performed by: RADIOLOGY

## 2022-03-10 PROCEDURE — 77063 MAMMO DIGITAL SCREENING BILAT WITH TOMO: ICD-10-PCS | Mod: 26,,, | Performed by: RADIOLOGY

## 2022-05-30 RX ORDER — LOSARTAN POTASSIUM 100 MG/1
TABLET ORAL
Qty: 90 TABLET | Refills: 2 | Status: SHIPPED | OUTPATIENT
Start: 2022-05-30 | End: 2022-12-13 | Stop reason: SDUPTHER

## 2022-05-30 NOTE — TELEPHONE ENCOUNTER
Refill Authorization Note   Krysta Kam  is requesting a refill authorization.  Brief Assessment and Rationale for Refill:  Approve     Medication Therapy Plan:       Medication Reconciliation Completed: No   Comments:     No Care Gaps recommended.     Note composed:3:52 PM 05/30/2022

## 2022-05-30 NOTE — TELEPHONE ENCOUNTER
No new care gaps identified.  [facility] Embedded Care Gaps. Reference number: [id number]. 5/30/2022   3:38:31 PM CDT

## 2022-07-08 ENCOUNTER — LAB VISIT (OUTPATIENT)
Dept: LAB | Facility: HOSPITAL | Age: 77
End: 2022-07-08
Attending: INTERNAL MEDICINE
Payer: MEDICARE

## 2022-07-08 DIAGNOSIS — E11.9 DIABETES MELLITUS WITHOUT COMPLICATION: ICD-10-CM

## 2022-07-08 LAB
ALBUMIN SERPL BCP-MCNC: 3.7 G/DL (ref 3.5–5.2)
ALP SERPL-CCNC: 71 U/L (ref 55–135)
ALT SERPL W/O P-5'-P-CCNC: 18 U/L (ref 10–44)
ANION GAP SERPL CALC-SCNC: 11 MMOL/L (ref 8–16)
AST SERPL-CCNC: 23 U/L (ref 10–40)
BILIRUB SERPL-MCNC: 0.7 MG/DL (ref 0.1–1)
BUN SERPL-MCNC: 18 MG/DL (ref 8–23)
CALCIUM SERPL-MCNC: 9.5 MG/DL (ref 8.7–10.5)
CHLORIDE SERPL-SCNC: 105 MMOL/L (ref 95–110)
CO2 SERPL-SCNC: 26 MMOL/L (ref 23–29)
CREAT SERPL-MCNC: 0.9 MG/DL (ref 0.5–1.4)
EST. GFR  (AFRICAN AMERICAN): >60 ML/MIN/1.73 M^2
EST. GFR  (NON AFRICAN AMERICAN): >60 ML/MIN/1.73 M^2
ESTIMATED AVG GLUCOSE: 120 MG/DL (ref 68–131)
GLUCOSE SERPL-MCNC: 86 MG/DL (ref 70–110)
HBA1C MFR BLD: 5.8 % (ref 4–5.6)
POTASSIUM SERPL-SCNC: 4.6 MMOL/L (ref 3.5–5.1)
PROT SERPL-MCNC: 6.8 G/DL (ref 6–8.4)
SODIUM SERPL-SCNC: 142 MMOL/L (ref 136–145)

## 2022-07-08 PROCEDURE — 80053 COMPREHEN METABOLIC PANEL: CPT | Performed by: INTERNAL MEDICINE

## 2022-07-08 PROCEDURE — 36415 COLL VENOUS BLD VENIPUNCTURE: CPT | Mod: PO | Performed by: INTERNAL MEDICINE

## 2022-07-08 PROCEDURE — 83036 HEMOGLOBIN GLYCOSYLATED A1C: CPT | Performed by: INTERNAL MEDICINE

## 2022-07-14 ENCOUNTER — TELEPHONE (OUTPATIENT)
Dept: INTERNAL MEDICINE | Facility: CLINIC | Age: 77
End: 2022-07-14

## 2022-07-14 ENCOUNTER — OFFICE VISIT (OUTPATIENT)
Dept: INTERNAL MEDICINE | Facility: CLINIC | Age: 77
End: 2022-07-14
Payer: MEDICARE

## 2022-07-14 VITALS
OXYGEN SATURATION: 99 % | SYSTOLIC BLOOD PRESSURE: 132 MMHG | DIASTOLIC BLOOD PRESSURE: 80 MMHG | WEIGHT: 129 LBS | HEIGHT: 60 IN | HEART RATE: 66 BPM | BODY MASS INDEX: 25.32 KG/M2

## 2022-07-14 DIAGNOSIS — I10 PRIMARY HYPERTENSION: ICD-10-CM

## 2022-07-14 DIAGNOSIS — Z86.59 HISTORY OF DEPRESSION: ICD-10-CM

## 2022-07-14 DIAGNOSIS — M79.7 FIBROMYALGIA: ICD-10-CM

## 2022-07-14 DIAGNOSIS — D64.9 ANEMIA, UNSPECIFIED TYPE: Primary | ICD-10-CM

## 2022-07-14 DIAGNOSIS — Z78.0 POST-MENOPAUSAL: ICD-10-CM

## 2022-07-14 DIAGNOSIS — E11.36 TYPE 2 DIABETES MELLITUS WITH DIABETIC CATARACT, WITHOUT LONG-TERM CURRENT USE OF INSULIN: ICD-10-CM

## 2022-07-14 DIAGNOSIS — F41.9 ANXIETY: ICD-10-CM

## 2022-07-14 PROCEDURE — 99214 PR OFFICE/OUTPT VISIT, EST, LEVL IV, 30-39 MIN: ICD-10-PCS | Mod: S$GLB,,, | Performed by: INTERNAL MEDICINE

## 2022-07-14 PROCEDURE — 3072F PR LOW RISK FOR RETINOPATHY: ICD-10-PCS | Mod: CPTII,S$GLB,, | Performed by: INTERNAL MEDICINE

## 2022-07-14 PROCEDURE — 1126F PR PAIN SEVERITY QUANTIFIED, NO PAIN PRESENT: ICD-10-PCS | Mod: CPTII,S$GLB,, | Performed by: INTERNAL MEDICINE

## 2022-07-14 PROCEDURE — 99214 OFFICE O/P EST MOD 30 MIN: CPT | Mod: S$GLB,,, | Performed by: INTERNAL MEDICINE

## 2022-07-14 PROCEDURE — 3075F PR MOST RECENT SYSTOLIC BLOOD PRESS GE 130-139MM HG: ICD-10-PCS | Mod: CPTII,S$GLB,, | Performed by: INTERNAL MEDICINE

## 2022-07-14 PROCEDURE — 3288F PR FALLS RISK ASSESSMENT DOCUMENTED: ICD-10-PCS | Mod: CPTII,S$GLB,, | Performed by: INTERNAL MEDICINE

## 2022-07-14 PROCEDURE — 3288F FALL RISK ASSESSMENT DOCD: CPT | Mod: CPTII,S$GLB,, | Performed by: INTERNAL MEDICINE

## 2022-07-14 PROCEDURE — 1126F AMNT PAIN NOTED NONE PRSNT: CPT | Mod: CPTII,S$GLB,, | Performed by: INTERNAL MEDICINE

## 2022-07-14 PROCEDURE — 99499 RISK ADDL DX/OHS AUDIT: ICD-10-PCS | Mod: S$GLB,,, | Performed by: INTERNAL MEDICINE

## 2022-07-14 PROCEDURE — 3079F PR MOST RECENT DIASTOLIC BLOOD PRESSURE 80-89 MM HG: ICD-10-PCS | Mod: CPTII,S$GLB,, | Performed by: INTERNAL MEDICINE

## 2022-07-14 PROCEDURE — 99999 PR PBB SHADOW E&M-EST. PATIENT-LVL IV: CPT | Mod: PBBFAC,,, | Performed by: INTERNAL MEDICINE

## 2022-07-14 PROCEDURE — 3079F DIAST BP 80-89 MM HG: CPT | Mod: CPTII,S$GLB,, | Performed by: INTERNAL MEDICINE

## 2022-07-14 PROCEDURE — 99999 PR PBB SHADOW E&M-EST. PATIENT-LVL IV: ICD-10-PCS | Mod: PBBFAC,,, | Performed by: INTERNAL MEDICINE

## 2022-07-14 PROCEDURE — 99499 UNLISTED E&M SERVICE: CPT | Mod: S$GLB,,, | Performed by: INTERNAL MEDICINE

## 2022-07-14 PROCEDURE — 1101F PT FALLS ASSESS-DOCD LE1/YR: CPT | Mod: CPTII,S$GLB,, | Performed by: INTERNAL MEDICINE

## 2022-07-14 PROCEDURE — 1101F PR PT FALLS ASSESS DOC 0-1 FALLS W/OUT INJ PAST YR: ICD-10-PCS | Mod: CPTII,S$GLB,, | Performed by: INTERNAL MEDICINE

## 2022-07-14 PROCEDURE — 1159F MED LIST DOCD IN RCRD: CPT | Mod: CPTII,S$GLB,, | Performed by: INTERNAL MEDICINE

## 2022-07-14 PROCEDURE — 3072F LOW RISK FOR RETINOPATHY: CPT | Mod: CPTII,S$GLB,, | Performed by: INTERNAL MEDICINE

## 2022-07-14 PROCEDURE — 1159F PR MEDICATION LIST DOCUMENTED IN MEDICAL RECORD: ICD-10-PCS | Mod: CPTII,S$GLB,, | Performed by: INTERNAL MEDICINE

## 2022-07-14 PROCEDURE — 3075F SYST BP GE 130 - 139MM HG: CPT | Mod: CPTII,S$GLB,, | Performed by: INTERNAL MEDICINE

## 2022-07-14 NOTE — PROGRESS NOTES
She is a 76-year-old lady coming in today to follow-up her ongoing medical problems.    She has had both covid vaccines since last visit.       She still having a lot of anxiety in dealing with her elderly, demented mother who is 93.   I last saw back in July 2021.  She has htn .  Blood pressure today is 132/80.  She is on amlodipine 10 mg a day, chlorthalidone  and losartan 100 mg a day.      She is not having any type of headache or chest pain at the current time.  She has had hypertension for multiple years.     She has ischemic colitis and has been having some iron deficiency anemia,   started her on iron last visit, she is tolerating it well.  In the past, she has   been on Feldene and she also was on Celebrex, but she is off both of these.  Has returned to normal, and her MCV is now normal..She had a Follow-up with GI about in Oct 2019.  ..  She is not having any blood in the stool.  h/h in &/2021 was 12.1/38.2.       She says her fibromyalgia has been acting up since she has been off nonsteroidals,   but she is not having any abdominal pain. She complains of a lot of back pain today.  Worse in the morning and she has difficulty getting out of bed.   She has some low back and thoracic  pain.          She has diabetes mellitus type 2.    She has been pretty well controlled.  Last hemoglobin A1c is 5.8 back.   She is not on any medication for her diabetes, but working on a low-glucose   diet.   She is on a statin and last lipid panel was  Check recently.           REVIEW OF SYSTEMS:  No chest pain, shortness of breath, palpitations, nausea,   vomiting, blurriness of vision.  No PND or orthopnea.  She has lost 2 # since last .       PHYSICAL EXAMINATION:       /80   Pulse 66   Ht 5' (1.524 m)   Wt 58.5 kg (128 lb 15.5 oz)   SpO2 99%   BMI 25.19 kg/m²     Constitutional:  PT  is oriented to person, place, and time. He appears well-developed and well-nourished. No distress.    HENT: PERRL  Head: Normocephalic and atraumatic.   Eyes: Conjunctivae, EOM and lids are normal.  SHe has a small cyst under her right eye.   Neck: Normal range of motion. Neck supple.   Thyroid is not enlarged.    Cardiovascular: Normal rate, regular rhythm, normal heart sounds and intact distal pulses.   No extrasystoles are present. Exam reveals no gallop.    Pulmonary/Chest: Effort normal and breath sounds normal. No respiratory distress. He has no wheezes.  no rales.   Abdominal: Soft.  exhibits no distension. There is no tenderness.  Bowel sound are normal    Musculoskeletal: He exhibits no edema or deformity.   Neurological: He is alert and oriented to person, place, and time.   Skin: Skin is warm, dry and intact. He is not diaphoretic. No pallor.   She has a rash in her right groid- healing - might have been shingles or fungal-- resolving -- only hyperpigmented.    Walks in without assistance.  Is here with er elderly mother today                          ASSESSMENT:  1 She has had covid vaccine x 2 and would like to      2. Diabetes, well controlled.  --      3. Anemia.  resolved.  - will recheck next visit   4. Fibromyalgia.  We are going to actually start her back on some diclofenac, Maybe PT for back pain.    .  5. Hypertension blood pressure boaderline but has been better  .     We discussed low-salt diet.  6. Hyperlipidemia.  Continue medications.  7. Anxiety--   Also,restart her  Citalopram, she thinks she needs to restart this.  . SHe is not letting her elderly mother bother her as much anymore.( not taking it often)      BMD     Follow up in 6 months

## 2022-07-14 NOTE — TELEPHONE ENCOUNTER
Pt wanted to know if you can send something in to her pharmacy for her arthritis???    Digna MAZARIEGOS

## 2022-08-04 ENCOUNTER — HOSPITAL ENCOUNTER (OUTPATIENT)
Dept: RADIOLOGY | Facility: CLINIC | Age: 77
Discharge: HOME | End: 2022-08-04
Attending: INTERNAL MEDICINE
Payer: MEDICARE

## 2022-08-04 DIAGNOSIS — Z78.0 POST-MENOPAUSAL: ICD-10-CM

## 2022-08-04 PROCEDURE — 77080 DXA BONE DENSITY AXIAL: CPT | Mod: TC

## 2022-08-04 PROCEDURE — 77080 DEXA BONE DENSITY SPINE HIP: ICD-10-PCS | Mod: 26,,, | Performed by: INTERNAL MEDICINE

## 2022-08-04 PROCEDURE — 77080 DXA BONE DENSITY AXIAL: CPT | Mod: 26,,, | Performed by: INTERNAL MEDICINE

## 2022-08-15 NOTE — TELEPHONE ENCOUNTER
No new care gaps identified.  [facility] Embedded Care Gaps. Reference number: [id number]. 8/15/2022   3:52:57 PM CDT

## 2022-08-16 RX ORDER — CITALOPRAM 10 MG/1
TABLET ORAL
Qty: 90 TABLET | Refills: 3 | Status: SHIPPED | OUTPATIENT
Start: 2022-08-16 | End: 2023-01-20 | Stop reason: SDUPTHER

## 2022-08-16 NOTE — TELEPHONE ENCOUNTER
Refill Authorization Note   Krysta Eddy  is requesting a refill authorization.  Brief Assessment and Rationale for Refill:  Approve     Medication Therapy Plan:       Medication Reconciliation Completed: No   Comments:     No Care Gaps recommended.     Note composed:11:50 AM 08/16/2022

## 2022-09-21 ENCOUNTER — OFFICE VISIT (OUTPATIENT)
Dept: OPTOMETRY | Facility: CLINIC | Age: 77
End: 2022-09-21
Payer: MEDICARE

## 2022-09-21 DIAGNOSIS — E11.36 TYPE 2 DIABETES MELLITUS WITH DIABETIC CATARACT, WITHOUT LONG-TERM CURRENT USE OF INSULIN: ICD-10-CM

## 2022-09-21 DIAGNOSIS — H35.033 HYPERTENSIVE RETINOPATHY OF BOTH EYES: ICD-10-CM

## 2022-09-21 DIAGNOSIS — H25.13 NUCLEAR SCLEROTIC CATARACT OF BOTH EYES: ICD-10-CM

## 2022-09-21 DIAGNOSIS — H52.203 HYPEROPIA WITH ASTIGMATISM AND PRESBYOPIA, BILATERAL: Primary | ICD-10-CM

## 2022-09-21 DIAGNOSIS — H52.03 HYPEROPIA WITH ASTIGMATISM AND PRESBYOPIA, BILATERAL: Primary | ICD-10-CM

## 2022-09-21 DIAGNOSIS — E11.9 DIABETES MELLITUS WITHOUT COMPLICATION: ICD-10-CM

## 2022-09-21 DIAGNOSIS — Z01.00 EYE EXAM, ROUTINE: ICD-10-CM

## 2022-09-21 DIAGNOSIS — H25.813 COMBINED FORMS OF AGE-RELATED CATARACT OF BOTH EYES: ICD-10-CM

## 2022-09-21 DIAGNOSIS — H16.223 KERATOCONJUNCTIVITIS SICCA, NOT SPECIFIED AS SJOGREN'S, BILATERAL: ICD-10-CM

## 2022-09-21 DIAGNOSIS — H02.9 LESION OF RIGHT LOWER EYELID: ICD-10-CM

## 2022-09-21 DIAGNOSIS — H52.4 HYPEROPIA WITH ASTIGMATISM AND PRESBYOPIA, BILATERAL: Primary | ICD-10-CM

## 2022-09-21 PROCEDURE — 99999 PR PBB SHADOW E&M-EST. PATIENT-LVL III: CPT | Mod: PBBFAC,,, | Performed by: OPTOMETRIST

## 2022-09-21 PROCEDURE — 92015 DETERMINE REFRACTIVE STATE: CPT | Mod: S$GLB,,, | Performed by: OPTOMETRIST

## 2022-09-21 PROCEDURE — 99999 PR PBB SHADOW E&M-EST. PATIENT-LVL III: ICD-10-PCS | Mod: PBBFAC,,, | Performed by: OPTOMETRIST

## 2022-09-21 PROCEDURE — 92014 COMPRE OPH EXAM EST PT 1/>: CPT | Mod: S$GLB,,, | Performed by: OPTOMETRIST

## 2022-09-21 PROCEDURE — 92015 PR REFRACTION: ICD-10-PCS | Mod: S$GLB,,, | Performed by: OPTOMETRIST

## 2022-09-21 PROCEDURE — 99499 UNLISTED E&M SERVICE: CPT | Mod: S$GLB,,, | Performed by: OPTOMETRIST

## 2022-09-21 PROCEDURE — 92014 PR EYE EXAM, EST PATIENT,COMPREHESV: ICD-10-PCS | Mod: S$GLB,,, | Performed by: OPTOMETRIST

## 2022-09-21 PROCEDURE — 99499 RISK ADDL DX/OHS AUDIT: ICD-10-PCS | Mod: S$GLB,,, | Performed by: OPTOMETRIST

## 2022-09-21 RX ORDER — PREDNISOLONE ACETATE 10 MG/ML
1 SUSPENSION/ DROPS OPHTHALMIC 3 TIMES DAILY
Qty: 5 ML | Refills: 2 | Status: SHIPPED | OUTPATIENT
Start: 2022-09-21 | End: 2023-09-21

## 2022-09-21 NOTE — PROGRESS NOTES
HPI    Patient here today for routine eye exam. C/o VA changes ou, burning OD, no   pain and occasional flashes and floaters ou.    Restasis bid ou  AT ou prn    Hemoglobin A1C       Date                     Value               Ref Range             Status                07/08/2022               5.8 (H)             4.0 - 5.6 %           Final                      01/03/2022               5.8 (H)             4.0 - 5.6 %           Final                      07/02/2021               5.6                 4.0 - 5.6 %           Final                Last edited by Brandie Law on 9/21/2022 11:24 AM.        ROS    Negative for: Constitutional, Gastrointestinal, Neurological, Skin,   Genitourinary, Musculoskeletal, HENT, Endocrine, Cardiovascular, Eyes,   Respiratory, Psychiatric, Allergic/Imm, Heme/Lymph  Last edited by Jacquelyn Salcedo, OD on 9/21/2022 11:52 AM.        Assessment /Plan     For exam results, see Encounter Report.    Hyperopia with astigmatism and presbyopia, bilateral    Lesion of right lower eyelid    Type 2 diabetes mellitus with diabetic cataract, without long-term current use of insulin    Keratoconjunctivitis sicca, not specified as Sjogren's, bilateral    Eye exam, routine    Nuclear sclerotic cataract of both eyes    Hypertensive retinopathy of both eyes    Diabetes mellitus without complication    Combined forms of age-related cataract of both eyes    Other orders  -     prednisoLONE acetate (PRED FORTE) 1 % DrpS; Place 1 drop into both eyes 3 (three) times daily.  Dispense: 5 mL; Refill: 2    MONITOR. ED PT ON ALL EXAM FINDINGS  CONTINUE WITH RESTASIS BID OU; CONTINUE WITH AT'S PRN   MODERATE NS OU; UV PROTECTION.   TYPE 2 DM W/O RETINOPATHY OU; CONTINUE WITH PCP FOR GLYCEMIC CONTROL  RTC 6 MONTHS FOR CAT F/U

## 2022-10-05 NOTE — PROGRESS NOTES
I have personally taken the history and examined this patient and agree with the resident's note as stated above with the following thoughts:  bp is high today but has been much better during previous visit-- will recheck before she leaves.  She remains microcytic- suspect some thalassemia trait   She does not want vaccines or mmg    Yes

## 2022-11-01 ENCOUNTER — TELEPHONE (OUTPATIENT)
Dept: INTERNAL MEDICINE | Facility: CLINIC | Age: 77
End: 2022-11-01
Payer: MEDICARE

## 2022-11-01 NOTE — TELEPHONE ENCOUNTER
----- Message from Roxann Douglas sent at 11/1/2022 11:01 AM CDT -----  Contact: pt [phone]  Patient is requesting to speak with you but would not leave details as to what it is concerning.    Please call and advise.    Thank You

## 2022-11-01 NOTE — TELEPHONE ENCOUNTER
Pt wanted to know if Dr. Palafox can still be her pcp with people's health insurance.    Digna MAZARIEGOS

## 2022-11-14 ENCOUNTER — TELEPHONE (OUTPATIENT)
Dept: INTERNAL MEDICINE | Facility: CLINIC | Age: 77
End: 2022-11-14
Payer: MEDICARE

## 2022-11-14 DIAGNOSIS — Z11.59 ENCOUNTER FOR HEPATITIS C SCREENING TEST FOR LOW RISK PATIENT: Primary | ICD-10-CM

## 2022-11-15 NOTE — TELEPHONE ENCOUNTER
Care Due:                  Date            Visit Type   Department     Provider  --------------------------------------------------------------------------------                                EP -                              PRIMARY      NOM INTERNAL  Last Visit: 07-      CARE ([facility])   CAREN Palafox                              EP -                              PRIMARY      NOM INTERNAL  Next Visit: 01-      CARE ([facility])   [facility]       Gold Palafox                                                            Last  Test          Frequency    Reason                     Performed    Due Date  --------------------------------------------------------------------------------    Lipid Panel.  12 months..  atorvastatin.............  01- 12-    [facility] Embedded Care Gaps. Reference number: [id number]. 11/15/2022   1:28:50 PM CST

## 2022-11-15 NOTE — TELEPHONE ENCOUNTER
----- Message from Roxanne Somers sent at 11/15/2022  1:13 PM CST -----  Contact: [phone]  Requesting an RX refill or new RX.  Is this a refill or new RX: refill  RX name and strength   gabapentin (NEURONTIN) 300 MG capsule 180 capsule   Is this a 30 day or 90 day RX:   Pharmacy name and phone # :  [id number] [place] [address] [address] [facility] AT [address]   Phone:  [phone]  Fax:  [phone]    The doctors have asked that we provide their patients with the following 2 reminders -- prescription refills can take up to 72 hours, and a friendly reminder that in the future you can use your [facility] account to request refills: yes

## 2022-11-16 RX ORDER — GABAPENTIN 300 MG/1
300 CAPSULE ORAL 2 TIMES DAILY
Qty: 180 CAPSULE | Refills: 11 | Status: SHIPPED | OUTPATIENT
Start: 2022-11-16 | End: 2022-11-17 | Stop reason: SDUPTHER

## 2022-11-17 NOTE — TELEPHONE ENCOUNTER
No new care gaps identified.  [facility] Embedded Care Gaps. Reference number: [id number]. 11/17/2022   1:18:44 PM CST

## 2022-11-22 RX ORDER — GABAPENTIN 300 MG/1
300 CAPSULE ORAL 2 TIMES DAILY
Qty: 180 CAPSULE | Refills: 11 | Status: SHIPPED | OUTPATIENT
Start: 2022-11-22 | End: 2024-01-24

## 2023-01-13 ENCOUNTER — LAB VISIT (OUTPATIENT)
Dept: LAB | Facility: HOSPITAL | Age: 78
End: 2023-01-13
Attending: INTERNAL MEDICINE
Payer: MEDICARE

## 2023-01-13 DIAGNOSIS — D64.9 ANEMIA, UNSPECIFIED TYPE: ICD-10-CM

## 2023-01-13 DIAGNOSIS — E11.36 TYPE 2 DIABETES MELLITUS WITH DIABETIC CATARACT, WITHOUT LONG-TERM CURRENT USE OF INSULIN: ICD-10-CM

## 2023-01-13 LAB
ALBUMIN SERPL BCP-MCNC: 3.9 G/DL (ref 3.5–5.2)
ALP SERPL-CCNC: 67 U/L (ref 55–135)
ALT SERPL W/O P-5'-P-CCNC: 15 U/L (ref 10–44)
ANION GAP SERPL CALC-SCNC: 7 MMOL/L (ref 8–16)
AST SERPL-CCNC: 25 U/L (ref 10–40)
BASOPHILS # BLD AUTO: 0.05 K/UL (ref 0–0.2)
BASOPHILS NFR BLD: 0.9 % (ref 0–1.9)
BILIRUB SERPL-MCNC: 0.7 MG/DL (ref 0.1–1)
BUN SERPL-MCNC: 16 MG/DL (ref 8–23)
CALCIUM SERPL-MCNC: 9.9 MG/DL (ref 8.7–10.5)
CHLORIDE SERPL-SCNC: 106 MMOL/L (ref 95–110)
CHOLEST SERPL-MCNC: 184 MG/DL (ref 120–199)
CHOLEST/HDLC SERPL: 2.6 {RATIO} (ref 2–5)
CO2 SERPL-SCNC: 26 MMOL/L (ref 23–29)
CREAT SERPL-MCNC: 0.9 MG/DL (ref 0.5–1.4)
DIFFERENTIAL METHOD: ABNORMAL
EOSINOPHIL # BLD AUTO: 0.2 K/UL (ref 0–0.5)
EOSINOPHIL NFR BLD: 3 % (ref 0–8)
ERYTHROCYTE [DISTWIDTH] IN BLOOD BY AUTOMATED COUNT: 15.6 % (ref 11.5–14.5)
EST. GFR  (NO RACE VARIABLE): >60 ML/MIN/1.73 M^2
ESTIMATED AVG GLUCOSE: 117 MG/DL (ref 68–131)
GLUCOSE SERPL-MCNC: 95 MG/DL (ref 70–110)
HBA1C MFR BLD: 5.7 % (ref 4–5.6)
HCT VFR BLD AUTO: 35.1 % (ref 37–48.5)
HDLC SERPL-MCNC: 70 MG/DL (ref 40–75)
HDLC SERPL: 38 % (ref 20–50)
HGB BLD-MCNC: 10.5 G/DL (ref 12–16)
IMM GRANULOCYTES # BLD AUTO: 0.01 K/UL (ref 0–0.04)
IMM GRANULOCYTES NFR BLD AUTO: 0.2 % (ref 0–0.5)
LDLC SERPL CALC-MCNC: 106.4 MG/DL (ref 63–159)
LYMPHOCYTES # BLD AUTO: 1.5 K/UL (ref 1–4.8)
LYMPHOCYTES NFR BLD: 25.9 % (ref 18–48)
MCH RBC QN AUTO: 24.4 PG (ref 27–31)
MCHC RBC AUTO-ENTMCNC: 29.9 G/DL (ref 32–36)
MCV RBC AUTO: 82 FL (ref 82–98)
MONOCYTES # BLD AUTO: 0.5 K/UL (ref 0.3–1)
MONOCYTES NFR BLD: 9.3 % (ref 4–15)
NEUTROPHILS # BLD AUTO: 3.4 K/UL (ref 1.8–7.7)
NEUTROPHILS NFR BLD: 60.7 % (ref 38–73)
NONHDLC SERPL-MCNC: 114 MG/DL
NRBC BLD-RTO: 0 /100 WBC
PLATELET # BLD AUTO: 212 K/UL (ref 150–450)
PMV BLD AUTO: 13.5 FL (ref 9.2–12.9)
POTASSIUM SERPL-SCNC: 4.4 MMOL/L (ref 3.5–5.1)
PROT SERPL-MCNC: 7.4 G/DL (ref 6–8.4)
RBC # BLD AUTO: 4.3 M/UL (ref 4–5.4)
SODIUM SERPL-SCNC: 139 MMOL/L (ref 136–145)
TRIGL SERPL-MCNC: 38 MG/DL (ref 30–150)
WBC # BLD AUTO: 5.59 K/UL (ref 3.9–12.7)

## 2023-01-13 PROCEDURE — 36415 COLL VENOUS BLD VENIPUNCTURE: CPT | Mod: PO | Performed by: INTERNAL MEDICINE

## 2023-01-13 PROCEDURE — 80053 COMPREHEN METABOLIC PANEL: CPT | Performed by: INTERNAL MEDICINE

## 2023-01-13 PROCEDURE — 83036 HEMOGLOBIN GLYCOSYLATED A1C: CPT | Performed by: INTERNAL MEDICINE

## 2023-01-13 PROCEDURE — 85025 COMPLETE CBC W/AUTO DIFF WBC: CPT | Performed by: INTERNAL MEDICINE

## 2023-01-13 PROCEDURE — 80061 LIPID PANEL: CPT | Performed by: INTERNAL MEDICINE

## 2023-01-20 ENCOUNTER — OFFICE VISIT (OUTPATIENT)
Dept: INTERNAL MEDICINE | Facility: CLINIC | Age: 78
End: 2023-01-20
Payer: MEDICARE

## 2023-01-20 VITALS
SYSTOLIC BLOOD PRESSURE: 116 MMHG | DIASTOLIC BLOOD PRESSURE: 74 MMHG | BODY MASS INDEX: 24.41 KG/M2 | HEIGHT: 60 IN | OXYGEN SATURATION: 96 % | WEIGHT: 124.31 LBS | HEART RATE: 60 BPM

## 2023-01-20 DIAGNOSIS — E11.9 DIABETES MELLITUS WITHOUT COMPLICATION: ICD-10-CM

## 2023-01-20 DIAGNOSIS — M26.609 TMJ DISEASE: ICD-10-CM

## 2023-01-20 DIAGNOSIS — D64.9 ANEMIA, UNSPECIFIED TYPE: ICD-10-CM

## 2023-01-20 DIAGNOSIS — I10 PRIMARY HYPERTENSION: ICD-10-CM

## 2023-01-20 DIAGNOSIS — F41.9 ANXIETY: Primary | ICD-10-CM

## 2023-01-20 DIAGNOSIS — Z01.419 WELL WOMAN EXAM: ICD-10-CM

## 2023-01-20 DIAGNOSIS — M79.7 FIBROMYALGIA: ICD-10-CM

## 2023-01-20 DIAGNOSIS — E11.36 TYPE 2 DIABETES MELLITUS WITH DIABETIC CATARACT, WITHOUT LONG-TERM CURRENT USE OF INSULIN: ICD-10-CM

## 2023-01-20 PROBLEM — K55.9 ISCHEMIC COLITIS: Status: RESOLVED | Noted: 2019-06-13 | Resolved: 2023-01-20

## 2023-01-20 LAB
ALBUMIN/CREAT UR: 9.5 UG/MG (ref 0–30)
CREAT UR-MCNC: 105 MG/DL (ref 15–325)
MICROALBUMIN UR DL<=1MG/L-MCNC: 10 UG/ML

## 2023-01-20 PROCEDURE — 1159F MED LIST DOCD IN RCRD: CPT | Mod: CPTII,S$GLB,, | Performed by: INTERNAL MEDICINE

## 2023-01-20 PROCEDURE — 99999 PR PBB SHADOW E&M-EST. PATIENT-LVL IV: CPT | Mod: PBBFAC,,, | Performed by: INTERNAL MEDICINE

## 2023-01-20 PROCEDURE — 3072F PR LOW RISK FOR RETINOPATHY: ICD-10-PCS | Mod: CPTII,S$GLB,, | Performed by: INTERNAL MEDICINE

## 2023-01-20 PROCEDURE — 99499 UNLISTED E&M SERVICE: CPT | Mod: S$GLB,,, | Performed by: INTERNAL MEDICINE

## 2023-01-20 PROCEDURE — 1101F PT FALLS ASSESS-DOCD LE1/YR: CPT | Mod: CPTII,S$GLB,, | Performed by: INTERNAL MEDICINE

## 2023-01-20 PROCEDURE — 1159F PR MEDICATION LIST DOCUMENTED IN MEDICAL RECORD: ICD-10-PCS | Mod: CPTII,S$GLB,, | Performed by: INTERNAL MEDICINE

## 2023-01-20 PROCEDURE — 1126F AMNT PAIN NOTED NONE PRSNT: CPT | Mod: CPTII,S$GLB,, | Performed by: INTERNAL MEDICINE

## 2023-01-20 PROCEDURE — 3078F DIAST BP <80 MM HG: CPT | Mod: CPTII,S$GLB,, | Performed by: INTERNAL MEDICINE

## 2023-01-20 PROCEDURE — 3288F PR FALLS RISK ASSESSMENT DOCUMENTED: ICD-10-PCS | Mod: CPTII,S$GLB,, | Performed by: INTERNAL MEDICINE

## 2023-01-20 PROCEDURE — 1126F PR PAIN SEVERITY QUANTIFIED, NO PAIN PRESENT: ICD-10-PCS | Mod: CPTII,S$GLB,, | Performed by: INTERNAL MEDICINE

## 2023-01-20 PROCEDURE — 3072F LOW RISK FOR RETINOPATHY: CPT | Mod: CPTII,S$GLB,, | Performed by: INTERNAL MEDICINE

## 2023-01-20 PROCEDURE — 99499 RISK ADDL DX/OHS AUDIT: ICD-10-PCS | Mod: S$GLB,,, | Performed by: INTERNAL MEDICINE

## 2023-01-20 PROCEDURE — 3074F SYST BP LT 130 MM HG: CPT | Mod: CPTII,S$GLB,, | Performed by: INTERNAL MEDICINE

## 2023-01-20 PROCEDURE — 3288F FALL RISK ASSESSMENT DOCD: CPT | Mod: CPTII,S$GLB,, | Performed by: INTERNAL MEDICINE

## 2023-01-20 PROCEDURE — 99214 PR OFFICE/OUTPT VISIT, EST, LEVL IV, 30-39 MIN: ICD-10-PCS | Mod: S$GLB,,, | Performed by: INTERNAL MEDICINE

## 2023-01-20 PROCEDURE — 99214 OFFICE O/P EST MOD 30 MIN: CPT | Mod: S$GLB,,, | Performed by: INTERNAL MEDICINE

## 2023-01-20 PROCEDURE — 99999 PR PBB SHADOW E&M-EST. PATIENT-LVL IV: ICD-10-PCS | Mod: PBBFAC,,, | Performed by: INTERNAL MEDICINE

## 2023-01-20 PROCEDURE — 1101F PR PT FALLS ASSESS DOC 0-1 FALLS W/OUT INJ PAST YR: ICD-10-PCS | Mod: CPTII,S$GLB,, | Performed by: INTERNAL MEDICINE

## 2023-01-20 PROCEDURE — 3078F PR MOST RECENT DIASTOLIC BLOOD PRESSURE < 80 MM HG: ICD-10-PCS | Mod: CPTII,S$GLB,, | Performed by: INTERNAL MEDICINE

## 2023-01-20 PROCEDURE — 82570 ASSAY OF URINE CREATININE: CPT | Performed by: INTERNAL MEDICINE

## 2023-01-20 PROCEDURE — 3074F PR MOST RECENT SYSTOLIC BLOOD PRESSURE < 130 MM HG: ICD-10-PCS | Mod: CPTII,S$GLB,, | Performed by: INTERNAL MEDICINE

## 2023-01-20 RX ORDER — AMLODIPINE BESYLATE 10 MG/1
10 TABLET ORAL DAILY
Qty: 90 TABLET | Refills: 2 | Status: SHIPPED | OUTPATIENT
Start: 2023-01-20 | End: 2023-09-11

## 2023-01-20 RX ORDER — CHLORTHALIDONE 25 MG/1
25 TABLET ORAL DAILY
Qty: 90 TABLET | Refills: 3 | Status: SHIPPED | OUTPATIENT
Start: 2023-01-20 | End: 2023-11-28

## 2023-01-20 RX ORDER — CITALOPRAM 10 MG/1
10 TABLET ORAL DAILY
Qty: 90 TABLET | Refills: 3 | Status: SHIPPED | OUTPATIENT
Start: 2023-01-20 | End: 2023-07-20

## 2023-01-20 RX ORDER — ATORVASTATIN CALCIUM 40 MG/1
40 TABLET, FILM COATED ORAL DAILY
Qty: 90 TABLET | Refills: 3 | Status: SHIPPED | OUTPATIENT
Start: 2023-01-20 | End: 2023-11-28

## 2023-01-20 NOTE — PROGRESS NOTES
She is a 77-year-old lady coming in today to follow-up her ongoing medical problems.           She still having a lot of anxiety in dealing with her elderly, demented mother who is 93.   I last saw back in July 2021.  She has htn .  Blood pressure today is 116/74.   She is on amlodipine 10 mg a day, chlorthalidone  and losartan 100 mg a day.      She is not having any type of headache or chest pain at the current time.  She has had hypertension for multiple years.     She has ischemic colitis and has been having some iron deficiency anemia,   started her on iron last visit, she is tolerating it well.  In the past, she has   been on Feldene and she also was on Celebrex, but she is off both of these.  Has returned to normal, and her MCV is now normal..She had a Follow-up with GI about in Oct 2019.  ..  She is not having any blood in the stool.  h/h   was 10.5/35.1.       She says her fibromyalgia has been acting up since she has been off nonsteroidals,   but she is not having any abdominal pain. She complains of a lot of back pain today.  Worse in the morning and she has difficulty getting out of bed.   She has some low back and thoracic  pain.          She has diabetes mellitus type 2.    She has been pretty well controlled.  Last hemoglobin A1c is 5.7 back.   She is not on any medication for her diabetes, but working on a low-glucose   diet.   She is on a statin and last lipid panel was             REVIEW OF SYSTEMS:  No chest pain, shortness of breath, palpitations, nausea,   vomiting, blurriness of vision.  No PND or orthopnea.  Mother is at home demented and bed bound.        PHYSICAL EXAMINATION:        /74 (BP Location: Left arm, Patient Position: Sitting, BP Method: Medium (Manual))   Pulse 60   Ht 5' (1.524 m)   Wt 56.4 kg (124 lb 5.4 oz)   SpO2 96%   BMI 24.28 kg/m²        Constitutional:  PT  is oriented to person, place, and time. He appears well-developed and well-nourished. No distress.    HENT: PERRL-- Ear-- has cerumen on both sided but not impacted.    Head: Normocephalic and atraumatic.   Eyes: Conjunctivae, EOM and lids are normal.  SHe has a small cyst under her right eye.   Neck: Normal range of motion. Neck supple.   Thyroid is not enlarged.    Cardiovascular: Normal rate, regular rhythm, normal heart sounds and intact distal pulses.   No extrasystoles are present. Exam reveals no gallop.    Pulmonary/Chest: Effort normal and breath sounds normal. No respiratory distress. He has no wheezes.  no rales.   Abdominal: Soft.  exhibits no distension. There is no tenderness.  Bowel sound are normal    Musculoskeletal: He exhibits no edema or deformity.   Neurological: He is alert and oriented to person, place, and time.   Skin: Skin is warm, dry and intact. He is not diaphoretic. No pallor.   She has a rash in her right groid- healing - might have been shingles or fungal-- resolving -- only hyperpigmented.    Walks in without assistance.                            ASSESSMENT:  1  Diabetes, well controlled.  --     2.  Anemia. - will monitor-- chronic   3. Fibromyalgia.  - she has some headaches .   Discussed    4.  Hypertension blood pressure boaderline but has been better  .     We discussed low-salt diet.   .  5. Hyperlipidemia.  Continue medications.  6.  Anxiety--     Citalopram-- needs to take it more often and regularly.  Her mother is in hospic at home-- will let me know  about letter for some [facility]  care.  ,                Follow up in 6 months

## 2023-02-03 ENCOUNTER — OFFICE VISIT (OUTPATIENT)
Dept: OBSTETRICS AND GYNECOLOGY | Facility: CLINIC | Age: 78
End: 2023-02-03
Payer: MEDICARE

## 2023-02-03 VITALS
WEIGHT: 125.25 LBS | DIASTOLIC BLOOD PRESSURE: 80 MMHG | BODY MASS INDEX: 24.59 KG/M2 | HEIGHT: 60 IN | SYSTOLIC BLOOD PRESSURE: 180 MMHG

## 2023-02-03 DIAGNOSIS — Z01.419 WELL WOMAN EXAM: ICD-10-CM

## 2023-02-03 PROCEDURE — 3079F PR MOST RECENT DIASTOLIC BLOOD PRESSURE 80-89 MM HG: ICD-10-PCS | Mod: CPTII,S$GLB,, | Performed by: OBSTETRICS & GYNECOLOGY

## 2023-02-03 PROCEDURE — 3288F FALL RISK ASSESSMENT DOCD: CPT | Mod: CPTII,S$GLB,, | Performed by: OBSTETRICS & GYNECOLOGY

## 2023-02-03 PROCEDURE — 1101F PT FALLS ASSESS-DOCD LE1/YR: CPT | Mod: CPTII,S$GLB,, | Performed by: OBSTETRICS & GYNECOLOGY

## 2023-02-03 PROCEDURE — 1101F PR PT FALLS ASSESS DOC 0-1 FALLS W/OUT INJ PAST YR: ICD-10-PCS | Mod: CPTII,S$GLB,, | Performed by: OBSTETRICS & GYNECOLOGY

## 2023-02-03 PROCEDURE — 1126F AMNT PAIN NOTED NONE PRSNT: CPT | Mod: CPTII,S$GLB,, | Performed by: OBSTETRICS & GYNECOLOGY

## 2023-02-03 PROCEDURE — 3288F PR FALLS RISK ASSESSMENT DOCUMENTED: ICD-10-PCS | Mod: CPTII,S$GLB,, | Performed by: OBSTETRICS & GYNECOLOGY

## 2023-02-03 PROCEDURE — 3077F SYST BP >= 140 MM HG: CPT | Mod: CPTII,S$GLB,, | Performed by: OBSTETRICS & GYNECOLOGY

## 2023-02-03 PROCEDURE — 3079F DIAST BP 80-89 MM HG: CPT | Mod: CPTII,S$GLB,, | Performed by: OBSTETRICS & GYNECOLOGY

## 2023-02-03 PROCEDURE — G0101 PR CA SCREEN;PELVIC/BREAST EXAM: ICD-10-PCS | Mod: S$GLB,,, | Performed by: OBSTETRICS & GYNECOLOGY

## 2023-02-03 PROCEDURE — 1126F PR PAIN SEVERITY QUANTIFIED, NO PAIN PRESENT: ICD-10-PCS | Mod: CPTII,S$GLB,, | Performed by: OBSTETRICS & GYNECOLOGY

## 2023-02-03 PROCEDURE — 99999 PR PBB SHADOW E&M-EST. PATIENT-LVL II: ICD-10-PCS | Mod: PBBFAC,,, | Performed by: OBSTETRICS & GYNECOLOGY

## 2023-02-03 PROCEDURE — G0101 CA SCREEN;PELVIC/BREAST EXAM: HCPCS | Mod: S$GLB,,, | Performed by: OBSTETRICS & GYNECOLOGY

## 2023-02-03 PROCEDURE — 99999 PR PBB SHADOW E&M-EST. PATIENT-LVL II: CPT | Mod: PBBFAC,,, | Performed by: OBSTETRICS & GYNECOLOGY

## 2023-02-03 PROCEDURE — 3072F LOW RISK FOR RETINOPATHY: CPT | Mod: CPTII,S$GLB,, | Performed by: OBSTETRICS & GYNECOLOGY

## 2023-02-03 PROCEDURE — 3077F PR MOST RECENT SYSTOLIC BLOOD PRESSURE >= 140 MM HG: ICD-10-PCS | Mod: CPTII,S$GLB,, | Performed by: OBSTETRICS & GYNECOLOGY

## 2023-02-03 PROCEDURE — 3072F PR LOW RISK FOR RETINOPATHY: ICD-10-PCS | Mod: CPTII,S$GLB,, | Performed by: OBSTETRICS & GYNECOLOGY

## 2023-02-03 RX ORDER — ESTRADIOL 0.1 MG/G
1 CREAM VAGINAL DAILY
Qty: 45 G | Refills: 12 | Status: SHIPPED | OUTPATIENT
Start: 2023-02-03 | End: 2024-02-22

## 2023-02-03 NOTE — PROGRESS NOTES
PT HERE WITH HX OF A R VULVAR BOIL THAT HAS RESOLVED.  HAS VAGINAL DRYNESS. HAS NOT HAD INTERCOURSE IN A WHILE DUE TO PAIN.    ROS:  GENERAL: No fever, chills, fatigability or weight loss.  VULVAR: No pain, no lesions and no itching.  VAGINAL: No relaxation, no itching, no discharge, no abnormal bleeding and no lesions.  ABDOMEN: No abdominal pain. Denies nausea. Denies vomiting. No diarrhea. No constipation  BREAST: Denies pain. No lumps. No discharge.  URINARY: No incontinence, no nocturia, no frequency and no dysuria.  CARDIOVASCULAR: No chest pain. No shortness of breath. No leg cramps.  NEUROLOGICAL: No headaches. No vision changes.  The remainder of the review of systems was negative.    PE:   General Appearance: normal weight Well developed. Well nourished. In no acute distress.  Urethral Meatus: Normal size. Normal location. No lesions. No prolapse.  Vulva: Atrophic. Lesions: No.  Urethra: No masses. No tenderness. No prolapse. No scarring.  Bladder: No masses. No tenderness.  Vagina: Mucosa NI: yes Discharge: no Atrophic: yes Rectocele: no Cystocele: no Vaginal cuff intact: yes  Cervix: Absent.  Uterus: Absent.  Adnexa: Masses: No Tenderness: No       CDS Nodularity: No   Abdomen: normal weight  No masses. No tenderness.  Breasts: No bilateral masses. No bilateral discharge. No bilateral tenderness. No bilateral fibrocystic changes.  Neck: No thyroid enlargement. No thyroid masses.  Skin: Rashes: No    PROCEDURES:    DIAGNOSIS:  1. Well woman exam        PLAN:     MEDICATIONS & ORDERS:       Patient was counseled today on A.C.S. Pap guidelines and recommendations for yearly pelvic exams, mammograms and monthly self breast exams; to see her PCP for other health maintenance and the increased risks of CVD, MI, VTE, CVA , and Invasive Breast Cancer on Prempro and the increased risk of CVA with Premarin as reported by the W.H.I. studies; the benefits of HRT/ERT; her personal risks which include; alternative  therapies for vasomotor symptoms (not FDA approved) including soy, black cohosh, Vit E and avoidance of triggers such as cigarette smoking, alcohol, humidity, stress and caffeine; alternative Rxs for treatment of menopause symptoms such as antidepressants or Clonidine. Counseling session lasted approximately minutes, and all her questions were completely answered.      FOLLOW-UP: With me ALPA Gonzales Jr, MD, FACOG

## 2023-02-24 ENCOUNTER — TELEPHONE (OUTPATIENT)
Dept: INTERNAL MEDICINE | Facility: CLINIC | Age: 78
End: 2023-02-24
Payer: MEDICARE

## 2023-02-24 RX ORDER — TRAZODONE HYDROCHLORIDE 50 MG/1
50 TABLET ORAL NIGHTLY
Qty: 30 TABLET | Refills: 11 | Status: SHIPPED | OUTPATIENT
Start: 2023-02-24 | End: 2023-07-20

## 2023-02-24 NOTE — TELEPHONE ENCOUNTER
----- Message from Sasha Montanez sent at 2023  8:25 AM CST -----  Contact: [phone] Patient  Patient would like to get medical advice.  Symptoms (please be specific):   anxiety due to recent passing of her mother  How long have you had these symptoms: Pt states she had a Rx but it . Pt was not able to provide the name of the medication.   Would you like a call back, or a response through your [facility] portal?:   call back  Pharmacy name and phone # (copy from chart):     [id number] - [facility] [address] [facility] AT [address]   Phone:  [phone]  ax:  [phone]     Comments:

## 2023-05-30 ENCOUNTER — OFFICE VISIT (OUTPATIENT)
Dept: URGENT CARE | Facility: CLINIC | Age: 78
End: 2023-05-30
Payer: MEDICARE

## 2023-05-30 VITALS
HEART RATE: 73 BPM | DIASTOLIC BLOOD PRESSURE: 84 MMHG | OXYGEN SATURATION: 98 % | SYSTOLIC BLOOD PRESSURE: 194 MMHG | WEIGHT: 125 LBS | RESPIRATION RATE: 16 BRPM | HEIGHT: 60 IN | BODY MASS INDEX: 24.54 KG/M2 | TEMPERATURE: 98 F

## 2023-05-30 DIAGNOSIS — R03.0 ELEVATED BLOOD PRESSURE READING: Primary | ICD-10-CM

## 2023-05-30 DIAGNOSIS — M54.9 BACK PAIN, UNSPECIFIED BACK LOCATION, UNSPECIFIED BACK PAIN LATERALITY, UNSPECIFIED CHRONICITY: ICD-10-CM

## 2023-05-30 DIAGNOSIS — K59.00 CONSTIPATION, UNSPECIFIED CONSTIPATION TYPE: ICD-10-CM

## 2023-05-30 LAB
BILIRUB UR QL STRIP: NEGATIVE
GLUCOSE UR QL STRIP: NEGATIVE
KETONES UR QL STRIP: NEGATIVE
LEUKOCYTE ESTERASE UR QL STRIP: NEGATIVE
PH, POC UA: 5.5 (ref 5–8)
POC BLOOD, URINE: NEGATIVE
POC NITRATES, URINE: NEGATIVE
PROT UR QL STRIP: NEGATIVE
SP GR UR STRIP: 1.02 (ref 1–1.03)
UROBILINOGEN UR STRIP-ACNC: NORMAL (ref 0.1–1.1)

## 2023-05-30 PROCEDURE — 99214 OFFICE O/P EST MOD 30 MIN: CPT | Mod: S$GLB,,,

## 2023-05-30 PROCEDURE — 81003 URINALYSIS AUTO W/O SCOPE: CPT | Mod: QW,S$GLB,,

## 2023-05-30 PROCEDURE — 99214 PR OFFICE/OUTPT VISIT, EST, LEVL IV, 30-39 MIN: ICD-10-PCS | Mod: S$GLB,,,

## 2023-05-30 PROCEDURE — 81003 POCT URINALYSIS, DIPSTICK, AUTOMATED, W/O SCOPE: ICD-10-PCS | Mod: QW,S$GLB,,

## 2023-05-30 RX ORDER — POLYETHYLENE GLYCOL 3350 17 G/17G
17 POWDER, FOR SOLUTION ORAL DAILY
Qty: 7 EACH | Refills: 0 | Status: SHIPPED | OUTPATIENT
Start: 2023-05-30 | End: 2023-06-06

## 2023-05-30 RX ORDER — POLYETHYLENE GLYCOL 3350 17 G/17G
17 POWDER, FOR SOLUTION ORAL 2 TIMES DAILY
Qty: 14 EACH | Refills: 0 | Status: CANCELLED | OUTPATIENT
Start: 2023-05-30 | End: 2023-06-06

## 2023-05-30 NOTE — PATIENT INSTRUCTIONS
- Rest.    - Drink plenty of fluids.    - increase fiber intake.   - You can try taking Benefiber to increase fiber intake.   - Try taking miralax every 12 hours.     Elevated Blood Pressure  Your blood pressure was elevated during your visit to the urgent care.  It was not so high that immediate care was needed but it is recommended that you monitor your blood pressure over the next week or two to make sure that it is not staying elevated.  Please have your blood pressure taken 2 times in the morning, 5 minutes apart, and 2 times in the evening, 5 minutes apart. If your blood pressure is consistently above 140/90 you will need to follow up with your PCP more quickly.    - You must understand that you have received an Urgent Care treatment only and that you may be released before all of your medical problems are known or treated.   - You, the patient, will arrange for follow up care as instructed.   - If your condition worsens or fails to improve we recommend that you receive another evaluation at the ER immediately or contact your PCP to discuss your concerns or return here.   - Follow up with your PCP or specialty clinic as directed in the next 1-2 weeks if not improved or as needed.  You can call [phone] to schedule an appointment with the appropriate provider.    If your symptoms do not improve or worsen, go to the emergency room immediately.

## 2023-05-30 NOTE — PROGRESS NOTES
Subjective:      Patient ID: Krysta Kam is a 77 y.o. female.    Vitals:  height is 5' (1.524 m) and weight is 56.7 kg (125 lb). Her oral temperature is 97.8 °F (36.6 °C). Her blood pressure is 194/84 (abnormal) and her pulse is 73. Her respiration is 16 and oxygen saturation is 98%.     Chief Complaint: Abdominal Pain    77 Y.O FEMALE C/O BACK PAIN AND ABDOMINAL GAS X1 MONTH. PATIENT STATES THAT SHE HAS A LOT OF GAS AFTER EATING. PATIENT STATES THAT SHE ALSO BEEN HAVING ABDOMINAL BLOATING AND CONSTIPATION. She has tried taking miralax for symptoms which has helped.     Provider note:  Patient is a 70-year-old female who presents today with the chronic lower back pain abdominal pain that has been going on for 1 month.  Patient states that abdominal pain is intermittent.  She has tried taking MiraLax for symptoms with mild relief.  Patient states she has been more constipated than normal.  Patient's last bowel movement was this morning which was normal consistency.  Patient states that the longer she goes without bowel movements 3 days which is abnormal for her.  She denies any nausea or vomiting.  Denies bloody stools.  Patient states that she has been having chronic lower back pain for years.  Tylenol helps with symptoms.  She denies any radiation of the pain.  Denies any tingling or numbness anywhere.      Patient states that she takes her blood pressure medication at night.  She states that she has been eating foods that are higher in salt lately.  Patient denies any chest pain, headaches, change in vision or shortness of breath.    Abdominal Pain  This is a new problem. The current episode started more than 1 month ago. The onset quality is sudden. The problem occurs constantly. The problem has been rapidly worsening. The pain is located in the LLQ. The pain is at a severity of 3/10. The pain is mild. The abdominal pain radiates to the back. Associated symptoms include constipation. Pertinent  negatives include no nausea or vomiting. The pain is aggravated by eating. The pain is relieved by Bowel movements. She has tried nothing for the symptoms. The treatment provided no relief.     Constitution: Negative for fatigue.   HENT:  Negative for ear pain.    Gastrointestinal:  Positive for abdominal pain and constipation. Negative for nausea, vomiting, rectal pain and heartburn.   Neurological:  Negative for disorientation and altered mental status.   Psychiatric/Behavioral:  Negative for altered mental status and disorientation.     Objective:     Physical Exam   Constitutional: She is oriented to person, place, and time. She appears well-developed.   HENT:   Head: Normocephalic and atraumatic.   Ears:   Right Ear: External ear normal.   Left Ear: External ear normal.   Nose: Nose normal.   Mouth/Throat: Mucous membranes are normal.   Eyes: Conjunctivae and lids are normal.   Neck: Trachea normal. Neck supple.   Cardiovascular: Normal rate, regular rhythm and normal heart sounds.   Pulmonary/Chest: Effort normal and breath sounds normal. No respiratory distress.   Abdominal: Normal appearance and bowel sounds are normal. She exhibits no distension and no mass. Soft. There is no abdominal tenderness. There is no rebound, no guarding, no tenderness at McBurney's point, negative De La Vega's sign, no left CVA tenderness, negative Rovsing's sign, negative psoas sign and no right CVA tenderness.      Comments: No rebound or guarding noted on exam.  No abdominal tenderness at this time.   Musculoskeletal: Normal range of motion.         General: Normal range of motion.   Neurological: She is alert and oriented to person, place, and time. She has normal strength.   Skin: Skin is warm, dry, intact, not diaphoretic and not pale.   Psychiatric: Her speech is normal and behavior is normal. Judgment and thought content normal.   Nursing note and vitals reviewed.  Results for orders placed or performed in visit on 05/30/23    POCT Urinalysis, Dipstick, Automated, W/O Scope   Result Value Ref Range    POC Blood, Urine Negative Negative    POC Bilirubin, Urine Negative Negative    POC Urobilinogen, Urine NORMAL 0.1 - 1.1    POC Ketones, Urine Negative Negative    POC Protein, Urine Negative Negative    POC Nitrates, Urine Negative Negative    POC Glucose, Urine Negative Negative    pH, UA 5.5 5 - 8    POC Specific Gravity, Urine 1.025 1.003 - 1.029    POC Leukocytes, Urine Negative Negative       Assessment:     1. Elevated blood pressure reading    2. Back pain, unspecified back location, unspecified back pain laterality, unspecified chronicity    3. Constipation, unspecified constipation type        Plan:   -spent greater than 30 minutes on patient encounter.    Elevated blood pressure reading    Back pain, unspecified back location, unspecified back pain laterality, unspecified chronicity  -     POCT Urinalysis, Dipstick, Automated, W/O Scope    Constipation, unspecified constipation type  -     polyethylene glycol (GLYCOLAX) 17 gram PwPk; Take 17 g by mouth once daily. for 7 days  Dispense: 7 each; Refill: 0                  Patient Instructions   - Rest.    - Drink plenty of fluids.    - increase fiber intake.   - You can try taking Benefiber to increase fiber intake.   - Try taking miralax every 12 hours.     Elevated Blood Pressure  Your blood pressure was elevated during your visit to the urgent care.  It was not so high that immediate care was needed but it is recommended that you monitor your blood pressure over the next week or two to make sure that it is not staying elevated.  Please have your blood pressure taken 2 times in the morning, 5 minutes apart, and 2 times in the evening, 5 minutes apart. If your blood pressure is consistently above 140/90 you will need to follow up with your PCP more quickly.    - You must understand that you have received an Urgent Care treatment only and that you may be released before all of  your medical problems are known or treated.   - You, the patient, will arrange for follow up care as instructed.   - If your condition worsens or fails to improve we recommend that you receive another evaluation at the ER immediately or contact your PCP to discuss your concerns or return here.   - Follow up with your PCP or specialty clinic as directed in the next 1-2 weeks if not improved or as needed.  You can call [phone] to schedule an appointment with the appropriate provider.    If your symptoms do not improve or worsen, go to the emergency room immediately.

## 2023-06-19 RX ORDER — DICLOFENAC SODIUM 10 MG/G
GEL TOPICAL 2 TIMES DAILY
Qty: 200 G | Refills: 4 | Status: SHIPPED | OUTPATIENT
Start: 2023-06-19 | End: 2023-11-07 | Stop reason: SDUPTHER

## 2023-06-19 RX ORDER — DICLOFENAC SODIUM 50 MG/1
50 TABLET, DELAYED RELEASE ORAL 2 TIMES DAILY PRN
Qty: 60 TABLET | Refills: 2 | Status: SHIPPED | OUTPATIENT
Start: 2023-06-19

## 2023-07-18 ENCOUNTER — LAB VISIT (OUTPATIENT)
Dept: LAB | Facility: HOSPITAL | Age: 78
End: 2023-07-18
Attending: INTERNAL MEDICINE
Payer: MEDICARE

## 2023-07-18 DIAGNOSIS — D64.9 ANEMIA, UNSPECIFIED TYPE: ICD-10-CM

## 2023-07-18 DIAGNOSIS — E11.9 DIABETES MELLITUS WITHOUT COMPLICATION: ICD-10-CM

## 2023-07-18 LAB
ALBUMIN SERPL BCP-MCNC: 3.6 G/DL (ref 3.5–5.2)
ALP SERPL-CCNC: 77 U/L (ref 55–135)
ALT SERPL W/O P-5'-P-CCNC: 22 U/L (ref 10–44)
ANION GAP SERPL CALC-SCNC: 10 MMOL/L (ref 8–16)
ANISOCYTOSIS BLD QL SMEAR: SLIGHT
AST SERPL-CCNC: 26 U/L (ref 10–40)
BASOPHILS # BLD AUTO: 0.06 K/UL (ref 0–0.2)
BASOPHILS NFR BLD: 1 % (ref 0–1.9)
BILIRUB SERPL-MCNC: 0.5 MG/DL (ref 0.1–1)
BUN SERPL-MCNC: 15 MG/DL (ref 8–23)
BURR CELLS BLD QL SMEAR: ABNORMAL
CALCIUM SERPL-MCNC: 9.7 MG/DL (ref 8.7–10.5)
CHLORIDE SERPL-SCNC: 103 MMOL/L (ref 95–110)
CO2 SERPL-SCNC: 28 MMOL/L (ref 23–29)
CREAT SERPL-MCNC: 0.9 MG/DL (ref 0.5–1.4)
DIFFERENTIAL METHOD: ABNORMAL
EOSINOPHIL # BLD AUTO: 0.2 K/UL (ref 0–0.5)
EOSINOPHIL NFR BLD: 3.1 % (ref 0–8)
ERYTHROCYTE [DISTWIDTH] IN BLOOD BY AUTOMATED COUNT: 15.5 % (ref 11.5–14.5)
EST. GFR  (NO RACE VARIABLE): >60 ML/MIN/1.73 M^2
ESTIMATED AVG GLUCOSE: 117 MG/DL (ref 68–131)
GLUCOSE SERPL-MCNC: 84 MG/DL (ref 70–110)
HBA1C MFR BLD: 5.7 % (ref 4–5.6)
HCT VFR BLD AUTO: 40.6 % (ref 37–48.5)
HGB BLD-MCNC: 12.5 G/DL (ref 12–16)
IMM GRANULOCYTES # BLD AUTO: 0.03 K/UL (ref 0–0.04)
IMM GRANULOCYTES NFR BLD AUTO: 0.5 % (ref 0–0.5)
LYMPHOCYTES # BLD AUTO: 1.6 K/UL (ref 1–4.8)
LYMPHOCYTES NFR BLD: 25.4 % (ref 18–48)
MCH RBC QN AUTO: 26.3 PG (ref 27–31)
MCHC RBC AUTO-ENTMCNC: 30.8 G/DL (ref 32–36)
MCV RBC AUTO: 85 FL (ref 82–98)
MONOCYTES # BLD AUTO: 0.7 K/UL (ref 0.3–1)
MONOCYTES NFR BLD: 11.2 % (ref 4–15)
NEUTROPHILS # BLD AUTO: 3.6 K/UL (ref 1.8–7.7)
NEUTROPHILS NFR BLD: 58.8 % (ref 38–73)
NRBC BLD-RTO: 0 /100 WBC
OVALOCYTES BLD QL SMEAR: ABNORMAL
PLATELET # BLD AUTO: 182 K/UL (ref 150–450)
PLATELET BLD QL SMEAR: ABNORMAL
PMV BLD AUTO: ABNORMAL FL (ref 9.2–12.9)
POIKILOCYTOSIS BLD QL SMEAR: SLIGHT
POTASSIUM SERPL-SCNC: 4.7 MMOL/L (ref 3.5–5.1)
PROT SERPL-MCNC: 7.2 G/DL (ref 6–8.4)
RBC # BLD AUTO: 4.76 M/UL (ref 4–5.4)
SCHISTOCYTES BLD QL SMEAR: ABNORMAL
SODIUM SERPL-SCNC: 141 MMOL/L (ref 136–145)
WBC # BLD AUTO: 6.18 K/UL (ref 3.9–12.7)

## 2023-07-18 PROCEDURE — 80053 COMPREHEN METABOLIC PANEL: CPT | Performed by: INTERNAL MEDICINE

## 2023-07-18 PROCEDURE — 36415 COLL VENOUS BLD VENIPUNCTURE: CPT | Mod: PO | Performed by: INTERNAL MEDICINE

## 2023-07-18 PROCEDURE — 83036 HEMOGLOBIN GLYCOSYLATED A1C: CPT | Performed by: INTERNAL MEDICINE

## 2023-07-18 PROCEDURE — 85025 COMPLETE CBC W/AUTO DIFF WBC: CPT | Performed by: INTERNAL MEDICINE

## 2023-07-20 ENCOUNTER — OFFICE VISIT (OUTPATIENT)
Dept: INTERNAL MEDICINE | Facility: CLINIC | Age: 78
End: 2023-07-20
Payer: MEDICARE

## 2023-07-20 VITALS
BODY MASS INDEX: 23.81 KG/M2 | HEART RATE: 59 BPM | DIASTOLIC BLOOD PRESSURE: 80 MMHG | SYSTOLIC BLOOD PRESSURE: 157 MMHG | OXYGEN SATURATION: 99 % | WEIGHT: 121.25 LBS | HEIGHT: 60 IN

## 2023-07-20 DIAGNOSIS — I10 HYPERTENSION, UNSPECIFIED TYPE: Primary | ICD-10-CM

## 2023-07-20 DIAGNOSIS — R14.0 BLOATING: ICD-10-CM

## 2023-07-20 DIAGNOSIS — M79.7 FIBROMYALGIA: ICD-10-CM

## 2023-07-20 DIAGNOSIS — Z86.59 HISTORY OF DEPRESSION: ICD-10-CM

## 2023-07-20 PROCEDURE — 3072F PR LOW RISK FOR RETINOPATHY: ICD-10-PCS | Mod: CPTII,S$GLB,, | Performed by: INTERNAL MEDICINE

## 2023-07-20 PROCEDURE — 99214 OFFICE O/P EST MOD 30 MIN: CPT | Mod: S$GLB,,, | Performed by: INTERNAL MEDICINE

## 2023-07-20 PROCEDURE — 99999 PR PBB SHADOW E&M-EST. PATIENT-LVL IV: CPT | Mod: PBBFAC,,, | Performed by: INTERNAL MEDICINE

## 2023-07-20 PROCEDURE — 3077F SYST BP >= 140 MM HG: CPT | Mod: CPTII,S$GLB,, | Performed by: INTERNAL MEDICINE

## 2023-07-20 PROCEDURE — 3079F DIAST BP 80-89 MM HG: CPT | Mod: CPTII,S$GLB,, | Performed by: INTERNAL MEDICINE

## 2023-07-20 PROCEDURE — 99214 PR OFFICE/OUTPT VISIT, EST, LEVL IV, 30-39 MIN: ICD-10-PCS | Mod: S$GLB,,, | Performed by: INTERNAL MEDICINE

## 2023-07-20 PROCEDURE — 1126F PR PAIN SEVERITY QUANTIFIED, NO PAIN PRESENT: ICD-10-PCS | Mod: CPTII,S$GLB,, | Performed by: INTERNAL MEDICINE

## 2023-07-20 PROCEDURE — 1126F AMNT PAIN NOTED NONE PRSNT: CPT | Mod: CPTII,S$GLB,, | Performed by: INTERNAL MEDICINE

## 2023-07-20 PROCEDURE — 3072F LOW RISK FOR RETINOPATHY: CPT | Mod: CPTII,S$GLB,, | Performed by: INTERNAL MEDICINE

## 2023-07-20 PROCEDURE — 3077F PR MOST RECENT SYSTOLIC BLOOD PRESSURE >= 140 MM HG: ICD-10-PCS | Mod: CPTII,S$GLB,, | Performed by: INTERNAL MEDICINE

## 2023-07-20 PROCEDURE — 3079F PR MOST RECENT DIASTOLIC BLOOD PRESSURE 80-89 MM HG: ICD-10-PCS | Mod: CPTII,S$GLB,, | Performed by: INTERNAL MEDICINE

## 2023-07-20 PROCEDURE — 99999 PR PBB SHADOW E&M-EST. PATIENT-LVL IV: ICD-10-PCS | Mod: PBBFAC,,, | Performed by: INTERNAL MEDICINE

## 2023-07-20 RX ORDER — DULOXETIN HYDROCHLORIDE 30 MG/1
30 CAPSULE, DELAYED RELEASE ORAL DAILY
Qty: 30 CAPSULE | Refills: 11 | Status: SHIPPED | OUTPATIENT
Start: 2023-07-20 | End: 2024-07-19

## 2023-07-20 RX ORDER — METOPROLOL SUCCINATE 25 MG/1
25 TABLET, EXTENDED RELEASE ORAL DAILY
Qty: 30 TABLET | Refills: 11 | Status: SHIPPED | OUTPATIENT
Start: 2023-07-20 | End: 2024-07-19

## 2023-07-22 ENCOUNTER — HOSPITAL ENCOUNTER (EMERGENCY)
Facility: HOSPITAL | Age: 78
Discharge: HOME | End: 2023-07-22
Attending: EMERGENCY MEDICINE | Admitting: EMERGENCY MEDICINE
Payer: MEDICARE

## 2023-07-22 VITALS
SYSTOLIC BLOOD PRESSURE: 189 MMHG | TEMPERATURE: 98 F | RESPIRATION RATE: 16 BRPM | DIASTOLIC BLOOD PRESSURE: 91 MMHG | OXYGEN SATURATION: 98 % | HEART RATE: 60 BPM

## 2023-07-22 DIAGNOSIS — R53.83 FATIGUE: ICD-10-CM

## 2023-07-22 DIAGNOSIS — I10 HTN (HYPERTENSION): ICD-10-CM

## 2023-07-22 LAB
ALBUMIN SERPL BCP-MCNC: 4.1 G/DL (ref 3.5–5.2)
ALP SERPL-CCNC: 81 U/L (ref 55–135)
ALT SERPL W/O P-5'-P-CCNC: 22 U/L (ref 10–44)
ANION GAP SERPL CALC-SCNC: 12 MMOL/L (ref 8–16)
AST SERPL-CCNC: 26 U/L (ref 10–40)
BACTERIA #/AREA URNS AUTO: NORMAL /HPF
BASOPHILS # BLD AUTO: 0.03 K/UL (ref 0–0.2)
BASOPHILS NFR BLD: 0.4 % (ref 0–1.9)
BILIRUB SERPL-MCNC: 0.7 MG/DL (ref 0.1–1)
BILIRUB UR QL STRIP: NEGATIVE
BUN SERPL-MCNC: 15 MG/DL (ref 6–30)
BUN SERPL-MCNC: 15 MG/DL (ref 8–23)
CALCIUM SERPL-MCNC: 9.7 MG/DL (ref 8.7–10.5)
CHLORIDE SERPL-SCNC: 101 MMOL/L (ref 95–110)
CHLORIDE SERPL-SCNC: 99 MMOL/L (ref 95–110)
CLARITY UR REFRACT.AUTO: CLEAR
CO2 SERPL-SCNC: 23 MMOL/L (ref 23–29)
COLOR UR AUTO: COLORLESS
CREAT SERPL-MCNC: 0.7 MG/DL (ref 0.5–1.4)
CREAT SERPL-MCNC: 0.9 MG/DL (ref 0.5–1.4)
DIFFERENTIAL METHOD: ABNORMAL
EOSINOPHIL # BLD AUTO: 0 K/UL (ref 0–0.5)
EOSINOPHIL NFR BLD: 0.4 % (ref 0–8)
ERYTHROCYTE [DISTWIDTH] IN BLOOD BY AUTOMATED COUNT: 14.9 % (ref 11.5–14.5)
EST. GFR  (NO RACE VARIABLE): >60 ML/MIN/1.73 M^2
GLUCOSE SERPL-MCNC: 144 MG/DL (ref 70–110)
GLUCOSE SERPL-MCNC: 149 MG/DL (ref 70–110)
GLUCOSE SERPL-MCNC: 153 MG/DL (ref 70–110)
GLUCOSE UR QL STRIP: ABNORMAL
HCT VFR BLD AUTO: 41.9 % (ref 37–48.5)
HCT VFR BLD CALC: 42 %PCV (ref 36–54)
HCV AB SERPL QL IA: NORMAL
HGB BLD-MCNC: 13.7 G/DL (ref 12–16)
HGB UR QL STRIP: NEGATIVE
HIV 1+2 AB+HIV1 P24 AG SERPL QL IA: NORMAL
IMM GRANULOCYTES # BLD AUTO: 0.02 K/UL (ref 0–0.04)
IMM GRANULOCYTES NFR BLD AUTO: 0.2 % (ref 0–0.5)
KETONES UR QL STRIP: ABNORMAL
LEUKOCYTE ESTERASE UR QL STRIP: NEGATIVE
LYMPHOCYTES # BLD AUTO: 1.3 K/UL (ref 1–4.8)
LYMPHOCYTES NFR BLD: 15.4 % (ref 18–48)
MAGNESIUM SERPL-MCNC: 1.6 MG/DL (ref 1.6–2.6)
MCH RBC QN AUTO: 26.6 PG (ref 27–31)
MCHC RBC AUTO-ENTMCNC: 32.7 G/DL (ref 32–36)
MCV RBC AUTO: 81 FL (ref 82–98)
MICROSCOPIC COMMENT: NORMAL
MONOCYTES # BLD AUTO: 0.4 K/UL (ref 0.3–1)
MONOCYTES NFR BLD: 4.3 % (ref 4–15)
NEUTROPHILS # BLD AUTO: 6.4 K/UL (ref 1.8–7.7)
NEUTROPHILS NFR BLD: 79.3 % (ref 38–73)
NITRITE UR QL STRIP: NEGATIVE
NRBC BLD-RTO: 0 /100 WBC
PH UR STRIP: 8 [PH] (ref 5–8)
PLATELET # BLD AUTO: 192 K/UL (ref 150–450)
PMV BLD AUTO: 13.5 FL (ref 9.2–12.9)
POC IONIZED CALCIUM: 1.18 MMOL/L (ref 1.06–1.42)
POC TCO2 (MEASURED): 24 MMOL/L (ref 23–29)
POCT GLUCOSE: 144 MG/DL (ref 70–110)
POTASSIUM BLD-SCNC: 3.7 MMOL/L (ref 3.5–5.1)
POTASSIUM SERPL-SCNC: 3.9 MMOL/L (ref 3.5–5.1)
PROT SERPL-MCNC: 8.1 G/DL (ref 6–8.4)
PROT UR QL STRIP: NEGATIVE
RBC # BLD AUTO: 5.15 M/UL (ref 4–5.4)
RBC #/AREA URNS AUTO: 1 /HPF (ref 0–4)
SAMPLE: ABNORMAL
SARS-COV-2 RDRP RESP QL NAA+PROBE: NEGATIVE
SODIUM BLD-SCNC: 137 MMOL/L (ref 136–145)
SODIUM SERPL-SCNC: 136 MMOL/L (ref 136–145)
SP GR UR STRIP: 1.01 (ref 1–1.03)
SQUAMOUS #/AREA URNS AUTO: 1 /HPF
TROPONIN I SERPL DL<=0.01 NG/ML-MCNC: <0.006 NG/ML (ref 0–0.03)
TSH SERPL DL<=0.005 MIU/L-ACNC: 0.99 UIU/ML (ref 0.4–4)
URN SPEC COLLECT METH UR: ABNORMAL
WBC # BLD AUTO: 8.11 K/UL (ref 3.9–12.7)
WBC #/AREA URNS AUTO: 0 /HPF (ref 0–5)
YEAST UR QL AUTO: NORMAL

## 2023-07-22 PROCEDURE — 82962 GLUCOSE BLOOD TEST: CPT | Mod: 91

## 2023-07-22 PROCEDURE — 80053 COMPREHEN METABOLIC PANEL: CPT

## 2023-07-22 PROCEDURE — 83735 ASSAY OF MAGNESIUM: CPT

## 2023-07-22 PROCEDURE — 87389 HIV-1 AG W/HIV-1&-2 AB AG IA: CPT | Performed by: PHYSICIAN ASSISTANT

## 2023-07-22 PROCEDURE — 99285 EMERGENCY DEPT VISIT HI MDM: CPT | Mod: 25

## 2023-07-22 PROCEDURE — 86803 HEPATITIS C AB TEST: CPT | Performed by: PHYSICIAN ASSISTANT

## 2023-07-22 PROCEDURE — 93010 EKG 12-LEAD: ICD-10-PCS | Mod: ,,, | Performed by: INTERNAL MEDICINE

## 2023-07-22 PROCEDURE — 84443 ASSAY THYROID STIM HORMONE: CPT

## 2023-07-22 PROCEDURE — 84484 ASSAY OF TROPONIN QUANT: CPT

## 2023-07-22 PROCEDURE — 81001 URINALYSIS AUTO W/SCOPE: CPT

## 2023-07-22 PROCEDURE — 93010 ELECTROCARDIOGRAM REPORT: CPT | Mod: ,,, | Performed by: INTERNAL MEDICINE

## 2023-07-22 PROCEDURE — 80048 BASIC METABOLIC PNL TOTAL CA: CPT | Mod: XB

## 2023-07-22 PROCEDURE — 85025 COMPLETE CBC W/AUTO DIFF WBC: CPT

## 2023-07-22 PROCEDURE — 25000003 PHARM REV CODE 250

## 2023-07-22 PROCEDURE — U0002 COVID-19 LAB TEST NON-CDC: HCPCS

## 2023-07-22 PROCEDURE — 96360 HYDRATION IV INFUSION INIT: CPT

## 2023-07-22 PROCEDURE — 93005 ELECTROCARDIOGRAM TRACING: CPT

## 2023-07-22 RX ORDER — METOPROLOL TARTRATE 25 MG/1
25 TABLET, FILM COATED ORAL 2 TIMES DAILY
Status: DISCONTINUED | OUTPATIENT
Start: 2023-07-22 | End: 2023-07-22

## 2023-07-22 RX ORDER — CHLORTHALIDONE 25 MG/1
25 TABLET ORAL DAILY
Status: DISCONTINUED | OUTPATIENT
Start: 2023-07-22 | End: 2023-07-22

## 2023-07-22 RX ORDER — CHLORTHALIDONE 25 MG/1
25 TABLET ORAL
Status: COMPLETED | OUTPATIENT
Start: 2023-07-22 | End: 2023-07-22

## 2023-07-22 RX ORDER — AMLODIPINE BESYLATE 10 MG/1
10 TABLET ORAL
Status: COMPLETED | OUTPATIENT
Start: 2023-07-22 | End: 2023-07-22

## 2023-07-22 RX ORDER — AMLODIPINE BESYLATE 10 MG/1
10 TABLET ORAL DAILY
Status: DISCONTINUED | OUTPATIENT
Start: 2023-07-22 | End: 2023-07-22

## 2023-07-22 RX ORDER — METOPROLOL TARTRATE 25 MG/1
25 TABLET, FILM COATED ORAL
Status: COMPLETED | OUTPATIENT
Start: 2023-07-22 | End: 2023-07-22

## 2023-07-22 RX ORDER — LOSARTAN POTASSIUM 50 MG/1
100 TABLET ORAL
Status: COMPLETED | OUTPATIENT
Start: 2023-07-22 | End: 2023-07-22

## 2023-07-22 RX ADMIN — METOPROLOL TARTRATE 25 MG: 25 TABLET, FILM COATED ORAL at 09:07

## 2023-07-22 RX ADMIN — AMLODIPINE BESYLATE 10 MG: 10 TABLET ORAL at 09:07

## 2023-07-22 RX ADMIN — LOSARTAN POTASSIUM 100 MG: 50 TABLET, FILM COATED ORAL at 06:07

## 2023-07-22 RX ADMIN — CHLORTHALIDONE 25 MG: 25 TABLET ORAL at 09:07

## 2023-07-22 RX ADMIN — SODIUM CHLORIDE 1000 ML: 9 INJECTION, SOLUTION INTRAVENOUS at 06:07

## 2023-07-22 NOTE — PROVIDER PROGRESS NOTES - EMERGENCY DEPT.
I have reviewed and agree with the resident note below. Ba Shields MD    ED Resident HAND-OFF NOTE:  7/22/2023 Received Sign Out    Chief Complaint   Patient presents with    Fatigue    Polyuria     Pt arrives c/o fatigue and polyuria since 2000 last night.     Krysta Kam is a 77 y.o. female who presented to the ED on 7/22/2023 with chief complaint of fatigue. I assumed care of patient from off-going ED physician team pending labs and re-evaluation.    On my evaluation, Krysta Kam appears well, hemodynamically stable and in NAD. Thus far, Krysta Kam has received:  Medications   sodium chloride 0.9% bolus 1,000 mL 1,000 mL (0 mLs Intravenous Stopped 7/22/23 0721)   losartan tablet 100 mg (100 mg Oral Given 7/22/23 0613)   amLODIPine tablet 10 mg (10 mg Oral Given 7/22/23 0927)   chlorthalidone tablet 25 mg (25 mg Oral Given 7/22/23 0927)   metoprolol tartrate (LOPRESSOR) tablet 25 mg (25 mg Oral Given 7/22/23 0927)       Vital Signs:  BP (!) 189/91   Pulse 60   Temp 97.9 °F (36.6 °C) (Oral)   Resp 16   SpO2 98%     Laboratory Studies:  Labs Reviewed   CBC W/ AUTO DIFFERENTIAL - Abnormal; Notable for the following components:       Result Value    MCV 81 (*)     MCH 26.6 (*)     RDW 14.9 (*)     MPV 13.5 (*)     Gran % 79.3 (*)     Lymph % 15.4 (*)     All other components within normal limits    Narrative:     Release to patient->Immediate   COMPREHENSIVE METABOLIC PANEL - Abnormal; Notable for the following components:    Glucose 149 (*)     All other components within normal limits    Narrative:     Release to patient->Immediate   URINALYSIS, REFLEX TO URINE CULTURE - Abnormal; Notable for the following components:    Color, UA Colorless (*)     Glucose, UA 3+ (*)     Ketones, UA Trace (*)     All other components within normal limits    Narrative:     Specimen Source->Urine   POCT GLUCOSE - Abnormal; Notable for the following components:    POCT Glucose 144 (*)     All  other components within normal limits   ISTAT PROCEDURE - Abnormal; Notable for the following components:    POC Glucose 153 (*)     All other components within normal limits   HIV 1 / 2 ANTIBODY    Narrative:     Release to patient->Immediate   HEPATITIS C ANTIBODY    Narrative:     Release to patient->Immediate   TROPONIN I    Narrative:     Release to patient->Immediate   TSH    Narrative:     Release to patient->Immediate   MAGNESIUM    Narrative:     Release to patient->Immediate   SARS-COV-2 RNA AMPLIFICATION, QUAL   URINALYSIS MICROSCOPIC    Narrative:     Specimen Source->Urine       MDM:  Labs grossly unremarkable, chest x-ray normal.  EKG without ischemia or arrhythmia.  On re-evaluation patient has significant hypertension, home medications ordered for blood pressure control.  On re-evaluation patient's blood pressure has decreased to an acceptable range.  Patient instructed to follow up with her PCP for further titration of her blood pressure medications and for further evaluation of her fatigue.    Discussed results, diagnosis, and treatment plan with patient; advised close follow-up with PCP. Reviewed strict return precautions. Patient confirms understanding and ability to comply. Patient was given the opportunity to ask questions prior to discharge and all questions answered.      ED Disposition Condition    Discharge Stable          ED Prescriptions    None       Follow-up Information       Follow up With Specialties Details Why Contact Info    Gold Palafox Jr., MD Internal Medicine   [address]  [place] [address]  [phone]      [facility] - Emergency Dept Emergency Medicine Go to  As needed, If symptoms worsen [address] [facility] [id number] [phone]          Carlos Shell MD  Emergency Medicine Resident  7/22/2023

## 2023-07-22 NOTE — DISCHARGE INSTRUCTIONS
It was a pleasure taking care of you today!     Diagnosis: Fatigue  -return to the emergency department for chest pain, shortness of breath, passing out, inability to keep foods or liquids down    Follow-Up Plan:  - Follow-up with primary care doctor within 3 - 5 days to discuss your recent ER visit and any additional concerns that you may have.  - Additional testing and/or evaluation as directed by your primary doctor    Return to the Emergency Department for symptoms including but not limited to: persistence or worsening of symptoms, shortness of breath or chest pain, inability to drink without vomiting, passing out/fainting/ loss of consciousness, or if you have other concerns.

## 2023-07-22 NOTE — ED PROVIDER NOTES
Encounter Date: 7/22/2023       History     Chief Complaint   Patient presents with    Fatigue    Polyuria     Pt arrives c/o fatigue and polyuria since 2000 last night.     Pt is a 77 year old female with hx of HTN who presents for evaluation of fatigue, which began today. She notes an associated urinary frequency and diarrhea. Denies any fever, chills, chest pain, shortness of breath, nausea, vomiting, or abdominal pain. No known trigger. Denies any exacerbating or alleviating factors.     The history is provided by the patient.   Review of patient's allergies indicates:   Allergen Reactions    Ondansetron      Other reaction(s): Flushing (Skin)    Savella [milnacipran] Nausea Only     Past Medical History:   Diagnosis Date    Anemia 6/2012    Anxiety     Cataract     Colon polyps 1/31/2017    Depression     Diabetes mellitus without complication 11/14/2018    Diabetes mellitus, type 2     Fibromyalgia     GERD (gastroesophageal reflux disease)     High cholesterol     Hypertension      Past Surgical History:   Procedure Laterality Date    BREAST BIOPSY  1989    BREAST BIOPSY Left     benign    COLONOSCOPY N/A 10/18/2016    Procedure: COLONOSCOPY;  Surgeon: Brittni Edmondson MD;  Location: [address]);  Service: Endoscopy;  Laterality: N/A;    COLONOSCOPY N/A 10/08/2019    Procedure: COLONOSCOPY;  Surgeon: Gold Ravi MD;  [facility]  Service: Endoscopy;  Laterality: N/A;    ESOPHAGOGASTRODUODENOSCOPY N/A 10/08/2019    Procedure: EGD (ESOPHAGOGASTRODUODENOSCOPY);  Surgeon: Gold Ravi MD;  [address]  Service: Endoscopy;  Laterality: N/A;  Okay for any provider due to KARSTEN    HYSTERECTOMY      KNEE ARTHROPLASTY Right 09/16/2020    Procedure: ARTHROPLASTY, KNEE: DEPUY-ATTUNE;  Surgeon: Tha Wright III, MD;  Location: [facility];  Service: Orthopedics;  Laterality: Right;     Family History   Problem Relation Age of Onset    Stroke Mother     Heart disease Father      No Known Problems Daughter     No Known Problems Son     No Known Problems Daughter     No Known Problems Daughter     No Known Problems Son     Cataracts Neg Hx     Cancer Neg Hx     Diabetes Neg Hx     Glaucoma Neg Hx     Retinal detachment Neg Hx     Celiac disease Neg Hx     Cirrhosis Neg Hx     Colon cancer Neg Hx     Cystic fibrosis Neg Hx     Esophageal cancer Neg Hx     Inflammatory bowel disease Neg Hx     Liver disease Neg Hx     Stomach cancer Neg Hx     Amblyopia Neg Hx     Blindness Neg Hx     Macular degeneration Neg Hx     Strabismus Neg Hx     Thyroid disease Neg Hx      Social History     Tobacco Use    Smoking status: Former     Packs/day: 0.25     Types: Cigarettes     Quit date: 1975     Years since quittin.5    Smokeless tobacco: Never    Tobacco comments:     occasional social smoker   Substance Use Topics    Alcohol use: No    Drug use: No     Review of Systems   Constitutional:  Positive for fatigue. Negative for chills and fever.   HENT:  Negative for ear discharge and ear pain.    Eyes:  Negative for pain and redness.   Respiratory:  Negative for shortness of breath.    Cardiovascular:  Negative for chest pain.   Gastrointestinal:  Positive for diarrhea. Negative for abdominal pain.   Genitourinary:  Positive for frequency. Negative for dysuria.   Musculoskeletal:  Negative for back pain.   Skin:  Negative for rash.   Neurological:  Negative for headaches.     Physical Exam     Initial Vitals [23 0505]   BP Pulse Resp Temp SpO2   (!) 214/102 99 14 98.3 °F (36.8 °C) 98 %      MAP       --         Physical Exam    Nursing note and vitals reviewed.  Constitutional: She appears well-developed and well-nourished. No distress.   HENT:   Head: Normocephalic and atraumatic.   Eyes: Conjunctivae and EOM are normal. Pupils are equal, round, and reactive to light.   Neck: Neck supple. No tracheal deviation present.   Normal range of motion.  Cardiovascular:  Normal rate, regular  rhythm and intact distal pulses.           No murmur heard.  Pulmonary/Chest: Breath sounds normal. No stridor. No respiratory distress.   Abdominal: Abdomen is soft. She exhibits no distension. There is no abdominal tenderness.   Musculoskeletal:         General: No edema. Normal range of motion.      Cervical back: Normal range of motion and neck supple.     Neurological: She is alert and oriented to person, place, and time. She has normal strength. No sensory deficit.   Skin: Skin is warm and dry. Capillary refill takes less than 2 seconds.       ED Course   Procedures  Labs Reviewed   CBC W/ AUTO DIFFERENTIAL - Abnormal; Notable for the following components:       Result Value    MCV 81 (*)     MCH 26.6 (*)     RDW 14.9 (*)     MPV 13.5 (*)     Gran % 79.3 (*)     Lymph % 15.4 (*)     All other components within normal limits    Narrative:     Release to patient->Immediate   COMPREHENSIVE METABOLIC PANEL - Abnormal; Notable for the following components:    Glucose 149 (*)     All other components within normal limits    Narrative:     Release to patient->Immediate   URINALYSIS, REFLEX TO URINE CULTURE - Abnormal; Notable for the following components:    Color, UA Colorless (*)     Glucose, UA 3+ (*)     Ketones, UA Trace (*)     All other components within normal limits    Narrative:     Specimen Source->Urine   POCT GLUCOSE - Abnormal; Notable for the following components:    POCT Glucose 144 (*)     All other components within normal limits   ISTAT PROCEDURE - Abnormal; Notable for the following components:    POC Glucose 153 (*)     All other components within normal limits   HIV 1 / 2 ANTIBODY    Narrative:     Release to patient->Immediate   HEPATITIS C ANTIBODY    Narrative:     Release to patient->Immediate   TROPONIN I    Narrative:     Release to patient->Immediate   TSH    Narrative:     Release to patient->Immediate   MAGNESIUM    Narrative:     Release to patient->Immediate   SARS-COV-2 RNA  AMPLIFICATION, QUAL   URINALYSIS MICROSCOPIC    Narrative:     Specimen Source->Urine   POCT GLUCOSE MONITORING CONTINUOUS   POCT GLUCOSE MONITORING CONTINUOUS   ISTAT CHEM8     EKG Readings: (Independently Interpreted)   Initial Reading: No STEMI. Rhythm: Normal Sinus Rhythm. Heart Rate: 84.   ECG Results              EKG 12-lead (Final result)  Result time 07/22/23 08:11:14      Final result by Interface, Lab In [facility] (07/22/23 08:11:14)                   Narrative:    Test Reason : I10,    Vent. Rate : 084 BPM     Atrial Rate : 084 BPM     P-R Int : 130 ms          QRS Dur : 068 ms      QT Int : 364 ms       P-R-T Axes : 063 056 041 degrees     QTc Int : 430 ms    Normal sinus rhythm  Normal ECG  When compared with ECG of 01-SEP-2020 14:19,  No significant change was found  Confirmed by Andrey ROSS MD ([id number]) on 7/22/2023 8:11:06 AM    Referred By: [email or web address]   SELF           Confirmed By:Andrey ROSS MD                                  Imaging Results              X-Ray Chest 1 View (Final result)  Result time 07/22/23 08:10:01      Final result by Mario Pink Jr., MD (07/22/23 08:10:01)                   Impression:      See above      Electronically signed by: Mario Pink MD  Date:    07/22/2023  Time:    08:10               Narrative:    EXAMINATION:  XR CHEST 1 VIEW    CLINICAL HISTORY:  Other fatigue    TECHNIQUE:  Single frontal view of the chest was performed.    COMPARISON:  April 2019    FINDINGS:  Monitoring leads in place.  Tortuosity of the descending thoracic aorta similar.  Heart size is similar.  Pulmonary vascularity not engorged.  The lungs fairly well aerated.  No confluent consolidation or pneumothorax.                                       Medications   sodium chloride 0.9% bolus 1,000 mL 1,000 mL (0 mLs Intravenous Stopped 7/22/23 0721)   losartan tablet 100 mg (100 mg Oral Given 7/22/23 0613)   amLODIPine tablet 10 mg (10 mg Oral Given 7/22/23 0927)   chlorthalidone tablet 25 mg (25 mg  Oral Given 7/22/23 0927)   metoprolol tartrate (LOPRESSOR) tablet 25 mg (25 mg Oral Given 7/22/23 0927)     Medical Decision Making:   History:   Old Medical Records: I decided to obtain old medical records.  Initial Assessment:   Pt presents for fatigue  Differential Diagnosis:   ACS, arrhythmia, electrolyte abnormality, anemia, dehydration, UTI, covid, pneumonia  Independently Interpreted Test(s):   I have ordered and independently interpreted EKG Reading(s) - see prior notes  Clinical Tests:   Lab Tests: Ordered and Reviewed  Radiological Study: Ordered and Reviewed  Medical Tests: Ordered and Reviewed  ED Management:  Labs without leukocytosis, anemia, or electrolyte abnormality. EKG without ST elevation. Trop negative. Pt well appearing and in no acute distress. Pt signed out pending UA and CXR          Attending Attestation:   Physician Attestation Statement for Resident:  As the supervising MD   Physician Attestation Statement: I have personally seen and examined this patient.   I agree with the above history.  -: Fatigue, polyuria   As the supervising MD I agree with the above PE.     As the supervising MD I agree with the above treatment, course, plan, and disposition.                               Clinical Impression:   Final diagnoses:  [I10] HTN (hypertension)  [R53.83] Fatigue        ED Disposition Condition    Discharge Stable          ED Prescriptions    None       Follow-up Information       Follow up With Specialties Details Why Contact Info    Gold Palafox Jr., MD Internal Medicine   [address]  [place] [address]  [phone]      [facility] - Emergency Dept Emergency Medicine Go to  As needed, If symptoms worsen [address] [facility] [id number] [phone]             Randy Ruano Jr., MD  Resident  07/22/23 1126       Angel Rose III, MD  07/22/23 1428

## 2023-07-22 NOTE — ED NOTES
I-STAT Chem-8+ Results:   Value Reference Range   Sodium 137 136-145 mmol/L   Potassium  3.7 3.5-5.1 mmol/L   Chloride 99  mmol/L   Ionized Calcium 1.18 1.06-1.42 mmol/L   CO2 (measured) 24 23-29 mmol/L   Glucose 153*  mg/dL   BUN 15 6-30 mg/dL   Creatinine 0.7 0.5-1.4 mg/dL   Hematocrit 42 36-54%

## 2023-07-22 NOTE — ED TRIAGE NOTES
Krysta Arroyo Loren Kam, a 77 y.o. female presents to the ED     Fatigue: Patient complains of fatigue. Symptoms began today. Sentinal symptom the patient feels fatigue. began with: Symptoms have been headaches and polyuria/malodorous urine. Associated diarrhea and abdominal cramping. Patient describes the following psychologic symptoms: stress related to recent death of loved one.  Patient denies fever, GI blood loss, and chest pain, nausea, vomiting. Symptoms have gradually worsened.   Triage note:  Chief Complaint   Patient presents with    Fatigue    Polyuria     Pt arrives c/o fatigue and polyuria since 2000 last night.     Review of patient's allergies indicates:   Allergen Reactions    Ondansetron      Other reaction(s): Flushing (Skin)    Savella [milnacipran] Nausea Only     Past Medical History:   Diagnosis Date    Anemia 6/2012    Anxiety     Cataract     Colon polyps 1/31/2017    Depression     Diabetes mellitus without complication 11/14/2018    Diabetes mellitus, type 2     Fibromyalgia     GERD (gastroesophageal reflux disease)     High cholesterol     Hypertension
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
## 2023-07-27 ENCOUNTER — TELEPHONE (OUTPATIENT)
Dept: INTERNAL MEDICINE | Facility: CLINIC | Age: 78
End: 2023-07-27
Payer: MEDICARE

## 2023-07-27 NOTE — TELEPHONE ENCOUNTER
----- Message from Edna Bazan sent at 7/27/2023  9:06 AM CDT -----  Contact: [phone]  Pt is trying to see if the Dr takes [employer] select plan HMO please give return call as to if this is accepted

## 2023-07-28 NOTE — TELEPHONE ENCOUNTER
Left pt a voicemail informing her that Dr. Palafox is in the network of the insurance.    Digna MAZARIEGOS

## 2023-08-25 ENCOUNTER — TELEPHONE (OUTPATIENT)
Dept: INTERNAL MEDICINE | Facility: CLINIC | Age: 78
End: 2023-08-25
Payer: MEDICARE

## 2023-08-25 DIAGNOSIS — Z12.31 VISIT FOR SCREENING MAMMOGRAM: Primary | ICD-10-CM

## 2023-08-25 NOTE — TELEPHONE ENCOUNTER
----- Message from Ana Laura Henriquez sent at 8/25/2023  1:44 PM CDT -----  Type:  Mammogram    Caller is requesting to schedule their annual mammogram appointment.  Order is not listed in EPIC.  Please enter order and contact patient to schedule.  Name of Caller: pt   Where would they like the mammogram performed?[facility]   Would the patient rather a call back or a response via [facility]?  Call   Best Call Back Number:[phone]  Additional Information:  mammo orders

## 2023-09-09 NOTE — TELEPHONE ENCOUNTER
No care due was identified.  [facility] Embedded Care Due Messages. Reference number: [id number].   9/08/2023 9:03:15 PM CDT

## 2023-09-11 RX ORDER — AMLODIPINE BESYLATE 10 MG/1
10 TABLET ORAL
Qty: 90 TABLET | Refills: 3 | Status: SHIPPED | OUTPATIENT
Start: 2023-09-11

## 2023-09-14 ENCOUNTER — OFFICE VISIT (OUTPATIENT)
Dept: INTERNAL MEDICINE | Facility: CLINIC | Age: 78
End: 2023-09-14
Attending: INTERNAL MEDICINE
Payer: MEDICARE

## 2023-09-14 ENCOUNTER — IMMUNIZATION (OUTPATIENT)
Dept: INTERNAL MEDICINE | Facility: CLINIC | Age: 78
End: 2023-09-14
Payer: MEDICARE

## 2023-09-14 ENCOUNTER — HOSPITAL ENCOUNTER (OUTPATIENT)
Dept: RADIOLOGY | Facility: HOSPITAL | Age: 78
Discharge: HOME | End: 2023-09-14
Attending: INTERNAL MEDICINE
Payer: MEDICARE

## 2023-09-14 VITALS
BODY MASS INDEX: 23.89 KG/M2 | HEART RATE: 69 BPM | OXYGEN SATURATION: 99 % | SYSTOLIC BLOOD PRESSURE: 154 MMHG | DIASTOLIC BLOOD PRESSURE: 82 MMHG | WEIGHT: 121.69 LBS | HEIGHT: 60 IN

## 2023-09-14 DIAGNOSIS — Z12.31 VISIT FOR SCREENING MAMMOGRAM: ICD-10-CM

## 2023-09-14 DIAGNOSIS — E11.9 DIABETES MELLITUS WITHOUT COMPLICATION: ICD-10-CM

## 2023-09-14 DIAGNOSIS — E11.36 TYPE 2 DIABETES MELLITUS WITH DIABETIC CATARACT, WITHOUT LONG-TERM CURRENT USE OF INSULIN: ICD-10-CM

## 2023-09-14 DIAGNOSIS — Z86.59 HISTORY OF DEPRESSION: ICD-10-CM

## 2023-09-14 DIAGNOSIS — M79.7 FIBROMYALGIA: Primary | ICD-10-CM

## 2023-09-14 DIAGNOSIS — I10 PRIMARY HYPERTENSION: ICD-10-CM

## 2023-09-14 DIAGNOSIS — R10.9 ABDOMINAL PAIN, UNSPECIFIED ABDOMINAL LOCATION: ICD-10-CM

## 2023-09-14 DIAGNOSIS — Z23 NEEDS FLU SHOT: Primary | ICD-10-CM

## 2023-09-14 DIAGNOSIS — E78.00 HIGH CHOLESTEROL: ICD-10-CM

## 2023-09-14 PROCEDURE — 99214 PR OFFICE/OUTPT VISIT, EST, LEVL IV, 30-39 MIN: ICD-10-PCS | Mod: S$GLB,,, | Performed by: INTERNAL MEDICINE

## 2023-09-14 PROCEDURE — 90694 VACC AIIV4 NO PRSRV 0.5ML IM: CPT | Mod: S$GLB,,, | Performed by: INTERNAL MEDICINE

## 2023-09-14 PROCEDURE — G0008 ADMIN INFLUENZA VIRUS VAC: HCPCS | Mod: S$GLB,,, | Performed by: INTERNAL MEDICINE

## 2023-09-14 PROCEDURE — 3072F LOW RISK FOR RETINOPATHY: CPT | Mod: CPTII,S$GLB,, | Performed by: INTERNAL MEDICINE

## 2023-09-14 PROCEDURE — 3288F PR FALLS RISK ASSESSMENT DOCUMENTED: ICD-10-PCS | Mod: CPTII,S$GLB,, | Performed by: INTERNAL MEDICINE

## 2023-09-14 PROCEDURE — 90694 FLU VACCINE - QUADRIVALENT - ADJUVANTED: ICD-10-PCS | Mod: S$GLB,,, | Performed by: INTERNAL MEDICINE

## 2023-09-14 PROCEDURE — 3079F DIAST BP 80-89 MM HG: CPT | Mod: CPTII,S$GLB,, | Performed by: INTERNAL MEDICINE

## 2023-09-14 PROCEDURE — 77067 SCR MAMMO BI INCL CAD: CPT | Mod: 26,,, | Performed by: RADIOLOGY

## 2023-09-14 PROCEDURE — 3077F PR MOST RECENT SYSTOLIC BLOOD PRESSURE >= 140 MM HG: ICD-10-PCS | Mod: CPTII,S$GLB,, | Performed by: INTERNAL MEDICINE

## 2023-09-14 PROCEDURE — 1159F PR MEDICATION LIST DOCUMENTED IN MEDICAL RECORD: ICD-10-PCS | Mod: CPTII,S$GLB,, | Performed by: INTERNAL MEDICINE

## 2023-09-14 PROCEDURE — 1101F PT FALLS ASSESS-DOCD LE1/YR: CPT | Mod: CPTII,S$GLB,, | Performed by: INTERNAL MEDICINE

## 2023-09-14 PROCEDURE — 1126F AMNT PAIN NOTED NONE PRSNT: CPT | Mod: CPTII,S$GLB,, | Performed by: INTERNAL MEDICINE

## 2023-09-14 PROCEDURE — 3072F PR LOW RISK FOR RETINOPATHY: ICD-10-PCS | Mod: CPTII,S$GLB,, | Performed by: INTERNAL MEDICINE

## 2023-09-14 PROCEDURE — 3077F SYST BP >= 140 MM HG: CPT | Mod: CPTII,S$GLB,, | Performed by: INTERNAL MEDICINE

## 2023-09-14 PROCEDURE — 99999 PR PBB SHADOW E&M-EST. PATIENT-LVL IV: ICD-10-PCS | Mod: PBBFAC,,, | Performed by: INTERNAL MEDICINE

## 2023-09-14 PROCEDURE — 99214 OFFICE O/P EST MOD 30 MIN: CPT | Mod: S$GLB,,, | Performed by: INTERNAL MEDICINE

## 2023-09-14 PROCEDURE — 99999 PR PBB SHADOW E&M-EST. PATIENT-LVL IV: CPT | Mod: PBBFAC,,, | Performed by: INTERNAL MEDICINE

## 2023-09-14 PROCEDURE — 1159F MED LIST DOCD IN RCRD: CPT | Mod: CPTII,S$GLB,, | Performed by: INTERNAL MEDICINE

## 2023-09-14 PROCEDURE — 77063 MAMMO DIGITAL SCREENING BILAT WITH TOMO: ICD-10-PCS | Mod: 26,,, | Performed by: RADIOLOGY

## 2023-09-14 PROCEDURE — 1101F PR PT FALLS ASSESS DOC 0-1 FALLS W/OUT INJ PAST YR: ICD-10-PCS | Mod: CPTII,S$GLB,, | Performed by: INTERNAL MEDICINE

## 2023-09-14 PROCEDURE — 3288F FALL RISK ASSESSMENT DOCD: CPT | Mod: CPTII,S$GLB,, | Performed by: INTERNAL MEDICINE

## 2023-09-14 PROCEDURE — 77067 SCR MAMMO BI INCL CAD: CPT | Mod: TC

## 2023-09-14 PROCEDURE — 1126F PR PAIN SEVERITY QUANTIFIED, NO PAIN PRESENT: ICD-10-PCS | Mod: CPTII,S$GLB,, | Performed by: INTERNAL MEDICINE

## 2023-09-14 PROCEDURE — 3079F PR MOST RECENT DIASTOLIC BLOOD PRESSURE 80-89 MM HG: ICD-10-PCS | Mod: CPTII,S$GLB,, | Performed by: INTERNAL MEDICINE

## 2023-09-14 PROCEDURE — 77063 BREAST TOMOSYNTHESIS BI: CPT | Mod: 26,,, | Performed by: RADIOLOGY

## 2023-09-14 PROCEDURE — G0008 FLU VACCINE - QUADRIVALENT - ADJUVANTED: ICD-10-PCS | Mod: S$GLB,,, | Performed by: INTERNAL MEDICINE

## 2023-09-14 PROCEDURE — 77067 MAMMO DIGITAL SCREENING BILAT WITH TOMO: ICD-10-PCS | Mod: 26,,, | Performed by: RADIOLOGY
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
## 2023-09-14 NOTE — PROGRESS NOTES
Two pt identifier verified. Pt tolerated well. Advised pt to wait 15 minutes post immunization. Pt verbalized understanding.    Digna MAZARIEGOS

## 2023-09-18 NOTE — TELEPHONE ENCOUNTER
No care due was identified.  [facility] Embedded Care Due Messages. Reference number: [id number].   9/18/2023 3:45:22 PM CDT

## 2023-09-19 RX ORDER — LOSARTAN POTASSIUM 100 MG/1
100 TABLET ORAL DAILY
Qty: 90 TABLET | Refills: 2 | Status: SHIPPED | OUTPATIENT
Start: 2023-09-19

## 2023-09-20 ENCOUNTER — TELEPHONE (OUTPATIENT)
Dept: ENDOSCOPY | Facility: HOSPITAL | Age: 78
End: 2023-09-20
Payer: MEDICARE

## 2023-09-20 ENCOUNTER — LAB VISIT (OUTPATIENT)
Dept: LAB | Facility: HOSPITAL | Age: 78
End: 2023-09-20
Attending: INTERNAL MEDICINE
Payer: MEDICARE

## 2023-09-20 ENCOUNTER — OFFICE VISIT (OUTPATIENT)
Dept: GASTROENTEROLOGY | Facility: CLINIC | Age: 78
End: 2023-09-20
Payer: MEDICARE

## 2023-09-20 VITALS
HEIGHT: 60 IN | HEART RATE: 71 BPM | BODY MASS INDEX: 23.63 KG/M2 | DIASTOLIC BLOOD PRESSURE: 83 MMHG | SYSTOLIC BLOOD PRESSURE: 202 MMHG | WEIGHT: 120.38 LBS

## 2023-09-20 DIAGNOSIS — K59.00 CONSTIPATION, UNSPECIFIED CONSTIPATION TYPE: ICD-10-CM

## 2023-09-20 DIAGNOSIS — K59.04 CHRONIC IDIOPATHIC CONSTIPATION: ICD-10-CM

## 2023-09-20 DIAGNOSIS — Z12.11 SCREEN FOR COLON CANCER: Primary | ICD-10-CM

## 2023-09-20 DIAGNOSIS — R10.9 ABDOMINAL PAIN, UNSPECIFIED ABDOMINAL LOCATION: ICD-10-CM

## 2023-09-20 DIAGNOSIS — K59.04 CHRONIC IDIOPATHIC CONSTIPATION: Primary | ICD-10-CM

## 2023-09-20 DIAGNOSIS — R19.4 CHANGE IN BOWEL HABITS: Primary | ICD-10-CM

## 2023-09-20 LAB
ALBUMIN SERPL BCP-MCNC: 4.1 G/DL (ref 3.5–5.2)
ALP SERPL-CCNC: 74 U/L (ref 55–135)
ALT SERPL W/O P-5'-P-CCNC: 17 U/L (ref 10–44)
ANION GAP SERPL CALC-SCNC: 11 MMOL/L (ref 8–16)
AST SERPL-CCNC: 23 U/L (ref 10–40)
BASOPHILS # BLD AUTO: 0.05 K/UL (ref 0–0.2)
BASOPHILS NFR BLD: 0.7 % (ref 0–1.9)
BILIRUB SERPL-MCNC: 0.5 MG/DL (ref 0.1–1)
BUN SERPL-MCNC: 18 MG/DL (ref 8–23)
CALCIUM SERPL-MCNC: 10.4 MG/DL (ref 8.7–10.5)
CHLORIDE SERPL-SCNC: 102 MMOL/L (ref 95–110)
CO2 SERPL-SCNC: 27 MMOL/L (ref 23–29)
CREAT SERPL-MCNC: 0.9 MG/DL (ref 0.5–1.4)
DIFFERENTIAL METHOD: ABNORMAL
EOSINOPHIL # BLD AUTO: 0.2 K/UL (ref 0–0.5)
EOSINOPHIL NFR BLD: 2.5 % (ref 0–8)
ERYTHROCYTE [DISTWIDTH] IN BLOOD BY AUTOMATED COUNT: 13.9 % (ref 11.5–14.5)
EST. GFR  (NO RACE VARIABLE): >60 ML/MIN/1.73 M^2
GLUCOSE SERPL-MCNC: 102 MG/DL (ref 70–110)
HCT VFR BLD AUTO: 34.1 % (ref 37–48.5)
HGB BLD-MCNC: 10.9 G/DL (ref 12–16)
IMM GRANULOCYTES # BLD AUTO: 0.02 K/UL (ref 0–0.04)
IMM GRANULOCYTES NFR BLD AUTO: 0.3 % (ref 0–0.5)
LIPASE SERPL-CCNC: 24 U/L (ref 4–60)
LYMPHOCYTES # BLD AUTO: 1.7 K/UL (ref 1–4.8)
LYMPHOCYTES NFR BLD: 24.1 % (ref 18–48)
MCH RBC QN AUTO: 26 PG (ref 27–31)
MCHC RBC AUTO-ENTMCNC: 32 G/DL (ref 32–36)
MCV RBC AUTO: 81 FL (ref 82–98)
MONOCYTES # BLD AUTO: 0.7 K/UL (ref 0.3–1)
MONOCYTES NFR BLD: 9.8 % (ref 4–15)
NEUTROPHILS # BLD AUTO: 4.3 K/UL (ref 1.8–7.7)
NEUTROPHILS NFR BLD: 62.6 % (ref 38–73)
NRBC BLD-RTO: 0 /100 WBC
PLATELET # BLD AUTO: 213 K/UL (ref 150–450)
PMV BLD AUTO: 13.3 FL (ref 9.2–12.9)
POTASSIUM SERPL-SCNC: 4.5 MMOL/L (ref 3.5–5.1)
PROT SERPL-MCNC: 8 G/DL (ref 6–8.4)
RBC # BLD AUTO: 4.2 M/UL (ref 4–5.4)
SODIUM SERPL-SCNC: 140 MMOL/L (ref 136–145)
WBC # BLD AUTO: 6.85 K/UL (ref 3.9–12.7)

## 2023-09-20 PROCEDURE — 1125F PR PAIN SEVERITY QUANTIFIED, PAIN PRESENT: ICD-10-PCS | Mod: CPTII,S$GLB,, | Performed by: INTERNAL MEDICINE

## 2023-09-20 PROCEDURE — 1159F MED LIST DOCD IN RCRD: CPT | Mod: CPTII,S$GLB,, | Performed by: INTERNAL MEDICINE

## 2023-09-20 PROCEDURE — 1101F PR PT FALLS ASSESS DOC 0-1 FALLS W/OUT INJ PAST YR: ICD-10-PCS | Mod: CPTII,S$GLB,, | Performed by: INTERNAL MEDICINE

## 2023-09-20 PROCEDURE — 1160F PR REVIEW ALL MEDS BY PRESCRIBER/CLIN PHARMACIST DOCUMENTED: ICD-10-PCS | Mod: CPTII,S$GLB,, | Performed by: INTERNAL MEDICINE

## 2023-09-20 PROCEDURE — 1125F AMNT PAIN NOTED PAIN PRSNT: CPT | Mod: CPTII,S$GLB,, | Performed by: INTERNAL MEDICINE

## 2023-09-20 PROCEDURE — 3079F DIAST BP 80-89 MM HG: CPT | Mod: CPTII,S$GLB,, | Performed by: INTERNAL MEDICINE

## 2023-09-20 PROCEDURE — 3288F FALL RISK ASSESSMENT DOCD: CPT | Mod: CPTII,S$GLB,, | Performed by: INTERNAL MEDICINE

## 2023-09-20 PROCEDURE — 3077F SYST BP >= 140 MM HG: CPT | Mod: CPTII,S$GLB,, | Performed by: INTERNAL MEDICINE

## 2023-09-20 PROCEDURE — 99999 PR PBB SHADOW E&M-EST. PATIENT-LVL IV: CPT | Mod: PBBFAC,,, | Performed by: INTERNAL MEDICINE

## 2023-09-20 PROCEDURE — 99204 OFFICE O/P NEW MOD 45 MIN: CPT | Mod: S$GLB,,, | Performed by: INTERNAL MEDICINE

## 2023-09-20 PROCEDURE — 3288F PR FALLS RISK ASSESSMENT DOCUMENTED: ICD-10-PCS | Mod: CPTII,S$GLB,, | Performed by: INTERNAL MEDICINE

## 2023-09-20 PROCEDURE — 1101F PT FALLS ASSESS-DOCD LE1/YR: CPT | Mod: CPTII,S$GLB,, | Performed by: INTERNAL MEDICINE

## 2023-09-20 PROCEDURE — 3077F PR MOST RECENT SYSTOLIC BLOOD PRESSURE >= 140 MM HG: ICD-10-PCS | Mod: CPTII,S$GLB,, | Performed by: INTERNAL MEDICINE

## 2023-09-20 PROCEDURE — 3079F PR MOST RECENT DIASTOLIC BLOOD PRESSURE 80-89 MM HG: ICD-10-PCS | Mod: CPTII,S$GLB,, | Performed by: INTERNAL MEDICINE

## 2023-09-20 PROCEDURE — 85025 COMPLETE CBC W/AUTO DIFF WBC: CPT | Performed by: INTERNAL MEDICINE

## 2023-09-20 PROCEDURE — 3072F LOW RISK FOR RETINOPATHY: CPT | Mod: CPTII,S$GLB,, | Performed by: INTERNAL MEDICINE

## 2023-09-20 PROCEDURE — 83690 ASSAY OF LIPASE: CPT | Performed by: INTERNAL MEDICINE

## 2023-09-20 PROCEDURE — 80053 COMPREHEN METABOLIC PANEL: CPT | Performed by: INTERNAL MEDICINE

## 2023-09-20 PROCEDURE — 1160F RVW MEDS BY RX/DR IN RCRD: CPT | Mod: CPTII,S$GLB,, | Performed by: INTERNAL MEDICINE

## 2023-09-20 PROCEDURE — 99999 PR PBB SHADOW E&M-EST. PATIENT-LVL IV: ICD-10-PCS | Mod: PBBFAC,,, | Performed by: INTERNAL MEDICINE

## 2023-09-20 PROCEDURE — 99204 PR OFFICE/OUTPT VISIT, NEW, LEVL IV, 45-59 MIN: ICD-10-PCS | Mod: S$GLB,,, | Performed by: INTERNAL MEDICINE

## 2023-09-20 PROCEDURE — 1159F PR MEDICATION LIST DOCUMENTED IN MEDICAL RECORD: ICD-10-PCS | Mod: CPTII,S$GLB,, | Performed by: INTERNAL MEDICINE

## 2023-09-20 PROCEDURE — 3072F PR LOW RISK FOR RETINOPATHY: ICD-10-PCS | Mod: CPTII,S$GLB,, | Performed by: INTERNAL MEDICINE

## 2023-09-20 PROCEDURE — 36415 COLL VENOUS BLD VENIPUNCTURE: CPT | Performed by: INTERNAL MEDICINE

## 2023-09-20 NOTE — Clinical Note
"Procedure: Colonoscopy along with anorectal manometry  Diagnosis:  Change in bowel habits worsening Constipation  Procedure Timin-12 weeks  *If within 4 weeks selected, please bhavana as high priority*  *If greater than 12 weeks, please select "5-12 weeks" and delay sending until 2 months prior to requested date*  Provider: Myself  Location: [address]  Additional Scheduling Information:  Constipation bowel prep protocol  Prep Specifications:Extended/Constipation prep  Is the patient taking a GLP-1 Agonist:no  Have you attached a patient to this message: yes "

## 2023-09-20 NOTE — PROGRESS NOTES
GENERAL GI PATIENT INTAKE:    COVID symptoms in the last 7 days (runny nose, sore throat, congestion, cough, fever): No  PCP: Gold Palafox Jr.  If not PCP-  number given to establish [phone]: Yes    ALLERGIES REVIEWED:  Yes    CHIEF COMPLAINT:    Chief Complaint   Patient presents with    Initial Visit    Abdominal Pain    Ear pressure       VITAL SIGNS:  BP (!) 217/86   Pulse 77   Ht 5' (1.524 m)   Wt 54.6 kg (120 lb 5.9 oz)   BMI 23.51 kg/m²      Change in medical, surgical, family or social history: No      REVIEWED MEDICATION LIST RECONCILED INCLUDING ABOVE MEDS:  Yes

## 2023-09-20 NOTE — PROGRESS NOTES
[facility] Consultation Note    Reason for Consult:  The primary encounter diagnosis was Chronic idiopathic constipation. A diagnosis of Abdominal pain, unspecified abdominal location was also pertinent to this visit.    PCP:   Gold Palafox Jr.   [address] / [place] [address]    Referring MD:  Gold Palafox Jr., Md  [address] [place] [address]    Initial History of Present Illness (HPI):  This is a 77 y.o. female here for evaluation of lower abdominal discomfort and pain patient is worried that is something maybe going on she is had a history of a hysterectomy in a past not certain if she still has her appendix or not she would like further imaging will get CT scan for further evaluation lab work today she has had a change in bowel habits in the last 3 months more constipated MiraLax is no longer really working for her she would like to try something different she is never been on Linzess before will try Linzess 145 mcg once daily she will stop her MiraLax will set her up for colonoscopy for further evaluation of change in bowel habits in anorectal manometry to rule out dyssynergy genic defecation.  No chest pain no shortness of breath no heartburn no early satiety no weight loss no blood in her stool she is followed by gyn she uses vaginal cream no blood in her urine    Abdominal pain - as above  Reflux - no  Dysphagia - no   Bowel habits - constipation  GI bleeding - none  NSAID usage - none    Interval HPI 09/20/2023:  The patient's last visit with me was on Visit date not found.      ROS:  Constitutional: No fevers, chills, No weight loss  ENT:  No heartburn no dysphagia no odynophagia no hoarseness  CV: No chest pain, no palpitation  Pulm: No cough, No shortness of breath, no wheezing  Ophtho: No vision changes  GI: see HPI  Derm: No rash, no itching  Heme: No lymphadenopathy, No easy bruising  MSK:  Some arthritis  : No dysuria, No hematuria  Endo: No  hot or cold intolerance  Neuro: No syncope, No seizure, no strokes  Psych: No uncontrolled anxiety, No uncontrolled depression    Medical History:  has a past medical history of Anemia (6/2012), Anxiety, Cataract, Colon polyps (1/31/2017), Depression, Diabetes mellitus without complication (11/14/2018), Diabetes mellitus, type 2, Fibromyalgia, GERD (gastroesophageal reflux disease), High cholesterol, and Hypertension.    Surgical History:  has a past surgical history that includes Hysterectomy; Colonoscopy (N/A, 10/18/2016); Esophagogastroduodenoscopy (N/A, 10/08/2019); Colonoscopy (N/A, 10/08/2019); Knee Arthroplasty (Right, 09/16/2020); Breast biopsy (1989); and Breast biopsy (Left).    Family History: family history includes Heart disease in her father; No Known Problems in her daughter, daughter, daughter, son, and son; Stroke in her mother..     Social History:  reports that she quit smoking about 48 years ago. Her smoking use included cigarettes. She has never used smokeless tobacco. She reports that she does not drink alcohol and does not use drugs.    Review of patient's allergies indicates:   Allergen Reactions    Ondansetron      Other reaction(s): Flushing (Skin)    Savella [milnacipran] Nausea Only       Medication List with Changes/Refills   New Medications    LINACLOTIDE (LINZESS) 145 MCG CAP CAPSULE    Take 1 capsule (145 mcg total) by mouth before breakfast.   Current Medications    ACETAMINOPHEN (TYLENOL) 650 MG TBSR    Take 1 tablet (650 mg total) by mouth every 8 (eight) hours as needed.    AMLODIPINE (NORVASC) 10 MG TABLET    TAKE 1 TABLET BY MOUTH ONCE  DAILY    ASCORBIC ACID, VITAMIN C, (VITAMIN C) 500 MG TABLET    Take 500 mg by mouth once daily.    ASPIRIN (ECOTRIN) 81 MG EC TABLET    Take 1 tablet (81 mg total) by mouth 2 (two) times daily.    ATORVASTATIN (LIPITOR) 40 MG TABLET    Take 1 tablet (40 mg total) by mouth once daily.    CALCIUM CARBONATE-VIT D3-MIN (CALTRATE 600+D PLUS MINERALS)  600 MG CALCIUM- 400 UNIT TAB    Take 1 tablet by mouth once daily.    CHLORTHALIDONE (HYGROTEN) 25 MG TAB    Take 1 tablet (25 mg total) by mouth once daily.    CINNAMON BARK (CINNAMON ORAL)    Take by mouth.    CYCLOSPORINE (RESTASIS) 0.05 % OPHTHALMIC EMULSION    INSTILL 1 DROP INTO BOTH EYES TWICE DAILY    DICLOFENAC (VOLTAREN) 50 MG EC TABLET    Take 1 tablet (50 mg total) by mouth 2 (two) times daily as needed (back pain).    DICLOFENAC SODIUM (VOLTAREN) 1 % GEL    Apply topically 2 (two) times daily.    DULOXETINE (CYMBALTA) 30 MG CAPSULE    Take 1 capsule (30 mg total) by mouth once daily.    ESTRADIOL (ESTRACE) 0.01 % (0.1 MG/GRAM) VAGINAL CREAM    Place 1 g vaginally once daily.    FERROUS GLUCONATE 236 MG (27 MG IRON) TAB    Take 1 tablet by mouth once daily.    FLAXSEED OIL ORAL    Take by mouth.    GABAPENTIN (NEURONTIN) 300 MG CAPSULE    Take 1 capsule (300 mg total) by mouth 2 (two) times daily.    LOSARTAN (COZAAR) 100 MG TABLET    Take 1 tablet (100 mg total) by mouth once daily.    METOPROLOL SUCCINATE (TOPROL-XL) 25 MG 24 HR TABLET    Take 1 tablet (25 mg total) by mouth once daily.    MULTIVIT-MIN/IRON/FOLIC/LUTEIN (CENTRUM SILVER WOMEN ORAL)    Take 1 tablet by mouth once daily.    OMEPRAZOLE (PRILOSEC) 40 MG CAPSULE    Take 1 capsule (40 mg total) by mouth once daily.    PREDNISOLONE ACETATE (PRED FORTE) 1 % DRPS    Place 1 drop into both eyes 3 (three) times daily.    TURMERIC ORAL    Take by mouth.    VITAMIN D (VITAMIN D3) 1000 UNITS TAB    Take 1,000 Units by mouth once daily.         Objective Findings:    Vital Signs:  BP (!) 217/86   Pulse 77   Ht 5' (1.524 m)   Wt 54.6 kg (120 lb 5.9 oz)   BMI 23.51 kg/m²   Body mass index is 23.51 kg/m².    Physical Exam:  General Appearance: Well appearing in no acute distress  Eyes:    No scleral icterus  ENT:  No lesions or masses   Lungs: CTA bilaterally, no wheezes, no rhonchi, no rales  Heart:  S1, S2 normal, no murmurs heard  Abdomen:  Non  distended, soft, no guarding, no rebound, no tenderness, no appreciated ascites, no bruits, no hepatosplenomegaly,  No CVA tenderness, no appreciated hernias, no De La Vega sign, no McBurney point tenderness  Musculoskeletal:  No major joint deformities  Skin: No petechiae or rash on exposed skin areas  Neurologic:  Alert and oriented x4  Psychiatric:  Normal speech mentation and affect    Labs:  Lab Results   Component Value Date    WBC 8.11 07/22/2023    HGB 13.7 07/22/2023    HCT 42 07/22/2023     07/22/2023    CHOL 184 01/13/2023    TRIG 38 01/13/2023    HDL 70 01/13/2023    ALT 22 07/22/2023    AST 26 07/22/2023     07/22/2023    K 3.9 07/22/2023     07/22/2023    CREATININE 0.9 07/22/2023    BUN 15 07/22/2023    CO2 23 07/22/2023    TSH 0.991 07/22/2023    INR 0.9 09/01/2020    HGBA1C 5.7 (H) 07/18/2023             Medical Decision Making:  Lab work reviewed  Prior colonoscopy reviewed  Constipation talk given  MiraLax talk given Linzess talk given  Colonoscopy in anorectal manometry talk given  Lab work talk given CT scan talk given      Assessment:  1. Chronic idiopathic constipation    2. Abdominal pain, unspecified abdominal location         Recommendations:  1. Lab work today  2. CT scan for further evaluation of abdominal pain change in bowel habits  3. Discontinue MiraLax start Linzess 145 mcg once daily for constipation  4. Referral to endoscopy schedulers for colonoscopy constipation bowel prep protocol for change in bowel habits constipation along with anorectal manometry to rule out defecation disorder like dyssynergic defecation  5. Return GI clinic 4 weeks for follow-up okay for telemedicine video visit       No follow-ups on file.      Order summary:  Orders Placed This Encounter    CT Enterography Abd_Pelvis With Contrast    Comprehensive Metabolic Panel    Lipase    CBC Auto Differential    linaCLOtide (LINZESS) 145 mcg Cap capsule         Thank you so much for allowing me to  participate in the care of Krysta Marin MD    DISCLAIMER: This note was prepared with [employer] voice recognition transcription software. Garbled syntax, mangled or inadvertent pronouns, and other bizarre constructions may be attributed to that software system. While efforts were made to correct any mistakes made by this voice recognition program, some errors and/or omissions may remain in the note that were missed when the note was originally created.

## 2023-09-20 NOTE — Clinical Note
Lab work today CT scan abdomen pelvis across the street at the imaging center Prescription for Linzess sent to her pharmacy Referral to endoscopy schedulers for colonoscopy anorectal manometry Please schedule patient telemedicine video visit 4 weeks for follow-up

## 2023-09-20 NOTE — TELEPHONE ENCOUNTER
"----- Message from Zuleyma Church sent at 2023  3:44 PM CDT -----    ----- Message -----  From: Jonathon Marin MD  Sent: 2023   3:17 PM CDT  To: [facility] Patients    Procedure: Colonoscopy along with anorectal manometry    Diagnosis:  Change in bowel habits worsening Constipation    Procedure Timin-12 weeks    #If within 4 weeks selected, please bhavana as high priority#    #If greater than 12 weeks, please select "5-12 weeks" and delay sending until 2 months prior to requested date#    Provider: Myself    Location: [address]    Additional Scheduling Information:  Constipation bowel prep protocol    Prep Specifications:Extended/Constipation prep    Is the patient taking a GLP-1 Agonist:no    Have you attached a patient to this message: yes     "

## 2023-09-20 NOTE — PATIENT INSTRUCTIONS
"Procedure: Colonoscopy along with anorectal manometry    Diagnosis:  Change in bowel habits worsening Constipation    Procedure Timin-12 weeks    *If within 4 weeks selected, please bahvana as high priority*    *If greater than 12 weeks, please select "5-12 weeks" and delay sending until 2 months prior to requested date*    Provider: Myself    Location: [address]    Additional Scheduling Information:  Constipation bowel prep protocol    Prep Specifications:Extended/Constipation prep    Is the patient taking a GLP-1 Agonist:no    Have you attached a patient to this message: yes   "

## 2023-09-20 NOTE — TELEPHONE ENCOUNTER
Spoke to Krysta to schedule procedure(s) Colonoscopy/Anal rectal       Physician to perform procedure(s) Dr. GREG Cardenas  Date of Procedure (s) 10/25/23  Arrival Time 12:00 PM  Time of Procedure(s) 1:00 PM   Location of Procedure(s) [facility] [address] [facility]  Type of Rx Prep sent to patient: PEG extended  Instructions provided to patient via [email or web address]    Patient was informed on the following information and verbalized understanding. Screening questionnaire reviewed with patient and complete. If procedure requires anesthesia, a responsible adult needs to be present to accompany the patient home, patient cannot drive after receiving anesthesia. Appointment details are tentative, especially check-in time. Patient will receive a prep-op call 4 days prior to confirm check-in time for procedure. If applicable the patient should contact their pharmacy to verify Rx for procedure prep is ready for pick-up. Patient was advised to call the scheduling department at [phone] if pharmacy states no Rx is available. Patient was advised to call the endoscopy scheduling department if any questions or concerns arise.      [facility] Scheduling Department

## 2023-09-29 ENCOUNTER — OFFICE VISIT (OUTPATIENT)
Dept: INTERNAL MEDICINE | Facility: CLINIC | Age: 78
End: 2023-09-29
Payer: MEDICARE

## 2023-09-29 VITALS
HEIGHT: 60 IN | OXYGEN SATURATION: 99 % | SYSTOLIC BLOOD PRESSURE: 192 MMHG | DIASTOLIC BLOOD PRESSURE: 94 MMHG | WEIGHT: 119.06 LBS | HEART RATE: 75 BPM | BODY MASS INDEX: 23.37 KG/M2

## 2023-09-29 DIAGNOSIS — I10 UNCONTROLLED HYPERTENSION: Primary | ICD-10-CM

## 2023-09-29 DIAGNOSIS — F41.9 ANXIETY: ICD-10-CM

## 2023-09-29 PROCEDURE — 3288F FALL RISK ASSESSMENT DOCD: CPT | Mod: CPTII,S$GLB,, | Performed by: PHYSICIAN ASSISTANT

## 2023-09-29 PROCEDURE — 1159F PR MEDICATION LIST DOCUMENTED IN MEDICAL RECORD: ICD-10-PCS | Mod: CPTII,S$GLB,, | Performed by: PHYSICIAN ASSISTANT

## 2023-09-29 PROCEDURE — 1101F PR PT FALLS ASSESS DOC 0-1 FALLS W/OUT INJ PAST YR: ICD-10-PCS | Mod: CPTII,S$GLB,, | Performed by: PHYSICIAN ASSISTANT

## 2023-09-29 PROCEDURE — 3080F DIAST BP >= 90 MM HG: CPT | Mod: CPTII,S$GLB,, | Performed by: PHYSICIAN ASSISTANT

## 2023-09-29 PROCEDURE — 99214 PR OFFICE/OUTPT VISIT, EST, LEVL IV, 30-39 MIN: ICD-10-PCS | Mod: S$GLB,,, | Performed by: PHYSICIAN ASSISTANT

## 2023-09-29 PROCEDURE — 1160F PR REVIEW ALL MEDS BY PRESCRIBER/CLIN PHARMACIST DOCUMENTED: ICD-10-PCS | Mod: CPTII,S$GLB,, | Performed by: PHYSICIAN ASSISTANT

## 2023-09-29 PROCEDURE — 1125F PR PAIN SEVERITY QUANTIFIED, PAIN PRESENT: ICD-10-PCS | Mod: CPTII,S$GLB,, | Performed by: PHYSICIAN ASSISTANT

## 2023-09-29 PROCEDURE — 3077F SYST BP >= 140 MM HG: CPT | Mod: CPTII,S$GLB,, | Performed by: PHYSICIAN ASSISTANT

## 2023-09-29 PROCEDURE — 1160F RVW MEDS BY RX/DR IN RCRD: CPT | Mod: CPTII,S$GLB,, | Performed by: PHYSICIAN ASSISTANT

## 2023-09-29 PROCEDURE — 3072F PR LOW RISK FOR RETINOPATHY: ICD-10-PCS | Mod: CPTII,S$GLB,, | Performed by: PHYSICIAN ASSISTANT

## 2023-09-29 PROCEDURE — 3288F PR FALLS RISK ASSESSMENT DOCUMENTED: ICD-10-PCS | Mod: CPTII,S$GLB,, | Performed by: PHYSICIAN ASSISTANT

## 2023-09-29 PROCEDURE — 99214 OFFICE O/P EST MOD 30 MIN: CPT | Mod: S$GLB,,, | Performed by: PHYSICIAN ASSISTANT

## 2023-09-29 PROCEDURE — 99999 PR PBB SHADOW E&M-EST. PATIENT-LVL V: ICD-10-PCS | Mod: PBBFAC,,, | Performed by: PHYSICIAN ASSISTANT

## 2023-09-29 PROCEDURE — 3077F PR MOST RECENT SYSTOLIC BLOOD PRESSURE >= 140 MM HG: ICD-10-PCS | Mod: CPTII,S$GLB,, | Performed by: PHYSICIAN ASSISTANT

## 2023-09-29 PROCEDURE — 1159F MED LIST DOCD IN RCRD: CPT | Mod: CPTII,S$GLB,, | Performed by: PHYSICIAN ASSISTANT

## 2023-09-29 PROCEDURE — 1125F AMNT PAIN NOTED PAIN PRSNT: CPT | Mod: CPTII,S$GLB,, | Performed by: PHYSICIAN ASSISTANT

## 2023-09-29 PROCEDURE — 3080F PR MOST RECENT DIASTOLIC BLOOD PRESSURE >= 90 MM HG: ICD-10-PCS | Mod: CPTII,S$GLB,, | Performed by: PHYSICIAN ASSISTANT

## 2023-09-29 PROCEDURE — 1101F PT FALLS ASSESS-DOCD LE1/YR: CPT | Mod: CPTII,S$GLB,, | Performed by: PHYSICIAN ASSISTANT

## 2023-09-29 PROCEDURE — 3072F LOW RISK FOR RETINOPATHY: CPT | Mod: CPTII,S$GLB,, | Performed by: PHYSICIAN ASSISTANT

## 2023-09-29 PROCEDURE — 99999 PR PBB SHADOW E&M-EST. PATIENT-LVL V: CPT | Mod: PBBFAC,,, | Performed by: PHYSICIAN ASSISTANT

## 2023-09-29 RX ORDER — ACETAMINOPHEN 500 MG
TABLET ORAL
Qty: 1 EACH | Refills: 0 | Status: SHIPPED | OUTPATIENT
Start: 2023-09-29

## 2023-09-29 NOTE — PROGRESS NOTES
Subjective     Patient ID: Krysta Kam is a 77 y.o. female.    Chief Complaint: Hypertension, Follow-up, and Headache    HPI    Established pt of Gold Palafox Jr., MD (new to me)      BP Readings from Last 6 Encounters:   23 (!) 192/94   23 (!) 202/83   23 (!) 154/82   23 (!) 189/91   23 (!) 157/80   23 (!) 194/84       Here for BP f/u (uncontrolled for quite some time per chart review)  Current prescribed meds  Chlorthalidone 25mg, amlodipine 10mg, losartan 100mg  Metoprolol added by PCP earlier this month    Today she only took losartan 100mg  Admits she may not be taking meds right,  Concerned about taking the all together at the same time      C/o +anxiety +insomnia had headache last night. thought Cymbalta was for her HTN makes her feel sick/nervous    She has a wrist cuff at home.     No cp, sob, dizziness, weakness.  Past Medical History:   Diagnosis Date    Anemia 2012    Anxiety     Cataract     Colon polyps 2017    Depression     Diabetes mellitus without complication 2018    Diabetes mellitus, type 2     Fibromyalgia     GERD (gastroesophageal reflux disease)     High cholesterol     Hypertension      Social History     Tobacco Use    Smoking status: Former     Current packs/day: 0.00     Types: Cigarettes     Quit date: 1975     Years since quittin.7    Smokeless tobacco: Never    Tobacco comments:     occasional social smoker   Substance Use Topics    Alcohol use: No    Drug use: No     Review of patient's allergies indicates:   Allergen Reactions    Ondansetron      Other reaction(s): Flushing (Skin)    Savella [milnacipran] Nausea Only           Review of Systems   Constitutional:  Negative for chills, diaphoresis and fever.   Respiratory:  Negative for cough and shortness of breath.    Cardiovascular:  Negative for chest pain and leg swelling.   Gastrointestinal:  Negative for abdominal pain, nausea and vomiting.    Integumentary:  Negative for rash.   Neurological:  Positive for headaches (last night after taking cymbalta). Negative for dizziness, syncope, facial asymmetry and weakness.   Psychiatric/Behavioral:  The patient is nervous/anxious.           Objective  BP (!) 192/94 (BP Location: Right arm, Patient Position: Sitting, BP Method: Medium (Manual))   Pulse 75   Ht 5' (1.524 m)   Wt 54 kg (119 lb 0.8 oz)   SpO2 99%   BMI 23.25 kg/m²       Physical Exam  Vitals reviewed.   Constitutional:       General: She is not in acute distress.     Appearance: She is well-developed. She is not ill-appearing.   HENT:      Head: Normocephalic and atraumatic.   Cardiovascular:      Rate and Rhythm: Normal rate and regular rhythm.      Heart sounds: No murmur heard.  Pulmonary:      Effort: Pulmonary effort is normal.      Breath sounds: Normal breath sounds. No wheezing or rales.   Abdominal:      General: Bowel sounds are normal.      Palpations: Abdomen is soft.      Tenderness: There is no abdominal tenderness.   Musculoskeletal:      Right lower leg: No edema.      Left lower leg: No edema.   Skin:     General: Skin is warm and dry.      Findings: No rash.   Neurological:      Mental Status: She is alert and oriented to person, place, and time.      Sensory: No sensory deficit.      Motor: No weakness.      Coordination: Coordination normal.      Gait: Gait normal.   Psychiatric:         Mood and Affect: Mood normal.            Assessment and Plan     1. Uncontrolled hypertension  -     blood pressure monitor (BLOOD PRESSURE KIT) Kit; One blood pressure monitoring device (arm cuff)  Dispense: 1 each; Refill: 0    Chronically uncontrolled HTN  BP Readings from Last 6 Encounters:   09/29/23 (!) 192/94   09/20/23 (!) 202/83   09/14/23 (!) 154/82   07/22/23 (!) 189/91   07/20/23 (!) 157/80   05/30/23 (!) 194/84   Non med adherence contributing factor  Reviewed medications in detail today and explaining their indications  Pt to  RTC in 1 week for BP check, advised to bring all bottles  Strict ED precautions advised.       2. Anxiety  Calming, stress relieving strategies discussed  Okay to HOLD cymbalta for now  consider alternative meds on RTC after med reconciliation.                                  Patient Instructions   MEDICATIONS FOR YOUR BLOOD PRESSURE  Chlorthalidone 25mg (morning)  Amlodipine 10mg  (night)  Losartan 100mg (morning)  Metoprolol 25mg (night)    BRING ALL MEDICATION BOTTLES TO YOUR APPOINTMENT NEXT WEEK    OKAY TO HOLD THE DULOXETINE FOR NOW (this was for your anxiety and fibromyalgia)    I WILL SEND A PRESCRIPTION TO [employer] FOR A BLOOD PRESSURE CUFF        Luz Ha PA-C

## 2023-09-29 NOTE — PATIENT INSTRUCTIONS
MEDICATIONS FOR YOUR BLOOD PRESSURE  Chlorthalidone 25mg (morning)  Amlodipine 10mg  (night)  Losartan 100mg (morning)  Metoprolol 25mg (night)    BRING ALL MEDICATION BOTTLES TO YOUR APPOINTMENT NEXT WEEK    OKAY TO HOLD THE DULOXETINE FOR NOW (this was for your anxiety and fibromyalgia)    I WILL SEND A PRESCRIPTION TO [facility] FOR A BLOOD PRESSURE CUFF

## 2023-09-30 ENCOUNTER — HOSPITAL ENCOUNTER (OUTPATIENT)
Dept: RADIOLOGY | Facility: HOSPITAL | Age: 78
Discharge: HOME | End: 2023-09-30
Attending: INTERNAL MEDICINE
Payer: MEDICARE

## 2023-09-30 DIAGNOSIS — R10.9 ABDOMINAL PAIN, UNSPECIFIED ABDOMINAL LOCATION: ICD-10-CM

## 2023-09-30 DIAGNOSIS — K59.04 CHRONIC IDIOPATHIC CONSTIPATION: ICD-10-CM

## 2023-09-30 PROCEDURE — 74177 CT ABD & PELVIS W/CONTRAST: CPT | Mod: 26,,, | Performed by: STUDENT IN AN ORGANIZED HEALTH CARE EDUCATION/TRAINING PROGRAM

## 2023-09-30 PROCEDURE — A9698 NON-RAD CONTRAST MATERIALNOC: HCPCS | Performed by: INTERNAL MEDICINE

## 2023-09-30 PROCEDURE — 74177 CT ENTEROGRAPHY ABD_PELVIS WITH CONTRAST: ICD-10-PCS | Mod: 26,,, | Performed by: STUDENT IN AN ORGANIZED HEALTH CARE EDUCATION/TRAINING PROGRAM

## 2023-09-30 PROCEDURE — 25500020 PHARM REV CODE 255: Performed by: INTERNAL MEDICINE

## 2023-09-30 PROCEDURE — 74177 CT ABD & PELVIS W/CONTRAST: CPT | Mod: TC

## 2023-09-30 RX ADMIN — BARIUM SULFATE 450 ML: 1 SUSPENSION ORAL at 11:09

## 2023-09-30 RX ADMIN — IOHEXOL 100 ML: 350 INJECTION, SOLUTION INTRAVENOUS at 11:09

## 2023-09-30 RX ADMIN — BARIUM SULFATE 225 ML: 1 SUSPENSION ORAL at 11:09

## 2023-10-02 ENCOUNTER — OFFICE VISIT (OUTPATIENT)
Dept: URGENT CARE | Facility: CLINIC | Age: 78
End: 2023-10-02
Payer: MEDICARE

## 2023-10-02 VITALS
HEIGHT: 60 IN | DIASTOLIC BLOOD PRESSURE: 74 MMHG | SYSTOLIC BLOOD PRESSURE: 172 MMHG | HEART RATE: 88 BPM | WEIGHT: 119.06 LBS | BODY MASS INDEX: 23.37 KG/M2 | OXYGEN SATURATION: 99 % | RESPIRATION RATE: 16 BRPM | TEMPERATURE: 98 F

## 2023-10-02 DIAGNOSIS — S90.811A: Primary | ICD-10-CM

## 2023-10-02 DIAGNOSIS — R03.0 ELEVATED BLOOD PRESSURE READING: ICD-10-CM

## 2023-10-02 DIAGNOSIS — M76.60 PAIN IN ACHILLES TENDON: ICD-10-CM

## 2023-10-02 DIAGNOSIS — M25.561 ACUTE PAIN OF RIGHT KNEE: ICD-10-CM

## 2023-10-02 PROCEDURE — 99213 OFFICE O/P EST LOW 20 MIN: CPT | Mod: S$GLB,,, | Performed by: NURSE PRACTITIONER

## 2023-10-02 PROCEDURE — 99213 PR OFFICE/OUTPT VISIT, EST, LEVL III, 20-29 MIN: ICD-10-PCS | Mod: S$GLB,,, | Performed by: NURSE PRACTITIONER

## 2023-10-02 NOTE — PATIENT INSTRUCTIONS
- Follow up with your PCP or specialty clinic as directed in the next 1-2 weeks if not improved or as needed.  You can call [phone] to schedule an appointment with the appropriate provider.    - Go to the ER or seek medical attention immediately if you develop new or worsening symptoms.    - You must understand that you have received an Urgent Care treatment only and that you may be released before all of your medical problems are known or treated.   - You, the patient, will arrange for follow up care as instructed.   - If your condition worsens or fails to improve we recommend that you receive another evaluation at the ER immediately or contact your PCP to discuss your concerns or return here.

## 2023-10-02 NOTE — PROGRESS NOTES
Subjective:      Patient ID: Krysta Kam is a 77 y.o. female.    Vitals:  height is 5' (1.524 m) and weight is 54 kg (119 lb 0.8 oz). Her oral temperature is 98.3 °F (36.8 °C). Her blood pressure is 172/74 (abnormal) and her pulse is 88. Her respiration is 16 and oxygen saturation is 99%.     Chief Complaint: Foot Pain      77-year-old female with medical history as listed below presents to clinic for evaluation of right Achilles pain.  Patient states that she was at [employer] 3 days ago, when another  ran into the back of her leg with a shopping cart.  She reports a history of  right knee replacement 2 years ago, states that she did feel some pulling to her right knee.  She presents today with pain to wear Achilles tendon, and the outside of her right knee.  She reports taking Tylenol, and elevating her leg with minimal relief.  She does have a follow-up with Internal Medicine for her blood pressure in 2 days.  She is awake and alert, answers questions appropriately, no acute distress noted on today's visit.    Past Medical History:  6/2012: Anemia  No date: Anxiety  No date: Cataract  1/31/2017: Colon polyps  No date: Depression  11/14/2018: Diabetes mellitus without complication  No date: Diabetes mellitus, type 2  No date: Fibromyalgia  No date: GERD (gastroesophageal reflux disease)  No date: High cholesterol  No date: Hypertension      Foot Pain  This is a new problem. The current episode started in the past 7 days. The problem occurs constantly. Associated symptoms include arthralgias. Pertinent negatives include no abdominal pain, chills, diaphoresis, fatigue, fever or joint swelling. The symptoms are aggravated by standing, walking and exertion. She has tried rest and acetaminophen for the symptoms. The treatment provided no relief.       Constitution: Negative for activity change, appetite change, chills, sweating, fatigue and fever.   Respiratory:  Negative for shortness of breath.     Gastrointestinal:  Negative for abdominal pain.   Musculoskeletal:  Positive for joint pain. Negative for joint swelling.   Skin:  Positive for abrasion (superficial).   Neurological:  Negative for dizziness.      Objective:     Physical Exam   Constitutional: She is oriented to person, place, and time.  Non-toxic appearance. She does not appear ill. No distress.   HENT:   Ears:   Right Ear: External ear normal.   Left Ear: External ear normal.   Nose: Nose normal.   Eyes: Conjunctivae are normal. Right eye exhibits no discharge. Left eye exhibits no discharge.   Cardiovascular: Normal rate.   Pulmonary/Chest: Effort normal. No respiratory distress.   Abdominal: Normal appearance.   Musculoskeletal:         General: Tenderness present. No swelling, deformity or signs of injury.      Right knee: Tenderness found. Lateral joint line tenderness noted.        Legs:    Neurological: She is alert and oriented to person, place, and time.   Psychiatric: Mood normal.   Nursing note and vitals reviewed.      Assessment:     1. Abrasion of heel, right, initial encounter    2. Pain in Achilles tendon    3. Acute pain of right knee        Plan:       Abrasion of heel, right, initial encounter    Pain in Achilles tendon  -     X-Ray Ankle Complete 3 View Right; Future; Expected date: 10/02/2023    Acute pain of right knee  -     XR KNEE 3 VIEW RIGHT; Future; Expected date: 10/02/2023      -   Ace wrap applied to right Achilles tendon in clinic.  Discussed taking previously prescribed diclofenac.  X-ray unavailable in clinic at time.  Patient expresses concerns given previous knee replacement, will order x-ray of right knee, and will follow-up with PCP.  Explained to patient that tenderness to lower leg appears to be in the region of her Achilles tendon, of which is not typically seen with x-ray, however patient would feel more comfortable with imaging.  Follow-up as scheduled with PCP.  Will call with imaging report.  Patient  verbalized understanding and is in agreement with plan.    Patient Instructions   - Follow up with your PCP or specialty clinic as directed in the next 1-2 weeks if not improved or as needed.  You can call [phone] to schedule an appointment with the appropriate provider.    - Go to the ER or seek medical attention immediately if you develop new or worsening symptoms.    - You must understand that you have received an Urgent Care treatment only and that you may be released before all of your medical problems are known or treated.   - You, the patient, will arrange for follow up care as instructed.   - If your condition worsens or fails to improve we recommend that you receive another evaluation at the ER immediately or contact your PCP to discuss your concerns or return here.

## 2023-10-04 ENCOUNTER — HOSPITAL ENCOUNTER (OUTPATIENT)
Dept: RADIOLOGY | Facility: HOSPITAL | Age: 78
Discharge: HOME | End: 2023-10-04
Attending: PHYSICIAN ASSISTANT
Payer: MEDICARE

## 2023-10-04 ENCOUNTER — OFFICE VISIT (OUTPATIENT)
Dept: INTERNAL MEDICINE | Facility: CLINIC | Age: 78
End: 2023-10-04
Payer: MEDICARE

## 2023-10-04 ENCOUNTER — HOSPITAL ENCOUNTER (OUTPATIENT)
Dept: RADIOLOGY | Facility: HOSPITAL | Age: 78
Discharge: HOME | End: 2023-10-04
Attending: NURSE PRACTITIONER
Payer: MEDICARE

## 2023-10-04 VITALS
BODY MASS INDEX: 23.29 KG/M2 | OXYGEN SATURATION: 99 % | HEART RATE: 75 BPM | DIASTOLIC BLOOD PRESSURE: 64 MMHG | HEIGHT: 60 IN | SYSTOLIC BLOOD PRESSURE: 118 MMHG | WEIGHT: 118.63 LBS

## 2023-10-04 DIAGNOSIS — G89.29 CHRONIC BILATERAL LOW BACK PAIN WITHOUT SCIATICA: ICD-10-CM

## 2023-10-04 DIAGNOSIS — M25.561 ACUTE PAIN OF RIGHT KNEE: ICD-10-CM

## 2023-10-04 DIAGNOSIS — M76.60 PAIN IN ACHILLES TENDON: ICD-10-CM

## 2023-10-04 DIAGNOSIS — M54.50 CHRONIC BILATERAL LOW BACK PAIN WITHOUT SCIATICA: ICD-10-CM

## 2023-10-04 DIAGNOSIS — I10 PRIMARY HYPERTENSION: Primary | ICD-10-CM

## 2023-10-04 DIAGNOSIS — M79.7 FIBROMYALGIA: ICD-10-CM

## 2023-10-04 PROCEDURE — 1125F PR PAIN SEVERITY QUANTIFIED, PAIN PRESENT: ICD-10-PCS | Mod: CPTII,S$GLB,, | Performed by: PHYSICIAN ASSISTANT

## 2023-10-04 PROCEDURE — 99999 PR PBB SHADOW E&M-EST. PATIENT-LVL V: CPT | Mod: PBBFAC,,, | Performed by: PHYSICIAN ASSISTANT

## 2023-10-04 PROCEDURE — 1160F PR REVIEW ALL MEDS BY PRESCRIBER/CLIN PHARMACIST DOCUMENTED: ICD-10-PCS | Mod: CPTII,S$GLB,, | Performed by: PHYSICIAN ASSISTANT

## 2023-10-04 PROCEDURE — 99214 PR OFFICE/OUTPT VISIT, EST, LEVL IV, 30-39 MIN: ICD-10-PCS | Mod: S$GLB,,, | Performed by: PHYSICIAN ASSISTANT

## 2023-10-04 PROCEDURE — 72080 X-RAY EXAM THORACOLMB 2/> VW: CPT | Mod: 26,,, | Performed by: RADIOLOGY

## 2023-10-04 PROCEDURE — 99999 PR PBB SHADOW E&M-EST. PATIENT-LVL V: ICD-10-PCS | Mod: PBBFAC,,, | Performed by: PHYSICIAN ASSISTANT

## 2023-10-04 PROCEDURE — 1125F AMNT PAIN NOTED PAIN PRSNT: CPT | Mod: CPTII,S$GLB,, | Performed by: PHYSICIAN ASSISTANT

## 2023-10-04 PROCEDURE — 73610 XR ANKLE COMPLETE 3 VIEW RIGHT: ICD-10-PCS | Mod: 26,RT,, | Performed by: RADIOLOGY

## 2023-10-04 PROCEDURE — 3288F PR FALLS RISK ASSESSMENT DOCUMENTED: ICD-10-PCS | Mod: CPTII,S$GLB,, | Performed by: PHYSICIAN ASSISTANT

## 2023-10-04 PROCEDURE — 3078F PR MOST RECENT DIASTOLIC BLOOD PRESSURE < 80 MM HG: ICD-10-PCS | Mod: CPTII,S$GLB,, | Performed by: PHYSICIAN ASSISTANT

## 2023-10-04 PROCEDURE — 73562 X-RAY EXAM OF KNEE 3: CPT | Mod: TC,RT

## 2023-10-04 PROCEDURE — 3074F SYST BP LT 130 MM HG: CPT | Mod: CPTII,S$GLB,, | Performed by: PHYSICIAN ASSISTANT

## 2023-10-04 PROCEDURE — 3074F PR MOST RECENT SYSTOLIC BLOOD PRESSURE < 130 MM HG: ICD-10-PCS | Mod: CPTII,S$GLB,, | Performed by: PHYSICIAN ASSISTANT

## 2023-10-04 PROCEDURE — 1101F PR PT FALLS ASSESS DOC 0-1 FALLS W/OUT INJ PAST YR: ICD-10-PCS | Mod: CPTII,S$GLB,, | Performed by: PHYSICIAN ASSISTANT

## 2023-10-04 PROCEDURE — 1159F MED LIST DOCD IN RCRD: CPT | Mod: CPTII,S$GLB,, | Performed by: PHYSICIAN ASSISTANT

## 2023-10-04 PROCEDURE — 72080 X-RAY EXAM THORACOLMB 2/> VW: CPT | Mod: TC

## 2023-10-04 PROCEDURE — 99214 OFFICE O/P EST MOD 30 MIN: CPT | Mod: S$GLB,,, | Performed by: PHYSICIAN ASSISTANT

## 2023-10-04 PROCEDURE — 3288F FALL RISK ASSESSMENT DOCD: CPT | Mod: CPTII,S$GLB,, | Performed by: PHYSICIAN ASSISTANT

## 2023-10-04 PROCEDURE — 72080 XR THORACOLUMBAR SPINE AP LATERAL: ICD-10-PCS | Mod: 26,,, | Performed by: RADIOLOGY

## 2023-10-04 PROCEDURE — 3072F PR LOW RISK FOR RETINOPATHY: ICD-10-PCS | Mod: CPTII,S$GLB,, | Performed by: PHYSICIAN ASSISTANT

## 2023-10-04 PROCEDURE — 73610 X-RAY EXAM OF ANKLE: CPT | Mod: 26,RT,, | Performed by: RADIOLOGY

## 2023-10-04 PROCEDURE — 73610 X-RAY EXAM OF ANKLE: CPT | Mod: TC,RT

## 2023-10-04 PROCEDURE — 73562 XR KNEE 3 VIEW RIGHT: ICD-10-PCS | Mod: 26,RT,, | Performed by: RADIOLOGY

## 2023-10-04 PROCEDURE — 73562 X-RAY EXAM OF KNEE 3: CPT | Mod: 26,RT,, | Performed by: RADIOLOGY

## 2023-10-04 PROCEDURE — 3072F LOW RISK FOR RETINOPATHY: CPT | Mod: CPTII,S$GLB,, | Performed by: PHYSICIAN ASSISTANT

## 2023-10-04 PROCEDURE — 3078F DIAST BP <80 MM HG: CPT | Mod: CPTII,S$GLB,, | Performed by: PHYSICIAN ASSISTANT

## 2023-10-04 PROCEDURE — 1160F RVW MEDS BY RX/DR IN RCRD: CPT | Mod: CPTII,S$GLB,, | Performed by: PHYSICIAN ASSISTANT

## 2023-10-04 PROCEDURE — 1101F PT FALLS ASSESS-DOCD LE1/YR: CPT | Mod: CPTII,S$GLB,, | Performed by: PHYSICIAN ASSISTANT

## 2023-10-04 PROCEDURE — 1159F PR MEDICATION LIST DOCUMENTED IN MEDICAL RECORD: ICD-10-PCS | Mod: CPTII,S$GLB,, | Performed by: PHYSICIAN ASSISTANT

## 2023-10-04 RX ORDER — TIZANIDINE 4 MG/1
4 TABLET ORAL NIGHTLY PRN
Qty: 20 TABLET | Refills: 0 | Status: SHIPPED | OUTPATIENT
Start: 2023-10-04 | End: 2023-11-06 | Stop reason: SDUPTHER

## 2023-10-04 RX ORDER — CITALOPRAM 10 MG/1
TABLET ORAL
COMMUNITY
Start: 2023-10-03

## 2023-10-04 NOTE — PATIENT INSTRUCTIONS
Disregard the metoprolol since your blood pressure looks great today without it.     Xray back     Muscle relaxer to help your back

## 2023-10-04 NOTE — PROGRESS NOTES
Subjective     Patient ID: Krysta Kam is a 77 y.o. female.    Chief Complaint: Back Pain, Follow-up, and Hypertension    HPI  Established pt of Gold Palafox Jr., MD (new to me)    Here to follow BP.  Seen by me one week ago, suspected non adherence, med reconciliation completed at that visit  Pt has returned with her medciaiotn bottles for review  Notes adherence to meds except metoprolol (doesn't have med)    BP better with home readings. BP at goal today.     C/o chronic low/mid back for many years, aching pain, occ spasms. Diclofenac not helpful. Taking gabapentin. Tried topical rubs/heat yesterday which was helpful. No leg pain, n/t or weakness.       Past Medical History:   Diagnosis Date    Anemia 2012    Anxiety     Cataract     Colon polyps 2017    Depression     Diabetes mellitus without complication 2018    Diabetes mellitus, type 2     Fibromyalgia     GERD (gastroesophageal reflux disease)     High cholesterol     Hypertension      Social History     Tobacco Use    Smoking status: Former     Current packs/day: 0.00     Types: Cigarettes     Quit date: 1975     Years since quittin.7    Smokeless tobacco: Never    Tobacco comments:     occasional social smoker   Substance Use Topics    Alcohol use: No    Drug use: No     Review of patient's allergies indicates:   Allergen Reactions    Ondansetron      Other reaction(s): Flushing (Skin)    Savella [milnacipran] Nausea Only         Review of Systems   Constitutional:  Negative for chills, fever and unexpected weight change.   Respiratory:  Negative for cough and shortness of breath.    Cardiovascular:  Negative for chest pain and leg swelling.   Gastrointestinal:  Negative for abdominal pain, nausea and vomiting.   Musculoskeletal:  Positive for back pain.   Integumentary:  Negative for rash.   Neurological:  Negative for weakness and headaches.          Objective  /64   Pulse 75   Ht 5' (1.524 m)   Wt 53.8 kg  (118 lb 9.7 oz)   SpO2 99%   BMI 23.16 kg/m²       Physical Exam  Vitals reviewed.   Constitutional:       General: She is not in acute distress.     Appearance: She is well-developed.   HENT:      Head: Normocephalic and atraumatic.   Cardiovascular:      Rate and Rhythm: Normal rate and regular rhythm.      Heart sounds: No murmur heard.  Pulmonary:      Effort: Pulmonary effort is normal.      Breath sounds: Normal breath sounds. No wheezing or rales.   Abdominal:      General: Bowel sounds are normal.      Palpations: Abdomen is soft.      Tenderness: There is no abdominal tenderness.   Musculoskeletal:      Thoracic back: Tenderness present. Decreased range of motion.      Lumbar back: Negative right straight leg raise test and negative left straight leg raise test.        Back:       Right lower leg: No edema.      Left lower leg: No edema.   Skin:     General: Skin is warm and dry.      Findings: No rash.   Neurological:      Mental Status: She is alert.   Psychiatric:         Mood and Affect: Mood normal.            Assessment and Plan     1. Primary hypertension  Much improved  Continue   Chlorthalidone 25mg (morning)  Amlodipine 10mg  (night)  Losartan 100mg (morning)       2. Fibromyalgia  3. Chronic bilateral low back pain without sciatica  -     tiZANidine (ZANAFLEX) 4 MG tablet; Take 1 tablet (4 mg total) by mouth nightly as needed (muscle spasm/back).  Dispense: 20 tablet; Refill: 0  -     X-Ray Thoracolumbar Spine AP Lateral; Future; Expected date: 10/04/2023      Future Appointments   Date Time Provider Department Center   1/4/2024 10:00 AM Jacquelyn Salcedo OD [facility]   3/7/2024  9:00 AM [facility] [place]   3/14/2024 10:20 AM Gold Palafox Jr., MD [facility]  [facility] PA-C

## 2023-10-09 ENCOUNTER — TELEPHONE (OUTPATIENT)
Dept: INTERNAL MEDICINE | Facility: CLINIC | Age: 78
End: 2023-10-09
Payer: MEDICARE

## 2023-10-09 DIAGNOSIS — G89.29 CHRONIC BILATERAL LOW BACK PAIN WITHOUT SCIATICA: Primary | ICD-10-CM

## 2023-10-09 DIAGNOSIS — M54.50 CHRONIC BILATERAL LOW BACK PAIN WITHOUT SCIATICA: Primary | ICD-10-CM

## 2023-10-09 NOTE — TELEPHONE ENCOUNTER
Please inform patient her back xray shows degenerative disc and other arthritis changes.  I recommend follow up in our [facility]. Referral placed, help schedule.   Orders Placed This Encounter   Procedures    Ambulatory referral/consult to [facility] Spine Clinic         Future Appointments   Date Time Provider Department Center   1/4/2024 10:00 AM Jacquelyn Salcedo OD [facility]   3/7/2024  9:00 AM [facility], [facility] [place]   3/14/2024 10:20 AM Gold Palafox Jr., MD [facility]

## 2023-10-10 DIAGNOSIS — M51.34 DDD (DEGENERATIVE DISC DISEASE), THORACIC: Primary | ICD-10-CM

## 2023-10-10 NOTE — TELEPHONE ENCOUNTER
Called and relayed Luz Ha's message. Pt verbalized understanding.   Scheduled apt with [facility] Spine Clinic    Please inform patient her back xray shows degenerative disc and other arthritis changes.  I recommend follow up in our [facility]. Referral placed, help schedule.

## 2023-10-12 ENCOUNTER — HOSPITAL ENCOUNTER (OUTPATIENT)
Dept: RADIOLOGY | Facility: HOSPITAL | Age: 78
Discharge: HOME | End: 2023-10-12
Attending: ORTHOPAEDIC SURGERY
Payer: MEDICARE

## 2023-10-12 ENCOUNTER — OFFICE VISIT (OUTPATIENT)
Dept: ORTHOPEDICS | Facility: CLINIC | Age: 78
End: 2023-10-12
Payer: MEDICARE

## 2023-10-12 VITALS — BODY MASS INDEX: 23.58 KG/M2 | WEIGHT: 120.13 LBS | HEIGHT: 60 IN

## 2023-10-12 DIAGNOSIS — M51.34 DDD (DEGENERATIVE DISC DISEASE), THORACIC: ICD-10-CM

## 2023-10-12 DIAGNOSIS — G89.29 CHRONIC BILATERAL LOW BACK PAIN WITHOUT SCIATICA: ICD-10-CM

## 2023-10-12 DIAGNOSIS — M54.50 CHRONIC BILATERAL LOW BACK PAIN WITHOUT SCIATICA: ICD-10-CM

## 2023-10-12 PROCEDURE — 72070 X-RAY EXAM THORAC SPINE 2VWS: CPT | Mod: TC

## 2023-10-12 PROCEDURE — 72070 X-RAY EXAM THORAC SPINE 2VWS: CPT | Mod: 26,,, | Performed by: RADIOLOGY

## 2023-10-12 PROCEDURE — 1125F PR PAIN SEVERITY QUANTIFIED, PAIN PRESENT: ICD-10-PCS | Mod: CPTII,S$GLB,, | Performed by: ORTHOPAEDIC SURGERY

## 2023-10-12 PROCEDURE — 99214 PR OFFICE/OUTPT VISIT, EST, LEVL IV, 30-39 MIN: ICD-10-PCS | Mod: S$GLB,,, | Performed by: ORTHOPAEDIC SURGERY

## 2023-10-12 PROCEDURE — 1159F PR MEDICATION LIST DOCUMENTED IN MEDICAL RECORD: ICD-10-PCS | Mod: CPTII,S$GLB,, | Performed by: ORTHOPAEDIC SURGERY

## 2023-10-12 PROCEDURE — 72070 XR THORACIC SPINE AP LATERAL: ICD-10-PCS | Mod: 26,,, | Performed by: RADIOLOGY

## 2023-10-12 PROCEDURE — 1159F MED LIST DOCD IN RCRD: CPT | Mod: CPTII,S$GLB,, | Performed by: ORTHOPAEDIC SURGERY

## 2023-10-12 PROCEDURE — 99999 PR PBB SHADOW E&M-EST. PATIENT-LVL IV: ICD-10-PCS | Mod: PBBFAC,,, | Performed by: ORTHOPAEDIC SURGERY

## 2023-10-12 PROCEDURE — 99214 OFFICE O/P EST MOD 30 MIN: CPT | Mod: S$GLB,,, | Performed by: ORTHOPAEDIC SURGERY

## 2023-10-12 PROCEDURE — 3072F PR LOW RISK FOR RETINOPATHY: ICD-10-PCS | Mod: CPTII,S$GLB,, | Performed by: ORTHOPAEDIC SURGERY

## 2023-10-12 PROCEDURE — 1125F AMNT PAIN NOTED PAIN PRSNT: CPT | Mod: CPTII,S$GLB,, | Performed by: ORTHOPAEDIC SURGERY

## 2023-10-12 PROCEDURE — 3072F LOW RISK FOR RETINOPATHY: CPT | Mod: CPTII,S$GLB,, | Performed by: ORTHOPAEDIC SURGERY

## 2023-10-12 PROCEDURE — 99999 PR PBB SHADOW E&M-EST. PATIENT-LVL IV: CPT | Mod: PBBFAC,,, | Performed by: ORTHOPAEDIC SURGERY

## 2023-10-12 NOTE — PROGRESS NOTES
DATE: 10/12/2023  PATIENT: Krysta Kam    Supervising Physician: Jasson Armenta M.D.    CHIEF COMPLAINT: low back pain    HISTORY:  Krysta Kam is a 77 y.o. female with a pmhx of DM II here for initial evaluation of low back and bilateral leg pain (Back - 5, Leg - 5).  The pain in the lower back and down both legs is what bothers her most.  The pain has been present for years, worsening over time. The patient describes the pain as aching.  The pain is worse with getting out of bed and improved by leaning on the shopping cart. There is positive associated numbness and tingling. There is mild subjective weakness. Prior treatments have included remote hx of ESIs.    The patient denies myelopathic symptoms such as handwriting changes or difficulty with buttons/coins/keys. Denies perineal paresthesias, bowel/bladder dysfunction.    PAST MEDICAL/SURGICAL HISTORY:  Past Medical History:   Diagnosis Date    Anemia 6/2012    Anxiety     Cataract     Colon polyps 1/31/2017    Depression     Diabetes mellitus without complication 11/14/2018    Diabetes mellitus, type 2     Fibromyalgia     GERD (gastroesophageal reflux disease)     High cholesterol     Hypertension      Past Surgical History:   Procedure Laterality Date    BREAST BIOPSY  1989    BREAST BIOPSY Left     benign    COLONOSCOPY N/A 10/18/2016    Procedure: COLONOSCOPY;  Surgeon: Brittni Edmondson MD;  Location: [address]);  Service: Endoscopy;  Laterality: N/A;    COLONOSCOPY N/A 10/08/2019    Procedure: COLONOSCOPY;  Surgeon: Gold Ravi MD;  Location: [address]);  Service: Endoscopy;  Laterality: N/A;    ESOPHAGOGASTRODUODENOSCOPY N/A 10/08/2019    Procedure: EGD (ESOPHAGOGASTRODUODENOSCOPY);  Surgeon: Gold Ravi MD;  Location: [facility] ([address] [place] [address]);  Service: Endoscopy;  Laterality: N/A;  Okay for any provider due to KARSTEN    HYSTERECTOMY      KNEE ARTHROPLASTY Right 09/16/2020    Procedure: ARTHROPLASTY, KNEE:  GAETANO;  Surgeon: Tha Wright III, MD;  Location: [facility];  Service: Orthopedics;  Laterality: Right;       Medications:   Current Outpatient Medications on File Prior to Visit   Medication Sig Dispense Refill    acetaminophen (TYLENOL) 650 MG TbSR Take 1 tablet (650 mg total) by mouth every 8 (eight) hours as needed. 120 tablet 0    amLODIPine (NORVASC) 10 MG tablet TAKE 1 TABLET BY MOUTH ONCE  DAILY 90 tablet 3    ascorbic acid, vitamin C, (VITAMIN C) 500 MG tablet Take 500 mg by mouth once daily.      atorvastatin (LIPITOR) 40 MG tablet Take 1 tablet (40 mg total) by mouth once daily. 90 tablet 3    blood pressure monitor (BLOOD PRESSURE KIT) Kit One blood pressure monitoring device (arm cuff) 1 each 0    chlorthalidone (HYGROTEN) 25 MG Tab Take 1 tablet (25 mg total) by mouth once daily. 90 tablet 3    cinnamon bark (CINNAMON ORAL) Take by mouth.      citalopram (CELEXA) 10 MG tablet       cycloSPORINE (RESTASIS) 0.05 % ophthalmic emulsion INSTILL 1 DROP INTO BOTH EYES TWICE DAILY 180 each 3    diclofenac (VOLTAREN) 50 MG EC tablet Take 1 tablet (50 mg total) by mouth 2 (two) times daily as needed (back pain). 60 tablet 2    diclofenac sodium (VOLTAREN) 1 % Gel Apply topically 2 (two) times daily. 200 g 4    DULoxetine (CYMBALTA) 30 MG capsule Take 1 capsule (30 mg total) by mouth once daily. 30 capsule 11    estradioL (ESTRACE) 0.01 % (0.1 mg/gram) vaginal cream Place 1 g vaginally once daily. 45 g 12    ferrous gluconate 236 mg (27 mg iron) Tab Take 1 tablet by mouth once daily. 90 tablet 1    FLAXSEED OIL ORAL Take by mouth.      gabapentin (NEURONTIN) 300 MG capsule Take 1 capsule (300 mg total) by mouth 2 (two) times daily. 180 capsule 11    linaCLOtide (LINZESS) 145 mcg Cap capsule Take 1 capsule (145 mcg total) by mouth before breakfast. 30 capsule 0    losartan (COZAAR) 100 MG tablet Take 1 tablet (100 mg total) by mouth once daily. 90 tablet 2    metoprolol succinate (TOPROL-XL) 25 MG 24 hr  tablet Take 1 tablet (25 mg total) by mouth once daily. 30 tablet 11    multivit-min/iron/folic/lutein (CENTRUM SILVER WOMEN ORAL) Take 1 tablet by mouth once daily.      omeprazole (PRILOSEC) 40 MG capsule Take 1 capsule (40 mg total) by mouth once daily. 30 capsule 11    polyethylene glycol (COLYTE) 240-22.72-6.72 -5.84 gram SolR Take as directed bt provider office 8000 mL 0    tiZANidine (ZANAFLEX) 4 MG tablet Take 1 tablet (4 mg total) by mouth nightly as needed (muscle spasm/back). 20 tablet 0    TURMERIC ORAL Take by mouth.      vitamin D (VITAMIN D3) 1000 units Tab Take 1,000 Units by mouth once daily.      aspirin (ECOTRIN) 81 MG EC tablet Take 1 tablet (81 mg total) by mouth 2 (two) times daily. 60 tablet 0    calcium carbonate-vit D3-min (CALTRATE 600+D PLUS MINERALS) 600 mg calcium- 400 unit Tab Take 1 tablet by mouth once daily. 60 tablet 11     No current facility-administered medications on file prior to visit.       Social History:   Social History     Socioeconomic History    Marital status: Single    Number of children: 5   Tobacco Use    Smoking status: Former     Current packs/day: 0.00     Types: Cigarettes     Quit date: 1975     Years since quittin.8    Smokeless tobacco: Never    Tobacco comments:     occasional social smoker   Substance and Sexual Activity    Alcohol use: No    Drug use: No    Sexual activity: Not Currently   Social History Narrative    Retired      Social Determinants of Health     Financial Resource Strain: Low Risk  (2020)    Overall Financial Resource Strain (CARDIA)     Difficulty of Paying Living Expenses: Not hard at all   Food Insecurity: No Food Insecurity (2020)    Hunger Vital Sign     Worried About Running Out of Food in the Last Year: Never true     Ran Out of Food in the Last Year: Never true   Transportation Needs: No Transportation Needs (2020)    PRAPARE - Transportation     Lack of Transportation (Medical): No     Lack of  Transportation (Non-Medical): No   Physical Activity: Insufficiently Active (8/19/2020)    Exercise Vital Sign     Days of Exercise per Week: 4 days     Minutes of Exercise per Session: 10 min   Stress: No Stress Concern Present (8/19/2020)    [employer] - Occupational Stress Questionnaire     Feeling of Stress : Only a little   Social Connections: Socially Isolated (8/19/2020)    Social Connection and Isolation Panel [NHANES]     Frequency of Communication with Friends and Family: More than three times a week     Frequency of Social Gatherings with Friends and Family: Once a week     Attends [employer] Services: Never     Active Member of Clubs or Organizations: No     Attends Club or Organization Meetings: Never     Marital Status:    Housing Stability: Low Risk  (8/19/2020)    Housing Stability Vital Sign     Unable to Pay for Housing in the Last Year: No     Number of Places Lived in the Last Year: 1     Unstable Housing in the Last Year: No       REVIEW OF SYSTEMS:  Constitution: Negative. Negative for chills, fever and night sweats.   Cardiovascular: Negative for chest pain and syncope.   Respiratory: Negative for cough and shortness of breath.   Gastrointestinal: See HPI. Negative for nausea/vomiting. Negative for abdominal pain.  Genitourinary: See HPI. Negative for discoloration or dysuria.  Skin: Negative for dry skin, itching and rash.   Hematologic/Lymphatic: Negative for bleeding problem. Does not bruise/bleed easily.   Musculoskeletal: Negative for falls and muscle weakness.   Neurological: See HPI. No seizures.   Endocrine: Negative for polydipsia, polyphagia and polyuria.   Allergic/Immunologic: Negative for hives and persistent infections.     EXAM:  Ht 5' (1.524 m)   Wt 54.5 kg (120 lb 2.4 oz)   BMI 23.47 kg/m²     General: The patient is a very pleasant 77 y.o. female in no apparent distress, the patient is oriented to person, place and time.  Psych: Normal  mood and affect  HEENT: Vision grossly intact, hearing intact to the spoken word.  Lungs: Respirations unlabored.  Gait: Normal station and gait, no difficulty with toe or heel walk.   Skin: Dorsal lumbar skin negative for rashes, lesions, hairy patches and surgical scars. There is mild lumbar tenderness to palpation.  Range of motion: Lumbar range of motion is acceptable.  Spinal Balance: Global saggital and coronal spinal balance acceptable, not significant for scoliosis and kyphosis.  Musculoskeletal: No pain with the range of motion of the bilateral hips. No trochanteric tenderness to palpation.  Vascular: Bilateral lower extremities warm and well perfused, dorsalis pedis pulses 2+ bilaterally.  Neurological: Normal strength and tone in all major motor groups in the bilateral lower extremities. Normal sensation to light touch in the L2-S1 dermatomes bilaterally.  Deep tendon reflexes symmetric 2+ in the bilateral lower extremities.  Negative Babinski bilaterally. Straight leg raise negative bilaterally.    IMAGING:      Today I personally reviewed AP, Lat upright thoracolumbar spine films that demonstrate moderate degenerative changes with grade I anterolisthesis of L4 on L5.     Body mass index is 23.47 kg/m².    Hemoglobin A1C   Date Value Ref Range Status   07/18/2023 5.7 (H) 4.0 - 5.6 % Final     Comment:     ADA Screening Guidelines:  5.7-6.4%  Consistent with prediabetes  >or=6.5%  Consistent with diabetes    High levels of fetal hemoglobin interfere with the HbA1C  assay. Heterozygous hemoglobin variants (HbS, HgC, etc)do  not significantly interfere with this assay.   However, presence of multiple variants may affect accuracy.     01/13/2023 5.7 (H) 4.0 - 5.6 % Final     Comment:     ADA Screening Guidelines:  5.7-6.4%  Consistent with prediabetes  >or=6.5%  Consistent with diabetes    High levels of fetal hemoglobin interfere with the HbA1C  assay. Heterozygous hemoglobin variants (HbS, HgC, etc)do  not  significantly interfere with this assay.   However, presence of multiple variants may affect accuracy.     07/08/2022 5.8 (H) 4.0 - 5.6 % Final     Comment:     ADA Screening Guidelines:  5.7-6.4%  Consistent with prediabetes  >or=6.5%  Consistent with diabetes    High levels of fetal hemoglobin interfere with the HbA1C  assay. Heterozygous hemoglobin variants (HbS, HgC, etc)do  not significantly interfere with this assay.   However, presence of multiple variants may affect accuracy.             ASSESSMENT/PLAN:    Diagnoses and all orders for this visit:    Chronic bilateral low back pain without sciatica  -     Ambulatory referral/consult to [facility] Clinic  -     Ambulatory referral/consult to Physical/Occupational Therapy; Future        Today we discussed at length all of the different treatment options including anti-inflammatories, acetaminophen, rest, ice, heat, physical therapy including strengthening and stretching exercises, home exercises, ROM, aerobic conditioning, aqua therapy, other modalities including ultrasound, massage, and dry needling, epidural steroid injections and finally surgical intervention.      Pt presents with chronic low back pain and radiculopathy. Will send PT orders to [facility]. Pt will fu if pain persists, will consider MRI/ESIs

## 2023-10-14 NOTE — H&P (VIEW-ONLY)
Ms. Kam your CT scan was read as follows no cause for your abdominal    Impression:     No definite findings to explain reported abdominal pain.  No evidence of bowel inflammation or obstruction.  Pancreas is unremarkable.     Colonic diverticulosis without evidence acute diverticulitis.     Nonspecific lucencies throughout lumbar vertebral bodies, similar dating back to 2016.     Additional findings as above.        Electronically signed by: Devaughn Brandt  Date:                                            09/30/2023

## 2023-10-23 ENCOUNTER — TELEPHONE (OUTPATIENT)
Dept: GASTROENTEROLOGY | Facility: CLINIC | Age: 78
End: 2023-10-23
Payer: MEDICARE

## 2023-10-23 ENCOUNTER — TELEPHONE (OUTPATIENT)
Dept: ENDOSCOPY | Facility: HOSPITAL | Age: 78
End: 2023-10-23
Payer: MEDICARE

## 2023-10-23 NOTE — TELEPHONE ENCOUNTER
Patient transferred to endo schedulers at [phone]  Patient thank me   ----- Message from Eriberto Stone sent at 10/23/2023  8:33 AM CDT -----  Regarding: advise / colonoscopy questions  Contact: PT @ [phone]  Pt is calling asking to speak with someone in the office to discuss questions that she has about her upcoming colonoscopy. Please call. Thanks.

## 2023-10-23 NOTE — TELEPHONE ENCOUNTER
Patient calling with questions about upcoming colonoscopy on 120/25/23. All questions answered. Patient verbalized understanding.

## 2023-10-25 ENCOUNTER — HOSPITAL ENCOUNTER (OUTPATIENT)
Facility: HOSPITAL | Age: 78
Discharge: HOME | End: 2023-10-25
Attending: STUDENT IN AN ORGANIZED HEALTH CARE EDUCATION/TRAINING PROGRAM | Admitting: STUDENT IN AN ORGANIZED HEALTH CARE EDUCATION/TRAINING PROGRAM
Payer: MEDICARE

## 2023-10-25 ENCOUNTER — ANESTHESIA (OUTPATIENT)
Dept: ENDOSCOPY | Facility: HOSPITAL | Age: 78
End: 2023-10-25
Payer: MEDICARE

## 2023-10-25 ENCOUNTER — ANESTHESIA EVENT (OUTPATIENT)
Dept: ENDOSCOPY | Facility: HOSPITAL | Age: 78
End: 2023-10-25
Payer: MEDICARE

## 2023-10-25 VITALS
SYSTOLIC BLOOD PRESSURE: 125 MMHG | TEMPERATURE: 98 F | HEART RATE: 73 BPM | RESPIRATION RATE: 13 BRPM | OXYGEN SATURATION: 99 % | HEIGHT: 60 IN | BODY MASS INDEX: 23.16 KG/M2 | WEIGHT: 118 LBS | DIASTOLIC BLOOD PRESSURE: 60 MMHG

## 2023-10-25 DIAGNOSIS — R19.4 CHANGE IN BOWEL HABITS: ICD-10-CM

## 2023-10-25 LAB — POCT GLUCOSE: 90 MG/DL (ref 70–110)

## 2023-10-25 PROCEDURE — 82962 GLUCOSE BLOOD TEST: CPT | Performed by: STUDENT IN AN ORGANIZED HEALTH CARE EDUCATION/TRAINING PROGRAM

## 2023-10-25 PROCEDURE — 91122 HC ANORECTAL MANOMETRY: CPT | Mod: TC | Performed by: STUDENT IN AN ORGANIZED HEALTH CARE EDUCATION/TRAINING PROGRAM

## 2023-10-25 PROCEDURE — 91120 PR RECTAL SENSATION TEST, BALLOON: CPT | Mod: 26,,, | Performed by: STUDENT IN AN ORGANIZED HEALTH CARE EDUCATION/TRAINING PROGRAM

## 2023-10-25 PROCEDURE — 63600175 PHARM REV CODE 636 W HCPCS: Performed by: NURSE ANESTHETIST, CERTIFIED REGISTERED

## 2023-10-25 PROCEDURE — E9220 PRA ENDO ANESTHESIA: HCPCS | Mod: ,,, | Performed by: NURSE ANESTHETIST, CERTIFIED REGISTERED

## 2023-10-25 PROCEDURE — 25000003 PHARM REV CODE 250: Performed by: NURSE ANESTHETIST, CERTIFIED REGISTERED

## 2023-10-25 PROCEDURE — 45378 DIAGNOSTIC COLONOSCOPY: CPT | Performed by: INTERNAL MEDICINE

## 2023-10-25 PROCEDURE — 25000003 PHARM REV CODE 250: Performed by: STUDENT IN AN ORGANIZED HEALTH CARE EDUCATION/TRAINING PROGRAM

## 2023-10-25 PROCEDURE — 91120 HC RECTAL SENSATION TEST, BALLOON: CPT | Mod: TC | Performed by: STUDENT IN AN ORGANIZED HEALTH CARE EDUCATION/TRAINING PROGRAM

## 2023-10-25 PROCEDURE — 45378 DIAGNOSTIC COLONOSCOPY: CPT | Mod: ,,, | Performed by: INTERNAL MEDICINE

## 2023-10-25 PROCEDURE — 37000008 HC ANESTHESIA 1ST 15 MINUTES: Performed by: STUDENT IN AN ORGANIZED HEALTH CARE EDUCATION/TRAINING PROGRAM

## 2023-10-25 PROCEDURE — 37000009 HC ANESTHESIA EA ADD 15 MINS: Performed by: STUDENT IN AN ORGANIZED HEALTH CARE EDUCATION/TRAINING PROGRAM

## 2023-10-25 PROCEDURE — 45378 PR COLONOSCOPY,DIAGNOSTIC: ICD-10-PCS | Mod: ,,, | Performed by: INTERNAL MEDICINE

## 2023-10-25 PROCEDURE — 91122 PR ANAL PRESSURE RECORD: CPT | Mod: 26,,, | Performed by: STUDENT IN AN ORGANIZED HEALTH CARE EDUCATION/TRAINING PROGRAM

## 2023-10-25 PROCEDURE — E9220 PRA ENDO ANESTHESIA: ICD-10-PCS | Mod: ,,, | Performed by: NURSE ANESTHETIST, CERTIFIED REGISTERED

## 2023-10-25 PROCEDURE — 91122 PR ANAL PRESSURE RECORD: ICD-10-PCS | Mod: 26,,, | Performed by: STUDENT IN AN ORGANIZED HEALTH CARE EDUCATION/TRAINING PROGRAM

## 2023-10-25 PROCEDURE — 91120 PR RECTAL SENSATION TEST, BALLOON: ICD-10-PCS | Mod: 26,,, | Performed by: STUDENT IN AN ORGANIZED HEALTH CARE EDUCATION/TRAINING PROGRAM

## 2023-10-25 RX ORDER — EPHEDRINE SULFATE 50 MG/ML
INJECTION, SOLUTION INTRAVENOUS
Status: DISCONTINUED | OUTPATIENT
Start: 2023-10-25 | End: 2023-10-25

## 2023-10-25 RX ORDER — PHENYLEPHRINE HYDROCHLORIDE 10 MG/ML
INJECTION INTRAVENOUS
Status: DISCONTINUED | OUTPATIENT
Start: 2023-10-25 | End: 2023-10-25

## 2023-10-25 RX ORDER — SODIUM CHLORIDE 9 MG/ML
INJECTION, SOLUTION INTRAVENOUS CONTINUOUS
Status: DISCONTINUED | OUTPATIENT
Start: 2023-10-25 | End: 2023-10-25 | Stop reason: HOSPADM

## 2023-10-25 RX ORDER — LIDOCAINE HYDROCHLORIDE 20 MG/ML
INJECTION INTRAVENOUS
Status: DISCONTINUED | OUTPATIENT
Start: 2023-10-25 | End: 2023-10-25

## 2023-10-25 RX ORDER — PROPOFOL 10 MG/ML
VIAL (ML) INTRAVENOUS
Status: DISCONTINUED | OUTPATIENT
Start: 2023-10-25 | End: 2023-10-25

## 2023-10-25 RX ORDER — PROPOFOL 10 MG/ML
INJECTION, EMULSION INTRAVENOUS CONTINUOUS PRN
Status: DISCONTINUED | OUTPATIENT
Start: 2023-10-25 | End: 2023-10-25

## 2023-10-25 RX ADMIN — PHENYLEPHRINE HYDROCHLORIDE 100 MCG: 10 INJECTION INTRAVENOUS at 03:10

## 2023-10-25 RX ADMIN — PROPOFOL 40 MG: 10 INJECTION, EMULSION INTRAVENOUS at 03:10

## 2023-10-25 RX ADMIN — PROPOFOL 30 MG: 10 INJECTION, EMULSION INTRAVENOUS at 03:10

## 2023-10-25 RX ADMIN — LIDOCAINE HYDROCHLORIDE 75 MG: 20 INJECTION INTRAVENOUS at 02:10

## 2023-10-25 RX ADMIN — SODIUM CHLORIDE: 9 INJECTION, SOLUTION INTRAVENOUS at 11:10

## 2023-10-25 RX ADMIN — PROPOFOL 150 MCG/KG/MIN: 10 INJECTION, EMULSION INTRAVENOUS at 02:10

## 2023-10-25 RX ADMIN — GLYCOPYRROLATE 0.2 MG: 0.2 INJECTION, SOLUTION INTRAMUSCULAR; INTRAVENOUS at 03:10

## 2023-10-25 RX ADMIN — EPHEDRINE SULFATE 5 MG: 50 INJECTION INTRAVENOUS at 03:10

## 2023-10-25 RX ADMIN — PROPOFOL 70 MG: 10 INJECTION, EMULSION INTRAVENOUS at 02:10

## 2023-10-25 NOTE — TRANSFER OF CARE
Anesthesia Transfer of Care Note    Patient: Krysta Kam    Procedure(s) Performed: Procedure(s) (LRB):  MANOMETRY, ANORECTAL (N/A)  COLONOSCOPY (N/A)    Patient location: GI    Anesthesia Type: general    Transport from OR: Transported from OR on room air with adequate spontaneous ventilation    Post pain: adequate analgesia    Post assessment: no apparent anesthetic complications    Post vital signs: stable    Level of consciousness: responds to stimulation and sedated    Nausea/Vomiting: no nausea/vomiting    Complications: none    Transfer of care protocol was followed      Last vitals:   Visit Vitals  BP (!) 108/55   Pulse 69   Temp 36.7 °C (98.1 °F) (Tympanic)   Resp 20   Ht 5' (1.524 m)   Wt 53.5 kg (118 lb)   SpO2 99%   Breastfeeding No   BMI 23.05 kg/m²

## 2023-10-25 NOTE — ANESTHESIA PREPROCEDURE EVALUATION
10/25/2023  Krysta Kam is a 77 y.o., female.      Pre-op Assessment    I have reviewed the Patient Summary Reports.          Review of Systems  Anesthesia Hx:  No problems with previous Anesthesia    Social:  Non-Smoker    Hematology/Oncology:  Hematology Normal   Oncology Normal     EENT/Dental:EENT/Dental Normal   Cardiovascular:   Hypertension    Pulmonary:  Pulmonary Normal    Renal/:  Renal/ Normal     Hepatic/GI:   GERD    Musculoskeletal:  Musculoskeletal Normal    Neurological:   Neuromuscular Disease, (Fibromyalgia)    Endocrine:   Diabetes, type 2    Dermatological:  Skin Normal    Psych:   anxiety          Physical Exam  General: Alert and Oriented    Airway:  Mallampati: II / II  Mouth Opening: Normal  TM Distance: Normal  Tongue: Normal  Neck ROM: Normal ROM    Dental:  Intact    Chest/Lungs:  Clear to auscultation, Normal Respiratory Rate    Heart:  Rate: Normal  Rhythm: Regular Rhythm  Sounds: Normal        Anesthesia Plan  Type of Anesthesia, risks & benefits discussed:    Anesthesia Type: Gen ETT, MAC  Intra-op Monitoring Plan: Standard ASA Monitors  Post Op Pain Control Plan: multimodal analgesia  Airway Plan: Direct  Informed Consent: Informed consent signed with the Patient and all parties understand the risks and agree with anesthesia plan.  All questions answered.   ASA Score: 3    Ready For Surgery From Anesthesia Perspective.     .

## 2023-10-25 NOTE — PROVATION PATIENT INSTRUCTIONS
Discharge Summary/Instructions after an Endoscopic Procedure  Patient Name: Krysta Kam  Patient MRN: [id number]  Patient YOB: 1945 Wednesday, October 25, 2023  Armando Cardenas MD  Dear patient,  As a result of recent federal legislation (The Federal Cures Act), you may   receive lab or pathology results from your procedure in your [facility]   account before your physician is able to contact you. Your physician or   their representative will relay the results to you with their   recommendations at their soonest availability.  Thank you,  RESTRICTIONS:  During your procedure today, you received medications for sedation.  These   medications may affect your judgment, balance and coordination.  Therefore,   for 24 hours, you have the following restrictions:   - DO NOT drive a car, operate machinery, make legal/financial decisions,   sign important papers or drink alcohol.    ACTIVITY:  Today: no heavy lifting, straining or running due to procedural   sedation/anesthesia.  The following day: return to full activity including work.  DIET:  Eat and drink normally unless instructed otherwise.     TREATMENT FOR COMMON SIDE EFFECTS:  - Mild abdominal pain, nausea, belching, bloating or excessive gas:  rest,   eat lightly and use a heating pad.  - Sore Throat: treat with throat lozenges and/or gargle with warm salt   water.  - Because air was used during the procedure, expelling large amounts of air   from your rectum or belching is normal.  - If a bowel prep was taken, you may not have a bowel movement for 1-3 days.    This is normal.  SYMPTOMS TO WATCH FOR AND REPORT TO YOUR PHYSICIAN:  1. Abdominal pain or bloating, other than gas cramps.  2. Chest pain.  3. Back pain.  4. Signs of infection such as: chills or fever occurring within 24 hours   after the procedure.  5. Rectal bleeding, which would show as bright red, maroon, or black stools.   (A tablespoon of blood from the rectum is not serious, especially if    hemorrhoids are present.)  6. Vomiting.  7. Weakness or dizziness.  GO DIRECTLY TO THE NEAREST EMERGENCY ROOM IF YOU HAVE ANY OF THE FOLLOWING:      Difficulty breathing              Chills and/or fever over 101 F   Persistent vomiting and/or vomiting blood   Severe abdominal pain   Severe chest pain   Black, tarry stools   Bleeding- more than one tablespoon   Any other symptom or condition that you feel may need urgent attention  Your doctor recommends these additional instructions:  If any biopsies were taken, your doctors clinic will contact you in 1 to 2   weeks with any results.  - Discharge patient to home.   - Repeat colonoscopy is not recommended due to current age (66 years or   older) for surveillance.   - The findings and recommendations were discussed with the designated   responsible adult.   For questions, problems or results please call your physician - Armando Cardenas MD at Work:  [phone].  [facility], EMERGENCY ROOM PHONE NUMBER: [phone]  IF A COMPLICATION OR EMERGENCY SITUATION ARISES AND YOU ARE UNABLE TO REACH   YOUR PHYSICIAN - GO DIRECTLY TO THE EMERGENCY ROOM.  Armando Cardenas MD  10/25/2023 3:23:05 PM  This report has been verified and signed electronically.  Dear patient,  As a result of recent federal legislation (The Federal Cures Act), you may   receive lab or pathology results from your procedure in your [facility]   account before your physician is able to contact you. Your physician or   their representative will relay the results to you with their   recommendations at their soonest availability.  Thank you,  PROVATION

## 2023-10-25 NOTE — ANESTHESIA POSTPROCEDURE EVALUATION
Anesthesia Post Evaluation    Patient: Krysta Kam    Procedure(s) Performed: Procedure(s) (LRB):  MANOMETRY, ANORECTAL (N/A)  COLONOSCOPY (N/A)    Final Anesthesia Type: general      Patient location during evaluation: PACU  Patient participation: Yes- Able to Participate  Level of consciousness: awake and alert  Post-procedure vital signs: reviewed and stable  Pain control: Pain has been treated.  Airway patency: patent    PONV status: PONV absent or treated.  Anesthetic complications: no      Cardiovascular status: hemodynamically stable  Respiratory status: spontaneous ventilation  Hydration status: euvolemic  Follow-up not needed.          Vitals Value Taken Time   /60 10/25/23 1600   Temp 36.7 °C (98.1 °F) 10/25/23 1530   Pulse 73 10/25/23 1600   Resp 13 10/25/23 1600   SpO2 99 % 10/25/23 1600         Event Time   Out of Recovery 16:21:25         Pain/Marito Score: Marito Score: 10 (10/25/2023  3:53 PM)

## 2023-10-26 NOTE — PROVATION PATIENT INSTRUCTIONS
Discharge Summary/Instructions after an Endoscopic Procedure  Patient Name: Krysta Kam  Patient MRN: [id number]  Patient YOB: 1945 Wednesday, October 25, 2023  Christoph Roche MD  Dear patient,  As a result of recent federal legislation (The Federal Cures Act), you may   receive lab or pathology results from your procedure in your [facility]   account before your physician is able to contact you. Your physician or   their representative will relay the results to you with their   recommendations at their soonest availability.  Thank you,  RESTRICTIONS:  During your procedure today, you received medications for sedation.  These   medications may affect your judgment, balance and coordination.  Therefore,   for 24 hours, you have the following restrictions:   - DO NOT drive a car, operate machinery, make legal/financial decisions,   sign important papers or drink alcohol.    ACTIVITY:  Today: no heavy lifting, straining or running due to procedural   sedation/anesthesia.  The following day: return to full activity including work.  DIET:  Eat and drink normally unless instructed otherwise.     TREATMENT FOR COMMON SIDE EFFECTS:  - Mild abdominal pain, nausea, belching, bloating or excessive gas:  rest,   eat lightly and use a heating pad.  - Sore Throat: treat with throat lozenges and/or gargle with warm salt   water.  - Because air was used during the procedure, expelling large amounts of air   from your rectum or belching is normal.  - If a bowel prep was taken, you may not have a bowel movement for 1-3 days.    This is normal.  SYMPTOMS TO WATCH FOR AND REPORT TO YOUR PHYSICIAN:  1. Abdominal pain or bloating, other than gas cramps.  2. Chest pain.  3. Back pain.  4. Signs of infection such as: chills or fever occurring within 24 hours   after the procedure.  5. Rectal bleeding, which would show as bright red, maroon, or black stools.   (A tablespoon of blood from the rectum is not serious,  especially if   hemorrhoids are present.)  6. Vomiting.  7. Weakness or dizziness.  GO DIRECTLY TO THE NEAREST EMERGENCY ROOM IF YOU HAVE ANY OF THE FOLLOWING:      Difficulty breathing              Chills and/or fever over 101 F   Persistent vomiting and/or vomiting blood   Severe abdominal pain   Severe chest pain   Black, tarry stools   Bleeding- more than one tablespoon   Any other symptom or condition that you feel may need urgent attention  Your doctor recommends these additional instructions:  If any biopsies were taken, your doctors clinic will contact you in 1 to 2   weeks with any results.  - Referral to pelvic floor physical therapy  For questions, problems or results please call your physician - Christoph Roche MD at Work:  [phone].  [facility] [place], EMERGENCY ROOM PHONE NUMBER: [phone]  IF A COMPLICATION OR EMERGENCY SITUATION ARISES AND YOU ARE UNABLE TO REACH   YOUR PHYSICIAN - GO DIRECTLY TO THE EMERGENCY ROOM.  Christoph Roche MD  10/26/2023 1:45:56 PM  This report has been verified and signed electronically.  Dear patient,  As a result of recent federal legislation (The Federal Cures Act), you may   receive lab or pathology results from your procedure in your [facility]   account before your physician is able to contact you. Your physician or   their representative will relay the results to you with their   recommendations at their soonest availability.  Thank you,  [employer]ATION

## 2023-10-27 ENCOUNTER — PATIENT MESSAGE (OUTPATIENT)
Dept: GASTROENTEROLOGY | Facility: CLINIC | Age: 78
End: 2023-10-27
Payer: MEDICARE

## 2023-10-27 DIAGNOSIS — D50.9 MICROCYTIC ANEMIA: Primary | ICD-10-CM

## 2023-10-27 NOTE — PROGRESS NOTES
Deborah please schedule Krysta Arroyo for blood work 12 hour fasting anemia labs this coming week for further evaluation of her slight anemia orders have been placed.    Please schedule her a telemedicine video visit with me in 2 weeks for follow-up okay for lunchtime or after hours

## 2023-10-30 ENCOUNTER — CLINICAL SUPPORT (OUTPATIENT)
Dept: REHABILITATION | Facility: HOSPITAL | Age: 78
End: 2023-10-30
Payer: MEDICARE

## 2023-10-30 DIAGNOSIS — G89.29 CHRONIC BILATERAL LOW BACK PAIN WITHOUT SCIATICA: ICD-10-CM

## 2023-10-30 DIAGNOSIS — M54.50 CHRONIC BILATERAL LOW BACK PAIN WITHOUT SCIATICA: ICD-10-CM

## 2023-10-30 DIAGNOSIS — R29.898 DECREASED STRENGTH OF LOWER EXTREMITY: ICD-10-CM

## 2023-10-30 DIAGNOSIS — Z74.09 IMPAIRED FUNCTIONAL MOBILITY, BALANCE, GAIT, AND ENDURANCE: ICD-10-CM

## 2023-10-30 PROCEDURE — 97162 PT EVAL MOD COMPLEX 30 MIN: CPT

## 2023-10-30 PROCEDURE — 97112 NEUROMUSCULAR REEDUCATION: CPT

## 2023-11-01 ENCOUNTER — LAB VISIT (OUTPATIENT)
Dept: LAB | Facility: HOSPITAL | Age: 78
End: 2023-11-01
Attending: INTERNAL MEDICINE
Payer: MEDICARE

## 2023-11-01 DIAGNOSIS — D50.9 MICROCYTIC ANEMIA: ICD-10-CM

## 2023-11-01 LAB
FERRITIN SERPL-MCNC: 34 NG/ML (ref 20–300)
HGB BLD-MCNC: 10.3 G/DL (ref 12–16)
IRON SERPL-MCNC: 42 UG/DL (ref 30–160)
SATURATED IRON: 9 % (ref 20–50)
TOTAL IRON BINDING CAPACITY: 463 UG/DL (ref 250–450)
TRANSFERRIN SERPL-MCNC: 313 MG/DL (ref 200–375)

## 2023-11-01 PROCEDURE — 36415 COLL VENOUS BLD VENIPUNCTURE: CPT | Mod: PO | Performed by: INTERNAL MEDICINE

## 2023-11-01 PROCEDURE — 85018 HEMOGLOBIN: CPT | Performed by: INTERNAL MEDICINE

## 2023-11-01 PROCEDURE — 82728 ASSAY OF FERRITIN: CPT | Performed by: INTERNAL MEDICINE

## 2023-11-01 PROCEDURE — 84466 ASSAY OF TRANSFERRIN: CPT | Performed by: INTERNAL MEDICINE

## 2023-11-01 PROCEDURE — 83540 ASSAY OF IRON: CPT | Performed by: INTERNAL MEDICINE

## 2023-11-04 ENCOUNTER — PATIENT MESSAGE (OUTPATIENT)
Dept: GASTROENTEROLOGY | Facility: CLINIC | Age: 78
End: 2023-11-04
Payer: MEDICARE

## 2023-11-04 DIAGNOSIS — D50.9 IRON DEFICIENCY ANEMIA, UNSPECIFIED IRON DEFICIENCY ANEMIA TYPE: Primary | ICD-10-CM

## 2023-11-04 DIAGNOSIS — D50.9 IRON DEFICIENCY ANEMIA, UNSPECIFIED IRON DEFICIENCY ANEMIA TYPE: ICD-10-CM

## 2023-11-04 RX ORDER — GLUC/MSM/COLGN2/HYAL/ANTIARTH3 375-375-20
1 TABLET ORAL DAILY
Qty: 90 TABLET | Refills: 1 | Status: SHIPPED | OUTPATIENT
Start: 2023-11-04 | End: 2023-11-04 | Stop reason: SDUPTHER

## 2023-11-04 RX ORDER — GLUC/MSM/COLGN2/HYAL/ANTIARTH3 375-375-20
1 TABLET ORAL DAILY
Qty: 90 TABLET | Refills: 1 | Status: SHIPPED | OUTPATIENT
Start: 2023-11-04 | End: 2024-01-21 | Stop reason: SDUPTHER

## 2023-11-04 NOTE — PROGRESS NOTES
GI MA team - please tell patient that they are iron deficient and anemic and recommend that they take ferrous gluconate one 324mg pill once daily for next 3 months.    GI MA team -  Please order repeat fasting Hemoglobin, Iron/TIBC, and Ferritin in 8 weeks - Orders placed.     Please schedule patient for telemedicine video visit for follow-up of her iron deficiency anemia with me    Please tell her I recommend EGD for further evaluation referral placed

## 2023-11-06 ENCOUNTER — TELEPHONE (OUTPATIENT)
Dept: ENDOSCOPY | Facility: HOSPITAL | Age: 78
End: 2023-11-06
Payer: MEDICARE

## 2023-11-06 ENCOUNTER — OFFICE VISIT (OUTPATIENT)
Dept: INTERNAL MEDICINE | Facility: CLINIC | Age: 78
End: 2023-11-06
Payer: MEDICARE

## 2023-11-06 VITALS — WEIGHT: 110 LBS | BODY MASS INDEX: 21.6 KG/M2 | HEIGHT: 60 IN

## 2023-11-06 VITALS
BODY MASS INDEX: 23.32 KG/M2 | HEIGHT: 60 IN | OXYGEN SATURATION: 99 % | WEIGHT: 118.81 LBS | DIASTOLIC BLOOD PRESSURE: 60 MMHG | HEART RATE: 68 BPM | SYSTOLIC BLOOD PRESSURE: 132 MMHG

## 2023-11-06 DIAGNOSIS — D50.9 IRON DEFICIENCY ANEMIA, UNSPECIFIED IRON DEFICIENCY ANEMIA TYPE: Primary | ICD-10-CM

## 2023-11-06 DIAGNOSIS — M54.50 CHRONIC BILATERAL LOW BACK PAIN WITHOUT SCIATICA: ICD-10-CM

## 2023-11-06 DIAGNOSIS — M25.579 ANKLE PAIN, UNSPECIFIED CHRONICITY, UNSPECIFIED LATERALITY: Primary | ICD-10-CM

## 2023-11-06 DIAGNOSIS — G89.29 CHRONIC BILATERAL LOW BACK PAIN WITHOUT SCIATICA: ICD-10-CM

## 2023-11-06 PROBLEM — Z74.09 IMPAIRED FUNCTIONAL MOBILITY, BALANCE, GAIT, AND ENDURANCE: Status: ACTIVE | Noted: 2023-11-06

## 2023-11-06 PROBLEM — R29.898 DECREASED STRENGTH OF LOWER EXTREMITY: Status: ACTIVE | Noted: 2023-11-06

## 2023-11-06 PROCEDURE — 99999 PR PBB SHADOW E&M-EST. PATIENT-LVL V: ICD-10-PCS | Mod: PBBFAC,,, | Performed by: NURSE PRACTITIONER

## 2023-11-06 PROCEDURE — 1159F MED LIST DOCD IN RCRD: CPT | Mod: CPTII,S$GLB,, | Performed by: NURSE PRACTITIONER

## 2023-11-06 PROCEDURE — 1159F PR MEDICATION LIST DOCUMENTED IN MEDICAL RECORD: ICD-10-PCS | Mod: CPTII,S$GLB,, | Performed by: NURSE PRACTITIONER

## 2023-11-06 PROCEDURE — 3072F LOW RISK FOR RETINOPATHY: CPT | Mod: CPTII,S$GLB,, | Performed by: NURSE PRACTITIONER

## 2023-11-06 PROCEDURE — 1125F AMNT PAIN NOTED PAIN PRSNT: CPT | Mod: CPTII,S$GLB,, | Performed by: NURSE PRACTITIONER

## 2023-11-06 PROCEDURE — 3075F SYST BP GE 130 - 139MM HG: CPT | Mod: CPTII,S$GLB,, | Performed by: NURSE PRACTITIONER

## 2023-11-06 PROCEDURE — 1101F PR PT FALLS ASSESS DOC 0-1 FALLS W/OUT INJ PAST YR: ICD-10-PCS | Mod: CPTII,S$GLB,, | Performed by: NURSE PRACTITIONER

## 2023-11-06 PROCEDURE — 99214 PR OFFICE/OUTPT VISIT, EST, LEVL IV, 30-39 MIN: ICD-10-PCS | Mod: S$GLB,,, | Performed by: NURSE PRACTITIONER

## 2023-11-06 PROCEDURE — 3072F PR LOW RISK FOR RETINOPATHY: ICD-10-PCS | Mod: CPTII,S$GLB,, | Performed by: NURSE PRACTITIONER

## 2023-11-06 PROCEDURE — 1101F PT FALLS ASSESS-DOCD LE1/YR: CPT | Mod: CPTII,S$GLB,, | Performed by: NURSE PRACTITIONER

## 2023-11-06 PROCEDURE — 3078F DIAST BP <80 MM HG: CPT | Mod: CPTII,S$GLB,, | Performed by: NURSE PRACTITIONER

## 2023-11-06 PROCEDURE — 99999 PR PBB SHADOW E&M-EST. PATIENT-LVL V: CPT | Mod: PBBFAC,,, | Performed by: NURSE PRACTITIONER

## 2023-11-06 PROCEDURE — 99214 OFFICE O/P EST MOD 30 MIN: CPT | Mod: S$GLB,,, | Performed by: NURSE PRACTITIONER

## 2023-11-06 PROCEDURE — 3288F PR FALLS RISK ASSESSMENT DOCUMENTED: ICD-10-PCS | Mod: CPTII,S$GLB,, | Performed by: NURSE PRACTITIONER

## 2023-11-06 PROCEDURE — 3075F PR MOST RECENT SYSTOLIC BLOOD PRESS GE 130-139MM HG: ICD-10-PCS | Mod: CPTII,S$GLB,, | Performed by: NURSE PRACTITIONER

## 2023-11-06 PROCEDURE — 1125F PR PAIN SEVERITY QUANTIFIED, PAIN PRESENT: ICD-10-PCS | Mod: CPTII,S$GLB,, | Performed by: NURSE PRACTITIONER

## 2023-11-06 PROCEDURE — 3288F FALL RISK ASSESSMENT DOCD: CPT | Mod: CPTII,S$GLB,, | Performed by: NURSE PRACTITIONER

## 2023-11-06 PROCEDURE — 3078F PR MOST RECENT DIASTOLIC BLOOD PRESSURE < 80 MM HG: ICD-10-PCS | Mod: CPTII,S$GLB,, | Performed by: NURSE PRACTITIONER

## 2023-11-06 PROCEDURE — 1160F PR REVIEW ALL MEDS BY PRESCRIBER/CLIN PHARMACIST DOCUMENTED: ICD-10-PCS | Mod: CPTII,S$GLB,, | Performed by: NURSE PRACTITIONER

## 2023-11-06 PROCEDURE — 1160F RVW MEDS BY RX/DR IN RCRD: CPT | Mod: CPTII,S$GLB,, | Performed by: NURSE PRACTITIONER

## 2023-11-06 RX ORDER — TIZANIDINE 4 MG/1
4 TABLET ORAL NIGHTLY PRN
Qty: 30 TABLET | Refills: 0 | Status: SHIPPED | OUTPATIENT
Start: 2023-11-06

## 2023-11-06 NOTE — PLAN OF CARE
[facility]   Physical Therapy Initial Evaluation      Name: Krysta Kam  Clinic Number: [id number]    Therapy Diagnosis:   Encounter Diagnoses   Name Primary?    Chronic bilateral low back pain without sciatica     Impaired functional mobility, balance, gait, and endurance     Decreased strength of lower extremity         Physician: Simi Velez,*    Physician Orders: PT Eval and Treat low back  Medical Diagnosis from Referral: M54.50 (ICD-10-CM) - Low back pain, unspecified G89.29 (ICD-10-CM) - Other chronic pain   Evaluation Date: 10/30/2023  Authorization Period Expiration: 12/18/2023  Plan of Care Expiration: 12/31/2023  Progress Note Due: 10th visit  Date of Surgery: NA  Visit # / Visits authorized: 1/ 1   FOTO: 1/ 3    Precautions: Diabetes     Time In: 12:01 PM  Time Out: 12:58 PM  Total Billable Time: 57 minutes    Subjective     Date of onset: Chronic - over 5 years    History of current condition - Krysta reports to the clinic with complaints of low back pain with radicular symptoms for over 5 years but has recently gotten worse within the last year. She reports her greatest difficulty is having to get in/out of bed or chair, walking and standing for prolonged periods of time. She reports pain down her legs but stopping above the knee. She reports that she wears a brace around her waist which helps with her pain some. Patient has history of right total knee arthroplasty in 2020. Patient denies changes in bowel/bladder.     Falls: No falls within the last year    Imaging: See imaging section    Prior Therapy: Yes - right total knee in 2020  Social History: lives with their family  Occupation: Retired  Prior Level of Function: independent in activities of daily living/instrumental activities of daily living   Current Level of Function: Difficulty with standing, walking, morning, getting in/out of bed or chair    Pain:  Current 6/10, worst 8/10, best 5/10    Location: midline back  and buttocks    Description: Deep  Aggravating Factors: Standing, Walking, Morning, and Getting out of bed/chair  Easing Factors: pain medication, heating pad, rest, and lean each direction    Patients goals: Decrease pain     Medical History:   Past Medical History:   Diagnosis Date    Anemia 6/2012    Anxiety     Cataract     Colon polyps 1/31/2017    Depression     Diabetes mellitus without complication 11/14/2018    Diabetes mellitus, type 2     Fibromyalgia     GERD (gastroesophageal reflux disease)     High cholesterol     Hypertension      Surgical History:   Krysta Kam  has a past surgical history that includes Hysterectomy; Colonoscopy (N/A, 10/18/2016); Esophagogastroduodenoscopy (N/A, 10/08/2019); Colonoscopy (N/A, 10/08/2019); Knee Arthroplasty (Right, 09/16/2020); Breast biopsy (1989); Breast biopsy (Left); Anorectal manometry (N/A, 10/25/2023); and Colonoscopy (N/A, 10/25/2023).    Medications:   Krysta has a current medication list which includes the following prescription(s): acetaminophen, amlodipine, ascorbic acid (vitamin c), aspirin, atorvastatin, blood pressure monitor, calcium carbonate-vit d3-min, chlorthalidone, cinnamon bark, citalopram, cyclosporine, diclofenac, diclofenac sodium, duloxetine, estradiol, ferrous gluconate, flaxseed oil, gabapentin, losartan, metoprolol succinate, multivit-min/iron/fa/vit k/lut, omeprazole, polyethylene glycol, tizanidine, turmeric, and vitamin d.    Allergies:   Review of patient's allergies indicates:   Allergen Reactions    Ondansetron      Other reaction(s): Flushing (Skin)    Savella [milnacipran] Nausea Only        Objective      Observation: Patient ambulates to clinic independently with antalgic gait pattern due to poor knee range of motion from previous total knee arthroplasty in 2020. (Knee flexion active range of motion 97 degrees).      Posture: Forward head and rounded shoulders     Lumbar Range of Motion: *  Denotes Pain    ROM   Flexion WNL   Extension *WNL   Left Side Bending WNL   Right Side Bending *WNL      Lower Extremity Strength  Right LE   Left LE     Hip Ext 3+/5 Hip Ext 3+/5   Hip ABD 3/5 Hip ABD  3+/5   Hip IR 3+/5 Hip IR 3+/5   Hip ER 3+/5 Hip IR 3+/5   Hip ADD (seated) 5/5 Hip ADD (seated) 5/5   Hip FLEX 3+/5 Hip FLEX 3+/5   Knee FLEX 3+/5 Knee FLEX 3+/5   Knee EXT 4/5 Knee EXT 4/5   Plantar flexion (seated) 4/5 Plantar flexion (seated) 4/5   Dorsiflexion 4/5 Dorsiflexion 4/5      Neural Tension Testing:  SLR: L (Neg); R (Neg)  Slump Test: L (Neg); R (Neg)     Sensation: Decreased to light touch along L2-L3 nerve root      Red flag screen:               B/B changes: Neg              Clonus: Neg              Babinski: Neg     Endurance Assessment:    Evaluation   Timed Up and Go 12 seconds   30 sec STS  6x with bilateral upper extremity assist        Table: Population Norms for TUG    Age  Average TUG    60 - 69 years  8.1 seconds    70 - 79 years  9.2 seconds    80 - 99 years  11.3 seconds       Intake Outcome Measure for FOTO Lumbar Survey    Therapist reviewed FOTO scores for Krysta Kam on 10/30/2023.   FOTO report - see Media section or FOTO account episode details.    Intake Score: 49         Treatment     Total Treatment time (time-based codes) separate from Evaluation: 23 minutes     Krysta received the treatments listed below:      therapeutic exercises: to develop strength, endurance, ROM, flexibility, posture, and core stabilization for 00 minutes including:     manual therapy techniques: Joint mobilizations and Manual traction were applied to the: lumbar spine for 00 minutes, including:     neuromuscular re-education activities: to improve Balance, Coordination, Kinesthetic, Sense, Proprioception, Posture, and Motor Control for 23 minutes, including:   Education on activity modification, plan of care, home exercise program, and functional anatomy  DKTC and LTR 5' each  PPT with 5  second hold x 20 reps  Straight leg raise 1 x 10 bilateral     therapeutic activities: to improve functional performance for 00 minutes, including:     Patient Education and Home Exercises     Education provided:   - Activity modification  - Plan of care  - Home exercise program  - Functional Anatomy    Written Home Exercises Provided: yes. Exercises were reviewed and Krysta was able to demonstrate them prior to the end of the session.  Krysta demonstrated good  understanding of the education provided. See EMR under Patient Instructions for exercises provided during therapy sessions.    Assessment     Krysta is a 77 y.o. female referred to outpatient Physical Therapy with a medical diagnosis of M54.50 (ICD-10-CM) - Low back pain, unspecified G89.29 (ICD-10-CM) - Other chronic pain. Patient presents with decreased right knee range of motion from previous surgery, decreased gross lower extremity strength, and poor proximal hip and abdominal core motor control. Patient functionally limited in transfer tasks, prolonged walking and standing tasks, and sitting. Patient prognosis is Fair. Patient will benefit from skilled outpatient Physical Therapy to address the deficits stated above and in the chart below, provide patient /family education, and to maximize patientt's level of independence.     Plan of care discussed with patient: Yes  Patient's spiritual, cultural and educational needs considered and patient is agreeable to the plan of care and goals as stated below:     Anticipated Barriers for therapy: Lifestyle, previous total knee arthroplasty surgery, chronicity of symptoms    Medical Necessity is demonstrated by the following  History  Co-morbidities and personal factors that may impact the plan of care [] LOW: no personal factors / co-morbidities  [] MODERATE: 1-2 personal factors / co-morbidities  [x] HIGH: 3+ personal factors / co-morbidities    Moderate / High Support Documentation:   Co-morbidities affecting plan  of care: See past medical history    Personal Factors:   lifestyle     Examination  Body Structures and Functions, activity limitations and participation restrictions that may impact the plan of care [] LOW: addressing 1-2 elements  [] MODERATE: 3+ elements  [x] HIGH: 4+ elements (please support below)    Moderate / High Support Documentation: range of motion, strength, gait, mobility, transfers, motor control     Clinical Presentation [] LOW: stable  [x] MODERATE: Evolving  [] HIGH: Unstable     Decision Making/ Complexity Score: moderate       Goals:  Short Term Goals: 4 weeks   1. Patient will reduce maximal pain rating to < 3/10 pain to facilitate ability to sleep through the night and recover from PT interventions.  2. Patient will be able to ambulate for at least 10 minutes with < 3/10 pain to improve walking tolerance.   3. Patient will be able to get in/out of bed with < 3/10 pain to improve performance with activities of daily living.     Long Term Goals: 8-10 weeks   1. Patient will improve TUG score to < 12 seconds to reduce risk for falls.   2. Patient will demonstrate > 4/5 lower quarter strength to facilitate transfers from sit to stand from various surfaces without restriction.  3. Patient will improve 30 second sit to stand to at least 10x for improvement with transfer tasks.    4. Patient will improve FOTO intake score to at least 55 indicating a clinically significant change in function.     Plan     Plan of care Certification: 10/30/2023 to 12/31/2023.    Outpatient Physical Therapy 2 times weekly for 8-10 weeks to include the following interventions: Cervical/Lumbar Traction, Gait Training, Manual Therapy, Moist Heat/ Ice, Neuromuscular Re-ed, Patient Education, Self Care, Therapeutic Activities, and Therapeutic Exercise.     Loly Cruz, PT, DPT        Physician's Signature: _________________________________________ Date: ________________

## 2023-11-06 NOTE — PROGRESS NOTES
"Subjective     Patient ID: Krysta Kam is a 77 y.o. female.    Chief Complaint: Ankle Pain    Pt of Dr. Palafox, here for, "achiness around the ankle for a couple of weeks."    She had her ankle hit by a grocery basket on 9-30-23. This caused right ankle pain and right knee pain, hx of TKR right. Seen on 10-2-at Urgent care and 10-4 by Luz Ha for same issues. Xrays done of back, knee, and ankle, all were normal. Given Tizanidine 4mg at bedtime for her back which she is requesting a refill on. Was on Voltaren BID and has also been taking Tylenol 650mg BID otc.  Still has pain in right ankle. Does PT for back.      Review of Systems   Constitutional:  Negative for activity change and fever.   Respiratory:  Negative for chest tightness and shortness of breath.    Cardiovascular:  Negative for chest pain and leg swelling.   Gastrointestinal:  Negative for abdominal pain, constipation, diarrhea, nausea and vomiting.   Genitourinary:  Negative for dysuria.   Musculoskeletal:  Positive for arthralgias, back pain and leg pain. Negative for gait problem, joint swelling and myalgias.        As documented in HPI     Allergic/Immunologic: Negative for environmental allergies, food allergies and immunocompromised state.   Neurological:  Negative for weakness, light-headedness, numbness, headaches and coordination difficulties.   Hematological:  Negative for adenopathy. Does not bruise/bleed easily.   Psychiatric/Behavioral:  Negative for suicidal ideas.      Review of patient's allergies indicates:   Allergen Reactions    Ondansetron      Other reaction(s): Flushing (Skin)    Savella [milnacipran] Nausea Only       Current Outpatient Medications:     acetaminophen (TYLENOL) 650 MG TbSR, Take 1 tablet (650 mg total) by mouth every 8 (eight) hours as needed., Disp: 120 tablet, Rfl: 0    amLODIPine (NORVASC) 10 MG tablet, TAKE 1 TABLET BY MOUTH ONCE  DAILY, Disp: 90 tablet, Rfl: 3    ascorbic acid, vitamin C, " (VITAMIN C) 500 MG tablet, Take 500 mg by mouth once daily., Disp: , Rfl:     atorvastatin (LIPITOR) 40 MG tablet, Take 1 tablet (40 mg total) by mouth once daily., Disp: 90 tablet, Rfl: 3    blood pressure monitor (BLOOD PRESSURE KIT) Kit, One blood pressure monitoring device (arm cuff), Disp: 1 each, Rfl: 0    chlorthalidone (HYGROTEN) 25 MG Tab, Take 1 tablet (25 mg total) by mouth once daily., Disp: 90 tablet, Rfl: 3    cinnamon bark (CINNAMON ORAL), Take by mouth., Disp: , Rfl:     citalopram (CELEXA) 10 MG tablet, , Disp: , Rfl:     cycloSPORINE (RESTASIS) 0.05 % ophthalmic emulsion, INSTILL 1 DROP INTO BOTH EYES TWICE DAILY, Disp: 180 each, Rfl: 3    diclofenac (VOLTAREN) 50 MG EC tablet, Take 1 tablet (50 mg total) by mouth 2 (two) times daily as needed (back pain)., Disp: 60 tablet, Rfl: 2    diclofenac sodium (VOLTAREN) 1 % Gel, Apply topically 2 (two) times daily., Disp: 200 g, Rfl: 4    DULoxetine (CYMBALTA) 30 MG capsule, Take 1 capsule (30 mg total) by mouth once daily., Disp: 30 capsule, Rfl: 11    estradioL (ESTRACE) 0.01 % (0.1 mg/gram) vaginal cream, Place 1 g vaginally once daily., Disp: 45 g, Rfl: 12    ferrous gluconate 236 mg (27 mg iron) Tab, Take 1 tablet by mouth once daily., Disp: 90 tablet, Rfl: 1    FLAXSEED OIL ORAL, Take by mouth., Disp: , Rfl:     gabapentin (NEURONTIN) 300 MG capsule, Take 1 capsule (300 mg total) by mouth 2 (two) times daily., Disp: 180 capsule, Rfl: 11    losartan (COZAAR) 100 MG tablet, Take 1 tablet (100 mg total) by mouth once daily., Disp: 90 tablet, Rfl: 2    metoprolol succinate (TOPROL-XL) 25 MG 24 hr tablet, Take 1 tablet (25 mg total) by mouth once daily., Disp: 30 tablet, Rfl: 11    multivit-min/iron/folic/lutein (CENTRUM SILVER WOMEN ORAL), Take 1 tablet by mouth once daily., Disp: , Rfl:     omeprazole (PRILOSEC) 40 MG capsule, Take 1 capsule (40 mg total) by mouth once daily., Disp: 30 capsule, Rfl: 11    polyethylene glycol (COLYTE) 240-22.72-6.72  -5.84 gram SolR, Take as directed bt provider office, Disp: 8000 mL, Rfl: 0    TURMERIC ORAL, Take by mouth., Disp: , Rfl:     vitamin D (VITAMIN D3) 1000 units Tab, Take 1,000 Units by mouth once daily., Disp: , Rfl:     aspirin (ECOTRIN) 81 MG EC tablet, Take 1 tablet (81 mg total) by mouth 2 (two) times daily., Disp: 60 tablet, Rfl: 0    calcium carbonate-vit D3-min (CALTRATE 600+D PLUS MINERALS) 600 mg calcium- 400 unit Tab, Take 1 tablet by mouth once daily., Disp: 60 tablet, Rfl: 11    tiZANidine (ZANAFLEX) 4 MG tablet, Take 1 tablet (4 mg total) by mouth nightly as needed (muscle spasm/back)., Disp: 30 tablet, Rfl: 0    Patient Active Problem List   Diagnosis    Fibromyalgia    Peripheral neuropathy    Muscle spasm    DJD (degenerative joint disease) of knee    Insomnia    Hypertension    High cholesterol    TMJ disease    History of depression    Senile cataracts of both eyes    Total body pain    Diabetes mellitus without complication    Anxiety    Keratoconjunctivitis sicca, not specified as Sjogren's, bilateral    Nuclear sclerotic cataract of both eyes    Hypertensive retinopathy of both eyes    Type 2 diabetes mellitus with diabetic cataract, without long-term current use of insulin    Normocytic hypochromic anemia    Status post total right knee replacement 9/16/2020    Right knee pain    Impaired functional mobility, balance, gait, and endurance    Decreased strength of lower extremity       Past Medical History:   Diagnosis Date    Anemia 6/2012    Anxiety     Cataract     Colon polyps 1/31/2017    Depression     Diabetes mellitus without complication 11/14/2018    Diabetes mellitus, type 2     Fibromyalgia     GERD (gastroesophageal reflux disease)     High cholesterol     Hypertension        Past Surgical History:   Procedure Laterality Date    ANORECTAL MANOMETRY N/A 10/25/2023    Procedure: MANOMETRY, ANORECTAL;  Surgeon: Christoph Roche MD;  Location: [facility] ([address] [place] [address]);  Service:  Endoscopy;  Laterality: N/A;  Peg prep extended  prep instructions given to pt in clininc and portal -  time frame 5-12 weeks  10/18-precall complete-MS    BREAST BIOPSY      BREAST BIOPSY Left     benign    COLONOSCOPY N/A 10/18/2016    Procedure: COLONOSCOPY;  Surgeon: Brittni Edmondson MD;  Location: [facility] (4TH FLR);  Service: Endoscopy;  Laterality: N/A;    COLONOSCOPY N/A 10/08/2019    Procedure: COLONOSCOPY;  Surgeon: Gold Ravi MD;  Location: [facility] ENDO (4TH FLR);  Service: Endoscopy;  Laterality: N/A;    COLONOSCOPY N/A 10/25/2023    Procedure: COLONOSCOPY;  Surgeon: Armando Cardenas MD;  Location: [facility] ENDO (4TH FLR);  Service: Endoscopy;  Laterality: N/A;    ESOPHAGOGASTRODUODENOSCOPY N/A 10/08/2019    Procedure: EGD (ESOPHAGOGASTRODUODENOSCOPY);  Surgeon: Gold Ravi MD;  Location: [facility] (4TH FLR);  Service: Endoscopy;  Laterality: N/A;  Okay for any provider due to KARSTEN    HYSTERECTOMY      KNEE ARTHROPLASTY Right 2020    Procedure: ARTHROPLASTY, KNEE: DEPUY-ATTUNE;  Surgeon: Tha Wright III, MD;  Location: [facility];  Service: Orthopedics;  Laterality: Right;       Social History     Socioeconomic History    Marital status: Single    Number of children: 5   Tobacco Use    Smoking status: Former     Current packs/day: 0.00     Types: Cigarettes     Quit date: 1975     Years since quittin.8    Smokeless tobacco: Never    Tobacco comments:     occasional social smoker   Substance and Sexual Activity    Alcohol use: No    Drug use: No    Sexual activity: Not Currently   Social History Narrative    Retired      Social Determinants of Health     Financial Resource Strain: Low Risk  (2020)    Overall Financial Resource Strain (CARDIA)     Difficulty of Paying Living Expenses: Not hard at all   Food Insecurity: No Food Insecurity (2020)    Hunger Vital Sign     Worried About Running Out of Food in the Last Year: Never true     Ran Out of Food in the Last Year: Never true    Transportation Needs: No Transportation Needs (8/19/2020)    PRAPARE - Transportation     Lack of Transportation (Medical): No     Lack of Transportation (Non-Medical): No   Physical Activity: Insufficiently Active (8/19/2020)    Exercise Vital Sign     Days of Exercise per Week: 4 days     Minutes of Exercise per Session: 10 min   Stress: No Stress Concern Present (8/19/2020)    [employer] - Occupational Stress Questionnaire     Feeling of Stress : Only a little   Social Connections: Socially Isolated (8/19/2020)    Social Connection and Isolation Panel [NHANES]     Frequency of Communication with Friends and Family: More than three times a week     Frequency of Social Gatherings with Friends and Family: Once a week     Attends Mosque Services: Never     Active Member of Clubs or Organizations: No     Attends Club or Organization Meetings: Never     Marital Status:    Housing Stability: Low Risk  (8/19/2020)    Housing Stability Vital Sign     Unable to Pay for Housing in the Last Year: No     Number of Places Lived in the Last Year: 1     Unstable Housing in the Last Year: No       Family History   Problem Relation Age of Onset    Stroke Mother     Heart disease Father     No Known Problems Daughter     No Known Problems Son     No Known Problems Daughter     No Known Problems Daughter     No Known Problems Son     Cataracts Neg Hx     Cancer Neg Hx     Diabetes Neg Hx     Glaucoma Neg Hx     Retinal detachment Neg Hx     Celiac disease Neg Hx     Cirrhosis Neg Hx     Colon cancer Neg Hx     Cystic fibrosis Neg Hx     Esophageal cancer Neg Hx     Inflammatory bowel disease Neg Hx     Liver disease Neg Hx     Stomach cancer Neg Hx     Amblyopia Neg Hx     Blindness Neg Hx     Macular degeneration Neg Hx     Strabismus Neg Hx     Thyroid disease Neg Hx        Objective     Vitals:    11/06/23 1100   BP: 132/60   Pulse: 68   SpO2: 99%   Weight: 53.9 kg (118 lb 13.3 oz)    Height: 5' (1.524 m)   PainSc:   4     Body mass index is 23.21 kg/m².    Physical Exam  Vitals and nursing note reviewed.   Constitutional:       Appearance: Normal appearance. She is normal weight.   HENT:      Head: Normocephalic.   Cardiovascular:      Rate and Rhythm: Normal rate.   Pulmonary:      Effort: Pulmonary effort is normal.   Musculoskeletal:         General: Tenderness present. No swelling, deformity or signs of injury. Normal range of motion.      Cervical back: Normal range of motion.      Right ankle: No swelling or deformity. Tenderness present over the lateral malleolus. Normal range of motion.      Right Achilles Tendon: Tenderness present.   Neurological:      Mental Status: She is alert.            Assessment and Plan     1. Ankle pain, unspecified chronicity, unspecified laterality  Continue Ankle wrap, rest, ice, elevation    Increase Tylenol 650 mg to 3 times a day    Refilled Tizanidine    Self care instructions provided in AVS    2. BMI 23.0-23.9, adult  BMI reviewed    3. Chronic bilateral low back pain without sciatica  -     tiZANidine (ZANAFLEX) 4 MG tablet; Take 1 tablet (4 mg total) by mouth nightly as needed (muscle spasm/back).  Dispense: 30 tablet; Refill: 0      Follow up if symptoms worsen or fail to improve.

## 2023-11-06 NOTE — PATIENT INSTRUCTIONS
Continue Ankle wrap, rest, ice, elevation    Increase Tylenol 650 mg to 3 times a day    Refilled Tizanidine

## 2023-11-07 RX ORDER — DICLOFENAC SODIUM 10 MG/G
GEL TOPICAL 2 TIMES DAILY
Qty: 200 G | Refills: 4 | Status: SHIPPED | OUTPATIENT
Start: 2023-11-07

## 2023-11-07 NOTE — TELEPHONE ENCOUNTER
Spoke to Krysta to schedule procedure(s) Upper Endoscopy (EGD)       Physician to perform procedure(s) Dr. HOSSEIN Marin  Date of Procedure (s) 3/5/24  Arrival Time 1:30 PM  Time of Procedure(s) 2:30 PM   Location of Procedure(s) [address]  Type of Rx Prep sent to patient: Other  Instructions provided to patient via [facility]    Patient was informed on the following information and verbalized understanding. Screening questionnaire reviewed with patient and complete. If procedure requires anesthesia, a responsible adult needs to be present to accompany the patient home, patient cannot drive after receiving anesthesia. Appointment details are tentative, especially check-in time. Patient will receive a prep-op call 4 days prior to confirm check-in time for procedure. If applicable the patient should contact their pharmacy to verify Rx for procedure prep is ready for pick-up. Patient was advised to call the scheduling department at [phone] if pharmacy states no Rx is available. Patient was advised to call the endoscopy scheduling department if any questions or concerns arise.      [facility] Scheduling Department

## 2023-11-07 NOTE — TELEPHONE ENCOUNTER
Care Due:                  Date            Visit Type   Department     Provider  --------------------------------------------------------------------------------                                EP -                              PRIMARY      NOM INTERNAL  Last Visit: 09-      CARE ([facility])   CAREN Palafox                              EP -                              PRIMARY      NOM INTERNAL  Next Visit: 03-      CARE ([facility])   [facility]       Gold Palafox                                                            Last  Test          Frequency    Reason                     Performed    Due Date  --------------------------------------------------------------------------------    Lipid Panel.  12 months..  atorvastatin.............  01- 01-    [facility] Embedded Care Due Messages. Reference number: [id number].   11/07/2023 12:20:40 PM CST  
When To Wash Off: 6 hours
Keratolytic Agent (Required): podophyllin
Consent: The patient's consent was obtained including but not limited to risks of crusting, scabbing, scarring, blistering, darker or lighter pigmentary change, recurrence, incomplete removal and infection.
Post-Care Instructions: I reviewed with the patient in detail post-care instructions. The patient understands that the treated areas should be washed off 6 to 8 hours after application.
Detail Level: Detailed
Add 52 Modifier (Optional): no

## 2023-11-07 NOTE — TELEPHONE ENCOUNTER
"----- Message from Zuleyma Church sent at 2023 10:33 AM CST -----    ----- Message -----  From: Jonathon Marin MD  Sent: 2023   2:54 PM CST  To: [facility]    Procedure: EGD    Diagnosis: Iron deficiency anemia    Procedure Timin-6 weeks    #If within 4 weeks selected, please bhavana as high priority#    #If greater than 12 weeks, please select "5-12 weeks" and delay sending until 3 months prior to requested date#     Provider: Any GI provider    Location: No Preference    Additional Scheduling Information: No scheduling concerns    Prep Specifications:Standard prep    Is the patient taking a GLP-1 Agonist:no    Have you attached a patient to this message: yes       "

## 2023-11-13 ENCOUNTER — PATIENT MESSAGE (OUTPATIENT)
Dept: GASTROENTEROLOGY | Facility: CLINIC | Age: 78
End: 2023-11-13
Payer: MEDICARE

## 2023-11-28 RX ORDER — CITALOPRAM 10 MG/1
10 TABLET ORAL
Qty: 90 TABLET | Refills: 3 | OUTPATIENT
Start: 2023-11-28

## 2023-11-28 RX ORDER — CHLORTHALIDONE 25 MG/1
25 TABLET ORAL
Qty: 90 TABLET | Refills: 3 | Status: SHIPPED | OUTPATIENT
Start: 2023-11-28 | End: 2023-12-12 | Stop reason: SDUPTHER

## 2023-11-28 RX ORDER — ATORVASTATIN CALCIUM 40 MG/1
40 TABLET, FILM COATED ORAL
Qty: 90 TABLET | Refills: 0 | Status: SHIPPED | OUTPATIENT
Start: 2023-11-28 | End: 2024-01-30

## 2023-11-28 NOTE — TELEPHONE ENCOUNTER
No care due was identified.  [facility] Embedded Care Due Messages. Reference number: [id number].   11/27/2023 9:06:00 PM CST

## 2023-12-13 RX ORDER — CHLORTHALIDONE 25 MG/1
25 TABLET ORAL DAILY
Qty: 90 TABLET | Refills: 3 | Status: SHIPPED | OUTPATIENT
Start: 2023-12-13

## 2023-12-13 NOTE — TELEPHONE ENCOUNTER
No care due was identified.  [facility] Embedded Care Due Messages. Reference number: [id number].   12/12/2023 8:27:27 PM CST

## 2024-01-03 ENCOUNTER — TELEPHONE (OUTPATIENT)
Dept: OPHTHALMOLOGY | Facility: CLINIC | Age: 79
End: 2024-01-03
Payer: MEDICARE

## 2024-01-04 ENCOUNTER — OFFICE VISIT (OUTPATIENT)
Dept: OPTOMETRY | Facility: CLINIC | Age: 79
End: 2024-01-04
Payer: MEDICARE

## 2024-01-04 ENCOUNTER — TELEPHONE (OUTPATIENT)
Dept: INTERNAL MEDICINE | Facility: CLINIC | Age: 79
End: 2024-01-04
Payer: MEDICARE

## 2024-01-04 DIAGNOSIS — E11.9 DIABETES MELLITUS WITHOUT COMPLICATION: ICD-10-CM

## 2024-01-04 DIAGNOSIS — H52.203 HYPEROPIA WITH ASTIGMATISM AND PRESBYOPIA, BILATERAL: ICD-10-CM

## 2024-01-04 DIAGNOSIS — E11.36 TYPE 2 DIABETES MELLITUS WITH DIABETIC CATARACT, WITHOUT LONG-TERM CURRENT USE OF INSULIN: ICD-10-CM

## 2024-01-04 DIAGNOSIS — H16.223 KERATOCONJUNCTIVITIS SICCA, NOT SPECIFIED AS SJOGREN'S, BILATERAL: ICD-10-CM

## 2024-01-04 DIAGNOSIS — H25.813 COMBINED FORMS OF AGE-RELATED CATARACT OF BOTH EYES: ICD-10-CM

## 2024-01-04 DIAGNOSIS — J32.9 SINUSITIS, UNSPECIFIED CHRONICITY, UNSPECIFIED LOCATION: Primary | ICD-10-CM

## 2024-01-04 DIAGNOSIS — H52.03 HYPEROPIA WITH ASTIGMATISM AND PRESBYOPIA, BILATERAL: ICD-10-CM

## 2024-01-04 DIAGNOSIS — J32.1 SINUSITIS CHRONIC, FRONTAL: ICD-10-CM

## 2024-01-04 DIAGNOSIS — H52.4 HYPEROPIA WITH ASTIGMATISM AND PRESBYOPIA, BILATERAL: ICD-10-CM

## 2024-01-04 DIAGNOSIS — E11.9 TYPE 2 DIABETES MELLITUS WITHOUT RETINOPATHY: Primary | ICD-10-CM

## 2024-01-04 PROCEDURE — 99214 OFFICE O/P EST MOD 30 MIN: CPT | Mod: S$GLB,,, | Performed by: OPTOMETRIST

## 2024-01-04 PROCEDURE — 99999 PR PBB SHADOW E&M-EST. PATIENT-LVL III: CPT | Mod: PBBFAC,,, | Performed by: OPTOMETRIST

## 2024-01-04 PROCEDURE — 92015 DETERMINE REFRACTIVE STATE: CPT | Mod: S$GLB,,, | Performed by: OPTOMETRIST

## 2024-01-04 RX ORDER — PREDNISOLONE ACETATE 10 MG/ML
1 SUSPENSION/ DROPS OPHTHALMIC 4 TIMES DAILY
Qty: 5 ML | Refills: 2 | Status: SHIPPED | OUTPATIENT
Start: 2024-01-04 | End: 2025-01-03

## 2024-01-04 RX ORDER — PREDNISOLONE ACETATE 10 MG/ML
1 SUSPENSION/ DROPS OPHTHALMIC 4 TIMES DAILY
Qty: 5 ML | Refills: 2 | Status: SHIPPED | OUTPATIENT
Start: 2024-01-04 | End: 2024-01-04

## 2024-01-04 NOTE — TELEPHONE ENCOUNTER
----- Message from Marian Staples sent at 1/4/2024  1:14 PM CST -----  Good Afternoon,     This patient has a history of Sinusitis and would like a referral to be seen by a ENT doctor for a sinus evaluation.    Thank you!

## 2024-01-04 NOTE — PROGRESS NOTES
HPI    Patient is here today for a diabetic eye exam. Last seen on 09/21/2022.   Patient states VA has been blurry at distance and near. A little   difficulty reading the smaller prints. States blurred VA is more   noticeable in OD than OS. Patient states there is intermittent pain near   her right eye/eyebrow. It is tender to touch. Rates the pain 8/10. No eye   pain today. States there are occasional flashes and floaters in her   vision. Eyes are sensitive to light and it is difficult to drive at night   due to glare.    Eye Meds: Restasis -- typically BID OU  Past Ocular Sx: None    Hemoglobin A1C       Date                     Value               Ref Range             Status                07/18/2023               5.7 (H)             4.0 - 5.6 %           Final              Comment:    ADA Screening Guidelines:  5.7-6.4%  Consistent with   prediabetes  >or=6.5%  Consistent with diabetes    High levels of fetal   hemoglobin interfere with the HbA1C  assay. Heterozygous hemoglobin   variants (HbS, HgC, etc)do  not significantly interfere with this assay.     However, presence of multiple variants may affect accuracy.         01/13/2023               5.7 (H)             4.0 - 5.6 %           Final              Comment:    ADA Screening Guidelines:  5.7-6.4%  Consistent with   prediabetes  >or=6.5%  Consistent with diabetes    High levels of fetal   hemoglobin interfere with the HbA1C  assay. Heterozygous hemoglobin   variants (HbS, HgC, etc)do  not significantly interfere with this assay.     However, presence of multiple variants may affect accuracy.         07/08/2022               5.8 (H)             4.0 - 5.6 %           Final              Comment:    ADA Screening Guidelines:  5.7-6.4%  Consistent with   prediabetes  >or=6.5%  Consistent with diabetes    High levels of fetal   hemoglobin interfere with the HbA1C  assay. Heterozygous hemoglobin   variants (HbS, HgC, etc)do  not significantly interfere with this  assay.     However, presence of multiple variants may affect accuracy.    ----------   Last edited by Marian Staples on 1/4/2024  9:49 AM.            Assessment /Plan     For exam results, see Encounter Report.    Type 2 diabetes mellitus without retinopathy    Hyperopia with astigmatism and presbyopia, bilateral    Type 2 diabetes mellitus with diabetic cataract, without long-term current use of insulin    Sinusitis chronic, frontal    Diabetes mellitus without complication    Keratoconjunctivitis sicca, not specified as Sjogren's, bilateral    Combined forms of age-related cataract of both eyes    Other orders  -     prednisoLONE acetate (PRED FORTE) 1 % DrpS; Place 1 drop into both eyes 4 (four) times daily.  Dispense: 5 mL; Refill: 2      MONITOR. ED PT ON ALL EXAM FINDINGS  RX FINAL SPECS   TYPE 2 DM W/O RETINOPATHY OU; CONTINUE WITH PCP FOR GLYCEMIC CONTROL  H/O FRONTAL SINUSITIS RIGHT SIDE; REFER TO ENT FOR CONSULT   DISCUSSED DRY EYE THERAPY; RX PF 1% QID OU X 1-2 WEEKS; CONTINUE WITH AT'S PRNN  MODERATE COMBO CATS OU; NOT VISUALLY SIGNIFICANT OU AT THIS TIME; MONITOR.  RTC 1 YR//PRN FOR REE/DFE

## 2024-01-08 ENCOUNTER — LAB VISIT (OUTPATIENT)
Dept: LAB | Facility: HOSPITAL | Age: 79
End: 2024-01-08
Attending: INTERNAL MEDICINE
Payer: MEDICARE

## 2024-01-08 DIAGNOSIS — D50.9 IRON DEFICIENCY ANEMIA, UNSPECIFIED IRON DEFICIENCY ANEMIA TYPE: ICD-10-CM

## 2024-01-08 LAB
FERRITIN SERPL-MCNC: 49 NG/ML (ref 20–300)
HGB BLD-MCNC: 12.6 G/DL (ref 12–16)
IRON SERPL-MCNC: 35 UG/DL (ref 30–160)
SATURATED IRON: 9 % (ref 20–50)
TOTAL IRON BINDING CAPACITY: 392 UG/DL (ref 250–450)
TRANSFERRIN SERPL-MCNC: 265 MG/DL (ref 200–375)

## 2024-01-08 PROCEDURE — 36415 COLL VENOUS BLD VENIPUNCTURE: CPT | Mod: PO | Performed by: INTERNAL MEDICINE

## 2024-01-08 PROCEDURE — 83540 ASSAY OF IRON: CPT | Performed by: INTERNAL MEDICINE

## 2024-01-08 PROCEDURE — 85018 HEMOGLOBIN: CPT | Performed by: INTERNAL MEDICINE

## 2024-01-08 PROCEDURE — 82728 ASSAY OF FERRITIN: CPT | Performed by: INTERNAL MEDICINE

## 2024-01-09 NOTE — TELEPHONE ENCOUNTER
I called pt to speak with her, and she informed me that ENT clinic had already called her, and she has an appointment for the 17th

## 2024-01-10 ENCOUNTER — OFFICE VISIT (OUTPATIENT)
Dept: URGENT CARE | Facility: CLINIC | Age: 79
End: 2024-01-10
Payer: MEDICARE

## 2024-01-10 VITALS
HEIGHT: 60 IN | SYSTOLIC BLOOD PRESSURE: 155 MMHG | OXYGEN SATURATION: 95 % | DIASTOLIC BLOOD PRESSURE: 76 MMHG | BODY MASS INDEX: 21.6 KG/M2 | RESPIRATION RATE: 14 BRPM | WEIGHT: 110 LBS | TEMPERATURE: 99 F | HEART RATE: 89 BPM

## 2024-01-10 DIAGNOSIS — B34.9 VIRAL SYNDROME: Primary | ICD-10-CM

## 2024-01-10 DIAGNOSIS — R05.9 COUGH, UNSPECIFIED TYPE: ICD-10-CM

## 2024-01-10 LAB
CTP QC/QA: YES
SARS-COV-2 AG RESP QL IA.RAPID: NEGATIVE

## 2024-01-10 PROCEDURE — 87811 SARS-COV-2 COVID19 W/OPTIC: CPT | Mod: QW,S$GLB,,

## 2024-01-10 PROCEDURE — 99213 OFFICE O/P EST LOW 20 MIN: CPT | Mod: S$GLB,,,

## 2024-01-10 RX ORDER — BENZONATATE 100 MG/1
100 CAPSULE ORAL 3 TIMES DAILY PRN
Qty: 30 CAPSULE | Refills: 0 | Status: SHIPPED | OUTPATIENT
Start: 2024-01-10 | End: 2024-01-20

## 2024-01-10 NOTE — PATIENT INSTRUCTIONS
Negative for COVID. Symptoms are likely viral.     When sick with a viral illness, cover your mouth and nose when sneezing and coughing. Wash hands frequently. Sanitize areas at home. Wear a mask in public if you need. Stay home if you are having fevers, chills, body aches, fatigue. Symptoms should resolve in 7-14 days. There is no specific treatment for viral illnesses. We treat symptoms with supportive care. Getting plenty of rest but completing light activities daily, eating a well-balanced diet, drinking plenty of fluids, managing stress levels can aid in faster recovery.      Try tessalon perles as directed during the day for your cough. It will help numb the back of your throat so you do not have the urge to cough.      Try a decongestant and corticosteroid nasal spray like flonase for the next few days for sinus relief. Initial: 2 sprays in each nostril once daily for 1 week. Reduce to 1 spray in each nostril once per day. Stop taking if you develop a nose bleed. Nasal saline spray can be used together with flonase to help moisten nostrils.     Regular (Guaifenesin) Mucinex 1200 mg twice per day for 10 days can help thin secretions for better clearance. Drink plenty of fluids with this.     Honey is a natural cough suppressant.     If you do have Hypertension or palpitations, it is safe to take Coricidin HBP (multi-symptom flu) for relief of sinus symptoms.  Try DASH diet to help lower BP and buy a blood pressure cuff for home monitoring. Check blood pressure at least 2 times per day and create a log. Avoid eating foods that are high in salt. Eat more foods with potassium, magnesium and calcium which will help dilate your vessels and decrease your BP.     Warm tea/warm liquids will help soothe the back of your throat. Warm water salt gurgles can also be helpful. A dry throat will cause pain. Make sure to stay hydrated. Water and pedilyte are the best to drink. Neti pot irrigation, humidifier in your room,  avoiding fans, warm compresses to face, eating/drinking hot soups, hot shower before bedtime can help.     The recommended daily fluid intake for women is 2.7 liters (five 16 oz bottles).      Alternate Tylenol and ibuprofen every 4 hours as needed for fever and body aches.  Please take NSAIDs with a full glass of water and food to avoid GI upset.      Please only use over the counter cough and cold medications for 3-5 days at a time to avoid rebound symptoms.     Getting plenty of rest can aid in a faster recovery of illnesses.     Please follow-up with your primary care provider or return to the clinic if not better/worsening symptoms in 1 week.     Report to the ER if you have chest pain, shortness of breath, palpitations.

## 2024-01-10 NOTE — PROGRESS NOTES
Subjective:      Patient ID: Krysta Kam is a 78 y.o. female.    Vitals:  height is 5' (1.524 m) and weight is 49.9 kg (110 lb 0.2 oz). Her oral temperature is 99.2 °F (37.3 °C). Her blood pressure is 155/76 (abnormal) and her pulse is 89. Her respiration is 14 and oxygen saturation is 95%.     Chief Complaint: Cough    Pt reports that she has been having a dry cough, headaches, sneezing, and sinus pressure since Sunday (4 days ago). Pt reports that she has been taking dayquil for symptoms    Cough  This is a new problem. The current episode started in the past 7 days (sunday). The cough is Non-productive. Associated symptoms include ear congestion and headaches. Pertinent negatives include no chest pain, chills, ear pain, fever, heartburn, hemoptysis, myalgias, nasal congestion, postnasal drip, rash, rhinorrhea, sore throat, shortness of breath, sweats, weight loss or wheezing. She has tried OTC cough suppressant (dayquil) for the symptoms. The treatment provided no relief. There is no history of asthma, bronchitis or COPD.       Constitution: Positive for fatigue. Negative for chills and fever.   HENT:  Positive for sinus pressure. Negative for ear pain, postnasal drip and sore throat.    Cardiovascular:  Negative for chest pain.   Respiratory:  Positive for cough. Negative for bloody sputum, shortness of breath and wheezing.    Gastrointestinal:  Negative for abdominal pain, nausea, vomiting, diarrhea and heartburn.   Musculoskeletal:  Negative for muscle ache.   Skin:  Negative for rash.   Allergic/Immunologic: Positive for sneezing.   Neurological:  Positive for headaches.      Objective:     Physical Exam   Constitutional: She is oriented to person, place, and time. She appears well-developed. She is cooperative.  Non-toxic appearance. She does not appear ill. No distress.   HENT:   Head: Normocephalic and atraumatic.   Ears:   Right Ear: Hearing, tympanic membrane, external ear and ear canal  normal.   Left Ear: Hearing, tympanic membrane, external ear and ear canal normal.   Nose: No mucosal edema, rhinorrhea, nasal deformity or congestion. No epistaxis. Right sinus exhibits frontal sinus tenderness. Right sinus exhibits no maxillary sinus tenderness. Left sinus exhibits frontal sinus tenderness. Left sinus exhibits no maxillary sinus tenderness.   Mouth/Throat: Uvula is midline, oropharynx is clear and moist and mucous membranes are normal. No trismus in the jaw. Normal dentition. No uvula swelling. No oropharyngeal exudate, posterior oropharyngeal edema or posterior oropharyngeal erythema.   Eyes: Conjunctivae and lids are normal. Pupils are equal, round, and reactive to light. No scleral icterus.   Neck: Trachea normal and phonation normal. Neck supple. No edema present. No erythema present. No neck rigidity present.   Cardiovascular: Normal rate, regular rhythm, normal heart sounds and normal pulses.   Pulmonary/Chest: Effort normal and breath sounds normal. No stridor. No respiratory distress. She has no decreased breath sounds. She has no wheezes. She has no rhonchi. She has no rales.   Abdominal: Normal appearance.   Musculoskeletal: Normal range of motion.         General: No deformity. Normal range of motion.   Lymphadenopathy:     She has no cervical adenopathy.   Neurological: She is alert and oriented to person, place, and time. She exhibits normal muscle tone. Coordination normal.   Skin: Skin is warm, dry, intact, not diaphoretic and not pale.   Psychiatric: Her speech is normal and behavior is normal. Judgment and thought content normal.   Nursing note and vitals reviewed.      Assessment:     1. Viral syndrome    2. Cough, unspecified type        Plan:     Results for orders placed or performed in visit on 01/10/24   SARS Coronavirus 2 Antigen, POCT Manual Read   Result Value Ref Range    SARS Coronavirus 2 Antigen Negative Negative     Acceptable Yes        Viral  syndrome    Cough, unspecified type  -     SARS Coronavirus 2 Antigen, POCT Manual Read  -     benzonatate (TESSALON) 100 MG capsule; Take 1 capsule (100 mg total) by mouth 3 (three) times daily as needed for Cough.  Dispense: 30 capsule; Refill: 0            Discussed results/diagnosis/plan with patient in clinic. Strict precautions given to patient to monitor for worsening signs and symptoms. Advised to follow up with PCP or specialist.  Explained side effects of medications prescribed with patient and informed him/her to discontinue use if he/she has any side effects and to inform UC or PCP if this occurs. All questions answered. Strict ED verses clinic return precautions stressed and given in depth. Advised if symptoms worsens of fail to improve he/she should go to the Emergency Room. Discharge and follow-up instructions given verbally/printed with the patient who expressed understanding and willingness to comply with my recommendations. Patient voiced understanding and in agreement with current treatment plan. Patient exits the exam room in no acute distress. Conversant and engaged during discharge discussion, verbalized understanding.

## 2024-01-21 DIAGNOSIS — D50.9 IRON DEFICIENCY ANEMIA, UNSPECIFIED IRON DEFICIENCY ANEMIA TYPE: ICD-10-CM

## 2024-01-21 RX ORDER — GLUC/MSM/COLGN2/HYAL/ANTIARTH3 375-375-20
1 TABLET ORAL DAILY
Qty: 90 TABLET | Refills: 1 | Status: SHIPPED | OUTPATIENT
Start: 2024-01-21 | End: 2024-01-21 | Stop reason: SDUPTHER

## 2024-01-21 RX ORDER — GLUC/MSM/COLGN2/HYAL/ANTIARTH3 375-375-20
1 TABLET ORAL DAILY
Qty: 90 TABLET | Refills: 1 | Status: SHIPPED | OUTPATIENT
Start: 2024-01-21 | End: 2024-07-19

## 2024-01-22 ENCOUNTER — OFFICE VISIT (OUTPATIENT)
Dept: OTOLARYNGOLOGY | Facility: CLINIC | Age: 79
End: 2024-01-22
Payer: MEDICARE

## 2024-01-22 VITALS
RESPIRATION RATE: 18 BRPM | DIASTOLIC BLOOD PRESSURE: 66 MMHG | SYSTOLIC BLOOD PRESSURE: 173 MMHG | HEART RATE: 80 BPM | BODY MASS INDEX: 23.68 KG/M2 | WEIGHT: 121.25 LBS

## 2024-01-22 DIAGNOSIS — H61.23 BILATERAL IMPACTED CERUMEN: ICD-10-CM

## 2024-01-22 DIAGNOSIS — H93.13 TINNITUS AURIUM, BILATERAL: Primary | ICD-10-CM

## 2024-01-22 PROCEDURE — 69210 REMOVE IMPACTED EAR WAX UNI: CPT | Mod: S$GLB,,, | Performed by: PHYSICIAN ASSISTANT

## 2024-01-22 PROCEDURE — 99999 PR PBB SHADOW E&M-EST. PATIENT-LVL V: CPT | Mod: PBBFAC,,, | Performed by: PHYSICIAN ASSISTANT

## 2024-01-22 PROCEDURE — 99203 OFFICE O/P NEW LOW 30 MIN: CPT | Mod: 25,S$GLB,, | Performed by: PHYSICIAN ASSISTANT

## 2024-01-22 NOTE — PROCEDURES
Ear Cerumen Removal    Date/Time: 1/22/2024 11:00 AM    Performed by: Nain Valdes PA-C  Authorized by: Nain Valdes PA-C      Local anesthetic:  None  Location details:  Right ear  Procedure type: curette    Cerumen  Removal Results:  Cerumen completely removed  Patient tolerance:  Patient tolerated the procedure well with no immediate complications

## 2024-01-22 NOTE — PROGRESS NOTES
"Subjective:     HPI: Krysta Kam is a 78 y.o. female who was referred to me by Dr. Gold Palafox Jr. in consultation for sinusitis.    Patient reports developing episodic L>R tinnitus ("motor running") with associated transient balance problem.  Patient denies any room spinning sensation, hearing loss, ear pressure, ear drainage.  Patient states symptoms started within the last year and off-balance sensation not always present.  Patient reports tinnitus is worse when it is quiet.  Patient denies any history of ear trouble or ear surgery.    Past Medical/Past Surgical History  Past Medical History:   Diagnosis Date    Anemia 6/2012    Anxiety     Cataract     Colon polyps 1/31/2017    Depression     Diabetes mellitus without complication 11/14/2018    Diabetes mellitus, type 2     Fibromyalgia     GERD (gastroesophageal reflux disease)     High cholesterol     Hypertension      She has a past surgical history that includes Hysterectomy; Colonoscopy (N/A, 10/18/2016); Esophagogastroduodenoscopy (N/A, 10/08/2019); Colonoscopy (N/A, 10/08/2019); Knee Arthroplasty (Right, 09/16/2020); Breast biopsy (1989); Breast biopsy (Left); Anorectal manometry (N/A, 10/25/2023); and Colonoscopy (N/A, 10/25/2023).    Family History/Social History  Her family history includes Heart disease in her father; No Known Problems in her daughter, daughter, daughter, son, and son; Stroke in her mother.  She reports that she quit smoking about 49 years ago. Her smoking use included cigarettes. She has never used smokeless tobacco. She reports that she does not drink alcohol and does not use drugs.    Allergies/Immunizations  She is allergic to ondansetron and savella [milnacipran].  Immunization History   Administered Date(s) Administered    COVID-19, MRNA, LN-S, PF (Pfizer) (Purple Cap) 04/05/2021, 05/03/2021    Influenza 11/10/2010    Influenza (FLUAD) - Quadrivalent - Adjuvanted - PF *Preferred* (65+) 01/05/2021, 01/14/2022, " 10/27/2022, 09/14/2023    Influenza - High Dose - PF (65 years and older) 12/06/2011, 10/21/2013, 12/01/2015, 09/14/2016, 10/05/2017, 11/14/2018, 10/29/2019    Influenza Split 11/10/2010        Medications   acetaminophen Tbsr  amLODIPine  ascorbic acid (vitamin C)  atorvastatin  blood pressure monitor Kit  CENTRUM SILVER WOMEN ORAL  chlorthalidone Tab  CINNAMON ORAL  citalopram  cycloSPORINE  diclofenac  diclofenac sodium Gel  DULoxetine  estradioL  ferrous gluconate Tab  FLAXSEED OIL ORAL  gabapentin  losartan  metoprolol succinate  omeprazole  polyethylene glycol Solr  prednisoLONE acetate Drps  tiZANidine  TURMERIC ORAL  vitamin D Tab     Review of Systems   HENT: Positive for ringing in the ears.  Negative for hearing loss.            Objective:     BP (!) 173/66 (BP Location: Left arm, Patient Position: Sitting, BP Method: Large (Automatic))   Pulse 80   Resp 18   Wt 55 kg (121 lb 4.1 oz)   BMI 23.68 kg/m²        Constitutional:   She appears well-developed and well-nourished. She is active. Normal speech.      Head:  Normocephalic and atraumatic.     Ears:    Right Ear: No drainage. Tympanic membrane is not injected and not retracted. No middle ear effusion. No hemotympanum. No decreased hearing is noted.   AS EAC cerumen impaction  AD EAC cerumen removed  AS lobule with evidence of prior piercings    Pulmonary/Chest:   Effort normal.     Psychiatric:   She has a normal mood and affect. Her speech is normal and behavior is normal.       Procedure    Cerumen removal performed.  See procedure note.    Data Reviewed  I personally reviewed the chart, including any outside records, and pertinent data below:    I reviewed the following notes: ENT, Internal medicine    WBC (K/uL)   Date Value   09/20/2023 6.85     Eosinophil % (%)   Date Value   09/20/2023 2.5     Eos # (K/uL)   Date Value   09/20/2023 0.2     Platelets (K/uL)   Date Value   09/20/2023 213     Glucose (mg/dL)   Date Value   09/20/2023 102     No  "results found for: "IGE"    I independently reviewed the images of the CT sinuses dated 8/29/2013. Pertinent sinus findings include small R max mucous retention cyst and overall patent sinuses.    Assessment & Plan:     1. Tinnitus aurium, bilateral  2. Bilateral impacted cerumen  -     Ambulatory referral/consult to Audiology; Future; Expected date: 01/29/2024  -     Ear Cerumen Removal  - Cerumen removed from AD EAC but not completely from AS EAC  - patient to follow up with HOSSEIN Luke at [facility] for cerumen removal and audiogram    She will Follow up if symptoms worsen or fail to improve.  I had a discussion with the patient regarding her condition and the further workup and management options.    All questions were answered, and the patient is in agreement with the above.     Disclaimer:  This note may have been prepared utilizing voice recognition software which may result in occasional typographical errors in the text such as sound alike words.   If further clarification is needed, please contact the ENT department of [facility].  "

## 2024-01-22 NOTE — PROGRESS NOTES
GI MA team - please tell patient that they are iron deficient but not anemic and recommend that they take ferrous gluconate one 324mg pill once daily for next 3 months.    GI MA team -  Please order repeat fasting Hemoglobin, Iron/TIBC, and Ferritin in 12 weeks - Orders placed.

## 2024-01-24 RX ORDER — GABAPENTIN 300 MG/1
300 CAPSULE ORAL 2 TIMES DAILY
Qty: 180 CAPSULE | Refills: 11 | Status: SHIPPED | OUTPATIENT
Start: 2024-01-24 | End: 2024-05-06

## 2024-01-24 NOTE — TELEPHONE ENCOUNTER
Care Due:                  Date            Visit Type   Department     Provider  --------------------------------------------------------------------------------                                EP -                              PRIMARY      [facility] INTERNAL  Last Visit: 09-      CARE ([facility])   CAREN Palafox                              EP -                              PRIMARY      [facility] INTERNAL  Next Visit: 03-      CARE ([facility])   [facility]       Gold Palafox                                                            Last  Test          Frequency    Reason                     Performed    Due Date  --------------------------------------------------------------------------------    Lipid Panel.  12 months..  atorvastatin.............  01- 01-    [facility] Embedded Care Due Messages. Reference number: [id number].   1/24/2024 3:22:13 AM CST

## 2024-01-30 RX ORDER — ATORVASTATIN CALCIUM 40 MG/1
40 TABLET, FILM COATED ORAL
Qty: 90 TABLET | Refills: 3 | Status: SHIPPED | OUTPATIENT
Start: 2024-01-30

## 2024-01-30 NOTE — TELEPHONE ENCOUNTER
No care due was identified.  [facility] Embedded Care Due Messages. Reference number: [id number].   1/29/2024 9:13:10 PM CST

## 2024-02-28 ENCOUNTER — PATIENT MESSAGE (OUTPATIENT)
Dept: ENDOSCOPY | Facility: HOSPITAL | Age: 79
End: 2024-02-28
Payer: MEDICARE

## 2024-03-04 ENCOUNTER — TELEPHONE (OUTPATIENT)
Dept: ENDOSCOPY | Facility: HOSPITAL | Age: 79
End: 2024-03-04
Payer: MEDICARE

## 2024-03-04 NOTE — TELEPHONE ENCOUNTER
Patient calling with questions about EGD scheduled on tomorrow 3/5/24. All questions answered. Patient verbalized understanding.

## 2024-03-04 NOTE — TELEPHONE ENCOUNTER
Spoke to pt to schedule procedure(s) Upper Endoscopy (EGD)       Physician to perform procedure(s) Dr. HOSSEIN Marin  Date of Procedure (s) 3/15/24  Arrival Time 11:45 AM  Time of Procedure(s) 12:45 AM   Location of Procedure(s) [address]  Type of Rx Prep sent to patient: N/A  Instructions provided to patient via [facility]    Patient was informed on the following information and verbalized understanding. Screening questionnaire reviewed with patient and complete. If procedure requires anesthesia, a responsible adult needs to be present to accompany the patient home, patient cannot drive after receiving anesthesia. Appointment details are tentative, especially check-in time. Patient will receive a prep-op call 7 days prior to confirm check-in time for procedure. If applicable the patient should contact their pharmacy to verify Rx for procedure prep is ready for pick-up. Patient was advised to call the scheduling department at [phone] if pharmacy states no Rx is available. Patient was advised to call the endoscopy scheduling department if any questions or concerns arise.      [facility] Scheduling Department

## 2024-03-12 ENCOUNTER — LAB VISIT (OUTPATIENT)
Dept: LAB | Facility: HOSPITAL | Age: 79
End: 2024-03-12
Attending: INTERNAL MEDICINE
Payer: MEDICARE

## 2024-03-12 DIAGNOSIS — E11.9 DIABETES MELLITUS WITHOUT COMPLICATION: ICD-10-CM

## 2024-03-12 LAB
ALBUMIN SERPL BCP-MCNC: 3.6 G/DL (ref 3.5–5.2)
ALP SERPL-CCNC: 56 U/L (ref 55–135)
ALT SERPL W/O P-5'-P-CCNC: 17 U/L (ref 10–44)
ANION GAP SERPL CALC-SCNC: 8 MMOL/L (ref 8–16)
AST SERPL-CCNC: 24 U/L (ref 10–40)
BILIRUB SERPL-MCNC: 0.8 MG/DL (ref 0.1–1)
BUN SERPL-MCNC: 15 MG/DL (ref 8–23)
CALCIUM SERPL-MCNC: 10.5 MG/DL (ref 8.7–10.5)
CHLORIDE SERPL-SCNC: 102 MMOL/L (ref 95–110)
CHOLEST SERPL-MCNC: 178 MG/DL (ref 120–199)
CHOLEST/HDLC SERPL: 2.9 {RATIO} (ref 2–5)
CO2 SERPL-SCNC: 28 MMOL/L (ref 23–29)
CREAT SERPL-MCNC: 1 MG/DL (ref 0.5–1.4)
EST. GFR  (NO RACE VARIABLE): 57.7 ML/MIN/1.73 M^2
ESTIMATED AVG GLUCOSE: 131 MG/DL (ref 68–131)
GLUCOSE SERPL-MCNC: 109 MG/DL (ref 70–110)
HBA1C MFR BLD: 6.2 % (ref 4–5.6)
HDLC SERPL-MCNC: 62 MG/DL (ref 40–75)
HDLC SERPL: 34.8 % (ref 20–50)
LDLC SERPL CALC-MCNC: 105.6 MG/DL (ref 63–159)
NONHDLC SERPL-MCNC: 116 MG/DL
POTASSIUM SERPL-SCNC: 4.4 MMOL/L (ref 3.5–5.1)
PROT SERPL-MCNC: 7.3 G/DL (ref 6–8.4)
SODIUM SERPL-SCNC: 138 MMOL/L (ref 136–145)
TRIGL SERPL-MCNC: 52 MG/DL (ref 30–150)

## 2024-03-12 PROCEDURE — 36415 COLL VENOUS BLD VENIPUNCTURE: CPT | Mod: PO | Performed by: INTERNAL MEDICINE

## 2024-03-12 PROCEDURE — 83036 HEMOGLOBIN GLYCOSYLATED A1C: CPT | Performed by: INTERNAL MEDICINE

## 2024-03-12 PROCEDURE — 80061 LIPID PANEL: CPT | Performed by: INTERNAL MEDICINE

## 2024-03-12 PROCEDURE — 80053 COMPREHEN METABOLIC PANEL: CPT | Performed by: INTERNAL MEDICINE

## 2024-03-14 ENCOUNTER — OFFICE VISIT (OUTPATIENT)
Dept: INTERNAL MEDICINE | Facility: CLINIC | Age: 79
End: 2024-03-14
Payer: MEDICARE

## 2024-03-14 VITALS
BODY MASS INDEX: 23.07 KG/M2 | HEIGHT: 60 IN | HEART RATE: 68 BPM | DIASTOLIC BLOOD PRESSURE: 72 MMHG | WEIGHT: 117.5 LBS | OXYGEN SATURATION: 100 % | SYSTOLIC BLOOD PRESSURE: 134 MMHG

## 2024-03-14 DIAGNOSIS — M26.609 TMJ DISEASE: ICD-10-CM

## 2024-03-14 DIAGNOSIS — I10 HYPERTENSION, UNSPECIFIED TYPE: ICD-10-CM

## 2024-03-14 DIAGNOSIS — E11.36 TYPE 2 DIABETES MELLITUS WITH DIABETIC CATARACT, WITHOUT LONG-TERM CURRENT USE OF INSULIN: Primary | ICD-10-CM

## 2024-03-14 DIAGNOSIS — Z86.59 HISTORY OF DEPRESSION: ICD-10-CM

## 2024-03-14 DIAGNOSIS — E11.9 DIABETES MELLITUS WITHOUT COMPLICATION: ICD-10-CM

## 2024-03-14 DIAGNOSIS — D64.9 ANEMIA, UNSPECIFIED TYPE: ICD-10-CM

## 2024-03-14 DIAGNOSIS — F41.9 ANXIETY: ICD-10-CM

## 2024-03-14 DIAGNOSIS — M79.7 FIBROMYALGIA: ICD-10-CM

## 2024-03-14 DIAGNOSIS — E78.00 HIGH CHOLESTEROL: ICD-10-CM

## 2024-03-14 PROCEDURE — 99999 PR PBB SHADOW E&M-EST. PATIENT-LVL V: CPT | Mod: PBBFAC,,, | Performed by: INTERNAL MEDICINE

## 2024-03-14 PROCEDURE — G2211 COMPLEX E/M VISIT ADD ON: HCPCS | Mod: S$GLB,,, | Performed by: INTERNAL MEDICINE

## 2024-03-14 PROCEDURE — 99214 OFFICE O/P EST MOD 30 MIN: CPT | Mod: S$GLB,,, | Performed by: INTERNAL MEDICINE
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
## 2024-03-15 ENCOUNTER — ANESTHESIA (OUTPATIENT)
Dept: ENDOSCOPY | Facility: HOSPITAL | Age: 79
End: 2024-03-15
Payer: MEDICARE

## 2024-03-15 ENCOUNTER — ANESTHESIA EVENT (OUTPATIENT)
Dept: ENDOSCOPY | Facility: HOSPITAL | Age: 79
End: 2024-03-15
Payer: MEDICARE

## 2024-03-15 ENCOUNTER — HOSPITAL ENCOUNTER (OUTPATIENT)
Facility: HOSPITAL | Age: 79
Discharge: HOME | End: 2024-03-15
Attending: INTERNAL MEDICINE | Admitting: INTERNAL MEDICINE
Payer: MEDICARE

## 2024-03-15 VITALS
BODY MASS INDEX: 22.78 KG/M2 | TEMPERATURE: 98 F | HEIGHT: 60 IN | RESPIRATION RATE: 16 BRPM | OXYGEN SATURATION: 97 % | HEART RATE: 68 BPM | DIASTOLIC BLOOD PRESSURE: 63 MMHG | WEIGHT: 116 LBS | SYSTOLIC BLOOD PRESSURE: 135 MMHG

## 2024-03-15 DIAGNOSIS — D50.9 FE DEFICIENCY ANEMIA: ICD-10-CM

## 2024-03-15 PROCEDURE — 88342 IMHCHEM/IMCYTCHM 1ST ANTB: CPT | Mod: 26,,, | Performed by: PATHOLOGY

## 2024-03-15 PROCEDURE — 37000008 HC ANESTHESIA 1ST 15 MINUTES: Performed by: INTERNAL MEDICINE

## 2024-03-15 PROCEDURE — 25000003 PHARM REV CODE 250: Performed by: NURSE ANESTHETIST, CERTIFIED REGISTERED

## 2024-03-15 PROCEDURE — 37000009 HC ANESTHESIA EA ADD 15 MINS: Performed by: INTERNAL MEDICINE

## 2024-03-15 PROCEDURE — 43239 EGD BIOPSY SINGLE/MULTIPLE: CPT | Mod: GC,,, | Performed by: INTERNAL MEDICINE

## 2024-03-15 PROCEDURE — 27201012 HC FORCEPS, HOT/COLD, DISP: Performed by: INTERNAL MEDICINE

## 2024-03-15 PROCEDURE — 63600175 PHARM REV CODE 636 W HCPCS: Performed by: NURSE ANESTHETIST, CERTIFIED REGISTERED

## 2024-03-15 PROCEDURE — 43239 EGD BIOPSY SINGLE/MULTIPLE: CPT | Performed by: INTERNAL MEDICINE

## 2024-03-15 PROCEDURE — 88305 TISSUE EXAM BY PATHOLOGIST: CPT | Mod: 59 | Performed by: PATHOLOGY

## 2024-03-15 PROCEDURE — E9220 PRA ENDO ANESTHESIA: HCPCS | Mod: ,,, | Performed by: NURSE ANESTHETIST, CERTIFIED REGISTERED

## 2024-03-15 PROCEDURE — 88342 IMHCHEM/IMCYTCHM 1ST ANTB: CPT | Performed by: PATHOLOGY

## 2024-03-15 PROCEDURE — 88305 TISSUE EXAM BY PATHOLOGIST: CPT | Mod: 26,,, | Performed by: PATHOLOGY

## 2024-03-15 RX ORDER — PROPOFOL 10 MG/ML
VIAL (ML) INTRAVENOUS
Status: DISCONTINUED | OUTPATIENT
Start: 2024-03-15 | End: 2024-03-15

## 2024-03-15 RX ORDER — LIDOCAINE HYDROCHLORIDE 20 MG/ML
INJECTION INTRAVENOUS
Status: DISCONTINUED | OUTPATIENT
Start: 2024-03-15 | End: 2024-03-15

## 2024-03-15 RX ORDER — SODIUM CHLORIDE 9 MG/ML
INJECTION, SOLUTION INTRAVENOUS CONTINUOUS
Status: DISCONTINUED | OUTPATIENT
Start: 2024-03-15 | End: 2024-03-15 | Stop reason: HOSPADM

## 2024-03-15 RX ORDER — PROPOFOL 10 MG/ML
VIAL (ML) INTRAVENOUS CONTINUOUS PRN
Status: DISCONTINUED | OUTPATIENT
Start: 2024-03-15 | End: 2024-03-15

## 2024-03-15 RX ORDER — ESMOLOL HYDROCHLORIDE 10 MG/ML
INJECTION INTRAVENOUS
Status: DISCONTINUED | OUTPATIENT
Start: 2024-03-15 | End: 2024-03-15

## 2024-03-15 RX ADMIN — SODIUM CHLORIDE: 9 INJECTION, SOLUTION INTRAVENOUS at 12:03

## 2024-03-15 RX ADMIN — ESMOLOL HYDROCHLORIDE 20 MG: 100 INJECTION, SOLUTION INTRAVENOUS at 12:03

## 2024-03-15 RX ADMIN — PROPOFOL 40 MG: 10 INJECTION, EMULSION INTRAVENOUS at 12:03

## 2024-03-15 RX ADMIN — LIDOCAINE HYDROCHLORIDE 50 MG: 20 INJECTION INTRAVENOUS at 12:03

## 2024-03-15 RX ADMIN — PROPOFOL 150 MCG/KG/MIN: 10 INJECTION, EMULSION INTRAVENOUS at 12:03

## 2024-03-15 NOTE — PROVATION PATIENT INSTRUCTIONS
Discharge Summary/Instructions after an Endoscopic Procedure  Patient Name: Krysta Kam  Patient MRN: [id number]  Patient YOB: 1945  Friday, March 15, 2024  Jonathon Marin MD  Dear patient,  As a result of recent federal legislation (The Federal Cures Act), you may   receive lab or pathology results from your procedure in your [facility]   account before your physician is able to contact you. Your physician or   their representative will relay the results to you with their   recommendations at their soonest availability.  Thank you,  RESTRICTIONS:  During your procedure today, you received medications for sedation.  These   medications may affect your judgment, balance and coordination.  Therefore,   for 24 hours, you have the following restrictions:   - DO NOT drive a car, operate machinery, make legal/financial decisions,   sign important papers or drink alcohol.    ACTIVITY:  Today: no heavy lifting, straining or running due to procedural   sedation/anesthesia.  The following day: return to full activity including work.  DIET:  Eat and drink normally unless instructed otherwise.     TREATMENT FOR COMMON SIDE EFFECTS:  - Mild abdominal pain, nausea, belching, bloating or excessive gas:  rest,   eat lightly and use a heating pad.  - Sore Throat: treat with throat lozenges and/or gargle with warm salt   water.  - Because air was used during the procedure, expelling large amounts of air   from your rectum or belching is normal.  - If a bowel prep was taken, you may not have a bowel movement for 1-3 days.    This is normal.  SYMPTOMS TO WATCH FOR AND REPORT TO YOUR PHYSICIAN:  1. Abdominal pain or bloating, other than gas cramps.  2. Chest pain.  3. Back pain.  4. Signs of infection such as: chills or fever occurring within 24 hours   after the procedure.  5. Rectal bleeding, which would show as bright red, maroon, or black stools.   (A tablespoon of blood from the rectum is not serious, especially if    hemorrhoids are present.)  6. Vomiting.  7. Weakness or dizziness.  GO DIRECTLY TO THE NEAREST EMERGENCY ROOM IF YOU HAVE ANY OF THE FOLLOWING:      Difficulty breathing              Chills and/or fever over 101 F   Persistent vomiting and/or vomiting blood   Severe abdominal pain   Severe chest pain   Black, tarry stools   Bleeding- more than one tablespoon   Any other symptom or condition that you feel may need urgent attention  Your doctor recommends these additional instructions:  If any biopsies were taken, your doctors clinic will contact you in 1 to 2   weeks with any results.  - Discharge patient to home.   - Follow an antireflux regimen.   - Await pathology results.   - Telephone endoscopist for pathology results in 3 weeks.   - Return to primary care physician.   - The findings and recommendations were discussed with the patient.  For questions, problems or results please call your physician - Jonathon Marin MD at Work:  [phone].  [facility], EMERGENCY ROOM PHONE NUMBER: [phone]  IF A COMPLICATION OR EMERGENCY SITUATION ARISES AND YOU ARE UNABLE TO REACH   YOUR PHYSICIAN - GO DIRECTLY TO THE EMERGENCY ROOM.  Jonathon Marin MD  3/15/2024 12:52:10 PM  This report has been verified and signed electronically.  Dear patient,  As a result of recent federal legislation (The Federal Cures Act), you may   receive lab or pathology results from your procedure in your [facility]   account before your physician is able to contact you. Your physician or   their representative will relay the results to you with their   recommendations at their soonest availability.  Thank you,  [employer]ATION

## 2024-03-15 NOTE — H&P
Short Stay Endoscopy History and Physical    PCP - Gold Palafox Jr., MD     Procedure - EGD  ASA - per anesthesia  Mallampati - per anesthesia  History of Anesthesia problems - no  Family history Anesthesia problems -  no   Plan of anesthesia - General    HPI:  This is a 78 y.o. female here for evaluation of :     Fe Def. Anemia.       ROS:  Constitutional: No fevers, chills, No weight loss  CV: No chest pain  Pulm: No cough, No shortness of breath  Ophtho: No vision changes  GI: see HPI  Derm: No rash    Medical History:  has a past medical history of Anemia (6/2012), Anxiety, Cataract, Colon polyps (1/31/2017), Depression, Diabetes mellitus without complication (11/14/2018), Diabetes mellitus, type 2, Fibromyalgia, GERD (gastroesophageal reflux disease), High cholesterol, and Hypertension.    Surgical History:  has a past surgical history that includes Hysterectomy; Colonoscopy (N/A, 10/18/2016); Esophagogastroduodenoscopy (N/A, 10/08/2019); Colonoscopy (N/A, 10/08/2019); Knee Arthroplasty (Right, 09/16/2020); Breast biopsy (1989); Breast biopsy (Left); Anorectal manometry (N/A, 10/25/2023); and Colonoscopy (N/A, 10/25/2023).    Family History: family history includes Heart disease in her father; No Known Problems in her daughter, daughter, daughter, son, and son; Stroke in her mother.. Otherwise no colon cancer, inflammatory bowel disease, or GI malignancies.    Social History:  reports that she quit smoking about 49 years ago. Her smoking use included cigarettes. She has never used smokeless tobacco. She reports that she does not drink alcohol and does not use drugs.    Review of patient's allergies indicates:   Allergen Reactions    Ondansetron      Other reaction(s): Flushing (Skin)    Savella [milnacipran] Nausea Only       Medications:   Medications Prior to Admission   Medication Sig Dispense Refill Last Dose    acetaminophen (TYLENOL) 650 MG TbSR Take 1 tablet (650 mg total) by mouth every 8 (eight)  hours as needed. 120 tablet 0     amLODIPine (NORVASC) 10 MG tablet TAKE 1 TABLET BY MOUTH ONCE  DAILY 90 tablet 3     ascorbic acid, vitamin C, (VITAMIN C) 500 MG tablet Take 500 mg by mouth once daily.       aspirin (ECOTRIN) 81 MG EC tablet Take 1 tablet (81 mg total) by mouth 2 (two) times daily. 60 tablet 0     atorvastatin (LIPITOR) 40 MG tablet TAKE 1 TABLET BY MOUTH ONCE  DAILY 90 tablet 3     blood pressure monitor (BLOOD PRESSURE KIT) Kit One blood pressure monitoring device (arm cuff) 1 each 0     calcium carbonate-vit D3-min (CALTRATE 600+D PLUS MINERALS) 600 mg calcium- 400 unit Tab Take 1 tablet by mouth once daily. 60 tablet 11     chlorthalidone (HYGROTEN) 25 MG Tab Take 1 tablet (25 mg total) by mouth once daily. 90 tablet 3     cinnamon bark (CINNAMON ORAL) Take by mouth.       citalopram (CELEXA) 10 MG tablet        cycloSPORINE (RESTASIS) 0.05 % ophthalmic emulsion INSTILL 1 DROP INTO BOTH EYES TWICE DAILY 180 each 3     diclofenac (VOLTAREN) 50 MG EC tablet Take 1 tablet (50 mg total) by mouth 2 (two) times daily as needed (back pain). 60 tablet 2     diclofenac sodium (VOLTAREN) 1 % Gel Apply topically 2 (two) times daily. 200 g 4     DULoxetine (CYMBALTA) 30 MG capsule Take 1 capsule (30 mg total) by mouth once daily. 30 capsule 11     estradioL (ESTRACE) 0.01 % (0.1 mg/gram) vaginal cream Place 1 g vaginally once daily. 45 g 12     ferrous gluconate 236 mg (27 mg iron) Tab Take 1 tablet by mouth once daily. 90 tablet 1     FLAXSEED OIL ORAL Take by mouth.       gabapentin (NEURONTIN) 300 MG capsule TAKE 1 CAPSULE(300 MG) BY MOUTH TWICE DAILY 180 capsule 11     losartan (COZAAR) 100 MG tablet Take 1 tablet (100 mg total) by mouth once daily. 90 tablet 2     metoprolol succinate (TOPROL-XL) 25 MG 24 hr tablet Take 1 tablet (25 mg total) by mouth once daily. 30 tablet 11     multivit-min/iron/folic/lutein (CENTRUM SILVER WOMEN ORAL) Take 1 tablet by mouth once daily.       omeprazole (PRILOSEC)  40 MG capsule Take 1 capsule (40 mg total) by mouth once daily. 30 capsule 11     polyethylene glycol (COLYTE) 240-22.72-6.72 -5.84 gram SolR Take as directed bt provider office 8000 mL 0     prednisoLONE acetate (PRED FORTE) 1 % DrpS Place 1 drop into both eyes 4 (four) times daily. 5 mL 2     tiZANidine (ZANAFLEX) 4 MG tablet Take 1 tablet (4 mg total) by mouth nightly as needed (muscle spasm/back). 30 tablet 0     TURMERIC ORAL Take by mouth.       vitamin D (VITAMIN D3) 1000 units Tab Take 1,000 Units by mouth once daily.          Physical Exam:    Vital Signs: There were no vitals filed for this visit.    General Appearance: Well appearing in no acute distress  Eyes:    No scleral icterus  ENT: Neck supple, Lips, mucosa, and tongue normal; teeth and gums normal  Abdomen: Soft, non tender, non distended with normal bowel sounds. No hepatosplenomegaly, ascites, or mass.  Extremities: No edema  Skin: No rash    Labs:  Lab Results   Component Value Date    WBC 6.85 09/20/2023    HGB 12.6 01/08/2024    HCT 34.1 (L) 09/20/2023     09/20/2023    CHOL 178 03/12/2024    TRIG 52 03/12/2024    HDL 62 03/12/2024    ALT 17 03/12/2024    AST 24 03/12/2024     03/12/2024    K 4.4 03/12/2024     03/12/2024    CREATININE 1.0 03/12/2024    BUN 15 03/12/2024    CO2 28 03/12/2024    TSH 0.991 07/22/2023    INR 0.9 09/01/2020    HGBA1C 6.2 (H) 03/12/2024       I have explained the risks and benefits of endoscopy procedures to the patient including but not limited to bleeding, perforation, infection, and death.  The patient was asked if they understand and allowed to ask any further questions to their satisfaction.      Rhina Brown DO

## 2024-03-15 NOTE — TRANSFER OF CARE
Anesthesia Transfer of Care Note    Patient: Krysta Kam    Procedure(s) Performed: Procedure(s) (LRB):  EGD (ESOPHAGOGASTRODUODENOSCOPY) (N/A)    Patient location: PACU    Anesthesia Type: general    Transport from OR: Transported from OR on room air with adequate spontaneous ventilation    Post pain: adequate analgesia    Post assessment: no apparent anesthetic complications and tolerated procedure well    Post vital signs: stable    Level of consciousness: awake, alert and oriented    Nausea/Vomiting: no nausea/vomiting    Complications: none    Transfer of care protocol was followed      Last vitals: Visit Vitals  BP (!) 151/69   Pulse 76   Temp 36.7 °C (98 °F)   Resp 16   Ht 5' (1.524 m)   Wt 52.6 kg (116 lb)   SpO2 99%   Breastfeeding No   BMI 22.65 kg/m²

## 2024-03-15 NOTE — ANESTHESIA PREPROCEDURE EVALUATION
03/15/2024  Krysta Kam is a 78 y.o., female.    Pre-operative evaluation for Procedure(s) (LRB):  EGD (ESOPHAGOGASTRODUODENOSCOPY) (N/A)    Patient Active Problem List   Diagnosis    Fibromyalgia    Peripheral neuropathy    Muscle spasm    DJD (degenerative joint disease) of knee    Insomnia    Hypertension    High cholesterol    TMJ disease    History of depression    Senile cataracts of both eyes    Total body pain    Diabetes mellitus without complication    Anxiety    Keratoconjunctivitis sicca, not specified as Sjogren's, bilateral    Nuclear sclerotic cataract of both eyes    Hypertensive retinopathy of both eyes    Type 2 diabetes mellitus with diabetic cataract, without long-term current use of insulin    Normocytic hypochromic anemia    Status post total right knee replacement 9/16/2020    Right knee pain    Impaired functional mobility, balance, gait, and endurance    Decreased strength of lower extremity       Review of patient's allergies indicates:   Allergen Reactions    Ondansetron      Other reaction(s): Flushing (Skin)    Savella [milnacipran] Nausea Only       No current facility-administered medications on file prior to encounter.     Current Outpatient Medications on File Prior to Encounter   Medication Sig Dispense Refill    amLODIPine (NORVASC) 10 MG tablet TAKE 1 TABLET BY MOUTH ONCE  DAILY 90 tablet 3    losartan (COZAAR) 100 MG tablet Take 1 tablet (100 mg total) by mouth once daily. 90 tablet 2    acetaminophen (TYLENOL) 650 MG TbSR Take 1 tablet (650 mg total) by mouth every 8 (eight) hours as needed. 120 tablet 0    ascorbic acid, vitamin C, (VITAMIN C) 500 MG tablet Take 500 mg by mouth once daily.      aspirin (ECOTRIN) 81 MG EC tablet Take 1 tablet (81 mg total) by mouth 2 (two) times daily. 60 tablet 0    blood pressure monitor (BLOOD  PRESSURE KIT) Kit One blood pressure monitoring device (arm cuff) 1 each 0    calcium carbonate-vit D3-min (CALTRATE 600+D PLUS MINERALS) 600 mg calcium- 400 unit Tab Take 1 tablet by mouth once daily. 60 tablet 11    cinnamon bark (CINNAMON ORAL) Take by mouth.      citalopram (CELEXA) 10 MG tablet       cycloSPORINE (RESTASIS) 0.05 % ophthalmic emulsion INSTILL 1 DROP INTO BOTH EYES TWICE DAILY 180 each 3    diclofenac (VOLTAREN) 50 MG EC tablet Take 1 tablet (50 mg total) by mouth 2 (two) times daily as needed (back pain). 60 tablet 2    DULoxetine (CYMBALTA) 30 MG capsule Take 1 capsule (30 mg total) by mouth once daily. 30 capsule 11    estradioL (ESTRACE) 0.01 % (0.1 mg/gram) vaginal cream Place 1 g vaginally once daily. 45 g 12    FLAXSEED OIL ORAL Take by mouth.      metoprolol succinate (TOPROL-XL) 25 MG 24 hr tablet Take 1 tablet (25 mg total) by mouth once daily. 30 tablet 11    multivit-min/iron/folic/lutein (CENTRUM SILVER WOMEN ORAL) Take 1 tablet by mouth once daily.      omeprazole (PRILOSEC) 40 MG capsule Take 1 capsule (40 mg total) by mouth once daily. 30 capsule 11    polyethylene glycol (COLYTE) 240-22.72-6.72 -5.84 gram SolR Take as directed bt provider office 8000 mL 0    tiZANidine (ZANAFLEX) 4 MG tablet Take 1 tablet (4 mg total) by mouth nightly as needed (muscle spasm/back). 30 tablet 0    TURMERIC ORAL Take by mouth.      vitamin D (VITAMIN D3) 1000 units Tab Take 1,000 Units by mouth once daily.         Past Surgical History:   Procedure Laterality Date    ANORECTAL MANOMETRY N/A 10/25/2023    Procedure: MANOMETRY, ANORECTAL;  Surgeon: Christoph Roche MD;  Location: [facility] ([address] [place] [address]);  Service: Endoscopy;  Laterality: N/A;  Peg prep extended  prep instructions given to pt in clininc and portal -  time frame 5-12 weeks  10/18-precall complete-MS    BREAST BIOPSY  1989    BREAST BIOPSY Left     benign    COLONOSCOPY N/A 10/18/2016    Procedure: COLONOSCOPY;   Surgeon: Brittni Edmondson MD;  Location: [facility] (4TH FLR);  Service: Endoscopy;  Laterality: N/A;    COLONOSCOPY N/A 10/08/2019    Procedure: COLONOSCOPY;  Surgeon: Gold Ravi MD;  Location: [facility] (4TH FLR);  Service: Endoscopy;  Laterality: N/A;    COLONOSCOPY N/A 10/25/2023    Procedure: COLONOSCOPY;  Surgeon: Armando Cardenas MD;  [facility]  Service: Endoscopy;  Laterality: N/A;    ESOPHAGOGASTRODUODENOSCOPY N/A 10/08/2019    Procedure: EGD (ESOPHAGOGASTRODUODENOSCOPY);  Surgeon: Gold Ravi MD;  [facility]  Service: Endoscopy;  Laterality: N/A;  Okay for any provider due to KARSTEN    HYSTERECTOMY      KNEE ARTHROPLASTY Right 2020    Procedure: ARTHROPLASTY, KNEE: DEPUY-ATTUNE;  Surgeon: Tha Wright III, MD;  Location: [facility];  Service: Orthopedics;  Laterality: Right;       Social History     Socioeconomic History    Marital status: Single    Number of children: 5   Tobacco Use    Smoking status: Former     Current packs/day: 0.00     Types: Cigarettes     Quit date: 1975     Years since quittin.2    Smokeless tobacco: Never    Tobacco comments:     occasional social smoker   Substance and Sexual Activity    Alcohol use: No    Drug use: No    Sexual activity: Not Currently   Social History Narrative    Retired      Social Determinants of Health     Financial Resource Strain: Low Risk  (2020)    Overall Financial Resource Strain (CARDIA)     Difficulty of Paying Living Expenses: Not hard at all   Food Insecurity: No Food Insecurity (2020)    Hunger Vital Sign     Worried About Running Out of Food in the Last Year: Never true     Ran Out of Food in the Last Year: Never true   Transportation Needs: No Transportation Needs (2020)    PRAPARE - Transportation     Lack of Transportation (Medical): No     Lack of Transportation (Non-Medical): No   Physical Activity: Insufficiently Active (2020)    Exercise Vital  "Sign     Days of Exercise per Week: 4 days     Minutes of Exercise per Session: 10 min   Stress: No Stress Concern Present (2020)    [employer] - Occupational Stress Questionnaire     Feeling of Stress : Only a little   Social Connections: Socially Isolated (2020)    Social Connection and Isolation Panel [NHANES]     Frequency of Communication with Friends and Family: More than three times a week     Frequency of Social Gatherings with Friends and Family: Once a week     Attends Jew Services: Never     Active Member of Clubs or Organizations: No     Attends Club or Organization Meetings: Never     Marital Status:    Housing Stability: Low Risk  (2020)    Housing Stability Vital Sign     Unable to Pay for Housing in the Last Year: No     Number of Places Lived in the Last Year: 1     Unstable Housing in the Last Year: No         CBC: No results for input(s): "WBC", "RBC", "HGB", "HCT", "PLT", "MCV", "MCH", "MCHC" in the last 72 hours.    CMP: No results for input(s): "NA", "K", "CL", "CO2", "BUN", "CREATININE", "GLU", "MG", "PHOS", "CALCIUM", "ALBUMIN", "PROT", "ALKPHOS", "ALT", "AST", "BILITOT" in the last 72 hours.    INR  No results for input(s): "PT", "INR", "PROTIME", "APTT" in the last 72 hours.        Diagnostic Studies:      EKD Echo:  No results found for this or any previous visit.      Pre-op Assessment    I have reviewed the Patient Summary Reports.     I have reviewed the Nursing Notes. I have reviewed the NPO Status.   I have reviewed the Medications.     Review of Systems  Anesthesia Hx:  No problems with previous Anesthesia   History of prior surgery of interest to airway management or planning:            Denies Personal Hx of Anesthesia complications.                    Social:  Former Smoker       Hematology/Oncology:  Hematology Normal   Oncology Normal                                   EENT/Dental:  EENT/Dental " Normal           Cardiovascular:     Hypertension, poorly controlled           hyperlipidemia  Denies MUNGUIA.                            Pulmonary:  Pulmonary Normal      Denies Shortness of breath.                  Musculoskeletal:  Arthritis               Neurological:    Neuromuscular Disease,                                   Endocrine:  Diabetes           Psych:   anxiety               Physical Exam  General: Well nourished, Cooperative, Alert and Oriented    Airway:  Mallampati: II   Mouth Opening: Normal  TM Distance: Normal  Tongue: Normal  Neck ROM: Normal ROM    Dental:  Dentures    Chest/Lungs:  Normal Respiratory Rate    Heart:  Rate: Normal    Anesthesia Plan  Type of Anesthesia, risks & benefits discussed:    Anesthesia Type: Gen Natural Airway  Intra-op Monitoring Plan: Standard ASA Monitors  Post Op Pain Control Plan:   (medical reason for not using multimodal pain management)  Induction:  IV  Informed Consent: Informed consent signed with the Patient and all parties understand the risks and agree with anesthesia plan.  All questions answered.   ASA Score: 3  Day of Surgery Review of History & Physical: H&P Update referred to the surgeon/provider.    Ready For Surgery From Anesthesia Perspective.   .

## 2024-03-17 NOTE — ANESTHESIA POSTPROCEDURE EVALUATION
Anesthesia Post Evaluation    Patient: Krysta Kam    Procedure(s) Performed: Procedure(s) (LRB):  EGD (ESOPHAGOGASTRODUODENOSCOPY) (N/A)    Final Anesthesia Type: general      Patient location during evaluation: GI PACU  Patient participation: Yes- Able to Participate  Level of consciousness: awake and alert and oriented  Post-procedure vital signs: reviewed and stable  Pain management: adequate  Airway patency: patent    PONV status at discharge: No PONV  Anesthetic complications: no      Cardiovascular status: blood pressure returned to baseline and hemodynamically stable  Respiratory status: unassisted, spontaneous ventilation and room air  Hydration status: euvolemic  Follow-up not needed.              Vitals Value Taken Time   /63 03/15/24 1324   Temp 36.7 °C (98 °F) 03/15/24 1252   Pulse 68 03/15/24 1324   Resp 16 03/15/24 1324   SpO2 97 % 03/15/24 1324         Event Time   Out of Recovery 13:25:14         Pain/Marito Score: No data recorded

## 2024-03-20 LAB
FINAL PATHOLOGIC DIAGNOSIS: NORMAL
GROSS: NORMAL
Lab: NORMAL
SUPPLEMENTAL DIAGNOSIS: NORMAL

## 2024-04-02 ENCOUNTER — OFFICE VISIT (OUTPATIENT)
Dept: OTOLARYNGOLOGY | Facility: CLINIC | Age: 79
End: 2024-04-02
Payer: MEDICARE

## 2024-04-02 ENCOUNTER — CLINICAL SUPPORT (OUTPATIENT)
Dept: AUDIOLOGY | Facility: CLINIC | Age: 79
End: 2024-04-02
Payer: MEDICARE

## 2024-04-02 DIAGNOSIS — H90.3 SENSORINEURAL HEARING LOSS (SNHL) OF BOTH EARS: ICD-10-CM

## 2024-04-02 DIAGNOSIS — H90.3 SENSORINEURAL HEARING LOSS, BILATERAL: Primary | ICD-10-CM

## 2024-04-02 DIAGNOSIS — H61.23 BILATERAL IMPACTED CERUMEN: Primary | ICD-10-CM

## 2024-04-02 DIAGNOSIS — H93.13 TINNITUS AURIUM, BILATERAL: ICD-10-CM

## 2024-04-02 DIAGNOSIS — H61.893 EAR CANAL DRYNESS, BILATERAL: ICD-10-CM

## 2024-04-02 PROCEDURE — 92557 COMPREHENSIVE HEARING TEST: CPT | Mod: S$GLB,,, | Performed by: AUDIOLOGIST

## 2024-04-02 PROCEDURE — 92567 TYMPANOMETRY: CPT | Mod: S$GLB,,, | Performed by: AUDIOLOGIST

## 2024-04-02 PROCEDURE — 99999 PR PBB SHADOW E&M-EST. PATIENT-LVL I: CPT | Mod: PBBFAC,,, | Performed by: AUDIOLOGIST

## 2024-04-02 PROCEDURE — 1126F AMNT PAIN NOTED NONE PRSNT: CPT | Mod: CPTII,S$GLB,, | Performed by: NURSE PRACTITIONER

## 2024-04-02 PROCEDURE — 1159F MED LIST DOCD IN RCRD: CPT | Mod: CPTII,S$GLB,, | Performed by: NURSE PRACTITIONER

## 2024-04-02 PROCEDURE — 3288F FALL RISK ASSESSMENT DOCD: CPT | Mod: CPTII,S$GLB,, | Performed by: NURSE PRACTITIONER

## 2024-04-02 PROCEDURE — 99999 PR PBB SHADOW E&M-EST. PATIENT-LVL III: CPT | Mod: PBBFAC,,, | Performed by: NURSE PRACTITIONER

## 2024-04-02 PROCEDURE — 1101F PT FALLS ASSESS-DOCD LE1/YR: CPT | Mod: CPTII,S$GLB,, | Performed by: NURSE PRACTITIONER

## 2024-04-02 PROCEDURE — 99215 OFFICE O/P EST HI 40 MIN: CPT | Mod: S$GLB,,, | Performed by: NURSE PRACTITIONER

## 2024-04-02 RX ORDER — PREDNISOLONE ACETATE 10 MG/ML
1 SUSPENSION/ DROPS OPHTHALMIC 4 TIMES DAILY
Qty: 5 ML | Refills: 2 | Status: SHIPPED | OUTPATIENT
Start: 2024-04-02 | End: 2025-04-02

## 2024-04-02 NOTE — PROGRESS NOTES
Audiologic Evaluation 4/2/2024:       Krysta Dina Kam, a 78 y.o. female, was seen today in the clinic for an audiologic evaluation for tinnitus.  Ms. Kam reported bilateral tinnitus that is louder in the left ear and sounds like a motor. She reported occasional difficulty understanding the television and denied dizziness and otalgia.     Tympanometry revealed Type A tympanogram in the right ear and Type A tympanogram in the left ear. Audiogram results revealed normal hearing sloping to moderate sensorineural hearing loss in the right ear and normal hearing sloping to moderately severe sensorineural hearing loss in the left ear.  Speech reception thresholds were noted at 30 dB in the right ear and 20 dB in the left ear.  Speech discrimination scores were 88% in the right ear and 88% in the left ear.    Recommendations:  Otologic evaluation  Hearing aid consultation  Annual audiogram  Hearing protection when in noise

## 2024-04-02 NOTE — PROGRESS NOTES
Subjective:   Krysta Kam is a 78 y.o. female who presents for an ear cleaning and tinnitus evaluation. She has a past medical history of Anemia (6/2012), Anxiety, Cataract, Colon polyps (1/31/2017), Depression, Diabetes mellitus without complication (11/14/2018), Diabetes mellitus, type 2, Fibromyalgia, GERD (gastroesophageal reflux disease), High cholesterol, and Hypertension. She reports recently partial cleaning with ALANA Valdes and referred for cleaning and tinnitus. She reports years of bilateral tinnitus worse AS and usually worse in quiet environments. She denies any significant hearing loss and notes hearing is equal bilaterally. Denies any otalgia or otorrhea. There is not a family history of hearing loss at a young age. There is not a prior history of ear surgery. There is not a prior history of ear infections. There is not a history of ear trauma. She denies a history of significant noise exposure.    Past Medical History  She has a past medical history of Anemia, Anxiety, Cataract, Colon polyps, Depression, Diabetes mellitus without complication, Diabetes mellitus, type 2, Fibromyalgia, GERD (gastroesophageal reflux disease), High cholesterol, and Hypertension.    Past Surgical History  She has a past surgical history that includes Hysterectomy; Colonoscopy (N/A, 10/18/2016); Esophagogastroduodenoscopy (N/A, 10/08/2019); Colonoscopy (N/A, 10/08/2019); Knee Arthroplasty (Right, 09/16/2020); Breast biopsy (1989); Breast biopsy (Left); Anorectal manometry (N/A, 10/25/2023); Colonoscopy (N/A, 10/25/2023); and Esophagogastroduodenoscopy (N/A, 3/15/2024).    Family History  Her family history includes Heart disease in her father; No Known Problems in her daughter, daughter, daughter, son, and son; Stroke in her mother.    Social History  She reports that she quit smoking about 49 years ago. Her smoking use included cigarettes. She has never used smokeless tobacco. She reports that she does not  drink alcohol and does not use drugs.    Allergies  She is allergic to ondansetron and savella [milnacipran].    Medications  She has a current medication list which includes the following prescription(s): acetaminophen, amlodipine, ascorbic acid (vitamin c), aspirin, atorvastatin, blood pressure monitor, calcium carbonate-vit d3-min, chlorthalidone, cinnamon bark, citalopram, cyclosporine, diclofenac, diclofenac sodium, duloxetine, estradiol, ferrous gluconate, flaxseed oil, gabapentin, losartan, metoprolol succinate, multivit-min/iron/fa/vit k/lut, omeprazole, polyethylene glycol, prednisolone acetate, tizanidine, turmeric, and vitamin d.    Objective:     Constitutional:   She is oriented to person, place, and time. She appears well-developed and well-nourished. She appears alert. She is cooperative.  Non-toxic appearance. She does not have a sickly appearance. She does not appear ill. Normal speech.      Head:  Normocephalic and atraumatic. Not macrocephalic and not microcephalic. Head is without abrasion, without right periorbital erythema, without left periorbital erythema and without TMJ tenderness.     Ears:    Right Ear: No drainage, swelling or tenderness. No mastoid tenderness. Tympanic membrane is not scarred, not perforated, not erythematous, not retracted and not bulging. No middle ear effusion.   Left Ear: No drainage, swelling or tenderness. No mastoid tenderness. Tympanic membrane is not scarred, not perforated, not erythematous, not retracted and not bulging.  No middle ear effusion.   Ears:    Ceruminous debris obstructing bilateral TMs removed under microscopy    Pulmonary/Chest:   Effort normal.     Psychiatric:   She has a normal mood and affect. Her speech is normal and behavior is normal.     Neurological:   She is alert and oriented to person, place, and time.     Procedure  Cerumen removal performed.  See procedure note.  Procedure Note:  The patient was brought to the minor procedure room  and placed under the operating microscope of the left ear canal which was cleaned of ceruminous debris. Using a combination of suction, curettes and cup forceps the patient's cerumen was removed. The patient tolerated the procedure well. There were no complications.  Procedure Note:  The patient was brought to the minor procedure room and placed under the operating microscope of the right ear canal which was cleaned of ceruminous debris. Using a combination of suction, curettes and cup forceps the patient's cerumen was removed. The patient tolerated the procedure well. There were no complications.    Audiogram    I independently reviewed the tracings of the complete audiometric evaluation. I reviewed the audiogram with the patient as well. Pertinent findings include binaural normal sloping to moderately severe sensorineural hearing loss with normal tymps.  Assessment:     1. Bilateral impacted cerumen    2. Tinnitus aurium, bilateral    3. Ear canal dryness, bilateral    4. Sensorineural hearing loss (SNHL) of both ears      Plan:     Bilateral impacted cerumen  Cerumen impaction removed under microscopy. Patient tolerated procedure well.    Tinnitus aurium, bilateral  Discussed the etiology of tinnitus and management strategies including the use of background sound enrichment. Further instructed that avoidance of caffeine, alcohol, tobacco, and stress can be of benefit.     Ear canal dryness, bilateral  Possible ear eczema. Can apply a pea size amount of hydrocortisone 1% cream to the affected ear. Use 1-2 times daily for 1 week then stop. Can use PRN after that. Discussed importance of not using steroids on a continued daily basis.     Sensorineural hearing loss (SNHL) of both ears  Audiometric testing interpretation consistent with sensorineural hearing loss. Discussed the etiology of SNHL. Medically cleared for hearing amplification, and will follow-up with Audiology if interested. Hearing conservation in noisy  environments.

## 2024-04-27 ENCOUNTER — PATIENT MESSAGE (OUTPATIENT)
Dept: GASTROENTEROLOGY | Facility: CLINIC | Age: 79
End: 2024-04-27
Payer: MEDICARE

## 2024-04-27 NOTE — PROGRESS NOTES
Krysta Arroyo your EGD pathology was benign but shows gastric intestinal metaplasia this is a benign finding but a risk factor for stomach cancer but completely benign no dysplasia no H pylori.    Recommend a follow-up EGD in 1 year    Also recommend you avoid barbecue food salty foods pickled foods process meats and eat more fresh fruits and vegetable           1. DUODENUM, BIOPSY:  Duodenal mucosa without significant pathologic changes.    Negative for active duodenitis, celiac-like injury, dysplasia or malignancy.    2. STOMACH, BIOPSY:  Gastric fundic and antral mucosa with inactive chronic gastritis and focal intestinal metaplasia (complete type).    H.pylori immunostain is pending.    Negative for dysplasia or malignancy. VC     Comment: Interp By GAIL Gunn M.D., Signed on 03/20/2024 at 16:10  Supplemental Diagnosis An immunostain with H pylori is negative for organisms.  Controls are adequate.

## 2024-04-30 ENCOUNTER — HOSPITAL ENCOUNTER (EMERGENCY)
Facility: HOSPITAL | Age: 79
Discharge: HOME | End: 2024-04-30
Attending: EMERGENCY MEDICINE
Payer: MEDICARE

## 2024-04-30 VITALS
BODY MASS INDEX: 22.65 KG/M2 | SYSTOLIC BLOOD PRESSURE: 179 MMHG | HEART RATE: 74 BPM | WEIGHT: 115.94 LBS | OXYGEN SATURATION: 99 % | TEMPERATURE: 98 F | DIASTOLIC BLOOD PRESSURE: 91 MMHG | RESPIRATION RATE: 16 BRPM

## 2024-04-30 DIAGNOSIS — R00.2 PALPITATIONS: ICD-10-CM

## 2024-04-30 DIAGNOSIS — R53.81 MALAISE: Primary | ICD-10-CM

## 2024-04-30 DIAGNOSIS — R45.0 FEELING JITTERY: ICD-10-CM

## 2024-04-30 LAB
ALBUMIN SERPL BCP-MCNC: 3.9 G/DL (ref 3.5–5.2)
ALP SERPL-CCNC: 70 U/L (ref 55–135)
ALT SERPL W/O P-5'-P-CCNC: 18 U/L (ref 10–44)
ANION GAP SERPL CALC-SCNC: 10 MMOL/L (ref 8–16)
AST SERPL-CCNC: 26 U/L (ref 10–40)
BASOPHILS # BLD AUTO: 0.03 K/UL (ref 0–0.2)
BASOPHILS NFR BLD: 0.5 % (ref 0–1.9)
BILIRUB SERPL-MCNC: 0.5 MG/DL (ref 0.1–1)
BILIRUB UR QL STRIP: NEGATIVE
BUN SERPL-MCNC: 19 MG/DL (ref 8–23)
BURR CELLS BLD QL SMEAR: ABNORMAL
CALCIUM SERPL-MCNC: 10.3 MG/DL (ref 8.7–10.5)
CHLORIDE SERPL-SCNC: 102 MMOL/L (ref 95–110)
CLARITY UR REFRACT.AUTO: CLEAR
CO2 SERPL-SCNC: 27 MMOL/L (ref 23–29)
COLOR UR AUTO: COLORLESS
CREAT SERPL-MCNC: 1 MG/DL (ref 0.5–1.4)
DIFFERENTIAL METHOD BLD: ABNORMAL
EOSINOPHIL # BLD AUTO: 0.2 K/UL (ref 0–0.5)
EOSINOPHIL NFR BLD: 2.4 % (ref 0–8)
ERYTHROCYTE [DISTWIDTH] IN BLOOD BY AUTOMATED COUNT: 14.5 % (ref 11.5–14.5)
EST. GFR  (NO RACE VARIABLE): 57.7 ML/MIN/1.73 M^2
GIANT PLATELETS BLD QL SMEAR: PRESENT
GLUCOSE SERPL-MCNC: 100 MG/DL (ref 70–110)
GLUCOSE UR QL STRIP: NEGATIVE
HCT VFR BLD AUTO: 39.4 % (ref 37–48.5)
HCV AB SERPL QL IA: NORMAL
HGB BLD-MCNC: 12.7 G/DL (ref 12–16)
HGB UR QL STRIP: NEGATIVE
HIV 1+2 AB+HIV1 P24 AG SERPL QL IA: NORMAL
IMM GRANULOCYTES # BLD AUTO: 0.01 K/UL (ref 0–0.04)
IMM GRANULOCYTES NFR BLD AUTO: 0.2 % (ref 0–0.5)
KETONES UR QL STRIP: NEGATIVE
LEUKOCYTE ESTERASE UR QL STRIP: NEGATIVE
LYMPHOCYTES # BLD AUTO: 1.8 K/UL (ref 1–4.8)
LYMPHOCYTES NFR BLD: 29.8 % (ref 18–48)
MAGNESIUM SERPL-MCNC: 1.5 MG/DL (ref 1.6–2.6)
MCH RBC QN AUTO: 26.5 PG (ref 27–31)
MCHC RBC AUTO-ENTMCNC: 32.2 G/DL (ref 32–36)
MCV RBC AUTO: 82 FL (ref 82–98)
MONOCYTES # BLD AUTO: 0.6 K/UL (ref 0.3–1)
MONOCYTES NFR BLD: 10 % (ref 4–15)
NEUTROPHILS # BLD AUTO: 3.5 K/UL (ref 1.8–7.7)
NEUTROPHILS NFR BLD: 57.1 % (ref 38–73)
NITRITE UR QL STRIP: NEGATIVE
NRBC BLD-RTO: 0 /100 WBC
OHS QRS DURATION: 72 MS
OHS QTC CALCULATION: 421 MS
OVALOCYTES BLD QL SMEAR: ABNORMAL
PH UR STRIP: 7 [PH] (ref 5–8)
PLATELET # BLD AUTO: 173 K/UL (ref 150–450)
PLATELET BLD QL SMEAR: ABNORMAL
PMV BLD AUTO: 13.6 FL (ref 9.2–12.9)
POIKILOCYTOSIS BLD QL SMEAR: SLIGHT
POTASSIUM SERPL-SCNC: 3.6 MMOL/L (ref 3.5–5.1)
PROT SERPL-MCNC: 7.9 G/DL (ref 6–8.4)
PROT UR QL STRIP: NEGATIVE
RBC # BLD AUTO: 4.79 M/UL (ref 4–5.4)
SODIUM SERPL-SCNC: 139 MMOL/L (ref 136–145)
SP GR UR STRIP: 1 (ref 1–1.03)
SPHEROCYTES BLD QL SMEAR: ABNORMAL
TROPONIN I SERPL DL<=0.01 NG/ML-MCNC: <0.006 NG/ML (ref 0–0.03)
TSH SERPL DL<=0.005 MIU/L-ACNC: 3.59 UIU/ML (ref 0.4–4)
URN SPEC COLLECT METH UR: ABNORMAL
WBC # BLD AUTO: 6.17 K/UL (ref 3.9–12.7)

## 2024-04-30 PROCEDURE — 93010 ELECTROCARDIOGRAM REPORT: CPT | Mod: ,,, | Performed by: INTERNAL MEDICINE

## 2024-04-30 PROCEDURE — 85025 COMPLETE CBC W/AUTO DIFF WBC: CPT

## 2024-04-30 PROCEDURE — 93005 ELECTROCARDIOGRAM TRACING: CPT

## 2024-04-30 PROCEDURE — 86803 HEPATITIS C AB TEST: CPT | Performed by: PHYSICIAN ASSISTANT

## 2024-04-30 PROCEDURE — 84443 ASSAY THYROID STIM HORMONE: CPT

## 2024-04-30 PROCEDURE — 83735 ASSAY OF MAGNESIUM: CPT

## 2024-04-30 PROCEDURE — 81003 URINALYSIS AUTO W/O SCOPE: CPT

## 2024-04-30 PROCEDURE — 80053 COMPREHEN METABOLIC PANEL: CPT

## 2024-04-30 PROCEDURE — 87389 HIV-1 AG W/HIV-1&-2 AB AG IA: CPT | Performed by: PHYSICIAN ASSISTANT

## 2024-04-30 PROCEDURE — 84484 ASSAY OF TROPONIN QUANT: CPT

## 2024-04-30 PROCEDURE — 99285 EMERGENCY DEPT VISIT HI MDM: CPT | Mod: 25

## 2024-04-30 RX ORDER — MAGNESIUM GLUCONATE 27 MG(500)
27 TABLET ORAL ONCE
Status: DISCONTINUED | OUTPATIENT
Start: 2024-04-30 | End: 2024-04-30 | Stop reason: HOSPADM

## 2024-04-30 NOTE — ED PROVIDER NOTES
"Encounter Date: 4/30/2024       History     Chief Complaint   Patient presents with    Weakness    Dizziness     + chills x 3 days ago. States "shivering" and "feels like heart racing." Took tizanidine PTA for "the shakes."     Mrs. Kam is a 77yo F w/ a hx of HTN, HLD, presenting for a weeklong history of feeling jittery. Patient reports over the week she has not been feeling well. Patient reports also dry mouth. Patient also reports headache. Patient reports she is compliant with her losartan and chlorthialidone. Patient reports loose stools 2 nights ago that have now improved. Patient also reports burning and stinging with urination, along with increased frequency of urination. Patient otherwise is concerned for feeling jittery over the past week and that is why she is presenting to the ED.        Review of patient's allergies indicates:   Allergen Reactions    Ondansetron      Other reaction(s): Flushing (Skin)    Savella [milnacipran] Nausea Only     Past Medical History:   Diagnosis Date    Anemia 6/2012    Anxiety     Cataract     Colon polyps 1/31/2017    Depression     Diabetes mellitus without complication 11/14/2018    Diabetes mellitus, type 2     Fibromyalgia     GERD (gastroesophageal reflux disease)     High cholesterol     Hypertension      Past Surgical History:   Procedure Laterality Date    ANORECTAL MANOMETRY N/A 10/25/2023    Procedure: MANOMETRY, ANORECTAL;  Surgeon: Christoph Roche MD;  Location: [address]);  Service: Endoscopy;  Laterality: N/A;  Peg prep extended  prep instructions given to pt in clininc and [facility] -  time frame 5-12 weeks  10/18-precall complete-MS    BREAST BIOPSY  1989    BREAST BIOPSY Left     benign    COLONOSCOPY N/A 10/18/2016    Procedure: COLONOSCOPY;  Surgeon: Brittni Edmondson MD;  Location: [facility] ([address] [place] [address]);  Service: Endoscopy;  Laterality: N/A;    COLONOSCOPY N/A 10/08/2019    Procedure: COLONOSCOPY;  Surgeon: Gold Ravi MD;  Location: " [facility] ENDO (4TH FLR);  Service: Endoscopy;  Laterality: N/A;    COLONOSCOPY N/A 10/25/2023    Procedure: COLONOSCOPY;  Surgeon: Armando Cardenas MD;  Location: [facility] (4TH FLR);  Service: Endoscopy;  Laterality: N/A;    ESOPHAGOGASTRODUODENOSCOPY N/A 10/08/2019    Procedure: EGD (ESOPHAGOGASTRODUODENOSCOPY);  Surgeon: Gold Ravi MD;  [facility]  Service: Endoscopy;  Laterality: N/A;  Okay for any provider due to KARSTEN    ESOPHAGOGASTRODUODENOSCOPY N/A 3/15/2024    Procedure: EGD (ESOPHAGOGASTRODUODENOSCOPY);  Surgeon: Jonathon Marin MD;  Location: [facility] (4TH FLR);  Service: Endoscopy;  Laterality: N/A;  Tania  perp instructions sent to pt via portal  -precall compete-MS   pt r/s  [facility] instrctions sent to pt portal.cf  3/11-precall complete-MS    HYSTERECTOMY      KNEE ARTHROPLASTY Right 2020    Procedure: ARTHROPLASTY, KNEE: DEPUY-ATTUNE;  Surgeon: Tha Wright III, MD;  Location: [facility];  Service: Orthopedics;  Laterality: Right;     Family History   Problem Relation Name Age of Onset    Stroke Mother      Heart disease Father      No Known Problems Daughter      No Known Problems Son      No Known Problems Daughter      No Known Problems Daughter      No Known Problems Son      Cataracts Neg Hx      Cancer Neg Hx      Diabetes Neg Hx      Glaucoma Neg Hx      Retinal detachment Neg Hx      Celiac disease Neg Hx      Cirrhosis Neg Hx      Colon cancer Neg Hx      Cystic fibrosis Neg Hx      Esophageal cancer Neg Hx      Inflammatory bowel disease Neg Hx      Liver disease Neg Hx      Stomach cancer Neg Hx      Amblyopia Neg Hx      Blindness Neg Hx      Macular degeneration Neg Hx      Strabismus Neg Hx      Thyroid disease Neg Hx       Social History     Tobacco Use    Smoking status: Former     Current packs/day: 0.00     Types: Cigarettes     Quit date: 1975     Years since quittin.3    Smokeless tobacco: Never    Tobacco  comments:     occasional social smoker   Substance Use Topics    Alcohol use: No    Drug use: No     Review of Systems   Constitutional:  Negative for fever.   HENT:  Negative for sore throat.    Respiratory:  Negative for cough, shortness of breath and wheezing.    Cardiovascular:  Negative for chest pain, palpitations and leg swelling.   Gastrointestinal:  Negative for abdominal pain, constipation, diarrhea, nausea and vomiting.   Genitourinary:  Positive for dysuria and urgency.   Skin:  Negative for rash.   Neurological:  Positive for light-headedness. Negative for numbness.   Psychiatric/Behavioral:  Negative for confusion.        Physical Exam     Initial Vitals [04/30/24 0144]   BP Pulse Resp Temp SpO2   (!) 206/91 71 16 97.6 °F (36.4 °C) 100 %      MAP       --         Physical Exam    Constitutional: She appears well-developed.   HENT:   Head: Normocephalic and atraumatic.   Eyes: EOM are normal. Pupils are equal, round, and reactive to light.   Neck:   Normal range of motion.  Cardiovascular:  Normal rate.           Pulmonary/Chest: Breath sounds normal.   Abdominal: Abdomen is soft.   Musculoskeletal:         General: Normal range of motion.      Cervical back: Normal range of motion.     Neurological: She is alert and oriented to person, place, and time.   Skin: Skin is warm. Capillary refill takes less than 2 seconds.         ED Course   Procedures  Labs Reviewed - No data to display       Imaging Results    None          Medications - No data to display  Medical Decision Making  Patient presenting for a weeklong history of feeling unwell, patient has non-specific symptoms for the past week, and when asked to explain, patient states, she feels like she is being shaken. Patient also endorses dysuria, urgency and frequency. Will order a CBC, CMP and UA to monitor for electrolyte abnormalities and assess for a UTI    Care signed over to Dr. Bardales    Amount and/or Complexity of Data Reviewed  Labs:  ordered.               ED Course as of 04/30/24 0254 Tue Apr 30, 2024   0241 In his 1:46 a.m. independently interpreted by me normal sinus rhythm rate of 71 normal axis normal intervals [GK]      ED Course User Index  [GK] Corinne Bardales MD                           Clinical Impression:  Final diagnoses:  [R53.1] Weakness                 Jerson Burroughs MD  Resident  04/30/24 0254

## 2024-04-30 NOTE — DISCHARGE INSTRUCTIONS
I recommend close follow up with your doctor for re-evaluation.     Your workup in the ED today was unremarkable other than a mildly low magnesium which was replaced.  I did not find a cause for your symptoms.  Your stool tests will not result today, and you will be given a call if they are positive.

## 2024-05-01 ENCOUNTER — TELEPHONE (OUTPATIENT)
Dept: OPTOMETRY | Facility: CLINIC | Age: 79
End: 2024-05-01
Payer: MEDICARE

## 2024-05-01 NOTE — TELEPHONE ENCOUNTER
----- Message from Jacquelyn Salcedo OD sent at 5/1/2024 10:50 AM CDT -----  Regarding: RE: Recheck  Contact: [phone] Patient  She can come on Monday if she's up to driving out. If not, we can get her in next week when I'm at [facility]  ----- Message -----  From: Dalila Ma  Sent: 5/1/2024  10:41 AM CDT  To: Jacquelyn Salcedo OD  Subject: Recheck                                          Hey you want the patient to come back in for a recheck right? And If so do you want me to just fit her in. She wanted to come in on Monday but she live in [facility] so I don't think she will won't to come on [place]. Let me know what you want to do so I can give her a call.     Thanks  ----- Message -----  From: Estrellita Covington  Sent: 5/1/2024  10:16 AM CDT  To: Matias Valladares Staff    Pt is calling in regards to her prescription that she was given in January and had the script filled in April. Pt stated that the eyeglasses or too strong and she needs a different script. Pt is asking if she can get another script so she can bring to the place she had them filled and they will redo the glasses at no charge. Pt is asking if she can come see the doctor on Monday 05/06/2024 while she is in the area seeing her PCP? Please call and advise. Thank you

## 2024-05-02 ENCOUNTER — OFFICE VISIT (OUTPATIENT)
Dept: OPTOMETRY | Facility: CLINIC | Age: 79
End: 2024-05-02
Payer: MEDICARE

## 2024-05-02 DIAGNOSIS — H52.203 HYPEROPIA WITH ASTIGMATISM AND PRESBYOPIA, BILATERAL: ICD-10-CM

## 2024-05-02 DIAGNOSIS — H53.8 BLURRED VISION, BILATERAL: Primary | ICD-10-CM

## 2024-05-02 DIAGNOSIS — H52.03 HYPEROPIA WITH ASTIGMATISM AND PRESBYOPIA, BILATERAL: ICD-10-CM

## 2024-05-02 DIAGNOSIS — H52.4 HYPEROPIA WITH ASTIGMATISM AND PRESBYOPIA, BILATERAL: ICD-10-CM

## 2024-05-02 PROCEDURE — 99499 UNLISTED E&M SERVICE: CPT | Mod: S$GLB,,, | Performed by: OPTOMETRIST

## 2024-05-02 PROCEDURE — 99999 PR PBB SHADOW E&M-EST. PATIENT-LVL III: CPT | Mod: PBBFAC,,, | Performed by: OPTOMETRIST

## 2024-05-02 NOTE — PROGRESS NOTES
HPI    Patient is here today for MRx check. Patient states when wearing her   glasses, she has to pull them away from her face to see clearly. Distance   vision looks blurry and not sharp. Feels like her depth perception is off.   Intermittent pressure around OD, she rates it 3/10 when it occurs.     PF BID OU  Last edited by Marian Staples on 5/2/2024  9:31 AM.            Assessment /Plan     For exam results, see Encounter Report.    Blurred vision, bilateral    Hyperopia with astigmatism and presbyopia, bilateral      MONITOR. ED PT ON ALL EXAM FINDINGS  UPDATE SPECS RX   OCULAR HEALTH STABLE OD, OS   RTC 1 YR//PRN FOR REE/DFE

## 2024-05-06 ENCOUNTER — OFFICE VISIT (OUTPATIENT)
Dept: INTERNAL MEDICINE | Facility: CLINIC | Age: 79
End: 2024-05-06
Payer: MEDICARE

## 2024-05-06 ENCOUNTER — TELEPHONE (OUTPATIENT)
Dept: INTERNAL MEDICINE | Facility: CLINIC | Age: 79
End: 2024-05-06

## 2024-05-06 VITALS
DIASTOLIC BLOOD PRESSURE: 76 MMHG | HEIGHT: 60 IN | WEIGHT: 113.13 LBS | SYSTOLIC BLOOD PRESSURE: 122 MMHG | HEART RATE: 70 BPM | BODY MASS INDEX: 22.21 KG/M2 | OXYGEN SATURATION: 100 %

## 2024-05-06 DIAGNOSIS — M25.571 CHRONIC PAIN OF RIGHT ANKLE: ICD-10-CM

## 2024-05-06 DIAGNOSIS — M25.561 CHRONIC PAIN OF RIGHT KNEE: ICD-10-CM

## 2024-05-06 DIAGNOSIS — G89.29 CHRONIC PAIN OF RIGHT ANKLE: ICD-10-CM

## 2024-05-06 DIAGNOSIS — M54.30 SCIATICA, UNSPECIFIED LATERALITY: Primary | ICD-10-CM

## 2024-05-06 DIAGNOSIS — G89.29 CHRONIC PAIN OF RIGHT KNEE: ICD-10-CM

## 2024-05-06 PROCEDURE — 3078F DIAST BP <80 MM HG: CPT | Mod: CPTII,S$GLB,, | Performed by: INTERNAL MEDICINE

## 2024-05-06 PROCEDURE — 1101F PT FALLS ASSESS-DOCD LE1/YR: CPT | Mod: CPTII,S$GLB,, | Performed by: INTERNAL MEDICINE

## 2024-05-06 PROCEDURE — 3074F SYST BP LT 130 MM HG: CPT | Mod: CPTII,S$GLB,, | Performed by: INTERNAL MEDICINE

## 2024-05-06 PROCEDURE — 99214 OFFICE O/P EST MOD 30 MIN: CPT | Mod: S$GLB,,, | Performed by: INTERNAL MEDICINE

## 2024-05-06 PROCEDURE — 1159F MED LIST DOCD IN RCRD: CPT | Mod: CPTII,S$GLB,, | Performed by: INTERNAL MEDICINE

## 2024-05-06 PROCEDURE — 3288F FALL RISK ASSESSMENT DOCD: CPT | Mod: CPTII,S$GLB,, | Performed by: INTERNAL MEDICINE

## 2024-05-06 PROCEDURE — 99999 PR PBB SHADOW E&M-EST. PATIENT-LVL V: CPT | Mod: PBBFAC,,, | Performed by: INTERNAL MEDICINE

## 2024-05-06 PROCEDURE — 1125F AMNT PAIN NOTED PAIN PRSNT: CPT | Mod: CPTII,S$GLB,, | Performed by: INTERNAL MEDICINE

## 2024-05-06 RX ORDER — ETODOLAC 400 MG/1
400 TABLET, FILM COATED ORAL 2 TIMES DAILY
Qty: 60 TABLET | Refills: 2 | Status: SHIPPED | OUTPATIENT
Start: 2024-05-06

## 2024-05-06 RX ORDER — PREGABALIN 25 MG/1
25 CAPSULE ORAL 2 TIMES DAILY
Qty: 60 CAPSULE | Refills: 6 | Status: SHIPPED | OUTPATIENT
Start: 2024-05-06

## 2024-05-06 NOTE — TELEPHONE ENCOUNTER
----- Message from Ryley Adkins sent at 5/6/2024 12:55 PM CDT -----  Regarding: Referal for New Physical Therapy Location  Contact: Krysta Arroyo [phone]  [email or web address]     Patient is calling for Medical Advice regarding: Fax Referrals for physical therapy transferred to [facility]  [facility]  Address: [address] [place] [id number]  Hours:  Open Closes 6?PM  Phone: [phone]     Comments:  Fax papers of referral to address above and confirm with patient
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
## 2024-05-18 NOTE — TELEPHONE ENCOUNTER
No care due was identified.  [facility] Embedded Care Due Messages. Reference number: [id number].   5/18/2024 3:28:17 AM CDT

## 2024-05-19 RX ORDER — LOSARTAN POTASSIUM 100 MG/1
100 TABLET ORAL
Qty: 90 TABLET | Refills: 3 | Status: SHIPPED | OUTPATIENT
Start: 2024-05-19 | End: 2024-05-20 | Stop reason: SDUPTHER

## 2024-05-19 NOTE — TELEPHONE ENCOUNTER
Refill Decision Note   Krysta Kam  is requesting a refill authorization.  Brief Assessment and Rationale for Refill:  Approve     Medication Therapy Plan:        Comments:     Note composed:10:10 AM 05/19/2024

## 2024-05-20 NOTE — TELEPHONE ENCOUNTER
No care due was identified.  [facility] Embedded Care Due Messages. Reference number: [id number].   5/20/2024 5:11:04 PM CDT

## 2024-05-21 RX ORDER — LOSARTAN POTASSIUM 100 MG/1
100 TABLET ORAL DAILY
Qty: 90 TABLET | Refills: 3 | Status: SHIPPED | OUTPATIENT
Start: 2024-05-21

## 2024-05-28 ENCOUNTER — OFFICE VISIT (OUTPATIENT)
Dept: PAIN MEDICINE | Facility: CLINIC | Age: 79
End: 2024-05-28
Payer: MEDICARE

## 2024-05-28 VITALS
DIASTOLIC BLOOD PRESSURE: 76 MMHG | SYSTOLIC BLOOD PRESSURE: 155 MMHG | HEIGHT: 60 IN | WEIGHT: 114.44 LBS | BODY MASS INDEX: 22.47 KG/M2 | HEART RATE: 67 BPM

## 2024-05-28 DIAGNOSIS — G89.29 CHRONIC PAIN OF RIGHT ANKLE: ICD-10-CM

## 2024-05-28 DIAGNOSIS — M54.30 SCIATICA, UNSPECIFIED LATERALITY: ICD-10-CM

## 2024-05-28 DIAGNOSIS — M25.571 CHRONIC PAIN OF RIGHT ANKLE: ICD-10-CM

## 2024-05-28 DIAGNOSIS — G89.29 CHRONIC PAIN OF RIGHT KNEE: ICD-10-CM

## 2024-05-28 DIAGNOSIS — M25.561 CHRONIC PAIN OF RIGHT KNEE: ICD-10-CM

## 2024-05-28 PROCEDURE — 99999 PR PBB SHADOW E&M-EST. PATIENT-LVL IV: CPT | Mod: PBBFAC,,, | Performed by: EMERGENCY MEDICINE

## 2024-05-28 PROCEDURE — 99204 OFFICE O/P NEW MOD 45 MIN: CPT | Mod: S$GLB,,, | Performed by: EMERGENCY MEDICINE

## 2024-05-28 PROCEDURE — 1159F MED LIST DOCD IN RCRD: CPT | Mod: CPTII,S$GLB,, | Performed by: EMERGENCY MEDICINE

## 2024-05-28 PROCEDURE — 3078F DIAST BP <80 MM HG: CPT | Mod: CPTII,S$GLB,, | Performed by: EMERGENCY MEDICINE

## 2024-05-28 PROCEDURE — 1125F AMNT PAIN NOTED PAIN PRSNT: CPT | Mod: CPTII,S$GLB,, | Performed by: EMERGENCY MEDICINE

## 2024-05-28 PROCEDURE — 3288F FALL RISK ASSESSMENT DOCD: CPT | Mod: CPTII,S$GLB,, | Performed by: EMERGENCY MEDICINE

## 2024-05-28 PROCEDURE — 3077F SYST BP >= 140 MM HG: CPT | Mod: CPTII,S$GLB,, | Performed by: EMERGENCY MEDICINE

## 2024-05-28 PROCEDURE — 1101F PT FALLS ASSESS-DOCD LE1/YR: CPT | Mod: CPTII,S$GLB,, | Performed by: EMERGENCY MEDICINE
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
## 2024-06-10 ENCOUNTER — PATIENT MESSAGE (OUTPATIENT)
Dept: INTERNAL MEDICINE | Facility: CLINIC | Age: 79
End: 2024-06-10
Payer: MEDICARE

## 2024-08-09 RX ORDER — AMLODIPINE BESYLATE 10 MG/1
10 TABLET ORAL
Qty: 90 TABLET | Refills: 3 | Status: SHIPPED | OUTPATIENT
Start: 2024-08-09

## 2024-09-16 ENCOUNTER — LAB VISIT (OUTPATIENT)
Dept: LAB | Facility: HOSPITAL | Age: 79
End: 2024-09-16
Attending: INTERNAL MEDICINE
Payer: MEDICARE

## 2024-09-16 DIAGNOSIS — D64.9 ANEMIA, UNSPECIFIED TYPE: ICD-10-CM

## 2024-09-16 DIAGNOSIS — E11.36 TYPE 2 DIABETES MELLITUS WITH DIABETIC CATARACT, WITHOUT LONG-TERM CURRENT USE OF INSULIN: ICD-10-CM

## 2024-09-16 LAB
ALBUMIN SERPL BCP-MCNC: 3.7 G/DL (ref 3.5–5.2)
ALP SERPL-CCNC: 61 U/L (ref 55–135)
ALT SERPL W/O P-5'-P-CCNC: 19 U/L (ref 10–44)
ANION GAP SERPL CALC-SCNC: 9 MMOL/L (ref 8–16)
AST SERPL-CCNC: 26 U/L (ref 10–40)
BASOPHILS # BLD AUTO: 0.04 K/UL (ref 0–0.2)
BASOPHILS NFR BLD: 0.8 % (ref 0–1.9)
BILIRUB SERPL-MCNC: 0.6 MG/DL (ref 0.1–1)
BUN SERPL-MCNC: 17 MG/DL (ref 8–23)
CALCIUM SERPL-MCNC: 9.9 MG/DL (ref 8.7–10.5)
CHLORIDE SERPL-SCNC: 104 MMOL/L (ref 95–110)
CO2 SERPL-SCNC: 27 MMOL/L (ref 23–29)
CREAT SERPL-MCNC: 0.9 MG/DL (ref 0.5–1.4)
DIFFERENTIAL METHOD BLD: ABNORMAL
EOSINOPHIL # BLD AUTO: 0.1 K/UL (ref 0–0.5)
EOSINOPHIL NFR BLD: 2.7 % (ref 0–8)
ERYTHROCYTE [DISTWIDTH] IN BLOOD BY AUTOMATED COUNT: 13.9 % (ref 11.5–14.5)
EST. GFR  (NO RACE VARIABLE): >60 ML/MIN/1.73 M^2
ESTIMATED AVG GLUCOSE: 120 MG/DL (ref 68–131)
GLUCOSE SERPL-MCNC: 90 MG/DL (ref 70–110)
HBA1C MFR BLD: 5.8 % (ref 4–5.6)
HCT VFR BLD AUTO: 38.8 % (ref 37–48.5)
HGB BLD-MCNC: 12.4 G/DL (ref 12–16)
IMM GRANULOCYTES # BLD AUTO: 0.01 K/UL (ref 0–0.04)
IMM GRANULOCYTES NFR BLD AUTO: 0.2 % (ref 0–0.5)
LYMPHOCYTES # BLD AUTO: 1.5 K/UL (ref 1–4.8)
LYMPHOCYTES NFR BLD: 28.2 % (ref 18–48)
MCH RBC QN AUTO: 26.8 PG (ref 27–31)
MCHC RBC AUTO-ENTMCNC: 32 G/DL (ref 32–36)
MCV RBC AUTO: 84 FL (ref 82–98)
MONOCYTES # BLD AUTO: 0.5 K/UL (ref 0.3–1)
MONOCYTES NFR BLD: 10.3 % (ref 4–15)
NEUTROPHILS # BLD AUTO: 3 K/UL (ref 1.8–7.7)
NEUTROPHILS NFR BLD: 57.8 % (ref 38–73)
NRBC BLD-RTO: 0 /100 WBC
PLATELET # BLD AUTO: 163 K/UL (ref 150–450)
PMV BLD AUTO: ABNORMAL FL (ref 9.2–12.9)
POTASSIUM SERPL-SCNC: 4.4 MMOL/L (ref 3.5–5.1)
PROT SERPL-MCNC: 7.2 G/DL (ref 6–8.4)
RBC # BLD AUTO: 4.63 M/UL (ref 4–5.4)
SODIUM SERPL-SCNC: 140 MMOL/L (ref 136–145)
WBC # BLD AUTO: 5.14 K/UL (ref 3.9–12.7)

## 2024-09-16 PROCEDURE — 36415 COLL VENOUS BLD VENIPUNCTURE: CPT | Mod: PO | Performed by: INTERNAL MEDICINE

## 2024-09-16 PROCEDURE — 80053 COMPREHEN METABOLIC PANEL: CPT | Performed by: INTERNAL MEDICINE

## 2024-09-16 PROCEDURE — 85025 COMPLETE CBC W/AUTO DIFF WBC: CPT | Performed by: INTERNAL MEDICINE

## 2024-09-16 PROCEDURE — 83036 HEMOGLOBIN GLYCOSYLATED A1C: CPT | Performed by: INTERNAL MEDICINE

## 2024-09-18 DIAGNOSIS — Z78.0 MENOPAUSE: ICD-10-CM

## 2024-09-23 ENCOUNTER — IMMUNIZATION (OUTPATIENT)
Dept: INTERNAL MEDICINE | Facility: CLINIC | Age: 79
End: 2024-09-23
Payer: MEDICARE

## 2024-09-23 ENCOUNTER — OFFICE VISIT (OUTPATIENT)
Dept: INTERNAL MEDICINE | Facility: CLINIC | Age: 79
End: 2024-09-23
Payer: MEDICARE

## 2024-09-23 VITALS
HEIGHT: 60 IN | BODY MASS INDEX: 22.68 KG/M2 | SYSTOLIC BLOOD PRESSURE: 112 MMHG | HEART RATE: 58 BPM | WEIGHT: 115.5 LBS | OXYGEN SATURATION: 98 % | DIASTOLIC BLOOD PRESSURE: 72 MMHG

## 2024-09-23 DIAGNOSIS — Z12.31 OTHER SCREENING MAMMOGRAM: ICD-10-CM

## 2024-09-23 DIAGNOSIS — E78.00 HIGH CHOLESTEROL: ICD-10-CM

## 2024-09-23 DIAGNOSIS — K58.1 IRRITABLE BOWEL SYNDROME WITH CONSTIPATION: ICD-10-CM

## 2024-09-23 DIAGNOSIS — I10 HYPERTENSION, UNSPECIFIED TYPE: ICD-10-CM

## 2024-09-23 DIAGNOSIS — M26.609 TMJ DISEASE: ICD-10-CM

## 2024-09-23 DIAGNOSIS — Z23 NEED FOR VACCINATION: Primary | ICD-10-CM

## 2024-09-23 DIAGNOSIS — E11.36 TYPE 2 DIABETES MELLITUS WITH DIABETIC CATARACT, WITHOUT LONG-TERM CURRENT USE OF INSULIN: Primary | ICD-10-CM

## 2024-09-23 DIAGNOSIS — M79.7 FIBROMYALGIA: ICD-10-CM

## 2024-09-23 LAB
ALBUMIN/CREAT UR: 9.2 UG/MG (ref 0–30)
CREAT UR-MCNC: 87 MG/DL (ref 15–325)
MICROALBUMIN UR DL<=1MG/L-MCNC: 8 UG/ML

## 2024-09-23 PROCEDURE — 1101F PT FALLS ASSESS-DOCD LE1/YR: CPT | Mod: CPTII,S$GLB,, | Performed by: INTERNAL MEDICINE

## 2024-09-23 PROCEDURE — 1126F AMNT PAIN NOTED NONE PRSNT: CPT | Mod: CPTII,S$GLB,, | Performed by: INTERNAL MEDICINE

## 2024-09-23 PROCEDURE — 1159F MED LIST DOCD IN RCRD: CPT | Mod: CPTII,S$GLB,, | Performed by: INTERNAL MEDICINE

## 2024-09-23 PROCEDURE — 3074F SYST BP LT 130 MM HG: CPT | Mod: CPTII,S$GLB,, | Performed by: INTERNAL MEDICINE

## 2024-09-23 PROCEDURE — 3078F DIAST BP <80 MM HG: CPT | Mod: CPTII,S$GLB,, | Performed by: INTERNAL MEDICINE

## 2024-09-23 PROCEDURE — 99214 OFFICE O/P EST MOD 30 MIN: CPT | Mod: S$GLB,,, | Performed by: INTERNAL MEDICINE

## 2024-09-23 PROCEDURE — 82043 UR ALBUMIN QUANTITATIVE: CPT | Performed by: INTERNAL MEDICINE

## 2024-09-23 PROCEDURE — 82570 ASSAY OF URINE CREATININE: CPT | Performed by: INTERNAL MEDICINE

## 2024-09-23 PROCEDURE — 99999 PR PBB SHADOW E&M-EST. PATIENT-LVL IV: CPT | Mod: PBBFAC,,, | Performed by: INTERNAL MEDICINE

## 2024-09-23 PROCEDURE — 90653 IIV ADJUVANT VACCINE IM: CPT | Mod: S$GLB,,, | Performed by: INTERNAL MEDICINE

## 2024-09-23 PROCEDURE — G2211 COMPLEX E/M VISIT ADD ON: HCPCS | Mod: S$GLB,,, | Performed by: INTERNAL MEDICINE

## 2024-09-23 PROCEDURE — 3288F FALL RISK ASSESSMENT DOCD: CPT | Mod: CPTII,S$GLB,, | Performed by: INTERNAL MEDICINE

## 2024-09-23 PROCEDURE — G0008 ADMIN INFLUENZA VIRUS VAC: HCPCS | Mod: S$GLB,,, | Performed by: INTERNAL MEDICINE

## 2024-09-23 RX ORDER — ATORVASTATIN CALCIUM 40 MG/1
40 TABLET, FILM COATED ORAL DAILY
Qty: 90 TABLET | Refills: 3 | Status: SHIPPED | OUTPATIENT
Start: 2024-09-23

## 2024-09-23 RX ORDER — DULOXETIN HYDROCHLORIDE 30 MG/1
30 CAPSULE, DELAYED RELEASE ORAL DAILY
Qty: 30 CAPSULE | Refills: 11 | Status: SHIPPED | OUTPATIENT
Start: 2024-09-23 | End: 2025-09-23

## 2024-09-23 RX ORDER — LOSARTAN POTASSIUM 100 MG/1
100 TABLET ORAL DAILY
Qty: 90 TABLET | Refills: 3 | Status: SHIPPED | OUTPATIENT
Start: 2024-09-23
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
## 2024-09-25 RX ORDER — DICLOFENAC SODIUM 10 MG/G
GEL TOPICAL
Qty: 200 G | Refills: 3 | Status: SHIPPED | OUTPATIENT
Start: 2024-09-25

## 2024-09-25 NOTE — TELEPHONE ENCOUNTER
No care due was identified.  [facility] Embedded Care Due Messages. Reference number: [id number].   9/25/2024 12:36:41 PM CDT

## 2024-10-07 RX ORDER — DICLOFENAC SODIUM 10 MG/G
GEL TOPICAL DAILY
Qty: 200 G | Refills: 3 | Status: SHIPPED | OUTPATIENT
Start: 2024-10-07

## 2024-10-07 NOTE — TELEPHONE ENCOUNTER
No care due was identified.  [facility] Embedded Care Due Messages. Reference number: [id number].   10/07/2024 9:29:28 AM CDT

## 2024-10-30 RX ORDER — DICLOFENAC SODIUM 10 MG/G
GEL TOPICAL DAILY
Qty: 200 G | Refills: 3 | Status: SHIPPED | OUTPATIENT
Start: 2024-10-30

## 2024-11-04 ENCOUNTER — HOSPITAL ENCOUNTER (OUTPATIENT)
Dept: RADIOLOGY | Facility: OTHER | Age: 79
Discharge: HOME | End: 2024-11-04
Attending: INTERNAL MEDICINE
Payer: MEDICARE

## 2024-11-04 DIAGNOSIS — Z78.0 MENOPAUSE: ICD-10-CM

## 2024-11-04 PROCEDURE — 77080 DXA BONE DENSITY AXIAL: CPT | Mod: TC

## 2024-11-04 PROCEDURE — 77080 DXA BONE DENSITY AXIAL: CPT | Mod: 26,,, | Performed by: RADIOLOGY

## 2024-11-05 ENCOUNTER — TELEPHONE (OUTPATIENT)
Dept: ENDOSCOPY | Facility: HOSPITAL | Age: 79
End: 2024-11-05
Payer: MEDICARE

## 2024-11-05 VITALS — HEIGHT: 60 IN | BODY MASS INDEX: 23.56 KG/M2 | WEIGHT: 120 LBS

## 2024-11-05 DIAGNOSIS — K31.A0 GASTRIC INTESTINAL METAPLASIA: Primary | ICD-10-CM

## 2024-11-05 NOTE — TELEPHONE ENCOUNTER
Referral for procedure from [facility]      Spoke to Krysta to schedule procedure(s) Upper Endoscopy (EGD)       Physician to perform procedure(s) Dr. HOSSEIN Marin  Date of Procedure (s) 12/11/2024  Arrival Time 9:15 AM  Time of Procedure(s) 10:15 AM   Location of Procedure(s) [facility]  Type of Rx Prep sent to patient: Other  Instructions provided to patient via [facility]    Patient was informed on the following information and verbalized understanding. Screening questionnaire reviewed with patient and complete. If procedure requires anesthesia, a responsible adult needs to be present to accompany the patient home, patient cannot drive after receiving anesthesia. Appointment details are tentative, especially check-in time. Patient will receive a prep-op call 7 days prior to confirm check-in time for procedure. If applicable the patient should contact their pharmacy to verify Rx for procedure prep is ready for pick-up. Patient was advised to call the scheduling department at [phone] if pharmacy states no Rx is available. Patient was advised to call the endoscopy scheduling department if any questions or concerns arise.       Endoscopy Scheduling Department

## 2024-11-05 NOTE — TELEPHONE ENCOUNTER
"----- Message from Zuleyma sent at 10/21/2024  8:46 AM CDT -----    ----- Message -----  From: Jonathon Marin MD  Sent: 10/21/2024   8:00 AM CDT  To: [facility] Patients    Procedure: EGD    Diagnosis:  Follow-up gastric intestinal metaplasia of the stomach    Procedure Timin-12 weeks    #If within 4 weeks selected, please bhavana as high priority#    #If greater than 12 weeks, please select "5-12 weeks" and delay sending until 3 months prior to requested date#     Location: [address]    Additional Scheduling Information: No scheduling concerns    Prep Specifications:Standard prep    Is the patient taking a GLP-1 Agonist:no    Have you attached a patient to this message: yes  "

## 2024-12-11 ENCOUNTER — HOSPITAL ENCOUNTER (OUTPATIENT)
Facility: HOSPITAL | Age: 79
Discharge: HOME | End: 2024-12-11
Attending: INTERNAL MEDICINE | Admitting: INTERNAL MEDICINE
Payer: MEDICARE

## 2024-12-11 ENCOUNTER — ANESTHESIA EVENT (OUTPATIENT)
Dept: ENDOSCOPY | Facility: HOSPITAL | Age: 79
End: 2024-12-11
Payer: MEDICARE

## 2024-12-11 ENCOUNTER — ANESTHESIA (OUTPATIENT)
Dept: ENDOSCOPY | Facility: HOSPITAL | Age: 79
End: 2024-12-11
Payer: MEDICARE

## 2024-12-11 VITALS
DIASTOLIC BLOOD PRESSURE: 70 MMHG | HEART RATE: 61 BPM | WEIGHT: 118 LBS | SYSTOLIC BLOOD PRESSURE: 146 MMHG | BODY MASS INDEX: 23.16 KG/M2 | HEIGHT: 60 IN | RESPIRATION RATE: 17 BRPM | OXYGEN SATURATION: 97 % | TEMPERATURE: 98 F

## 2024-12-11 DIAGNOSIS — K31.A0 GASTRIC INTESTINAL METAPLASIA: ICD-10-CM

## 2024-12-11 PROCEDURE — 88342 IMHCHEM/IMCYTCHM 1ST ANTB: CPT | Performed by: STUDENT IN AN ORGANIZED HEALTH CARE EDUCATION/TRAINING PROGRAM

## 2024-12-11 PROCEDURE — 88305 TISSUE EXAM BY PATHOLOGIST: CPT | Performed by: STUDENT IN AN ORGANIZED HEALTH CARE EDUCATION/TRAINING PROGRAM

## 2024-12-11 PROCEDURE — 37000009 HC ANESTHESIA EA ADD 15 MINS: Performed by: INTERNAL MEDICINE

## 2024-12-11 PROCEDURE — 43239 EGD BIOPSY SINGLE/MULTIPLE: CPT | Performed by: INTERNAL MEDICINE

## 2024-12-11 PROCEDURE — 37000008 HC ANESTHESIA 1ST 15 MINUTES: Performed by: INTERNAL MEDICINE

## 2024-12-11 PROCEDURE — 88342 IMHCHEM/IMCYTCHM 1ST ANTB: CPT | Mod: 26,,, | Performed by: STUDENT IN AN ORGANIZED HEALTH CARE EDUCATION/TRAINING PROGRAM

## 2024-12-11 PROCEDURE — 88305 TISSUE EXAM BY PATHOLOGIST: CPT | Mod: 26,,, | Performed by: STUDENT IN AN ORGANIZED HEALTH CARE EDUCATION/TRAINING PROGRAM

## 2024-12-11 PROCEDURE — 43239 EGD BIOPSY SINGLE/MULTIPLE: CPT | Mod: ,,, | Performed by: INTERNAL MEDICINE

## 2024-12-11 PROCEDURE — 27201012 HC FORCEPS, HOT/COLD, DISP: Performed by: INTERNAL MEDICINE

## 2024-12-11 PROCEDURE — 63600175 PHARM REV CODE 636 W HCPCS: Performed by: NURSE ANESTHETIST, CERTIFIED REGISTERED

## 2024-12-11 RX ORDER — PROPOFOL 10 MG/ML
VIAL (ML) INTRAVENOUS
Status: DISCONTINUED | OUTPATIENT
Start: 2024-12-11 | End: 2024-12-11

## 2024-12-11 RX ORDER — LIDOCAINE HYDROCHLORIDE 20 MG/ML
INJECTION INTRAVENOUS
Status: DISCONTINUED | OUTPATIENT
Start: 2024-12-11 | End: 2024-12-11

## 2024-12-11 RX ORDER — SODIUM CHLORIDE 9 MG/ML
INJECTION, SOLUTION INTRAVENOUS CONTINUOUS
Status: DISCONTINUED | OUTPATIENT
Start: 2024-12-11 | End: 2024-12-11 | Stop reason: HOSPADM

## 2024-12-11 RX ORDER — PROPOFOL 10 MG/ML
VIAL (ML) INTRAVENOUS CONTINUOUS PRN
Status: DISCONTINUED | OUTPATIENT
Start: 2024-12-11 | End: 2024-12-11

## 2024-12-11 RX ADMIN — LIDOCAINE HYDROCHLORIDE 30 MG: 20 INJECTION INTRAVENOUS at 11:12

## 2024-12-11 RX ADMIN — PROPOFOL 10 MG: 10 INJECTION, EMULSION INTRAVENOUS at 11:12

## 2024-12-11 RX ADMIN — PROPOFOL 125 MCG/KG/MIN: 10 INJECTION, EMULSION INTRAVENOUS at 11:12

## 2024-12-11 RX ADMIN — PROPOFOL 20 MG: 10 INJECTION, EMULSION INTRAVENOUS at 11:12

## 2024-12-11 RX ADMIN — PROPOFOL 30 MG: 10 INJECTION, EMULSION INTRAVENOUS at 11:12

## 2024-12-11 NOTE — ANESTHESIA POSTPROCEDURE EVALUATION
Anesthesia Post Evaluation    Patient: Krysta Kam    Procedure(s) Performed: Procedure(s) (LRB):  EGD (ESOPHAGOGASTRODUODENOSCOPY) (N/A)    Final Anesthesia Type: general      Patient location during evaluation: GI PACU  Patient participation: Yes- Able to Participate  Level of consciousness: awake and alert and oriented  Post-procedure vital signs: reviewed and stable  Pain management: adequate  Airway patency: patent    PONV status at discharge: No PONV  Anesthetic complications: no      Cardiovascular status: blood pressure returned to baseline and hemodynamically stable  Respiratory status: unassisted, spontaneous ventilation and room air  Hydration status: euvolemic  Follow-up not needed.              Vitals Value Taken Time   /70 12/11/24 1220   Temp 36.7 °C (98.1 °F) 12/11/24 1152   Pulse 61 12/11/24 1220   Resp 17 12/11/24 1220   SpO2 97 % 12/11/24 1220         Event Time   Out of Recovery 12:21:25         Pain/Marito Score: Marito Score: 9 (12/11/2024 11:55 AM)

## 2024-12-11 NOTE — TRANSFER OF CARE
Anesthesia Transfer of Care Note    Patient: Krysta Kam    Procedure(s) Performed: Procedure(s) (LRB):  EGD (ESOPHAGOGASTRODUODENOSCOPY) (N/A)    Patient location: PACU    Anesthesia Type: general    Transport from OR: Transported from OR on room air with adequate spontaneous ventilation    Post pain: adequate analgesia    Post assessment: no apparent anesthetic complications and tolerated procedure well    Post vital signs: stable    Level of consciousness: awake, alert and oriented    Nausea/Vomiting: no nausea/vomiting    Complications: none    Transfer of care protocol was followed      Last vitals: Visit Vitals  BP (!) 192/79 (BP Location: Left arm, Patient Position: Lying)   Pulse 76   Temp 36.6 °C (97.9 °F) (Temporal)   Resp 16   Ht 5' (1.524 m)   Wt 53.5 kg (118 lb)   SpO2 100%   Breastfeeding No   BMI 23.05 kg/m²

## 2024-12-11 NOTE — PROVATION PATIENT INSTRUCTIONS
Discharge Summary/Instructions after an Endoscopic Procedure  Patient Name: Krysta Kam  Patient MRN: [id number]  Patient YOB: 1945 Wednesday, December 11, 2024  Jonathon Marin MD  Dear patient,  As a result of recent federal legislation (The Federal Cures Act), you may   receive lab or pathology results from your procedure in your [facility]   account before your physician is able to contact you. Your physician or   their representative will relay the results to you with their   recommendations at their soonest availability.  Thank you,  RESTRICTIONS:  During your procedure today, you received medications for sedation.  These   medications may affect your judgment, balance and coordination.  Therefore,   for 24 hours, you have the following restrictions:   - DO NOT drive a car, operate machinery, make legal/financial decisions,   sign important papers or drink alcohol.    ACTIVITY:  Today: no heavy lifting, straining or running due to procedural   sedation/anesthesia.  The following day: return to full activity including work.  DIET:  Eat and drink normally unless instructed otherwise.     TREATMENT FOR COMMON SIDE EFFECTS:  - Mild abdominal pain, nausea, belching, bloating or excessive gas:  rest,   eat lightly and use a heating pad.  - Sore Throat: treat with throat lozenges and/or gargle with warm salt   water.  - Because air was used during the procedure, expelling large amounts of air   from your rectum or belching is normal.  - If a bowel prep was taken, you may not have a bowel movement for 1-3 days.    This is normal.  SYMPTOMS TO WATCH FOR AND REPORT TO YOUR PHYSICIAN:  1. Abdominal pain or bloating, other than gas cramps.  2. Chest pain.  3. Back pain.  4. Signs of infection such as: chills or fever occurring within 24 hours   after the procedure.  5. Rectal bleeding, which would show as bright red, maroon, or black stools.   (A tablespoon of blood from the rectum is not serious, especially  if   hemorrhoids are present.)  6. Vomiting.  7. Weakness or dizziness.  GO DIRECTLY TO THE NEAREST EMERGENCY ROOM IF YOU HAVE ANY OF THE FOLLOWING:      Difficulty breathing              Chills and/or fever over 101 F   Persistent vomiting and/or vomiting blood   Severe abdominal pain   Severe chest pain   Black, tarry stools   Bleeding- more than one tablespoon   Any other symptom or condition that you feel may need urgent attention  Your doctor recommends these additional instructions:  If any biopsies were taken, your doctors clinic will contact you in 1 to 2   weeks with any results.  - Discharge patient to home.   - Await pathology results.   - Telephone endoscopist for pathology results in 3 weeks.   - Repeat upper endoscopy in 3 - 5 years for surveillance based on pathology   results.   - Return to primary care physician.   - The findings and recommendations were discussed with the patient.  For questions, problems or results please call your physician - Jonathon Marin MD at Work:  [phone].  [facility], EMERGENCY ROOM PHONE NUMBER: [phone]  IF A COMPLICATION OR EMERGENCY SITUATION ARISES AND YOU ARE UNABLE TO REACH   YOUR PHYSICIAN - GO DIRECTLY TO THE EMERGENCY ROOM.  Jonathon Marin MD  12/11/2024 11:44:01 AM  This report has been verified and signed electronically.  Dear patient,  As a result of recent federal legislation (The Federal Cures Act), you may   receive lab or pathology results from your procedure in your [facility]   account before your physician is able to contact you. Your physician or   their representative will relay the results to you with their   recommendations at their soonest availability.  Thank you,  PROVATION

## 2024-12-11 NOTE — ANESTHESIA PREPROCEDURE EVALUATION
12/11/2024  Krysta Kam is a 78 y.o., female.        Patient Name: Krysta Kam  YOB: 1945  MRN: [id number]  [facility]: [id number]      Code Status: Prior   Date of Procedure: 12/11/2024  Anesthesia: Choice Procedure: Procedure(s) (LRB):  EGD (ESOPHAGOGASTRODUODENOSCOPY) (N/A)  Pre-Operative Diagnosis: Gastric intestinal metaplasia [K31.A0]  Proceduralist: Surgeons and Role:     * Jonathon Marin MD - Primary        SUBJECTIVE:   Krysta Kam is a 78 y.o. female who  has a past medical history of Anemia (6/2012), Anxiety, Cataract, Colon polyps (1/31/2017), Depression, Diabetes mellitus without complication (11/14/2018), Diabetes mellitus, type 2, Fibromyalgia, GERD (gastroesophageal reflux disease), High cholesterol, and Hypertension. No notes on file    ALLERGIES:     Review of patient's allergies indicates:   Allergen Reactions    Ondansetron      Other reaction(s): Flushing (Skin)    Savella [milnacipran] Nausea Only     MEDICATIONS:     Current Facility-Administered Medications   Medication Dose Route Frequency Provider Last Rate Last Admin    0.9% NaCl infusion   Intravenous Continuous Jonathon Marin MD              History:     Patient Active Problem List   Diagnosis    Fibromyalgia    Peripheral neuropathy    Muscle spasm    DJD (degenerative joint disease) of knee    Insomnia    Hypertension    High cholesterol    TMJ disease    History of depression    Senile cataracts of both eyes    Total body pain    Diabetes mellitus without complication    Anxiety    Keratoconjunctivitis sicca, not specified as Sjogren's, bilateral    Nuclear sclerotic cataract of both eyes    Hypertensive retinopathy of both eyes    Type 2 diabetes mellitus with diabetic cataract, without long-term current use of insulin    Normocytic hypochromic anemia    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    Right knee pain    Impaired functional mobility, balance, gait, and endurance    Decreased strength of lower extremity     Past Medical History:   Diagnosis Date    Anemia 6/2012    Anxiety     Cataract     Colon polyps 1/31/2017    Depression     Diabetes mellitus without complication 11/14/2018    Diabetes mellitus, type 2     Fibromyalgia     GERD (gastroesophageal reflux disease)     High cholesterol     Hypertension      Surgical History:    has a past surgical history that includes Hysterectomy; Colonoscopy (N/A, 10/18/2016); Esophagogastroduodenoscopy (N/A, 10/08/2019); Colonoscopy (N/A, 10/08/2019); Knee Arthroplasty (Right, 09/16/2020); Breast biopsy (1989); Breast biopsy (Left); Anorectal manometry (N/A, 10/25/2023); Colonoscopy (N/A, 10/25/2023); and Esophagogastroduodenoscopy (N/A, 3/15/2024).   Social History:    reports that she is not currently sexually active.  reports that she quit smoking about 49 years ago. Her smoking use included cigarettes. She has never used smokeless tobacco. She reports that she does not drink alcohol and does not use drugs.       Pre-op Assessment    I have reviewed the Patient Summary Reports.     I have reviewed the Nursing Notes. I have reviewed the NPO Status.   I have reviewed the Medications.     Review of Systems  Hematology/Oncology:  Hematology Normal   Oncology Normal                                   EENT/Dental:  EENT/Dental Normal           Cardiovascular:     Hypertension                                          Pulmonary:  Pulmonary Normal                       Renal/:  Renal/ Normal                 Hepatic/GI:     GERD                Musculoskeletal:  Arthritis               Neurological:    Neuromuscular Disease,                                   Endocrine:  Diabetes           Dermatological:  Skin Normal    Psych:  Psychiatric History                Physical Exam  General: Well nourished, Alert and  Oriented    Airway:  Mallampati: II   Mouth Opening: Normal  TM Distance: Normal  Tongue: Normal  Neck ROM: Normal ROM    Dental:  Dentures      Anesthesia Plan  Type of Anesthesia, risks & benefits discussed:    Anesthesia Type: Gen Natural Airway  Intra-op Monitoring Plan: Standard ASA Monitors  Induction:  IV  Informed Consent: Informed consent signed with the Patient and all parties understand the risks and agree with anesthesia plan.  All questions answered.   ASA Score: 3  Day of Surgery Review of History & Physical: H&P Update referred to the surgeon/provider.    Ready For Surgery From Anesthesia Perspective.   .

## 2024-12-11 NOTE — H&P
[facility]  History & Physical    Subjective:      Chief Complaint/Reason for Admission:     EGD for Diagnoses  Gastric intestinal metaplasia [K31.A0]     Krysta Kam is a 78 y.o. female.    Past Medical History:   Diagnosis Date    Anemia 6/2012    Anxiety     Cataract     Colon polyps 1/31/2017    Depression     Diabetes mellitus without complication 11/14/2018    Diabetes mellitus, type 2     Fibromyalgia     GERD (gastroesophageal reflux disease)     High cholesterol     Hypertension      Past Surgical History:   Procedure Laterality Date    ANORECTAL MANOMETRY N/A 10/25/2023    Procedure: MANOMETRY, ANORECTAL;  Surgeon: Christoph Roche MD;  [address]  Service: Endoscopy;  Laterality: N/A;  Peg prep extended  prep instructions given to pt in [facility] and [place] -  time frame 5-12 weeks  10/18-precall complete-MS    BREAST BIOPSY  1989    BREAST BIOPSY Left     benign    COLONOSCOPY N/A 10/18/2016    Procedure: COLONOSCOPY;  Surgeon: Brittni Edmondson MD;  Location: [address]);  Service: Endoscopy;  Laterality: N/A;    COLONOSCOPY N/A 10/08/2019    Procedure: COLONOSCOPY;  Surgeon: Gold Ravi MD;  [address]  Service: Endoscopy;  Laterality: N/A;    COLONOSCOPY N/A 10/25/2023    Procedure: COLONOSCOPY;  Surgeon: Armando Cardenas MD;  Location: [address]);  Service: Endoscopy;  Laterality: N/A;    ESOPHAGOGASTRODUODENOSCOPY N/A 10/08/2019    Procedure: EGD (ESOPHAGOGASTRODUODENOSCOPY);  Surgeon: Gold Ravi MD;  Location: [address]);  Service: Endoscopy;  Laterality: N/A;  Okay for any provider due to KARSTEN    ESOPHAGOGASTRODUODENOSCOPY N/A 3/15/2024    Procedure: EGD (ESOPHAGOGASTRODUODENOSCOPY);  Surgeon: Jonathon Marin MD;  [address]  Service: Endoscopy;  Laterality: N/A;  Tania liz instructions sent to pt via portal  2/28-precall compete-MS   pt r/s 2/2 weather to 3.15.24  massiel pt, instrctions sent to pt portal.cf  3/11-precall complete-MS    HYSTERECTOMY      KNEE ARTHROPLASTY Right 2020    Procedure: ARTHROPLASTY, KNEE: DEPUY-ATTUNE;  Surgeon: Tha Wright III, MD;  Location: [facility];  Service: Orthopedics;  Laterality: Right;     Social History     Tobacco Use    Smoking status: Former     Current packs/day: 0.00     Types: Cigarettes     Quit date: 1975     Years since quittin.9    Smokeless tobacco: Never    Tobacco comments:     occasional social smoker   Substance Use Topics    Alcohol use: No    Drug use: No       PTA Medications   Medication Sig    linaCLOtide (LINZESS) 145 mcg Cap capsule Take 1 capsule (145 mcg total) by mouth before breakfast.    losartan (COZAAR) 100 MG tablet Take 1 tablet (100 mg total) by mouth once daily.    acetaminophen (TYLENOL) 650 MG TbSR Take 1 tablet (650 mg total) by mouth every 8 (eight) hours as needed.    amLODIPine (NORVASC) 10 MG tablet TAKE 1 TABLET BY MOUTH ONCE  DAILY    ascorbic acid, vitamin C, (VITAMIN C) 500 MG tablet Take 500 mg by mouth once daily.    aspirin (ECOTRIN) 81 MG EC tablet Take 1 tablet (81 mg total) by mouth 2 (two) times daily.    atorvastatin (LIPITOR) 40 MG tablet Take 1 tablet (40 mg total) by mouth once daily.    blood pressure monitor (BLOOD PRESSURE KIT) Kit One blood pressure monitoring device (arm cuff)    calcium carbonate-vit D3-min (CALTRATE 600+D PLUS MINERALS) 600 mg calcium- 400 unit Tab Take 1 tablet by mouth once daily.    chlorthalidone (HYGROTEN) 25 MG Tab Take 1 tablet (25 mg total) by mouth once daily.    cinnamon bark (CINNAMON ORAL) Take by mouth.    cycloSPORINE (RESTASIS) 0.05 % ophthalmic emulsion INSTILL 1 DROP INTO BOTH EYES TWICE DAILY    diclofenac sodium (VOLTAREN) 1 % Gel Apply topically once daily.    DULoxetine (CYMBALTA) 30 MG capsule Take 1 capsule (30 mg total) by mouth once daily.    estradioL (ESTRACE) 0.01 % (0.1 mg/gram) vaginal cream Place 1 g  vaginally once daily.    etodolac (LODINE) 400 MG tablet Take 1 tablet (400 mg total) by mouth 2 (two) times daily.    FLAXSEED OIL ORAL Take by mouth.    metoprolol succinate (TOPROL-XL) 25 MG 24 hr tablet Take 1 tablet (25 mg total) by mouth once daily.    multivit-min/iron/folic/lutein (CENTRUM SILVER WOMEN ORAL) Take 1 tablet by mouth once daily.    omeprazole (PRILOSEC) 40 MG capsule Take 1 capsule (40 mg total) by mouth once daily.    polyethylene glycol (COLYTE) 240-22.72-6.72 -5.84 gram SolR Take as directed bt provider office    prednisoLONE acetate (PRED FORTE) 1 % DrpS Place 1 drop into both eyes 4 (four) times daily.    pregabalin (LYRICA) 25 MG capsule Take 1 capsule (25 mg total) by mouth 2 (two) times daily.    TURMERIC ORAL Take by mouth.    vitamin D (VITAMIN D3) 1000 units Tab Take 1,000 Units by mouth once daily.     Review of patient's allergies indicates:   Allergen Reactions    Ondansetron      Other reaction(s): Flushing (Skin)    Savella [milnacipran] Nausea Only        Review of Systems   Constitutional:  Negative for fever.       Objective:      Vital Signs (Most Recent)  Temp: 97.9 °F (36.6 °C) (12/11/24 0931)  Pulse: 76 (12/11/24 0931)  Resp: 16 (12/11/24 0931)  BP: (!) 192/79 (12/11/24 0931)  SpO2: 100 % (12/11/24 0931)    Vital Signs Range (Last 24H):  Temp:  [97.9 °F (36.6 °C)]   Pulse:  [76]   Resp:  [16]   BP: (192)/(79)   SpO2:  [100 %]     Physical Exam  Cardiovascular:      Rate and Rhythm: Normal rate.   Pulmonary:      Effort: Pulmonary effort is normal.   Neurological:      Mental Status: She is alert and oriented to person, place, and time.           Assessment:      EGD for Diagnoses  Gastric intestinal metaplasia [K31.A0]       Plan:      EGD for Diagnoses  Gastric intestinal metaplasia [K31.A0]

## 2024-12-14 LAB
FINAL PATHOLOGIC DIAGNOSIS: NORMAL
GROSS: NORMAL
Lab: NORMAL
MICROSCOPIC EXAM: NORMAL

## 2024-12-21 ENCOUNTER — PATIENT MESSAGE (OUTPATIENT)
Dept: GASTROENTEROLOGY | Facility: CLINIC | Age: 79
End: 2024-12-21
Payer: MEDICARE

## 2024-12-21 NOTE — PROGRESS NOTES
Krysta Arroyo your EGD pathology was benign no H pylori no intestinal metaplasia no dysplasia    Recommend your next surveillance EGD in 5 years.    Final Pathologic Diagnosis 1. Stomach, antrum, greater curvature, biopsy:  - Antral-type mucosa showing mild chronic inactive gastritis with features of reactive gastropathy  - No Helicobacter pylori identified by immunohistochemistry  - Negative for intestinal metaplasia or dysplasia    2. Stomach, antrum, lesser curvature, biopsy:  - Antral/oxyntic transitional-type mucosa showing mild chronic inactive gastritis with features of reactive gastropathy  - No Helicobacter pylori organisms identified on routine H&E sections  - Negative for intestinal metaplasia or dysplasia    3. Stomach, incisura angularis, biopsy:    - Antral/oxyntic transitional-type mucosa showing mild chronic inactive gastritis with features of reactive gastropathy  - No Helicobacter pylori organisms identified on routine H&E sections  - Negative for intestinal metaplasia or dysplasia    4. Stomach, corpus, lesser curvature, biopsy:  - Oxyntic-type mucosa showing mild chronic inactive gastritis with features of reactive gastropathy  - No Helicobacter pylori organisms identified on routine H&E sections  - Negative for intestinal metaplasia or dysplasia    5. Stomach, corpus, greater curvature, biopsy:  - Oxyntic-type mucosa showing mild chronic inactive gastritis  - No Helicobacter pylori organisms identified on routine H&E sections  - Negative for intestinal metaplasia or dysplasia  Comment: Interp By Coy Salas M.D., Signed on 12/13/2024 at 23:46  Microscopic Exam Immunohistochemical stains are performed with appropriate and reactive controls.

## 2025-01-13 DIAGNOSIS — M54.30 SCIATICA, UNSPECIFIED LATERALITY: ICD-10-CM

## 2025-01-13 NOTE — TELEPHONE ENCOUNTER
Care Due:                  Date            Visit Type   Department     Provider  --------------------------------------------------------------------------------                                EP -                              PRIMARY      NOM INTERNAL  Last Visit: 09-      CARE ([facility])   CAREN Palafox                              EP -                              PRIMARY      NOM INTERNAL  Next Visit: 03-      CARE ([facility])   [facility]       Gold Palafox                                                            Last  Test          Frequency    Reason                     Performed    Due Date  --------------------------------------------------------------------------------    Lipid Panel.  12 months..  atorvastatin.............  03- 03-    [facility] Embedded Care Due Messages. Reference number: [id number].   1/13/2025 2:07:17 PM CST

## 2025-01-14 NOTE — TELEPHONE ENCOUNTER
Refill Routing Note   Medication(s) are not appropriate for processing by [facility] for the following reason(s):        Outside of protocol    [facility] action(s):  Route        Medication Therapy Plan: FLOS      Appointments  past 12m or future 3m with PCP    Date Provider   Last Visit   9/23/2024 Gold Palafox Jr., MD   Next Visit   3/24/2025 Gold Palafox Jr., MD   ED visits in past 90 days: 0        Note composed:6:23 PM 01/13/2025

## 2025-01-15 RX ORDER — PREGABALIN 25 MG/1
25 CAPSULE ORAL 2 TIMES DAILY
Qty: 60 CAPSULE | Refills: 2 | Status: SHIPPED | OUTPATIENT
Start: 2025-01-15

## 2025-02-13 ENCOUNTER — HOSPITAL ENCOUNTER (OUTPATIENT)
Dept: RADIOLOGY | Facility: HOSPITAL | Age: 80
Discharge: HOME | End: 2025-02-13
Attending: INTERNAL MEDICINE
Payer: MEDICARE

## 2025-02-13 DIAGNOSIS — Z12.31 OTHER SCREENING MAMMOGRAM: ICD-10-CM

## 2025-02-13 PROCEDURE — 77067 SCR MAMMO BI INCL CAD: CPT | Mod: TC

## 2025-02-14 ENCOUNTER — HOSPITAL ENCOUNTER (EMERGENCY)
Facility: HOSPITAL | Age: 80
Discharge: HOME | End: 2025-02-14
Attending: EMERGENCY MEDICINE
Payer: MEDICARE

## 2025-02-14 VITALS
SYSTOLIC BLOOD PRESSURE: 152 MMHG | OXYGEN SATURATION: 97 % | BODY MASS INDEX: 23.56 KG/M2 | DIASTOLIC BLOOD PRESSURE: 68 MMHG | TEMPERATURE: 98 F | WEIGHT: 120 LBS | RESPIRATION RATE: 16 BRPM | HEART RATE: 65 BPM | HEIGHT: 60 IN

## 2025-02-14 DIAGNOSIS — M79.651 RIGHT THIGH PAIN: Primary | ICD-10-CM

## 2025-02-14 LAB
ALBUMIN SERPL BCP-MCNC: 3.8 G/DL (ref 3.5–5.2)
ALP SERPL-CCNC: 74 U/L (ref 40–150)
ALT SERPL W/O P-5'-P-CCNC: 31 U/L (ref 10–44)
ANION GAP SERPL CALC-SCNC: 9 MMOL/L (ref 8–16)
ANISOCYTOSIS BLD QL SMEAR: SLIGHT
AST SERPL-CCNC: 38 U/L (ref 10–40)
BASOPHILS # BLD AUTO: 0.04 K/UL (ref 0–0.2)
BASOPHILS NFR BLD: 0.6 % (ref 0–1.9)
BILIRUB SERPL-MCNC: 0.7 MG/DL (ref 0.1–1)
BUN SERPL-MCNC: 12 MG/DL (ref 8–23)
CALCIUM SERPL-MCNC: 9.9 MG/DL (ref 8.7–10.5)
CHLORIDE SERPL-SCNC: 102 MMOL/L (ref 95–110)
CO2 SERPL-SCNC: 27 MMOL/L (ref 23–29)
CREAT SERPL-MCNC: 0.8 MG/DL (ref 0.5–1.4)
DIFFERENTIAL METHOD BLD: ABNORMAL
EOSINOPHIL # BLD AUTO: 0.1 K/UL (ref 0–0.5)
EOSINOPHIL NFR BLD: 1.1 % (ref 0–8)
ERYTHROCYTE [DISTWIDTH] IN BLOOD BY AUTOMATED COUNT: 13.9 % (ref 11.5–14.5)
EST. GFR  (NO RACE VARIABLE): >60 ML/MIN/1.73 M^2
GIANT PLATELETS BLD QL SMEAR: PRESENT
GLUCOSE SERPL-MCNC: 156 MG/DL (ref 70–110)
HCT VFR BLD AUTO: 41.8 % (ref 37–48.5)
HGB BLD-MCNC: 13.6 G/DL (ref 12–16)
IMM GRANULOCYTES # BLD AUTO: 0.01 K/UL (ref 0–0.04)
IMM GRANULOCYTES NFR BLD AUTO: 0.1 % (ref 0–0.5)
LYMPHOCYTES # BLD AUTO: 1.2 K/UL (ref 1–4.8)
LYMPHOCYTES NFR BLD: 16.7 % (ref 18–48)
MCH RBC QN AUTO: 26.9 PG (ref 27–31)
MCHC RBC AUTO-ENTMCNC: 32.5 G/DL (ref 32–36)
MCV RBC AUTO: 83 FL (ref 82–98)
MONOCYTES # BLD AUTO: 0.4 K/UL (ref 0.3–1)
MONOCYTES NFR BLD: 5.4 % (ref 4–15)
NEUTROPHILS # BLD AUTO: 5.5 K/UL (ref 1.8–7.7)
NEUTROPHILS NFR BLD: 76.1 % (ref 38–73)
NRBC BLD-RTO: 0 /100 WBC
OVALOCYTES BLD QL SMEAR: ABNORMAL
PLATELET # BLD AUTO: 189 K/UL (ref 150–450)
PMV BLD AUTO: 12.9 FL (ref 9.2–12.9)
POIKILOCYTOSIS BLD QL SMEAR: SLIGHT
POLYCHROMASIA BLD QL SMEAR: ABNORMAL
POTASSIUM SERPL-SCNC: 4 MMOL/L (ref 3.5–5.1)
PROT SERPL-MCNC: 7.7 G/DL (ref 6–8.4)
RBC # BLD AUTO: 5.06 M/UL (ref 4–5.4)
SODIUM SERPL-SCNC: 138 MMOL/L (ref 136–145)
WBC # BLD AUTO: 7.25 K/UL (ref 3.9–12.7)

## 2025-02-14 PROCEDURE — 99284 EMERGENCY DEPT VISIT MOD MDM: CPT | Mod: 25

## 2025-02-14 PROCEDURE — 25000003 PHARM REV CODE 250

## 2025-02-14 PROCEDURE — 63600175 PHARM REV CODE 636 W HCPCS

## 2025-02-14 PROCEDURE — 80053 COMPREHEN METABOLIC PANEL: CPT

## 2025-02-14 PROCEDURE — 85025 COMPLETE CBC W/AUTO DIFF WBC: CPT

## 2025-02-14 PROCEDURE — 96375 TX/PRO/DX INJ NEW DRUG ADDON: CPT

## 2025-02-14 PROCEDURE — 96374 THER/PROPH/DIAG INJ IV PUSH: CPT

## 2025-02-14 RX ORDER — OXYCODONE AND ACETAMINOPHEN 5; 325 MG/1; MG/1
1 TABLET ORAL EVERY 6 HOURS PRN
Qty: 12 TABLET | Refills: 0 | Status: SHIPPED | OUTPATIENT
Start: 2025-02-14 | End: 2025-02-14

## 2025-02-14 RX ORDER — OXYCODONE AND ACETAMINOPHEN 5; 325 MG/1; MG/1
1 TABLET ORAL EVERY 6 HOURS PRN
Qty: 12 TABLET | Refills: 0 | Status: SHIPPED | OUTPATIENT
Start: 2025-02-14 | End: 2025-02-21

## 2025-02-14 RX ORDER — KETOROLAC TROMETHAMINE 30 MG/ML
10 INJECTION, SOLUTION INTRAMUSCULAR; INTRAVENOUS
Status: COMPLETED | OUTPATIENT
Start: 2025-02-14 | End: 2025-02-14

## 2025-02-14 RX ORDER — METOCLOPRAMIDE HYDROCHLORIDE 5 MG/ML
10 INJECTION INTRAMUSCULAR; INTRAVENOUS
Status: COMPLETED | OUTPATIENT
Start: 2025-02-14 | End: 2025-02-14

## 2025-02-14 RX ORDER — METHOCARBAMOL 500 MG/1
500 TABLET, FILM COATED ORAL 4 TIMES DAILY
Qty: 40 TABLET | Refills: 0 | Status: SHIPPED | OUTPATIENT
Start: 2025-02-14 | End: 2025-02-21

## 2025-02-14 RX ORDER — OXYCODONE AND ACETAMINOPHEN 5; 325 MG/1; MG/1
1 TABLET ORAL
Status: COMPLETED | OUTPATIENT
Start: 2025-02-14 | End: 2025-02-14

## 2025-02-14 RX ORDER — ACETAMINOPHEN 325 MG/1
650 TABLET ORAL
Status: COMPLETED | OUTPATIENT
Start: 2025-02-14 | End: 2025-02-14

## 2025-02-14 RX ORDER — PREGABALIN 25 MG/1
25 CAPSULE ORAL
Status: DISCONTINUED | OUTPATIENT
Start: 2025-02-14 | End: 2025-02-14

## 2025-02-14 RX ADMIN — KETOROLAC TROMETHAMINE 10 MG: 30 INJECTION, SOLUTION INTRAMUSCULAR; INTRAVENOUS at 09:02

## 2025-02-14 RX ADMIN — ACETAMINOPHEN 650 MG: 325 TABLET ORAL at 09:02

## 2025-02-14 RX ADMIN — OXYCODONE HYDROCHLORIDE AND ACETAMINOPHEN 1 TABLET: 5; 325 TABLET ORAL at 09:02

## 2025-02-14 RX ADMIN — METOCLOPRAMIDE 10 MG: 5 INJECTION, SOLUTION INTRAMUSCULAR; INTRAVENOUS at 10:02

## 2025-02-14 NOTE — DISCHARGE INSTRUCTIONS
Please follow up with the your pain management doctor regarding your right-sided sciatic pain.  Please return to the emergency department if you become incontinent of urine or feces, are not able to walk, develop fever chills, or any other symptoms that concern you.

## 2025-02-14 NOTE — ED PROVIDER NOTES
Source of History:  Patient and chart    Chief complaint:  Leg Pain (R leg pain started few days ago down to ankle)      HPI:  Krysta Kam is a 79 y.o. female with a medical history as below presents to the emergency department with a chief complaint of right thigh pain.  Patient has a known history of knee in the right side.  For this she gets physical therapy on a regular basis.  I have physical therapy 2 days ago patient noticed some worsening right thigh pain and hip pain after her physical therapy session.  She denies any known acute traumatic incident.  She has been ambulatory since the event, but it has become increasingly difficult secondary to pain.  Patient denies any shortness of breath, fever, chills, or other signs infectious joint.  She denies any trauma to the joint.  She has no history of blood clots.  She has no further concerns at this time.    Review of patient's allergies indicates:   Allergen Reactions    Ondansetron      Other reaction(s): Flushing (Skin)    Savella [milnacipran] Nausea Only       No current facility-administered medications on file prior to encounter.     Current Outpatient Medications on File Prior to Encounter   Medication Sig Dispense Refill    acetaminophen (TYLENOL) 650 MG TbSR Take 1 tablet (650 mg total) by mouth every 8 (eight) hours as needed. 120 tablet 0    amLODIPine (NORVASC) 10 MG tablet TAKE 1 TABLET BY MOUTH ONCE  DAILY 90 tablet 3    ascorbic acid, vitamin C, (VITAMIN C) 500 MG tablet Take 500 mg by mouth once daily.      aspirin (ECOTRIN) 81 MG EC tablet Take 1 tablet (81 mg total) by mouth 2 (two) times daily. 60 tablet 0    atorvastatin (LIPITOR) 40 MG tablet Take 1 tablet (40 mg total) by mouth once daily. 90 tablet 3    blood pressure monitor (BLOOD PRESSURE KIT) Kit One blood pressure monitoring device (arm cuff) 1 each 0    calcium carbonate-vit D3-min (CALTRATE 600+D PLUS MINERALS) 600 mg calcium- 400 unit Tab Take 1 tablet by mouth once  daily. 60 tablet 11    chlorthalidone (HYGROTEN) 25 MG Tab Take 1 tablet (25 mg total) by mouth once daily. 90 tablet 3    cinnamon bark (CINNAMON ORAL) Take by mouth.      cycloSPORINE (RESTASIS) 0.05 % ophthalmic emulsion INSTILL 1 DROP INTO BOTH EYES TWICE DAILY 180 each 3    diclofenac sodium (VOLTAREN) 1 % Gel Apply topically once daily. 200 g 3    DULoxetine (CYMBALTA) 30 MG capsule Take 1 capsule (30 mg total) by mouth once daily. 30 capsule 11    estradioL (ESTRACE) 0.01 % (0.1 mg/gram) vaginal cream Place 1 g vaginally once daily. 45 g 12    etodolac (LODINE) 400 MG tablet Take 1 tablet (400 mg total) by mouth 2 (two) times daily. 60 tablet 2    FLAXSEED OIL ORAL Take by mouth.      linaCLOtide (LINZESS) 145 mcg Cap capsule Take 1 capsule (145 mcg total) by mouth before breakfast. 90 capsule 2    losartan (COZAAR) 100 MG tablet Take 1 tablet (100 mg total) by mouth once daily. 90 tablet 3    metoprolol succinate (TOPROL-XL) 25 MG 24 hr tablet Take 1 tablet (25 mg total) by mouth once daily. 30 tablet 11    multivit-min/iron/folic/lutein (CENTRUM SILVER WOMEN ORAL) Take 1 tablet by mouth once daily.      omeprazole (PRILOSEC) 40 MG capsule Take 1 capsule (40 mg total) by mouth once daily. 30 capsule 11    polyethylene glycol (COLYTE) 240-22.72-6.72 -5.84 gram SolR Take as directed bt provider office 8000 mL 0    prednisoLONE acetate (PRED FORTE) 1 % DrpS Place 1 drop into both eyes 4 (four) times daily. 5 mL 2    pregabalin (LYRICA) 25 MG capsule TAKE ONE CAPSULE BY MOUTH TWICE DAILY 60 capsule 2    TURMERIC ORAL Take by mouth.      vitamin D (VITAMIN D3) 1000 units Tab Take 1,000 Units by mouth once daily.         PMH:  As per HPI and below:  Past Medical History:   Diagnosis Date    Anemia 6/2012    Anxiety     Cataract     Colon polyps 1/31/2017    Depression     Diabetes mellitus without complication 11/14/2018    Diabetes mellitus, type 2     Fibromyalgia     GERD (gastroesophageal reflux disease)      High cholesterol     Hypertension      Past Surgical History:   Procedure Laterality Date    ANORECTAL MANOMETRY N/A 10/25/2023    Procedure: MANOMETRY, ANORECTAL;  Surgeon: Christoph Roche MD;  Location: [facility] ENDO (4TH FLR);  Service: Endoscopy;  Laterality: N/A;  Peg prep extended  prep instructions given to pt in [facility]  time frame 5-12 weeks  10/18-precall complete-MS    BREAST BIOPSY  1989    BREAST BIOPSY Left     benign    COLONOSCOPY N/A 10/18/2016    Procedure: COLONOSCOPY;  Surgeon: Brittni Edmondson MD;  Location: [facility] ENDO (4TH FLR);  Service: Endoscopy;  Laterality: N/A;    COLONOSCOPY N/A 10/08/2019    Procedure: COLONOSCOPY;  Surgeon: Gold Ravi MD;  Location: [facility] ENDO (4TH FLR);  Service: Endoscopy;  Laterality: N/A;    COLONOSCOPY N/A 10/25/2023    Procedure: COLONOSCOPY;  Surgeon: Armando Cardenas MD;  Location: [facility] ENDO (4TH FLR);  Service: Endoscopy;  Laterality: N/A;    ESOPHAGOGASTRODUODENOSCOPY N/A 10/08/2019    Procedure: EGD (ESOPHAGOGASTRODUODENOSCOPY);  Surgeon: Gold Ravi MD;  Location: [facility] (4TH FLR);  Service: Endoscopy;  Laterality: N/A;  Okay for any provider due to KARSTEN    ESOPHAGOGASTRODUODENOSCOPY N/A 3/15/2024    Procedure: EGD (ESOPHAGOGASTRODUODENOSCOPY);  Surgeon: Jonathon Marin MD;  Location: [facility] (4TH FLR);  Service: Endoscopy;  Laterality: N/A;  Tania  perp instructions sent to pt via portal  2/28-precall compete-MS   pt r/s 2/2 weather to 3.15.24 massiel pt, instrctions sent to pt portal.  3/11-precall complete-MS    ESOPHAGOGASTRODUODENOSCOPY N/A 12/11/2024    Procedure: EGD (ESOPHAGOGASTRODUODENOSCOPY);  Surgeon: Jonathon Marin MD;  Location: [facility] ENDO (4TH FLR);  Service: Endoscopy;  Laterality: N/A;  Refered by Tania Mackay, prep instructions sent to patient via portal.  12/4-pre call complete-tb    HYSTERECTOMY      KNEE ARTHROPLASTY Right 09/16/2020    Procedure: ARTHROPLASTY, KNEE: DEPUY-ATTUNE;  Surgeon: Tha GARZA  Kyle VIZCAINO MD;  Location: [facility];  Service: Orthopedics;  Laterality: Right;       Social History     Socioeconomic History    Marital status: Single    Number of children: 5   Tobacco Use    Smoking status: Former     Current packs/day: 0.00     Types: Cigarettes     Quit date: 1975     Years since quittin.1    Smokeless tobacco: Never    Tobacco comments:     occasional social smoker   Substance and Sexual Activity    Alcohol use: No    Drug use: No    Sexual activity: Not Currently   Social History Narrative    Retired      Social Drivers of Health     Financial Resource Strain: Low Risk  (2020)    Overall Financial Resource Strain (CARDIA)     Difficulty of Paying Living Expenses: Not hard at all   Food Insecurity: No Food Insecurity (2020)    Hunger Vital Sign     Worried About Running Out of Food in the Last Year: Never true     Ran Out of Food in the Last Year: Never true   Transportation Needs: No Transportation Needs (2020)    PRAPARE - Transportation     Lack of Transportation (Medical): No     Lack of Transportation (Non-Medical): No   Physical Activity: Insufficiently Active (2020)    Exercise Vital Sign     Days of Exercise per Week: 4 days     Minutes of Exercise per Session: 10 min   Stress: No Stress Concern Present (2020)    [employer] - Occupational Stress Questionnaire     Feeling of Stress : Only a little   Housing Stability: Low Risk  (2020)    Housing Stability Vital Sign     Unable to Pay for Housing in the Last Year: No     Number of Places Lived in the Last Year: 1     Unstable Housing in the Last Year: No       Family History   Problem Relation Name Age of Onset    Stroke Mother      Heart disease Father      No Known Problems Daughter      No Known Problems Son      No Known Problems Daughter      No Known Problems Daughter      No Known Problems Son      Cataracts Neg Hx      Cancer Neg Hx      Diabetes Neg Hx       Glaucoma Neg Hx      Retinal detachment Neg Hx      Celiac disease Neg Hx      Cirrhosis Neg Hx      Colon cancer Neg Hx      Cystic fibrosis Neg Hx      Esophageal cancer Neg Hx      Inflammatory bowel disease Neg Hx      Liver disease Neg Hx      Stomach cancer Neg Hx      Amblyopia Neg Hx      Blindness Neg Hx      Macular degeneration Neg Hx      Strabismus Neg Hx      Thyroid disease Neg Hx         Physical Exam:      Vitals:    02/14/25 1026   BP: (!) 152/68   Pulse: 65   Resp: 16   Temp: 97.5 °F (36.4 °C)     Physical Exam    Gen:  Stable in no acute distress.  Mental Status:    Alert and oriented x3.  Appropriate conversant  Skin: Warm, dry. No rashes seen.  Eyes: No conjunctival injection.  Pulm: No increased work of breathing.  No significant tachypnea.  No conversational dyspnea.    CV:  Normal rate  Abd: Soft.  Not distended.  Nontender.   MSK: No deformities.  Pulses intact distally.  No obvious fracture or dislocation.  No posterior calf or popliteal pain.  No sign of blood clots.  No sign of DVT.  No lower, rubor, erythema of the leg.  There midline spinal tenderness.    Neuro: Awake. Speech normal. No focal neuro deficit observed.      Procedures  Laboratory Studies:  Labs Reviewed   CBC W/ AUTO DIFFERENTIAL - Abnormal       Result Value    WBC 7.25      RBC 5.06      Hemoglobin 13.6      Hematocrit 41.8      MCV 83      MCH 26.9 (*)     MCHC 32.5      RDW 13.9      Platelets 189      MPV 12.9      Immature Granulocytes 0.1      Gran # (ANC) 5.5      Immature Grans (Abs) 0.01      Lymph # 1.2      Mono # 0.4      Eos # 0.1      Baso # 0.04      nRBC 0      Gran % 76.1 (*)     Lymph % 16.7 (*)     Mono % 5.4      Eosinophil % 1.1      Basophil % 0.6      Aniso Slight      Poik Slight      Poly Occasional      Ovalocytes Occasional      Large/Giant Platelets Present      Differential Method Automated     COMPREHENSIVE METABOLIC PANEL - Abnormal    Sodium 138      Potassium 4.0      Chloride 102       CO2 27      Glucose 156 (*)     BUN 12      Creatinine 0.8      Calcium 9.9      Total Protein 7.7      Albumin 3.8      Total Bilirubin 0.7      Alkaline Phosphatase 74      AST 38      ALT 31      eGFR >60.0      Anion Gap 9         Chart reviewed.  Patient has history chronic pain management.  She is being seen by physical therapy and pain management currently.  For right-sided leg pain    Imaging Results              X-Ray Lumbar Spine Ap And Lateral (Final result)  Result time 02/14/25 10:25:59      Final result by Rocco Lewis MD (02/14/25 10:25:59)                   Impression:      See above      Electronically signed by: Rocco Lewis MD  Date:    02/14/2025  Time:    10:25               Narrative:    EXAMINATION:  XR LUMBAR SPINE AP AND LATERAL    CLINICAL HISTORY:  radiating back pain;    TECHNIQUE:  AP, lateral and spot images were performed of the lumbar spine.    COMPARISON:  No 05/02/2016 ne    FINDINGS:  DJD with significant narrowing of the L4/L5 disc space.  There is a grade 1-2 L4/L5 anterolisthesis identified.  No acute fracture or dislocation.  No bone destruction identified.                                      Medications Given:  Medications   oxyCODONE-acetaminophen 5-325 mg per tablet 1 tablet (1 tablet Oral Given 2/14/25 0927)   acetaminophen tablet 650 mg (650 mg Oral Given 2/14/25 0927)   ketorolac injection 9.999 mg (9.999 mg Intravenous Given 2/14/25 0927)   metoclopramide injection 10 mg (10 mg Intravenous Given 2/14/25 1033)       ED Course as of 02/14/25 1054   Fri Feb 14, 2025   0943 X-Ray Lumbar Spine Ap And Lateral  Per my independent interpretation no sign of acute fracture or dislocation which would explain the pain. [VC]      ED Course User Index  [VC] Jermaine Chavarria MD           MDM:    79 y.o. female with right thigh pain    Differential includes but isn't limited to traumatic hip injury, femur injury, soft tissue injury, sciatic lumbar radiculopathy    Workup and imaging  consistent with L4-L5 degenerative disc disease likely with secondary sciatic pain.  This consistent with the patient's findings.  I have considered but think it is unlikely the patient is suffering from a blood clot.  She has a risk factors and no history of blood clots.  History and physical not consistent with a DVT.  I have considered but think it is unlikely the patient is suffering from a traumatic injury.  She has no history, that has no sign of traumatic injury on physical exam.  Pain control achieved with a multimodal approach.  Patient is ambulatory and tolerating food and fluids by mouth.  I have discharge the patient with return precautions.      Medical Decision Making  Amount and/or Complexity of Data Reviewed  Labs: ordered.  Radiology: ordered. Decision-making details documented in ED Course.    Risk  OTC drugs.  Prescription drug management.         Diagnostic Impression:    1. Right thigh pain         ED Disposition Condition    Discharge Stable          ED Prescriptions       Medication Sig Dispense Start Date End Date Auth. Provider    oxyCODONE-acetaminophen (PERCOCET) 5-325 mg per tablet Take 1 tablet by mouth every 6 (six) hours as needed for Pain. 12 tablet 2/14/2025 -- Jermaine Chavarria MD    methocarbamoL (ROBAXIN) 500 MG Tab Take 1 tablet (500 mg total) by mouth 4 (four) times daily. for 10 days 40 tablet 2/14/2025 2/24/2025 Jermaine Chavarria MD          Follow-up Information    None           Patient and/or family understands the plan and is in agreement, verbalized understanding, questions answered              Jermaine Chavarria MD  Resident  02/14/25 1039       Jermaine Chavarria MD  Resident  02/14/25 5693

## 2025-02-14 NOTE — ED TRIAGE NOTES
Krysta Arroyo Loren Kam, a 79 y.o. female presents to the ED w/ complaint of right leg pain x 2 days. Pt states pain starts in hip area and radiates down to foot. Pt denies any trauma to area but states that she had a knee replacement appx two years ago.     Triage note:  Chief Complaint   Patient presents with    Leg Pain     R leg pain started few days ago down to ankle     Review of patient's allergies indicates:   Allergen Reactions    Ondansetron      Other reaction(s): Flushing (Skin)    Savella [milnacipran] Nausea Only     Past Medical History:   Diagnosis Date    Anemia 6/2012    Anxiety     Cataract     Colon polyps 1/31/2017    Depression     Diabetes mellitus without complication 11/14/2018    Diabetes mellitus, type 2     Fibromyalgia     GERD (gastroesophageal reflux disease)     High cholesterol     Hypertension

## 2025-02-21 ENCOUNTER — OFFICE VISIT (OUTPATIENT)
Dept: INTERNAL MEDICINE | Facility: CLINIC | Age: 80
End: 2025-02-21
Payer: MEDICARE

## 2025-02-21 VITALS
SYSTOLIC BLOOD PRESSURE: 156 MMHG | WEIGHT: 125.88 LBS | BODY MASS INDEX: 24.71 KG/M2 | HEART RATE: 86 BPM | HEIGHT: 60 IN | DIASTOLIC BLOOD PRESSURE: 68 MMHG | OXYGEN SATURATION: 97 %

## 2025-02-21 DIAGNOSIS — M54.30 SCIATICA, UNSPECIFIED LATERALITY: ICD-10-CM

## 2025-02-21 DIAGNOSIS — M79.7 FIBROMYALGIA: ICD-10-CM

## 2025-02-21 DIAGNOSIS — I10 HYPERTENSION, UNSPECIFIED TYPE: ICD-10-CM

## 2025-02-21 DIAGNOSIS — E11.36 TYPE 2 DIABETES MELLITUS WITH DIABETIC CATARACT, WITHOUT LONG-TERM CURRENT USE OF INSULIN: Primary | ICD-10-CM

## 2025-02-21 DIAGNOSIS — M25.579 ANKLE PAIN, UNSPECIFIED CHRONICITY, UNSPECIFIED LATERALITY: ICD-10-CM

## 2025-02-21 RX ORDER — AMLODIPINE BESYLATE 10 MG/1
10 TABLET ORAL DAILY
Qty: 90 TABLET | Refills: 3 | Status: SHIPPED | OUTPATIENT
Start: 2025-02-21

## 2025-02-21 RX ORDER — KETOROLAC TROMETHAMINE 30 MG/ML
30 INJECTION, SOLUTION INTRAMUSCULAR; INTRAVENOUS
Status: COMPLETED | OUTPATIENT
Start: 2025-02-21 | End: 2025-02-21

## 2025-02-21 RX ORDER — METOPROLOL SUCCINATE 50 MG/1
50 TABLET, EXTENDED RELEASE ORAL DAILY
Qty: 90 TABLET | Refills: 3 | Status: SHIPPED | OUTPATIENT
Start: 2025-02-21

## 2025-02-21 RX ORDER — CHLORTHALIDONE 25 MG/1
25 TABLET ORAL DAILY
Qty: 90 TABLET | Refills: 3 | Status: SHIPPED | OUTPATIENT
Start: 2025-02-21

## 2025-02-21 RX ORDER — LOSARTAN POTASSIUM 100 MG/1
100 TABLET ORAL DAILY
Qty: 90 TABLET | Refills: 3 | Status: SHIPPED | OUTPATIENT
Start: 2025-02-21

## 2025-02-21 RX ADMIN — KETOROLAC TROMETHAMINE 30 MG: 30 INJECTION, SOLUTION INTRAMUSCULAR; INTRAVENOUS at 12:02
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
## 2025-02-24 ENCOUNTER — TELEPHONE (OUTPATIENT)
Dept: INTERNAL MEDICINE | Facility: CLINIC | Age: 80
End: 2025-02-24
Payer: MEDICARE

## 2025-02-24 NOTE — TELEPHONE ENCOUNTER
----- Message from Roxanne sent at 2/24/2025 11:54 AM CST -----  Contact: [phone]  [email or web address] Patient is calling for Medical Advice regarding:Patient would like a call back to discuss the medication tramadol she would like to start taking. Pharmacy name and phone#:[facility] #[id number] - [place] [address] [address] [facility] [address] Phone: [phone]: [phone] wants a call back or thru [facility]:callComments:Please callPlease advise patient replies from provider may take up to 48 hours.

## 2025-02-24 NOTE — TELEPHONE ENCOUNTER
Pt stated that she is experiencing pain in her legs down to her ankle and would like to know if you can send her in some tramadol because it helps & relieves the pain??    Digna MAZARIEGOS

## 2025-02-24 NOTE — TELEPHONE ENCOUNTER
Spoke with pt, informed her to f/u with ortho & pain medicine. She verbalized understanding.    Digna MAZARIEGOS

## 2025-02-28 ENCOUNTER — HOSPITAL ENCOUNTER (OUTPATIENT)
Dept: RADIOLOGY | Facility: HOSPITAL | Age: 80
Discharge: HOME | End: 2025-02-28
Attending: ORTHOPAEDIC SURGERY
Payer: MEDICARE

## 2025-02-28 ENCOUNTER — OFFICE VISIT (OUTPATIENT)
Dept: ORTHOPEDICS | Facility: CLINIC | Age: 80
End: 2025-02-28
Payer: MEDICARE

## 2025-02-28 VITALS — HEIGHT: 60 IN | WEIGHT: 125.88 LBS | BODY MASS INDEX: 24.71 KG/M2

## 2025-02-28 DIAGNOSIS — M25.579 ANKLE PAIN, UNSPECIFIED CHRONICITY, UNSPECIFIED LATERALITY: ICD-10-CM

## 2025-02-28 DIAGNOSIS — M25.571 CHRONIC PAIN OF RIGHT ANKLE: ICD-10-CM

## 2025-02-28 DIAGNOSIS — G89.29 CHRONIC PAIN OF RIGHT ANKLE: ICD-10-CM

## 2025-02-28 PROCEDURE — 73610 X-RAY EXAM OF ANKLE: CPT | Mod: TC,RT

## 2025-02-28 PROCEDURE — 73610 X-RAY EXAM OF ANKLE: CPT | Mod: 26,RT,, | Performed by: RADIOLOGY

## 2025-02-28 PROCEDURE — 99999 PR PBB SHADOW E&M-EST. PATIENT-LVL IV: CPT | Mod: PBBFAC,,, | Performed by: ORTHOPAEDIC SURGERY
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
## 2025-03-09 ENCOUNTER — HOSPITAL ENCOUNTER (OUTPATIENT)
Dept: RADIOLOGY | Facility: HOSPITAL | Age: 80
Discharge: HOME | End: 2025-03-09
Attending: ORTHOPAEDIC SURGERY
Payer: MEDICARE

## 2025-03-09 DIAGNOSIS — M25.571 CHRONIC PAIN OF RIGHT ANKLE: ICD-10-CM

## 2025-03-09 DIAGNOSIS — G89.29 CHRONIC PAIN OF RIGHT ANKLE: ICD-10-CM

## 2025-03-09 PROCEDURE — 73721 MRI JNT OF LWR EXTRE W/O DYE: CPT | Mod: TC,RT

## 2025-03-09 PROCEDURE — 73721 MRI JNT OF LWR EXTRE W/O DYE: CPT | Mod: 26,RT,, | Performed by: INTERNAL MEDICINE

## 2025-03-10 ENCOUNTER — OFFICE VISIT (OUTPATIENT)
Dept: ORTHOPEDICS | Facility: CLINIC | Age: 80
End: 2025-03-10
Payer: MEDICARE

## 2025-03-10 VITALS — HEIGHT: 60 IN | BODY MASS INDEX: 24.71 KG/M2 | WEIGHT: 125.88 LBS

## 2025-03-10 DIAGNOSIS — M25.571 CHRONIC PAIN OF RIGHT ANKLE: Primary | ICD-10-CM

## 2025-03-10 DIAGNOSIS — G89.29 CHRONIC PAIN OF RIGHT ANKLE: Primary | ICD-10-CM

## 2025-03-10 PROCEDURE — 1159F MED LIST DOCD IN RCRD: CPT | Mod: CPTII,93,, | Performed by: ORTHOPAEDIC SURGERY

## 2025-03-10 PROCEDURE — 1101F PT FALLS ASSESS-DOCD LE1/YR: CPT | Mod: CPTII,93,, | Performed by: ORTHOPAEDIC SURGERY

## 2025-03-10 PROCEDURE — 1125F AMNT PAIN NOTED PAIN PRSNT: CPT | Mod: CPTII,93,, | Performed by: ORTHOPAEDIC SURGERY

## 2025-03-10 PROCEDURE — 3288F FALL RISK ASSESSMENT DOCD: CPT | Mod: CPTII,93,, | Performed by: ORTHOPAEDIC SURGERY

## 2025-03-10 PROCEDURE — 99499 UNLISTED E&M SERVICE: CPT | Mod: 93,,, | Performed by: ORTHOPAEDIC SURGERY
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
## 2025-03-12 DIAGNOSIS — M79.7 FIBROMYALGIA: ICD-10-CM

## 2025-03-12 NOTE — TELEPHONE ENCOUNTER
No care due was identified.  [facility] Embedded Care Due Messages. Reference number: [id number].   3/12/2025 3:35:35 PM CDT

## 2025-03-13 RX ORDER — DULOXETIN HYDROCHLORIDE 30 MG/1
30 CAPSULE, DELAYED RELEASE ORAL
Qty: 90 CAPSULE | Refills: 3 | Status: SHIPPED | OUTPATIENT
Start: 2025-03-13

## 2025-03-13 NOTE — TELEPHONE ENCOUNTER
Refill Routing Note   Medication(s) are not appropriate for processing by [facility] for the following reason(s):        Required vitals abnormal    [facility] action(s):  Defer             Appointments  past 12m or future 3m with PCP    Date Provider   Last Visit   2/21/2025 Gold Palafox Jr., MD   Next Visit   3/21/2025 Godl Palafox Jr., MD   ED visits in past 90 days: 1        Note composed:9:53 PM 03/12/2025

## 2025-03-14 ENCOUNTER — TELEPHONE (OUTPATIENT)
Dept: ORTHOPEDICS | Facility: CLINIC | Age: 80
End: 2025-03-14
Payer: MEDICARE

## 2025-03-14 NOTE — TELEPHONE ENCOUNTER
Spoke to pt and was able to get her scheduled Cari 10, 2025 for 2:15 PM.    Sincerely,  Marbella Durham CMA   Certified Clinical Medical Assistant to Dr. Tha booker  Phone: [phone]  Fax: [phone]        ----- Message from Mauricio Tyson sent at 3/13/2025  2:08 PM CDT -----  Get patient scheduled with Dr. Wright.Sincerely,Marbella Durham CMA Certified Clinical Medical Assistant to Dr. Tha Wright lllPhone: [phone]: [phone]

## 2025-03-17 ENCOUNTER — LAB VISIT (OUTPATIENT)
Dept: LAB | Facility: HOSPITAL | Age: 80
End: 2025-03-17
Attending: INTERNAL MEDICINE
Payer: MEDICARE

## 2025-03-17 DIAGNOSIS — E11.36 TYPE 2 DIABETES MELLITUS WITH DIABETIC CATARACT, WITHOUT LONG-TERM CURRENT USE OF INSULIN: ICD-10-CM

## 2025-03-17 DIAGNOSIS — E78.00 HIGH CHOLESTEROL: ICD-10-CM

## 2025-03-17 LAB
ALBUMIN SERPL BCP-MCNC: 3.4 G/DL (ref 3.5–5.2)
ALP SERPL-CCNC: 68 U/L (ref 40–150)
ALT SERPL W/O P-5'-P-CCNC: 23 U/L (ref 10–44)
ANION GAP SERPL CALC-SCNC: 9 MMOL/L (ref 8–16)
AST SERPL-CCNC: 25 U/L (ref 10–40)
BILIRUB SERPL-MCNC: 0.4 MG/DL (ref 0.1–1)
BUN SERPL-MCNC: 17 MG/DL (ref 8–23)
CALCIUM SERPL-MCNC: 9.3 MG/DL (ref 8.7–10.5)
CHLORIDE SERPL-SCNC: 105 MMOL/L (ref 95–110)
CHOLEST SERPL-MCNC: 194 MG/DL (ref 120–199)
CHOLEST/HDLC SERPL: 3 {RATIO} (ref 2–5)
CO2 SERPL-SCNC: 26 MMOL/L (ref 23–29)
CREAT SERPL-MCNC: 0.8 MG/DL (ref 0.5–1.4)
EST. GFR  (NO RACE VARIABLE): >60 ML/MIN/1.73 M^2
GLUCOSE SERPL-MCNC: 104 MG/DL (ref 70–110)
HDLC SERPL-MCNC: 65 MG/DL (ref 40–75)
HDLC SERPL: 33.5 % (ref 20–50)
LDLC SERPL CALC-MCNC: 121.6 MG/DL (ref 63–159)
NONHDLC SERPL-MCNC: 129 MG/DL
POTASSIUM SERPL-SCNC: 4.6 MMOL/L (ref 3.5–5.1)
PROT SERPL-MCNC: 6.8 G/DL (ref 6–8.4)
SODIUM SERPL-SCNC: 140 MMOL/L (ref 136–145)
TRIGL SERPL-MCNC: 37 MG/DL (ref 30–150)

## 2025-03-17 PROCEDURE — 83036 HEMOGLOBIN GLYCOSYLATED A1C: CPT | Performed by: INTERNAL MEDICINE

## 2025-03-17 PROCEDURE — 80061 LIPID PANEL: CPT | Performed by: INTERNAL MEDICINE

## 2025-03-17 PROCEDURE — 36415 COLL VENOUS BLD VENIPUNCTURE: CPT | Mod: PO | Performed by: INTERNAL MEDICINE

## 2025-03-17 PROCEDURE — 80053 COMPREHEN METABOLIC PANEL: CPT | Performed by: INTERNAL MEDICINE

## 2025-03-18 LAB
ESTIMATED AVG GLUCOSE: 128 MG/DL (ref 68–131)
HBA1C MFR BLD: 6.1 % (ref 4–5.6)

## 2025-03-21 ENCOUNTER — OFFICE VISIT (OUTPATIENT)
Dept: INTERNAL MEDICINE | Facility: CLINIC | Age: 80
End: 2025-03-21
Payer: MEDICARE

## 2025-03-21 VITALS
DIASTOLIC BLOOD PRESSURE: 68 MMHG | HEIGHT: 60 IN | OXYGEN SATURATION: 100 % | WEIGHT: 122.13 LBS | HEART RATE: 64 BPM | BODY MASS INDEX: 23.98 KG/M2 | SYSTOLIC BLOOD PRESSURE: 156 MMHG

## 2025-03-21 DIAGNOSIS — I10 HYPERTENSION, UNSPECIFIED TYPE: Primary | ICD-10-CM

## 2025-03-21 DIAGNOSIS — M79.7 FIBROMYALGIA: ICD-10-CM

## 2025-03-21 PROCEDURE — 1126F AMNT PAIN NOTED NONE PRSNT: CPT | Mod: CPTII,S$GLB,, | Performed by: INTERNAL MEDICINE

## 2025-03-21 PROCEDURE — 3288F FALL RISK ASSESSMENT DOCD: CPT | Mod: CPTII,S$GLB,, | Performed by: INTERNAL MEDICINE

## 2025-03-21 PROCEDURE — 99214 OFFICE O/P EST MOD 30 MIN: CPT | Mod: S$GLB,,, | Performed by: INTERNAL MEDICINE

## 2025-03-21 PROCEDURE — 1101F PT FALLS ASSESS-DOCD LE1/YR: CPT | Mod: CPTII,S$GLB,, | Performed by: INTERNAL MEDICINE

## 2025-03-21 PROCEDURE — G2211 COMPLEX E/M VISIT ADD ON: HCPCS | Mod: S$GLB,,, | Performed by: INTERNAL MEDICINE

## 2025-03-21 PROCEDURE — 99999 PR PBB SHADOW E&M-EST. PATIENT-LVL IV: CPT | Mod: PBBFAC,,, | Performed by: INTERNAL MEDICINE

## 2025-03-21 PROCEDURE — 3077F SYST BP >= 140 MM HG: CPT | Mod: CPTII,S$GLB,, | Performed by: INTERNAL MEDICINE

## 2025-03-21 PROCEDURE — 1159F MED LIST DOCD IN RCRD: CPT | Mod: CPTII,S$GLB,, | Performed by: INTERNAL MEDICINE

## 2025-03-21 PROCEDURE — 3078F DIAST BP <80 MM HG: CPT | Mod: CPTII,S$GLB,, | Performed by: INTERNAL MEDICINE
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
## 2025-03-27 ENCOUNTER — OFFICE VISIT (OUTPATIENT)
Dept: OPTOMETRY | Facility: CLINIC | Age: 80
End: 2025-03-27
Payer: MEDICARE

## 2025-03-27 DIAGNOSIS — E11.36 TYPE 2 DIABETES MELLITUS WITH DIABETIC CATARACT, WITHOUT LONG-TERM CURRENT USE OF INSULIN: ICD-10-CM

## 2025-03-27 DIAGNOSIS — H16.223 KERATOCONJUNCTIVITIS SICCA, NOT SPECIFIED AS SJOGREN'S, BILATERAL: ICD-10-CM

## 2025-03-27 DIAGNOSIS — E11.9 DIABETES MELLITUS WITHOUT COMPLICATION: ICD-10-CM

## 2025-03-27 DIAGNOSIS — H25.813 COMBINED FORMS OF AGE-RELATED CATARACT OF BOTH EYES: ICD-10-CM

## 2025-03-27 DIAGNOSIS — E11.9 TYPE 2 DIABETES MELLITUS WITHOUT RETINOPATHY: Primary | ICD-10-CM

## 2025-03-27 DIAGNOSIS — H53.8 BLURRED VISION, BILATERAL: ICD-10-CM

## 2025-03-27 DIAGNOSIS — J32.1 SINUSITIS CHRONIC, FRONTAL: ICD-10-CM

## 2025-03-27 PROCEDURE — 99214 OFFICE O/P EST MOD 30 MIN: CPT | Mod: S$GLB,,, | Performed by: OPTOMETRIST

## 2025-03-27 PROCEDURE — 2023F DILAT RTA XM W/O RTNOPTHY: CPT | Mod: CPTII,S$GLB,, | Performed by: OPTOMETRIST

## 2025-03-27 PROCEDURE — 1159F MED LIST DOCD IN RCRD: CPT | Mod: CPTII,S$GLB,, | Performed by: OPTOMETRIST

## 2025-03-27 PROCEDURE — 92015 DETERMINE REFRACTIVE STATE: CPT | Mod: S$GLB,,, | Performed by: OPTOMETRIST

## 2025-03-27 PROCEDURE — 99999 PR PBB SHADOW E&M-EST. PATIENT-LVL I: CPT | Mod: PBBFAC,,, | Performed by: OPTOMETRIST

## 2025-03-27 NOTE — PROGRESS NOTES
HPI    79 Y.O F is here today for routine. C/C having floaters in OU, and also   having severe dry eye. She sts her RX works  fine. Pt is using visine for   her dry eye.   Last edited by Darrius Medellin on 3/27/2025  8:59 AM.            Assessment /Plan     For exam results, see Encounter Report.    Type 2 diabetes mellitus without retinopathy    Type 2 diabetes mellitus with diabetic cataract, without long-term current use of insulin    Sinusitis chronic, frontal    Diabetes mellitus without complication    Combined forms of age-related cataract of both eyes    Keratoconjunctivitis sicca, not specified as Sjogren's, bilateral    Blurred vision, bilateral      MONITOR. ED PT ON ALL EXAM FINDINGS  RX FINAL SPECS   OCULAR HEALTH STABLE OD, OS   TYPE 2 DM W/O RETINOPATHY OU; CONTINUE W/ PCP FOR GLYCEMIC CONTROL; MONITOR.  MILD NS OD>OS; MONITOR. PRESURGICAL; UV PROTECTION  DISCUSS DRY EYE THERAPIES; MONITOR. CONTINUE W/ OTC THERAPY; CONSIDER DRY EYE CONSULT PRN   RTC 1 YR//PRN FOR REE/DFE

## 2025-05-19 ENCOUNTER — TELEPHONE (OUTPATIENT)
Dept: OPTOMETRY | Facility: CLINIC | Age: 80
End: 2025-05-19
Payer: MEDICARE

## 2025-05-19 NOTE — TELEPHONE ENCOUNTER
----- Message from Luz sent at 5/19/2025 11:59 AM CDT -----  Regarding: RX Request  Contact: JEANNIE PANDYA [[id number]]  CONSULT/ADVISORYName of Caller:  JEANNIE PANDYA [[id number]]Contact Preference:  [phone] (home) Nature of Call:  Pt was seen on 03/27/2025 and misplaced her eyeglass prescription and would like another one.  Pt is requesting it to be mailed to her or emailed.  Please call to confirm.  ----- Message -----  From: Luz Zarco  Sent: 5/19/2025  12:03 PM CDT  To: [facility]ada[facility] Staff  Subject: RX Request                                       CONSULT/ADVISORYName of Caller:  JEANNIE PANDYA [[id number]]Contact Preference:  [phone] (home) Nature of Call:  Pt was seen on 03/27/2025 and misplaced her eyeglass prescription and would like another one.  Pt is requesting it to be mailed to her.  Please call.

## 2025-05-27 DIAGNOSIS — Z96.651 PRESENCE OF RIGHT ARTIFICIAL KNEE JOINT: Primary | ICD-10-CM

## 2025-05-30 ENCOUNTER — OFFICE VISIT (OUTPATIENT)
Dept: INTERNAL MEDICINE | Facility: CLINIC | Age: 80
End: 2025-05-30
Payer: MEDICARE

## 2025-05-30 VITALS
SYSTOLIC BLOOD PRESSURE: 135 MMHG | HEIGHT: 60 IN | HEART RATE: 72 BPM | DIASTOLIC BLOOD PRESSURE: 65 MMHG | OXYGEN SATURATION: 98 % | WEIGHT: 118.63 LBS | BODY MASS INDEX: 23.29 KG/M2

## 2025-05-30 DIAGNOSIS — Z86.59 HISTORY OF DEPRESSION: ICD-10-CM

## 2025-05-30 DIAGNOSIS — F41.9 ANXIETY: Primary | ICD-10-CM

## 2025-05-30 DIAGNOSIS — E11.36 TYPE 2 DIABETES MELLITUS WITH DIABETIC CATARACT, WITHOUT LONG-TERM CURRENT USE OF INSULIN: ICD-10-CM

## 2025-05-30 DIAGNOSIS — M79.7 FIBROMYALGIA: ICD-10-CM

## 2025-05-30 DIAGNOSIS — M54.30 SCIATICA, UNSPECIFIED LATERALITY: ICD-10-CM

## 2025-05-30 DIAGNOSIS — R52 TOTAL BODY PAIN: ICD-10-CM

## 2025-05-30 DIAGNOSIS — I10 HYPERTENSION, UNSPECIFIED TYPE: ICD-10-CM

## 2025-05-30 PROCEDURE — 99999 PR PBB SHADOW E&M-EST. PATIENT-LVL IV: CPT | Mod: PBBFAC,,, | Performed by: INTERNAL MEDICINE
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
## 2025-06-10 ENCOUNTER — HOSPITAL ENCOUNTER (OUTPATIENT)
Dept: RADIOLOGY | Facility: HOSPITAL | Age: 80
Discharge: HOME | End: 2025-06-10
Attending: ORTHOPAEDIC SURGERY
Payer: MEDICARE

## 2025-06-10 ENCOUNTER — OFFICE VISIT (OUTPATIENT)
Dept: ORTHOPEDICS | Facility: CLINIC | Age: 80
End: 2025-06-10
Payer: MEDICARE

## 2025-06-10 VITALS — BODY MASS INDEX: 23.5 KG/M2 | WEIGHT: 119.69 LBS | HEIGHT: 60 IN

## 2025-06-10 DIAGNOSIS — Z96.651 PRESENCE OF RIGHT ARTIFICIAL KNEE JOINT: Primary | ICD-10-CM

## 2025-06-10 DIAGNOSIS — Z96.651 PRESENCE OF RIGHT ARTIFICIAL KNEE JOINT: ICD-10-CM

## 2025-06-10 PROCEDURE — 99213 OFFICE O/P EST LOW 20 MIN: CPT | Mod: S$GLB,,, | Performed by: ORTHOPAEDIC SURGERY

## 2025-06-10 PROCEDURE — 73562 X-RAY EXAM OF KNEE 3: CPT | Mod: TC,RT

## 2025-06-10 PROCEDURE — 1125F AMNT PAIN NOTED PAIN PRSNT: CPT | Mod: CPTII,S$GLB,, | Performed by: ORTHOPAEDIC SURGERY

## 2025-06-10 PROCEDURE — 73562 X-RAY EXAM OF KNEE 3: CPT | Mod: 26,RT,, | Performed by: RADIOLOGY

## 2025-06-10 PROCEDURE — 3288F FALL RISK ASSESSMENT DOCD: CPT | Mod: CPTII,S$GLB,, | Performed by: ORTHOPAEDIC SURGERY

## 2025-06-10 PROCEDURE — 1159F MED LIST DOCD IN RCRD: CPT | Mod: CPTII,S$GLB,, | Performed by: ORTHOPAEDIC SURGERY

## 2025-06-10 PROCEDURE — 99999 PR PBB SHADOW E&M-EST. PATIENT-LVL III: CPT | Mod: PBBFAC,,, | Performed by: ORTHOPAEDIC SURGERY

## 2025-06-10 PROCEDURE — 1101F PT FALLS ASSESS-DOCD LE1/YR: CPT | Mod: CPTII,S$GLB,, | Performed by: ORTHOPAEDIC SURGERY

## 2025-06-10 NOTE — PROGRESS NOTES
Subjective:     HPI:   Krysta Kam is a 79 y.o. female who presents for annual follow up right TKA    Date of surgery: R TKA 9/16/20    History of Present Illness    CHIEF COMPLAINT:  - Right knee pain and swelling    HPI:  Ms. Kam presents for a 5-year follow-up visit after right knee surgery. Her knee has been bothering her for about three years, following an incident where she was hit by a basket on her ankle, which shifted her knee. She reports intermittent swelling and pain in her right knee, with varying severity. Ice has helped reduce the swelling.    She describes limitations in her activities, particularly with a floor exercise machine, experiencing pain when bending her knee. She notes variability in her ability to use the machine.    For treatment, she attended PT for about half a year, stopping last month. She currently takes Gabapentin, pain medication, and Tylenol for pain management. She denies using any assistive devices such as a cane, walker, or crutches.    She denies any formal medical diagnoses.    PREVIOUS TREATMENTS:  - Ms. Kam has been going to PT for about half a year, stopping last month  - Ms. Kam has been applying ice to the knee, which has helped reduce swelling  - Ms. Kam uses a small pedal exercise machine on the floor, but experiences pain when bending the knee during this exercise    MEDICATIONS:  - Gabapentin: 3 tablets  - Pain medicine  - Tylenol    SURGICAL HISTORY:  - Right knee replacement: 5 years ago         Medications: lyrica, tylenol     Assistive Devices: none    Comp sleeve daily     Limitations: some days ok, some days difficulty doing floor peddlar     Last seen 9/21 annual visit     ?2024 hit on medial ankle by grocery cart   Saw Dr Garcia 2/24 - ordered MRI   MRI results: MRI of the right ankle performed on 03/09/2025 did not reveal any obvious structural abnormalities on the medial aspect of her ankle or hindfoot. Some bone marrow  edema was noted in the anterior process of the calcaneus on the lateral aspect of the navicular bone but these do not correlate with her symptoms in her probably incidental findings.    I discussed the results with Ms. Kam and explained to her that there were no obvious structural abnormalities on the MRI in the location of her pain on the medial aspect of her ankle where she was hit by a grocery cart, so the etiology of her pain is unclear to me from a structural standpoint and that there is really nothing more I have to offer from an orthopedic standpoint.  She needs further management of pain she can contact her primary care physician.  She related to me on the phone that she has also been having some right knee pain and had a previous right total knee replacement by Dr. Wright.  I am going to put in a referral for her to be seen by Dr. Wright.     Today:   Was doing well and related R knee symptoms that started after the card injury  Has been doing to PT for almost a year - stopped last month  Ant, post, med, lat knee pain and swelling intermittent symptoms   More last week          Objective:   Body mass index is 23.38 kg/m².  Exam:    Gait: limp/antalgic none    Incision: healed    Stability:  Knee stable anterior-posterior varus and valgus stresses, no extensor lag    Extension: 7    Flexion: 90    Valgus angle: 8    Ttp med and lat tibia  Diffuse med ttp  Mild/mod effusion     1 year 0-115  Pre-op 5-110      Physical Exam    Musculoskeletal: No groin pain. No pain with passive range of motion over hip joint. Distally neurovascular intact. Good strength. Good sensation. Good pulses.  IMAGING:  - XR Right Knee: The metal cap on the end of the thigh bone looks good, but there are some concerns about the shin bone piece, with little lines visible that may indicate the piece is working loose  - MRI           Imaging:  Indication:  Exam status post right total knee arthroplasty  Exam Ordered: Radiographs of the  right knee include a standing anteroposterior view, a lateral view, and a sunrise view  Details of Examination: Todays exam show a well fixed, well positioned total knee arthroplasty with no evidence of wear, osteolysis, or loosening.  Impression:  Status post right total knee arthroplasty, implant in good position with no abnormality     Results                  Assessment:       ICD-10-CM ICD-9-CM   1. Presence of right artificial knee joint  Z96.651 V43.65        Anemia but last hemoglobin in 2019 was 12.3  Diabetes, last A1c 6.1  Fibromyalgia  Ischemic colitis  Chronic low back pain     Plan:           Assessment & Plan    RIGHT KNEE ISSUES (PAIN, INSTABILITY, STIFFNESS, PROSTHETIC JOINT):   Ordered special XRs of the whole lower extremities while standing to assess overall orientation and alignment of the knee.   Discussed potential knee revision depending on test results and patient's progress.   Rest and take it easy, avoiding therapy to allow knee to settle down.   Ordered XR Bilateral Legs, standing.    POTENTIAL INFECTION:   Ordered labs to check inflammatory markers for potential infection.    FOLLOW-UP:   Follow up in a few weeks with results of labs and new XRs.           Concerning RL lines under tibia  Decreasing ROM   But also has been doing PT for almost a year and recently stopped     Baseline CRP/ESR  Standing long alignment XR     Time to rest from PT     F/u with above     This note was generated with the assistance of ambient listening technology. Verbal consent was obtained by the patient and accompanying visitor(s) for the recording of patient appointment to facilitate this note. I attest to having reviewed and edited the generated note for accuracy, though some syntax or spelling errors may persist. Please contact the author of this note for any clarification.      Orders Placed This Encounter   Procedures    C-Reactive Protein     Standing Status:   Future     Expected Date:   6/10/2025      Expiration Date:   8/10/2026     Send normal result to authorizing provider's In Basket if patient is active on MyChart::   Yes    Sedimentation rate     Standing Status:   Future     Expected Date:   6/10/2025     Expiration Date:   8/10/2026     Send normal result to authorizing provider's In Basket if patient is active on MyChart::   Yes             Past Medical History:   Diagnosis Date    Anemia 6/2012    Anxiety     Cataract     Colon polyps 1/31/2017    Depression     Diabetes mellitus without complication 11/14/2018    Diabetes mellitus, type 2     Fibromyalgia     GERD (gastroesophageal reflux disease)     High cholesterol     Hypertension        Past Surgical History:   Procedure Laterality Date    ANORECTAL MANOMETRY N/A 10/25/2023    Procedure: MANOMETRY, ANORECTAL;  Surgeon: Christoph Roche MD;  [facility]  Service: Endoscopy;  Laterality: N/A;  Peg prep extended  prep instructions given to pt in clininc and portal -  time frame 5-12 weeks  10/18-precall complete-MS    BREAST BIOPSY  1989    BREAST BIOPSY Left     benign    COLONOSCOPY N/A 10/18/2016    Procedure: COLONOSCOPY;  Surgeon: Brittni Edmondson MD;  [facility]  Service: Endoscopy;  Laterality: N/A;    COLONOSCOPY N/A 10/08/2019    Procedure: COLONOSCOPY;  Surgeon: Gold Ravi MD;  [facility]  Service: Endoscopy;  Laterality: N/A;    COLONOSCOPY N/A 10/25/2023    Procedure: COLONOSCOPY;  Surgeon: Armando Cardenas MD;  [facility]  Service: Endoscopy;  Laterality: N/A;    ESOPHAGOGASTRODUODENOSCOPY N/A 10/08/2019    Procedure: EGD (ESOPHAGOGASTRODUODENOSCOPY);  Surgeon: Gold Ravi MD;  [facility]  Service: Endoscopy;  Laterality: N/A;  Okay for any provider due to KARSTEN    ESOPHAGOGASTRODUODENOSCOPY N/A 3/15/2024    Procedure: EGD (ESOPHAGOGASTRODUODENOSCOPY);  Surgeon: Jonathon Marin MD;  [facility]  Service:  Endoscopy;  Laterality: N/A;  Tania liz instructions sent to pt via portal  -precall compete-MS   pt r/s  weather to 3.15.24 massiel pt, instrctions sent to pt portal.  3/11-precall complete-MS    ESOPHAGOGASTRODUODENOSCOPY N/A 2024    Procedure: EGD (ESOPHAGOGASTRODUODENOSCOPY);  Surgeon: Jonathon Marin MD;  Location: [address]);  Service: Endoscopy;  Laterality: N/A;  Refered by Tania Mackay, prep instructions sent to patient via portal.  -pre call complete-tb    HYSTERECTOMY      KNEE ARTHROPLASTY Right 2020    Procedure: ARTHROPLASTY, KNEE: DEPUY-ATTUNE;  Surgeon: Tha Wright III, MD;  Location: [facility];  Service: Orthopedics;  Laterality: Right;       Family History   Problem Relation Name Age of Onset    Stroke Mother      Heart disease Father      No Known Problems Daughter      No Known Problems Son      No Known Problems Daughter      No Known Problems Daughter      No Known Problems Son      Cataracts Neg Hx      Cancer Neg Hx      Diabetes Neg Hx      Glaucoma Neg Hx      Retinal detachment Neg Hx      Celiac disease Neg Hx      Cirrhosis Neg Hx      Colon cancer Neg Hx      Cystic fibrosis Neg Hx      Esophageal cancer Neg Hx      Inflammatory bowel disease Neg Hx      Liver disease Neg Hx      Stomach cancer Neg Hx      Amblyopia Neg Hx      Blindness Neg Hx      Macular degeneration Neg Hx      Strabismus Neg Hx      Thyroid disease Neg Hx         Social History     Socioeconomic History    Marital status: Single    Number of children: 5   Tobacco Use    Smoking status: Former     Current packs/day: 0.00     Types: Cigarettes     Quit date: 1975     Years since quittin.4    Smokeless tobacco: Never    Tobacco comments:     occasional social smoker   Substance and Sexual Activity    Alcohol use: No    Drug use: No    Sexual activity: Not Currently   Social History Narrative    Retired      Social Drivers of Health     Financial Resource Strain:  High Risk (4/14/2025)    Received from [facility] SDOH Screening     In the past year, have you been unable to get any of the following when you really needed them? choose all that apply.: Internet   Food Insecurity: No Food Insecurity (8/19/2020)    Hunger Vital Sign     Worried About Running Out of Food in the Last Year: Never true     Ran Out of Food in the Last Year: Never true   Transportation Needs: No Transportation Needs (8/19/2020)    PRAPARE - Transportation     Lack of Transportation (Medical): No     Lack of Transportation (Non-Medical): No   Physical Activity: Insufficiently Active (8/19/2020)    Exercise Vital Sign     Days of Exercise per Week: 4 days     Minutes of Exercise per Session: 10 min   Stress: No Stress Concern Present (8/19/2020)    [employer] - Occupational Stress Questionnaire     Feeling of Stress : Only a little   Housing Stability: High Risk (4/14/2025)    Received from [facility] SDOH Screening     In the past year, have you been unable to get any of the following when you really needed them? choose all that apply.: Utilities (electric, gas, and water)

## 2025-06-17 ENCOUNTER — HOSPITAL ENCOUNTER (OUTPATIENT)
Dept: RADIOLOGY | Facility: HOSPITAL | Age: 80
Discharge: HOME | End: 2025-06-17
Attending: ORTHOPAEDIC SURGERY
Payer: MEDICARE

## 2025-06-17 DIAGNOSIS — Z96.651 PRESENCE OF RIGHT ARTIFICIAL KNEE JOINT: ICD-10-CM

## 2025-06-17 PROCEDURE — 77073 BONE LENGTH STUDIES: CPT | Mod: 26,,, | Performed by: RADIOLOGY

## 2025-06-17 PROCEDURE — 77073 BONE LENGTH STUDIES: CPT | Mod: TC

## 2025-06-30 ENCOUNTER — PATIENT MESSAGE (OUTPATIENT)
Dept: ORTHOPEDICS | Facility: CLINIC | Age: 80
End: 2025-06-30
Payer: MEDICARE

## 2025-07-01 ENCOUNTER — LAB VISIT (OUTPATIENT)
Dept: LAB | Facility: HOSPITAL | Age: 80
End: 2025-07-01
Payer: MEDICARE

## 2025-07-01 DIAGNOSIS — Z96.651 PRESENCE OF RIGHT ARTIFICIAL KNEE JOINT: ICD-10-CM

## 2025-07-01 LAB
CRP SERPL-MCNC: 3.4 MG/L
ERYTHROCYTE [SEDIMENTATION RATE] IN BLOOD BY PHOTOMETRIC METHOD: 34 MM/HR

## 2025-07-01 PROCEDURE — 36415 COLL VENOUS BLD VENIPUNCTURE: CPT

## 2025-07-01 PROCEDURE — 85652 RBC SED RATE AUTOMATED: CPT

## 2025-07-01 PROCEDURE — 86140 C-REACTIVE PROTEIN: CPT

## 2025-07-03 ENCOUNTER — OFFICE VISIT (OUTPATIENT)
Dept: ORTHOPEDICS | Facility: CLINIC | Age: 80
End: 2025-07-03
Payer: MEDICARE

## 2025-07-03 VITALS — BODY MASS INDEX: 24.17 KG/M2 | HEIGHT: 60 IN | WEIGHT: 123.13 LBS

## 2025-07-03 DIAGNOSIS — Z96.651 PRESENCE OF RIGHT ARTIFICIAL KNEE JOINT: Primary | ICD-10-CM

## 2025-07-03 PROCEDURE — 1101F PT FALLS ASSESS-DOCD LE1/YR: CPT | Mod: CPTII,S$GLB,, | Performed by: ORTHOPAEDIC SURGERY

## 2025-07-03 PROCEDURE — 3288F FALL RISK ASSESSMENT DOCD: CPT | Mod: CPTII,S$GLB,, | Performed by: ORTHOPAEDIC SURGERY

## 2025-07-03 PROCEDURE — 1125F AMNT PAIN NOTED PAIN PRSNT: CPT | Mod: CPTII,S$GLB,, | Performed by: ORTHOPAEDIC SURGERY

## 2025-07-03 PROCEDURE — 1159F MED LIST DOCD IN RCRD: CPT | Mod: CPTII,S$GLB,, | Performed by: ORTHOPAEDIC SURGERY

## 2025-07-03 PROCEDURE — 99999 PR PBB SHADOW E&M-EST. PATIENT-LVL III: CPT | Mod: PBBFAC,,, | Performed by: ORTHOPAEDIC SURGERY

## 2025-07-03 PROCEDURE — 99213 OFFICE O/P EST LOW 20 MIN: CPT | Mod: S$GLB,,, | Performed by: ORTHOPAEDIC SURGERY

## 2025-07-03 NOTE — PROGRESS NOTES
Subjective:     HPI:   Krysta Kam is a 79 y.o. female who presents for f/u R knee    No change in symptoms since stopping PT   Some days ok, some bad  Stiff in morning     7/1/25: CRP 3.4, ESR 34    History of Present Illness    CHIEF COMPLAINT:  - Knee pain and stiffness following knee replacement.    HPI:  Ms. Kam presents for follow-up regarding ongoing knee pain and stiffness following a previous knee replacement. Pain is localized to the knee, with occasional aching in the upper part, particularly in the mornings. She reports fluctuating symptoms. Her condition has remained relatively unchanged since discontinuing PT.    The initial injury occurred due to an impact that resulted in a fracture, leading to therapy. During therapy, treatments included ice application to the knee and stationary bike exercises, which were not reported as effective.    She reports difficulty with stairs, particularly when descending, necessitating a step-by-step approach. She continues to walk with a noticeable limp, indicating activity limitations due to her knee condition.    She is uncertain about her current medication intake.    She denies any formal medical diagnoses.    PREVIOUS TREATMENTS:  - Ms. Kam underwent therapy for ankle, leg, and knee for an extended period. Treatment included icing the knee and riding a bike, with no significant benefit reported.  - She discontinued therapy, with no real change in symptoms or pain since stopping.    SURGICAL HISTORY:  - Knee replacement: Date unspecified            Objective:   Body mass index is 24.05 kg/m².  Exam:    Physical Exam    Musculoskeletal: Limp present. Hip joint normal range of motion. Knee tenderness. Knock-knee alignment.  IMAGING:  - XR Knee: Overall alignment of the knee is slightly knock-kneed when standing. The shin bone piece appears to be at an angle of about 5 degrees, which is a couple degrees more than ideal.         Mild limp/antalgic  gait   Ttp proximal tibia  ROM no change    Imaging:    Results            Long alignment: DF 2 valgus mech, PT 5 valgus, no change in tibia angle since 2020 but RL lines      Assessment/Plan:       ICD-10-CM ICD-9-CM   1. Presence of right artificial knee joint  Z96.651 V43.65        Concern for aseptic tibial loosening     Assessment/Plan:     Assessment & Plan    MECHANICAL LOOSENING OF LEFT KNEE PROSTHESIS:   Discussed potential redo surgery to replace loose metal piece on tibial side.    LEFT KNEE PAIN AND STIFFNESS:   Recommend compressive knee sleeve to provide support and potentially improve knee function.   Use compressive knee sleeve for support and stability as needed.    FOLLOW-UP:   XR Knee in 3 months to monitor for changes.   Follow up in 3 months with repeat XRs.         Discussed observation v revision tibia  She is not ready for sx at this time   Try comp sleeve    F/u 3 months repeat XR or sooner if worse    No orders of the defined types were placed in this encounter.      This note was generated with the assistance of ambient listening technology. Verbal consent was obtained by the patient and accompanying visitor(s) for the recording of patient appointment to facilitate this note. I attest to having reviewed and edited the generated note for accuracy, though some syntax or spelling errors may persist. Please contact the author of this note for any clarification.            Past Medical History:   Diagnosis Date    Anemia 6/2012    Anxiety     Cataract     Colon polyps 1/31/2017    Depression     Diabetes mellitus without complication 11/14/2018    Diabetes mellitus, type 2     Fibromyalgia     GERD (gastroesophageal reflux disease)     High cholesterol     Hypertension        Past Surgical History:   Procedure Laterality Date    ANORECTAL MANOMETRY N/A 10/25/2023    Procedure: MANOMETRY, ANORECTAL;  Surgeon: Christoph Roche MD;  Location: [facility] ([address] [place] [address]);  Service: Endoscopy;   Laterality: N/A;  Peg prep extended  [facility]  time frame 5-12 weeks  10/18-precall complete-MS    BREAST BIOPSY  1989    BREAST BIOPSY Left     benign    COLONOSCOPY N/A 10/18/2016    Procedure: COLONOSCOPY;  Surgeon: Brittni Edmondson MD;  Location: [facility] (4TH FLR);  Service: Endoscopy;  Laterality: N/A;    COLONOSCOPY N/A 10/08/2019    Procedure: COLONOSCOPY;  Surgeon: Gold Ravi MD;  Location: [facility] ENDO (4TH FLR);  Service: Endoscopy;  Laterality: N/A;    COLONOSCOPY N/A 10/25/2023    Procedure: COLONOSCOPY;  Surgeon: Armando Cardenas MD;  Location: [facility] ENDO (4TH FLR);  Service: Endoscopy;  Laterality: N/A;    ESOPHAGOGASTRODUODENOSCOPY N/A 10/08/2019    Procedure: EGD (ESOPHAGOGASTRODUODENOSCOPY);  Surgeon: Gold Ravi MD;  Location: [facility] (4TH FLR);  Service: Endoscopy;  Laterality: N/A;  Okay for any provider due to KARSTEN    ESOPHAGOGASTRODUODENOSCOPY N/A 3/15/2024    Procedure: EGD (ESOPHAGOGASTRODUODENOSCOPY);  Surgeon: Jonathon Marin MD;  Location: [facility] (4TH FLR);  Service: Endoscopy;  Laterality: N/A;  Tania  perp instructions sent to pt via portal  2/28-precall compete-MS   pt r[facility]2 [facility] to [facility].  3/11-precall complete-MS    ESOPHAGOGASTRODUODENOSCOPY N/A 12/11/2024    Procedure: EGD (ESOPHAGOGASTRODUODENOSCOPY);  Surgeon: Jonathon Marin MD;  Location: [facility] (4TH FLR);  Service: Endoscopy;  Laterality: N/A;  Refered by Tania Mackay, prep instructions sent to patient via portal.  12/4-pre call complete-tb    HYSTERECTOMY      KNEE ARTHROPLASTY Right 09/16/2020    Procedure: ARTHROPLASTY, KNEE: DEPUY-ATTUNE;  Surgeon: Tha Wright III, MD;  Location: [facility];  Service: Orthopedics;  Laterality: Right;       Family History   Problem Relation Name Age of Onset    Stroke Mother      Heart disease Father      No Known Problems Daughter      No Known Problems Son      No Known  Problems Daughter      No Known Problems Daughter      No Known Problems Son      Cataracts Neg Hx      Cancer Neg Hx      Diabetes Neg Hx      Glaucoma Neg Hx      Retinal detachment Neg Hx      Celiac disease Neg Hx      Cirrhosis Neg Hx      Colon cancer Neg Hx      Cystic fibrosis Neg Hx      Esophageal cancer Neg Hx      Inflammatory bowel disease Neg Hx      Liver disease Neg Hx      Stomach cancer Neg Hx      Amblyopia Neg Hx      Blindness Neg Hx      Macular degeneration Neg Hx      Strabismus Neg Hx      Thyroid disease Neg Hx         Social History     Socioeconomic History    Marital status: Single    Number of children: 5   Tobacco Use    Smoking status: Former     Current packs/day: 0.00     Types: Cigarettes     Quit date: 1975     Years since quittin.5    Smokeless tobacco: Never    Tobacco comments:     occasional social smoker   Substance and Sexual Activity    Alcohol use: No    Drug use: No    Sexual activity: Not Currently   Social History Narrative    Retired      Social Drivers of Health     Financial Resource Strain: High Risk (2025)    Received from [facility] SDOH Screening     In the past year, have you been unable to get any of the following when you really needed them? choose all that apply.: Internet   Food Insecurity: No Food Insecurity (2020)    Hunger Vital Sign     Worried About Running Out of Food in the Last Year: Never true     Ran Out of Food in the Last Year: Never true   Transportation Needs: No Transportation Needs (2020)    PRAPARE - Transportation     Lack of Transportation (Medical): No     Lack of Transportation (Non-Medical): No   Physical Activity: Insufficiently Active (2020)    Exercise Vital Sign     Days of Exercise per Week: 4 days     Minutes of Exercise per Session: 10 min   Stress: No Stress Concern Present (2020)    [employer] [place] [employer] - Occupational Stress Questionnaire     Feeling of Stress :  Only a little   Housing Stability: High Risk (4/14/2025)    Received from [facility] SDOH Screening     In the past year, have you been unable to get any of the following when you really needed them? choose all that apply.: Utilities (electric, gas, and water)

## 2025-08-07 DIAGNOSIS — M54.30 SCIATICA, UNSPECIFIED LATERALITY: ICD-10-CM

## 2025-08-10 RX ORDER — PREGABALIN 25 MG/1
25 CAPSULE ORAL 2 TIMES DAILY
Qty: 60 CAPSULE | Refills: 2 | Status: SHIPPED | OUTPATIENT
Start: 2025-08-10

## 2025-08-31 ENCOUNTER — HOSPITAL ENCOUNTER (EMERGENCY)
Facility: HOSPITAL | Age: 80
Discharge: HOME | End: 2025-08-31
Attending: EMERGENCY MEDICINE
Payer: MEDICARE

## 2025-08-31 VITALS
RESPIRATION RATE: 16 BRPM | HEART RATE: 66 BPM | DIASTOLIC BLOOD PRESSURE: 70 MMHG | OXYGEN SATURATION: 99 % | WEIGHT: 123 LBS | BODY MASS INDEX: 24.15 KG/M2 | TEMPERATURE: 97 F | SYSTOLIC BLOOD PRESSURE: 199 MMHG | HEIGHT: 60 IN

## 2025-08-31 DIAGNOSIS — R10.9 FLANK PAIN: Primary | ICD-10-CM

## 2025-08-31 LAB
ABSOLUTE EOSINOPHIL (OHS): 0.17 K/UL
ABSOLUTE MONOCYTE (OHS): 0.59 K/UL (ref 0.3–1)
ABSOLUTE NEUTROPHIL COUNT (OHS): 3.83 K/UL (ref 1.8–7.7)
ALBUMIN SERPL BCP-MCNC: 3.9 G/DL (ref 3.5–5.2)
ALP SERPL-CCNC: 75 UNIT/L (ref 40–150)
ALT SERPL W/O P-5'-P-CCNC: 30 UNIT/L (ref 0–55)
ANION GAP (OHS): 9 MMOL/L (ref 8–16)
AST SERPL-CCNC: 34 UNIT/L (ref 0–50)
BASOPHILS # BLD AUTO: 0.05 K/UL
BASOPHILS NFR BLD AUTO: 0.8 %
BILIRUB SERPL-MCNC: 0.6 MG/DL (ref 0.1–1)
BILIRUB UR QL STRIP.AUTO: NEGATIVE
BUN SERPL-MCNC: 14 MG/DL (ref 8–23)
CALCIUM SERPL-MCNC: 9.4 MG/DL (ref 8.7–10.5)
CHLORIDE SERPL-SCNC: 105 MMOL/L (ref 95–110)
CLARITY UR: CLEAR
CO2 SERPL-SCNC: 25 MMOL/L (ref 23–29)
COLOR UR AUTO: YELLOW
CREAT SERPL-MCNC: 0.9 MG/DL (ref 0.5–1.4)
ERYTHROCYTE [DISTWIDTH] IN BLOOD BY AUTOMATED COUNT: 14.3 % (ref 11.5–14.5)
GFR SERPLBLD CREATININE-BSD FMLA CKD-EPI: >60 ML/MIN/1.73/M2
GLUCOSE SERPL-MCNC: 104 MG/DL (ref 70–110)
GLUCOSE UR QL STRIP: NEGATIVE
HCT VFR BLD AUTO: 39.3 % (ref 37–48.5)
HCV AB SERPL QL IA: NORMAL
HGB BLD-MCNC: 12.7 GM/DL (ref 12–16)
HGB UR QL STRIP: NEGATIVE
HIV 1+2 AB+HIV1 P24 AG SERPL QL IA: NORMAL
IMM GRANULOCYTES # BLD AUTO: 0.02 K/UL (ref 0–0.04)
IMM GRANULOCYTES NFR BLD AUTO: 0.3 % (ref 0–0.5)
KETONES UR QL STRIP: NEGATIVE
LEUKOCYTE ESTERASE UR QL STRIP: NEGATIVE
LYMPHOCYTES # BLD AUTO: 1.74 K/UL (ref 1–4.8)
MCH RBC QN AUTO: 26.2 PG (ref 27–31)
MCHC RBC AUTO-ENTMCNC: 32.3 G/DL (ref 32–36)
MCV RBC AUTO: 81 FL (ref 82–98)
NITRITE UR QL STRIP: NEGATIVE
NUCLEATED RBC (/100WBC) (OHS): 0 /100 WBC
PH UR STRIP: 7 [PH]
PLATELET # BLD AUTO: 186 K/UL (ref 150–450)
PMV BLD AUTO: 12.4 FL (ref 9.2–12.9)
POTASSIUM SERPL-SCNC: 4.2 MMOL/L (ref 3.5–5.1)
PROT SERPL-MCNC: 7.2 GM/DL (ref 6–8.4)
PROT UR QL STRIP: NEGATIVE
RBC # BLD AUTO: 4.84 M/UL (ref 4–5.4)
RELATIVE EOSINOPHIL (OHS): 2.7 %
RELATIVE LYMPHOCYTE (OHS): 27.2 % (ref 18–48)
RELATIVE MONOCYTE (OHS): 9.2 % (ref 4–15)
RELATIVE NEUTROPHIL (OHS): 59.8 % (ref 38–73)
SODIUM SERPL-SCNC: 139 MMOL/L (ref 136–145)
SP GR UR STRIP: 1.02
UROBILINOGEN UR STRIP-ACNC: NEGATIVE EU/DL
WBC # BLD AUTO: 6.4 K/UL (ref 3.9–12.7)

## 2025-08-31 PROCEDURE — 86803 HEPATITIS C AB TEST: CPT | Performed by: PHYSICIAN ASSISTANT

## 2025-08-31 PROCEDURE — 87389 HIV-1 AG W/HIV-1&-2 AB AG IA: CPT | Performed by: PHYSICIAN ASSISTANT

## 2025-08-31 PROCEDURE — 99285 EMERGENCY DEPT VISIT HI MDM: CPT | Mod: 25

## 2025-08-31 PROCEDURE — 85025 COMPLETE CBC W/AUTO DIFF WBC: CPT | Performed by: EMERGENCY MEDICINE

## 2025-08-31 PROCEDURE — 96374 THER/PROPH/DIAG INJ IV PUSH: CPT

## 2025-08-31 PROCEDURE — 63600175 PHARM REV CODE 636 W HCPCS: Mod: JZ,TB | Performed by: EMERGENCY MEDICINE

## 2025-08-31 PROCEDURE — 80053 COMPREHEN METABOLIC PANEL: CPT | Performed by: EMERGENCY MEDICINE

## 2025-08-31 PROCEDURE — 81003 URINALYSIS AUTO W/O SCOPE: CPT | Performed by: EMERGENCY MEDICINE

## 2025-08-31 RX ORDER — KETOROLAC TROMETHAMINE 30 MG/ML
15 INJECTION, SOLUTION INTRAMUSCULAR; INTRAVENOUS
Status: COMPLETED | OUTPATIENT
Start: 2025-08-31 | End: 2025-08-31

## 2025-08-31 RX ADMIN — KETOROLAC TROMETHAMINE 15 MG: 30 INJECTION, SOLUTION INTRAMUSCULAR; INTRAVENOUS at 11:08

## 2025-09-01 LAB — HOLD SPECIMEN: NORMAL

## 2025-09-03 LAB — HOLD SPECIMEN: NORMAL

## (undated) DEVICE — DRESSING AQUACEL AG RBBN 2X45

## (undated) DEVICE — SPONGE GAUZE 16PLY 4X4

## (undated) DEVICE — GAUZE SPONGE 4X4 12PLY

## (undated) DEVICE — SYR 50CC LL

## (undated) DEVICE — UNDERGLOVES BIOGEL PI SIZE 8.5

## (undated) DEVICE — PAD KNEE POLAR XL

## (undated) DEVICE — KIT IRR SUCTION HND PIECE

## (undated) DEVICE — ADHESIVE DERMABOND ADVANCED

## (undated) DEVICE — KIT TOTAL KNEE TKOFG

## (undated) DEVICE — Device

## (undated) DEVICE — BLADE DUAL CUT SAG 35X64X.89MM

## (undated) DEVICE — DRAPE INCISE IOBAN 2 23X33IN

## (undated) DEVICE — PUMP COLD THERAPY

## (undated) DEVICE — SUT MCRYL PLUS 4-0 PS2 27IN

## (undated) DEVICE — CATH SUCTION 10FR

## (undated) DEVICE — DRAPE SURG W/TWL 17 5/8X23

## (undated) DEVICE — SEE MEDLINE ITEM 157144

## (undated) DEVICE — SEE MEDLINE ITEM 157131

## (undated) DEVICE — PAD COLD THERAPY KNEE WRAP ON

## (undated) DEVICE — DRESSING TEGADERM 4.4X5IN

## (undated) DEVICE — SEE MEDLINE ITEM 152548

## (undated) DEVICE — SOL IRR NACL .9% 3000ML

## (undated) DEVICE — SUT 1 36IN COATED VICRYL UN

## (undated) DEVICE — SOL BETADINE 5%

## (undated) DEVICE — DRESSING TRANS 4X4 TEGADERM

## (undated) DEVICE — ELECTRODE REM PLYHSV RETURN 9

## (undated) DEVICE — SUT 2/0 36IN COATED VICRYL

## (undated) DEVICE — BLADE RECIP RIBBED

## (undated) DEVICE — BLADE SAGITTAL 18 X 1.27 X 90M

## (undated) DEVICE — SYS REVOLUTION CEMENT MIXING

## (undated) DEVICE — DRESSING TELFA N ADH 3X8

## (undated) DEVICE — SEE MEDLINE ITEM 146298

## (undated) DEVICE — CONTAINER SPECIMEN STRL 4OZ

## (undated) DEVICE — BRUSH SCRUB HIBICLENS 4%

## (undated) DEVICE — NDL 18GA X1 1/2 REG BEVEL

## (undated) DEVICE — GLOVE BIOGEL SKINSENSE PI 8.0

## (undated) DEVICE — SYS KNEE EPAK PIN ATTUNE
Type: IMPLANTABLE DEVICE | Site: KNEE | Status: NON-FUNCTIONAL
Removed: 2020-09-16

## (undated) DEVICE — MASK FLYTE HOOD PEEL AWAY

## (undated) DEVICE — ALCOHOL 70% ISOP W/GREEN 16OZ

## (undated) DEVICE — MARKER SKIN STND TIP BLUE BARR

## (undated) DEVICE — TOWEL OR XRAY WHITE 17X26IN

## (undated) DEVICE — TAPE SILK 3IN